# Patient Record
Sex: MALE | Race: WHITE | NOT HISPANIC OR LATINO | Employment: FULL TIME | URBAN - METROPOLITAN AREA
[De-identification: names, ages, dates, MRNs, and addresses within clinical notes are randomized per-mention and may not be internally consistent; named-entity substitution may affect disease eponyms.]

---

## 2017-04-25 ENCOUNTER — TRANSCRIBE ORDERS (OUTPATIENT)
Dept: ADMINISTRATIVE | Facility: HOSPITAL | Age: 52
End: 2017-04-25

## 2017-04-25 DIAGNOSIS — J44.9 CHRONIC OBSTRUCTIVE PULMONARY DISEASE, UNSPECIFIED COPD TYPE (HCC): Primary | ICD-10-CM

## 2017-04-30 ENCOUNTER — HOSPITAL ENCOUNTER (OUTPATIENT)
Facility: HOSPITAL | Age: 52
Setting detail: OBSERVATION
Discharge: HOME | End: 2017-05-01
Attending: EMERGENCY MEDICINE | Admitting: FAMILY MEDICINE
Payer: COMMERCIAL

## 2017-04-30 ENCOUNTER — GENERIC CONVERSION - ENCOUNTER (OUTPATIENT)
Dept: OTHER | Facility: OTHER | Age: 52
End: 2017-04-30

## 2017-04-30 ENCOUNTER — APPOINTMENT (EMERGENCY)
Dept: RADIOLOGY | Facility: HOSPITAL | Age: 52
End: 2017-04-30
Payer: COMMERCIAL

## 2017-04-30 DIAGNOSIS — R55 SYNCOPE: Primary | ICD-10-CM

## 2017-04-30 PROBLEM — S89.90XA KNEE INJURY: Status: ACTIVE | Noted: 2017-04-30

## 2017-04-30 LAB
ANION GAP SERPL CALCULATED.3IONS-SCNC: 9 MMOL/L (ref 4–13)
APTT PPP: 33 SECONDS (ref 23–35)
BASOPHILS # BLD AUTO: 0.07 THOUSANDS/ΜL (ref 0–0.1)
BASOPHILS NFR BLD AUTO: 1 % (ref 0–1)
BUN SERPL-MCNC: 15 MG/DL (ref 5–25)
CALCIUM SERPL-MCNC: 9 MG/DL (ref 8.3–10.1)
CHLORIDE SERPL-SCNC: 104 MMOL/L (ref 100–108)
CO2 SERPL-SCNC: 29 MMOL/L (ref 21–32)
CREAT SERPL-MCNC: 1.06 MG/DL (ref 0.6–1.3)
EOSINOPHIL # BLD AUTO: 0.28 THOUSAND/ΜL (ref 0–0.61)
EOSINOPHIL NFR BLD AUTO: 5 % (ref 0–6)
ERYTHROCYTE [DISTWIDTH] IN BLOOD BY AUTOMATED COUNT: 13.6 % (ref 11.6–15.1)
GFR SERPL CREATININE-BSD FRML MDRD: >60 ML/MIN/1.73SQ M
GLUCOSE SERPL-MCNC: 97 MG/DL (ref 65–140)
HCT VFR BLD AUTO: 45.6 % (ref 36.5–49.3)
HGB BLD-MCNC: 15 G/DL (ref 12–17)
INR PPP: 0.92 (ref 0.86–1.16)
LYMPHOCYTES # BLD AUTO: 1.94 THOUSANDS/ΜL (ref 0.6–4.47)
LYMPHOCYTES NFR BLD AUTO: 31 % (ref 14–44)
MCH RBC QN AUTO: 31.2 PG (ref 26.8–34.3)
MCHC RBC AUTO-ENTMCNC: 32.9 G/DL (ref 31.4–37.4)
MCV RBC AUTO: 95 FL (ref 82–98)
MONOCYTES # BLD AUTO: 0.59 THOUSAND/ΜL (ref 0.17–1.22)
MONOCYTES NFR BLD AUTO: 9 % (ref 4–12)
NEUTROPHILS # BLD AUTO: 3.37 THOUSANDS/ΜL (ref 1.85–7.62)
NEUTS SEG NFR BLD AUTO: 54 % (ref 43–75)
PLATELET # BLD AUTO: 272 THOUSANDS/UL (ref 149–390)
PLATELET # BLD AUTO: 291 THOUSANDS/UL (ref 149–390)
PMV BLD AUTO: 9.5 FL (ref 8.9–12.7)
PMV BLD AUTO: 9.6 FL (ref 8.9–12.7)
POTASSIUM SERPL-SCNC: 4.1 MMOL/L (ref 3.5–5.3)
PROTHROMBIN TIME: 12.6 SECONDS (ref 12.1–14.4)
RBC # BLD AUTO: 4.81 MILLION/UL (ref 3.88–5.62)
SODIUM SERPL-SCNC: 142 MMOL/L (ref 136–145)
TROPONIN I SERPL-MCNC: <0.02 NG/ML
TROPONIN I SERPL-MCNC: <0.02 NG/ML
WBC # BLD AUTO: 6.25 THOUSAND/UL (ref 4.31–10.16)

## 2017-04-30 PROCEDURE — 85049 AUTOMATED PLATELET COUNT: CPT | Performed by: PHYSICIAN ASSISTANT

## 2017-04-30 PROCEDURE — 94760 N-INVAS EAR/PLS OXIMETRY 1: CPT

## 2017-04-30 PROCEDURE — 80048 BASIC METABOLIC PNL TOTAL CA: CPT | Performed by: EMERGENCY MEDICINE

## 2017-04-30 PROCEDURE — 96360 HYDRATION IV INFUSION INIT: CPT

## 2017-04-30 PROCEDURE — 85730 THROMBOPLASTIN TIME PARTIAL: CPT | Performed by: EMERGENCY MEDICINE

## 2017-04-30 PROCEDURE — 99285 EMERGENCY DEPT VISIT HI MDM: CPT

## 2017-04-30 PROCEDURE — 85025 COMPLETE CBC W/AUTO DIFF WBC: CPT | Performed by: EMERGENCY MEDICINE

## 2017-04-30 PROCEDURE — 94664 DEMO&/EVAL PT USE INHALER: CPT

## 2017-04-30 PROCEDURE — 85610 PROTHROMBIN TIME: CPT | Performed by: EMERGENCY MEDICINE

## 2017-04-30 PROCEDURE — 84484 ASSAY OF TROPONIN QUANT: CPT | Performed by: EMERGENCY MEDICINE

## 2017-04-30 PROCEDURE — 73564 X-RAY EXAM KNEE 4 OR MORE: CPT

## 2017-04-30 PROCEDURE — 93005 ELECTROCARDIOGRAM TRACING: CPT | Performed by: EMERGENCY MEDICINE

## 2017-04-30 PROCEDURE — 84484 ASSAY OF TROPONIN QUANT: CPT | Performed by: PHYSICIAN ASSISTANT

## 2017-04-30 PROCEDURE — 36415 COLL VENOUS BLD VENIPUNCTURE: CPT | Performed by: EMERGENCY MEDICINE

## 2017-04-30 RX ORDER — ALBUTEROL SULFATE 90 UG/1
2 AEROSOL, METERED RESPIRATORY (INHALATION) EVERY 6 HOURS PRN
COMMUNITY
End: 2020-11-09 | Stop reason: SDUPTHER

## 2017-04-30 RX ORDER — PROMETHAZINE HYDROCHLORIDE 25 MG/1
25 TABLET ORAL EVERY 6 HOURS PRN
COMMUNITY
End: 2020-12-30

## 2017-04-30 RX ORDER — MECLIZINE HYDROCHLORIDE 25 MG/1
25 TABLET ORAL 2 TIMES DAILY PRN
Status: DISCONTINUED | OUTPATIENT
Start: 2017-04-30 | End: 2017-05-01 | Stop reason: HOSPADM

## 2017-04-30 RX ORDER — ALBUTEROL SULFATE 90 UG/1
2 AEROSOL, METERED RESPIRATORY (INHALATION) EVERY 6 HOURS PRN
Status: DISCONTINUED | OUTPATIENT
Start: 2017-04-30 | End: 2017-05-01 | Stop reason: HOSPADM

## 2017-04-30 RX ORDER — ONDANSETRON 2 MG/ML
4 INJECTION INTRAMUSCULAR; INTRAVENOUS EVERY 6 HOURS PRN
Status: DISCONTINUED | OUTPATIENT
Start: 2017-04-30 | End: 2017-05-01 | Stop reason: HOSPADM

## 2017-04-30 RX ORDER — NICOTINE 21 MG/24HR
1 PATCH, TRANSDERMAL 24 HOURS TRANSDERMAL DAILY
Status: DISCONTINUED | OUTPATIENT
Start: 2017-05-01 | End: 2017-05-01 | Stop reason: HOSPADM

## 2017-04-30 RX ORDER — ACETAMINOPHEN 325 MG/1
650 TABLET ORAL EVERY 6 HOURS PRN
Status: DISCONTINUED | OUTPATIENT
Start: 2017-04-30 | End: 2017-05-01 | Stop reason: HOSPADM

## 2017-04-30 RX ADMIN — SODIUM CHLORIDE 1000 ML: 0.9 INJECTION, SOLUTION INTRAVENOUS at 18:57

## 2017-05-01 ENCOUNTER — APPOINTMENT (OUTPATIENT)
Dept: NON INVASIVE DIAGNOSTICS | Facility: HOSPITAL | Age: 52
End: 2017-05-01
Payer: COMMERCIAL

## 2017-05-01 ENCOUNTER — GENERIC CONVERSION - ENCOUNTER (OUTPATIENT)
Dept: OTHER | Facility: OTHER | Age: 52
End: 2017-05-01

## 2017-05-01 VITALS
BODY MASS INDEX: 32.19 KG/M2 | WEIGHT: 224.87 LBS | DIASTOLIC BLOOD PRESSURE: 81 MMHG | TEMPERATURE: 98.1 F | OXYGEN SATURATION: 96 % | RESPIRATION RATE: 16 BRPM | SYSTOLIC BLOOD PRESSURE: 127 MMHG | HEIGHT: 70 IN | HEART RATE: 68 BPM

## 2017-05-01 LAB
ATRIAL RATE: 81 BPM
CHOLEST SERPL-MCNC: 190 MG/DL (ref 50–200)
HDLC SERPL-MCNC: 35 MG/DL (ref 40–60)
LDLC SERPL CALC-MCNC: 119 MG/DL (ref 0–100)
P AXIS: 41 DEGREES
PR INTERVAL: 210 MS
QRS AXIS: -15 DEGREES
QRSD INTERVAL: 78 MS
QT INTERVAL: 346 MS
QTC INTERVAL: 401 MS
T WAVE AXIS: 52 DEGREES
TRIGL SERPL-MCNC: 179 MG/DL
TROPONIN I SERPL-MCNC: <0.02 NG/ML
TSH SERPL DL<=0.05 MIU/L-ACNC: 3.51 UIU/ML (ref 0.36–3.74)
VENTRICULAR RATE: 81 BPM

## 2017-05-01 PROCEDURE — 84443 ASSAY THYROID STIM HORMONE: CPT | Performed by: PHYSICIAN ASSISTANT

## 2017-05-01 PROCEDURE — 84484 ASSAY OF TROPONIN QUANT: CPT | Performed by: PHYSICIAN ASSISTANT

## 2017-05-01 PROCEDURE — 80061 LIPID PANEL: CPT | Performed by: PHYSICIAN ASSISTANT

## 2017-05-01 PROCEDURE — 93306 TTE W/DOPPLER COMPLETE: CPT

## 2017-05-01 RX ADMIN — NICOTINE 1 PATCH: 21 PATCH, EXTENDED RELEASE TRANSDERMAL at 08:59

## 2017-05-01 RX ADMIN — ENOXAPARIN SODIUM 40 MG: 40 INJECTION SUBCUTANEOUS at 08:59

## 2017-05-06 ENCOUNTER — TRANSCRIBE ORDERS (OUTPATIENT)
Dept: ADMINISTRATIVE | Facility: HOSPITAL | Age: 52
End: 2017-05-06

## 2017-05-06 ENCOUNTER — HOSPITAL ENCOUNTER (OUTPATIENT)
Dept: RADIOLOGY | Facility: MEDICAL CENTER | Age: 52
Discharge: HOME | End: 2017-05-06
Payer: COMMERCIAL

## 2017-05-06 DIAGNOSIS — R05.9 COUGH: Primary | ICD-10-CM

## 2017-05-06 PROCEDURE — 71020 HB CHEST X-RAY 2VW FRONTAL&LATL: CPT

## 2017-05-16 ENCOUNTER — HOSPITAL ENCOUNTER (OUTPATIENT)
Dept: RADIOLOGY | Facility: MEDICAL CENTER | Age: 52
Discharge: HOME | End: 2017-05-16
Payer: COMMERCIAL

## 2017-05-16 DIAGNOSIS — J44.9 CHRONIC OBSTRUCTIVE PULMONARY DISEASE, UNSPECIFIED COPD TYPE (HCC): ICD-10-CM

## 2017-05-16 DIAGNOSIS — R05.9 COUGH: ICD-10-CM

## 2017-05-16 PROCEDURE — 71250 CT THORAX DX C-: CPT

## 2017-06-07 ENCOUNTER — ALLSCRIPTS OFFICE VISIT (OUTPATIENT)
Dept: OTHER | Facility: OTHER | Age: 52
End: 2017-06-07

## 2017-06-07 ENCOUNTER — TRANSCRIBE ORDERS (OUTPATIENT)
Dept: ADMINISTRATIVE | Facility: HOSPITAL | Age: 52
End: 2017-06-07

## 2017-06-07 DIAGNOSIS — R91.8 LUNG MASS: Primary | ICD-10-CM

## 2017-07-11 ENCOUNTER — ALLSCRIPTS OFFICE VISIT (OUTPATIENT)
Dept: OTHER | Facility: OTHER | Age: 52
End: 2017-07-11

## 2017-07-11 ENCOUNTER — TRANSCRIBE ORDERS (OUTPATIENT)
Dept: ADMINISTRATIVE | Facility: HOSPITAL | Age: 52
End: 2017-07-11

## 2017-07-11 DIAGNOSIS — I20.0 UNSTABLE ANGINA PECTORIS (HCC): Primary | ICD-10-CM

## 2017-07-11 DIAGNOSIS — R07.89 OTHER CHEST PAIN: ICD-10-CM

## 2017-07-18 ENCOUNTER — HOSPITAL ENCOUNTER (OUTPATIENT)
Dept: NON INVASIVE DIAGNOSTICS | Facility: CLINIC | Age: 52
Discharge: HOME | End: 2017-07-18
Payer: COMMERCIAL

## 2017-07-18 DIAGNOSIS — I20.0 UNSTABLE ANGINA PECTORIS (HCC): ICD-10-CM

## 2017-07-18 LAB
CHEST PAIN STATEMENT: NORMAL
MAX DIASTOLIC BP: 70 MMHG
MAX HEART RATE: 144 BPM
MAX PREDICTED HEART RATE: 169 BPM
MAX. SYSTOLIC BP: 164 MMHG
PROTOCOL NAME: NORMAL
REASON FOR TERMINATION: NORMAL
TARGET HR FORMULA: NORMAL
TEST INDICATION: NORMAL
TIME IN EXERCISE PHASE: 420 S

## 2017-07-18 PROCEDURE — A9502 TC99M TETROFOSMIN: HCPCS

## 2017-07-18 PROCEDURE — 93017 CV STRESS TEST TRACING ONLY: CPT

## 2017-07-18 PROCEDURE — 78452 HT MUSCLE IMAGE SPECT MULT: CPT

## 2017-07-20 ENCOUNTER — GENERIC CONVERSION - ENCOUNTER (OUTPATIENT)
Dept: OTHER | Facility: OTHER | Age: 52
End: 2017-07-20

## 2017-09-13 ENCOUNTER — HOSPITAL ENCOUNTER (OUTPATIENT)
Dept: RADIOLOGY | Facility: MEDICAL CENTER | Age: 52
Discharge: HOME | End: 2017-09-13
Payer: COMMERCIAL

## 2017-09-13 DIAGNOSIS — J42 CHRONIC BRONCHITIS (HCC): ICD-10-CM

## 2017-09-13 DIAGNOSIS — R91.1 SOLITARY PULMONARY NODULE: ICD-10-CM

## 2017-09-13 PROCEDURE — 71250 CT THORAX DX C-: CPT

## 2017-09-19 ENCOUNTER — GENERIC CONVERSION - ENCOUNTER (OUTPATIENT)
Dept: OTHER | Facility: OTHER | Age: 52
End: 2017-09-19

## 2018-01-11 NOTE — RESULT NOTES
Verified Results  NM MYOCARDIAL PERFUSION SPECT (RX STRESS AND/OR REST) [id number] 12:18PM Dominik Jeter     Test Name Result Flag Reference   NM MYOCARDIAL PERFUSION SPECT (RX STRESS AND/OR REST) (Report)     [address]   [phone]     Rest/Stress Gated SPECT Myocardial Perfusion Imaging After Exercise     Patient: Aquiles Will   MR number: [id number]   Account number: [de-identified]   : 1965   Age: 46 years   Gender: Male   Status: Outpatient   Location: [facility] [place]   Height: 70 in   Weight: 224 lb   BP: 130/ 90 mmHg     Allergies: NO KNOWN ALLERGIES     Diagnosis: R07 89 - Other chest pain     Primary Physician: Zeenat De Jesus MD   RN: Darryle Hoh, RN   Referring Physician: Sherral Closs, MD   Technician: Marlo Elizalde   Group: [facility]   Report Prepared By[de-identified] Darryle Hoh, RN   Interpreting Physician: Chidi Merino DO     INDICATIONS: Detection of coronary artery disease  HISTORY: The patient is a 46year old  male  Chest pain status: consistent with atypical angina  Other symptoms: syncope with collapse  Coronary artery disease risk factors: smoking and family history of premature coronary artery   disease  Cardiovascular history: none significant  Co-morbidity: history of lung disease(emphysema,lung nodule) and obesity  PHYSICAL EXAM: Baseline physical exam screening: no wheezes audible  REST ECG: Normal sinus rhythm  PROCEDURE: The study was performed at the [facility] [place]  Treadmill exercise testing was performed, using the Major protocol  Gated SPECT myocardial perfusion imaging was performed after stress  Systolic blood pressure   was 130 mmHg, at the start of the study  Diastolic blood pressure was 90 mmHg, at the start of the study  The heart rate was 78 bpm, at the start of the study  IV double checked       MAJOR PROTOCOL:   HR bpm SBP mmHg DBP mmHg Symptoms   Baseline 78 130 90 none   Stage 1 117 152 70 none   Stage 2 136 -- -- mild dyspnea   Stage 3 142 -- -- moderate dyspnea   Recovery 1 104 160 82 moderate dyspnea   Recovery 2 96 158 84 mild dyspnea   Recovery 3 -- -- -- none   No medications or fluids given  STRESS SUMMARY: Duration of exercise was 7 min  The patient exercised to protocol stage 3  Maximal work rate was 8 5 METs  Maximal heart rate during stress was 144 bpm ( 85 % of maximal predicted heart rate)  The heart rate response to   stress was normal  There was normal resting blood pressure with an appropriate response to stress  The rate-pressure product for the peak heart rate and blood pressure was 44082  There was no chest pain during stress  The stress test was   terminated due to moderate dyspnea  Pre oxygen saturation: 98 %  Peak oxygen saturation: 94 %  The stress ECG was negative for ischemia  There were no stress arrhythmias or conduction abnormalities  ISOTOPE ADMINISTRATION:   Resting isotope administration Stress isotope administration   Agent Tetrofosmin Tetrofosmin   Dose 11 mCi 33 mCi   Date 07/18/2017 07/18/2017   Injection-image interval 30 min 45 min     The radiopharmaceutical was injected one minute before the end of exercise  MYOCARDIAL PERFUSION IMAGING:   The image quality was good  Rotating projection images reveal mild diaphragmatic attenuation  Left ventricular size was normal  The TID ratio was 1 01  PERFUSION DEFECTS:   - There were no perfusion defects  GATED SPECT:   The calculated left ventricular ejection fraction was 59 %  There was no left ventricular regional abnormality  SUMMARY:   - Stress results: Duration of exercise was 7 min  Maximal work rate was 8 5 METs  Target heart rate was achieved  There was no chest pain during stress  - ECG conclusions: The stress ECG was negative for ischemia  - Perfusion imaging:  There were no perfusion defects    - Gated SPECT: The calculated left ventricular ejection fraction was 59 %  There was no left ventricular regional abnormality  IMPRESSIONS: Normal study after maximal exercise  No ischemia or scar  Myocardial perfusion imaging was normal at rest and with stress       Prepared and signed by     Qamar Peoples DO   Signed 07/18/2017 17:29:20

## 2018-01-13 VITALS
DIASTOLIC BLOOD PRESSURE: 86 MMHG | SYSTOLIC BLOOD PRESSURE: 140 MMHG | BODY MASS INDEX: 31.42 KG/M2 | HEART RATE: 72 BPM | WEIGHT: 224.44 LBS | HEIGHT: 71 IN

## 2018-01-13 VITALS
RESPIRATION RATE: 16 BRPM | DIASTOLIC BLOOD PRESSURE: 80 MMHG | WEIGHT: 220 LBS | SYSTOLIC BLOOD PRESSURE: 120 MMHG | BODY MASS INDEX: 29.8 KG/M2 | HEART RATE: 82 BPM | HEIGHT: 72 IN | OXYGEN SATURATION: 98 % | TEMPERATURE: 98 F

## 2018-01-13 NOTE — RESULT NOTES
Verified Results  * CT CHEST WO CONTRAST [id number] 09:56AM Price Murray Order Number: [id number]   Performing Comments: Agnes Burton   - Patient Instructions: Mayank Beal [place]     Test Name Result Flag Reference   CT CHEST WO CONTRAST (Report)     This is a summary report  The complete report is available in the patient's medical record  If you cannot access the medical record, please contact the sending organization for a detailed fax or copy  CT CHEST WITHOUT IV CONTRAST     INDICATION: 49-year-old man presenting for follow-up evaluation of pulmonary nodule     COMPARISON: Chest CT 5/16/2017  TECHNIQUE: CT examination of the chest was performed without intravenous contrast  Reformatted images were created in axial, sagittal, and coronal planes  Radiation dose length product (DLP) for this visit: 440 mGy-cm   This examination, like all CT scans performed in the [facility], was performed utilizing techniques to minimize radiation dose exposure, including the use of iterative    reconstruction and automated exposure control  FINDINGS:     LUNGS:    Right upper lobe anterior segment cavitary nodule measuring 1 8 x 1 0 cm is unchanged  A few other right upper lobe pulmonary micronodules measuring 1 to 2 mm seen on coronal MIPS are unchanged  No focal consolidation  Mild background centrilobular emphysema is unchanged  There is no tracheal or endobronchial lesion  PLEURA: Unremarkable  HEART/GREAT VESSELS: Unremarkable for patient's age  MEDIASTINUM AND ZAIDA: Unremarkable  CHEST WALL AND LOWER NECK:    Normal      VISUALIZED STRUCTURES IN THE UPPER ABDOMEN: Poorly defined hepatic hypodensities are again noted including a 4 4 x 2 4 cm lesion in segment 6 and a rounded hypodense lesion in segment 2 measuring 4 2 x 3 9 cm (series 2 images 59 and 47)  A few hepatic    cysts are also noted  OSSEOUS STRUCTURES: No acute fracture   No destructive osseous lesion  IMPRESSION:   1  Right upper lobe cavitary nodule measuring 1 8 x 1 1 cm is unchanged since 5/16/2017, for 4 months of stability  Despite unchanged size, this nodule remains suspicious in appearance  If tissue sampling is not performed, then the recommendation    would be for continued surveillance imaging in 3-6 months  2  Ill-defined hypodense hepatic solid masses are again identified, but suboptimally evaluated on a noncontrast chest CT  Liver MRI is again recommended         ##sigslh##sigslh     ##fuslh01##fuslh01   ##fuslh6##fuslh6       Workstation performed: [id number]     Signed by:   Olegario Jo MD   9/15/17   RAD_DOSE   Modality Radiation Exposure Data    Order Radiation    Type Dose Range    Radiation Dose 440 mGy-cm 0 - 6000 mGy-cm

## 2019-06-28 ENCOUNTER — APPOINTMENT (OUTPATIENT)
Dept: URGENT CARE | Facility: MEDICAL CENTER | Age: 54
End: 2019-06-28
Payer: OTHER MISCELLANEOUS

## 2019-06-28 PROCEDURE — 99283 EMERGENCY DEPT VISIT LOW MDM: CPT | Performed by: FAMILY MEDICINE

## 2019-06-28 PROCEDURE — G0382 LEV 3 HOSP TYPE B ED VISIT: HCPCS | Performed by: FAMILY MEDICINE

## 2019-07-03 ENCOUNTER — APPOINTMENT (OUTPATIENT)
Dept: URGENT CARE | Facility: MEDICAL CENTER | Age: 54
End: 2019-07-03
Payer: OTHER MISCELLANEOUS

## 2019-07-03 PROCEDURE — 99213 OFFICE O/P EST LOW 20 MIN: CPT

## 2019-07-10 ENCOUNTER — APPOINTMENT (OUTPATIENT)
Dept: URGENT CARE | Facility: MEDICAL CENTER | Age: 54
End: 2019-07-10
Payer: OTHER MISCELLANEOUS

## 2019-07-10 PROCEDURE — 99213 OFFICE O/P EST LOW 20 MIN: CPT | Performed by: PHYSICIAN ASSISTANT

## 2020-10-19 ENCOUNTER — TRANSCRIBE ORDERS (OUTPATIENT)
Dept: ADMINISTRATIVE | Facility: HOSPITAL | Age: 55
End: 2020-10-19

## 2020-10-19 ENCOUNTER — APPOINTMENT (OUTPATIENT)
Dept: RADIOLOGY | Facility: MEDICAL CENTER | Age: 55
End: 2020-10-19
Payer: COMMERCIAL

## 2020-10-19 ENCOUNTER — HOSPITAL ENCOUNTER (OUTPATIENT)
Dept: RADIOLOGY | Facility: MEDICAL CENTER | Age: 55
Discharge: HOME | End: 2020-10-19
Payer: COMMERCIAL

## 2020-10-19 ENCOUNTER — APPOINTMENT (OUTPATIENT)
Dept: LAB | Facility: MEDICAL CENTER | Age: 55
End: 2020-10-19
Payer: COMMERCIAL

## 2020-10-19 DIAGNOSIS — R55 SYNCOPE AND COLLAPSE: ICD-10-CM

## 2020-10-19 DIAGNOSIS — R55 SYNCOPE AND COLLAPSE: Primary | ICD-10-CM

## 2020-10-19 DIAGNOSIS — R05.9 COUGH: ICD-10-CM

## 2020-10-19 DIAGNOSIS — R05.9 COUGH: Primary | ICD-10-CM

## 2020-10-19 DIAGNOSIS — R55 SYNCOPE, UNSPECIFIED SYNCOPE TYPE: Primary | ICD-10-CM

## 2020-10-19 LAB
ALBUMIN SERPL BCP-MCNC: 3.7 G/DL (ref 3.5–5)
ALP SERPL-CCNC: 80 U/L (ref 46–116)
ALT SERPL W P-5'-P-CCNC: 36 U/L (ref 12–78)
ANION GAP SERPL CALCULATED.3IONS-SCNC: 3 MMOL/L (ref 4–13)
AST SERPL W P-5'-P-CCNC: 19 U/L (ref 5–45)
BACTERIA UR QL AUTO: ABNORMAL /HPF
BASOPHILS # BLD AUTO: 0.1 THOUSANDS/ΜL (ref 0–0.1)
BASOPHILS NFR BLD AUTO: 2 % (ref 0–1)
BILIRUB SERPL-MCNC: 0.35 MG/DL (ref 0.2–1)
BILIRUB UR QL STRIP: NEGATIVE
BUN SERPL-MCNC: 12 MG/DL (ref 5–25)
CALCIUM SERPL-MCNC: 8.9 MG/DL (ref 8.3–10.1)
CHLORIDE SERPL-SCNC: 109 MMOL/L (ref 100–108)
CLARITY UR: CLEAR
CO2 SERPL-SCNC: 26 MMOL/L (ref 21–32)
COLOR UR: YELLOW
CREAT SERPL-MCNC: 0.95 MG/DL (ref 0.6–1.3)
EOSINOPHIL # BLD AUTO: 0.34 THOUSAND/ΜL (ref 0–0.61)
EOSINOPHIL NFR BLD AUTO: 5 % (ref 0–6)
ERYTHROCYTE [DISTWIDTH] IN BLOOD BY AUTOMATED COUNT: 13.2 % (ref 11.6–15.1)
ERYTHROCYTE [SEDIMENTATION RATE] IN BLOOD: 31 MM/HOUR (ref 0–19)
GFR SERPL CREATININE-BSD FRML MDRD: 90 ML/MIN/1.73SQ M
GLUCOSE P FAST SERPL-MCNC: 84 MG/DL (ref 65–99)
GLUCOSE UR STRIP-MCNC: NEGATIVE MG/DL
HCT VFR BLD AUTO: 49.2 % (ref 36.5–49.3)
HGB BLD-MCNC: 15.4 G/DL (ref 12–17)
HGB UR QL STRIP.AUTO: NEGATIVE
HYALINE CASTS #/AREA URNS LPF: ABNORMAL /LPF
IMM GRANULOCYTES # BLD AUTO: 0.02 THOUSAND/UL (ref 0–0.2)
IMM GRANULOCYTES NFR BLD AUTO: 0 % (ref 0–2)
KETONES UR STRIP-MCNC: NEGATIVE MG/DL
LEUKOCYTE ESTERASE UR QL STRIP: NEGATIVE
LYMPHOCYTES # BLD AUTO: 2.15 THOUSANDS/ΜL (ref 0.6–4.47)
LYMPHOCYTES NFR BLD AUTO: 32 % (ref 14–44)
MCH RBC QN AUTO: 30.6 PG (ref 26.8–34.3)
MCHC RBC AUTO-ENTMCNC: 31.3 G/DL (ref 31.4–37.4)
MCV RBC AUTO: 98 FL (ref 82–98)
MONOCYTES # BLD AUTO: 0.58 THOUSAND/ΜL (ref 0.17–1.22)
MONOCYTES NFR BLD AUTO: 9 % (ref 4–12)
NEUTROPHILS # BLD AUTO: 3.64 THOUSANDS/ΜL (ref 1.85–7.62)
NEUTS SEG NFR BLD AUTO: 52 % (ref 43–75)
NITRITE UR QL STRIP: NEGATIVE
NON-SQ EPI CELLS URNS QL MICRO: ABNORMAL /HPF
NRBC BLD AUTO-RTO: 0 /100 WBCS
PH UR STRIP.AUTO: 5.5 [PH]
PLATELET # BLD AUTO: 312 THOUSANDS/UL (ref 149–390)
PMV BLD AUTO: 10.2 FL (ref 8.9–12.7)
POTASSIUM SERPL-SCNC: 4.6 MMOL/L (ref 3.5–5.3)
PROT SERPL-MCNC: 7.6 G/DL (ref 6.4–8.2)
PROT UR STRIP-MCNC: ABNORMAL MG/DL
RBC # BLD AUTO: 5.03 MILLION/UL (ref 3.88–5.62)
RBC #/AREA URNS AUTO: ABNORMAL /HPF
SODIUM SERPL-SCNC: 138 MMOL/L (ref 136–145)
SP GR UR STRIP.AUTO: 1.03 (ref 1–1.03)
TSH SERPL DL<=0.05 MIU/L-ACNC: 3 UIU/ML (ref 0.36–3.74)
UROBILINOGEN UR QL STRIP.AUTO: 0.2 E.U./DL
WBC # BLD AUTO: 6.83 THOUSAND/UL (ref 4.31–10.16)
WBC #/AREA URNS AUTO: ABNORMAL /HPF

## 2020-10-19 PROCEDURE — 80053 COMPREHEN METABOLIC PANEL: CPT | Performed by: INTERNAL MEDICINE

## 2020-10-19 PROCEDURE — 84443 ASSAY THYROID STIM HORMONE: CPT | Performed by: INTERNAL MEDICINE

## 2020-10-19 PROCEDURE — 70450 CT HEAD/BRAIN W/O DYE: CPT

## 2020-10-19 PROCEDURE — G1004 CDSM NDSC: HCPCS

## 2020-10-19 PROCEDURE — 36415 COLL VENOUS BLD VENIPUNCTURE: CPT | Performed by: INTERNAL MEDICINE

## 2020-10-19 PROCEDURE — 85025 COMPLETE CBC W/AUTO DIFF WBC: CPT | Performed by: INTERNAL MEDICINE

## 2020-10-19 PROCEDURE — 85652 RBC SED RATE AUTOMATED: CPT | Performed by: INTERNAL MEDICINE

## 2020-10-19 PROCEDURE — 81001 URINALYSIS AUTO W/SCOPE: CPT | Performed by: INTERNAL MEDICINE

## 2020-10-19 PROCEDURE — 71046 X-RAY EXAM CHEST 2 VIEWS: CPT

## 2020-10-21 ENCOUNTER — HOSPITAL ENCOUNTER (OUTPATIENT)
Dept: NEUROLOGY | Facility: CLINIC | Age: 55
Discharge: HOME | End: 2020-10-21
Payer: COMMERCIAL

## 2020-10-21 ENCOUNTER — TRANSCRIBE ORDERS (OUTPATIENT)
Dept: ADMINISTRATIVE | Facility: HOSPITAL | Age: 55
End: 2020-10-21

## 2020-10-21 DIAGNOSIS — R91.8 LUNG MASS: Primary | ICD-10-CM

## 2020-10-21 DIAGNOSIS — R55 SYNCOPE AND COLLAPSE: ICD-10-CM

## 2020-10-21 PROCEDURE — 95816 EEG AWAKE AND DROWSY: CPT | Performed by: PSYCHIATRY & NEUROLOGY

## 2020-10-21 PROCEDURE — 95816 EEG AWAKE AND DROWSY: CPT

## 2020-10-22 ENCOUNTER — HOSPITAL ENCOUNTER (OUTPATIENT)
Dept: RADIOLOGY | Facility: MEDICAL CENTER | Age: 55
Discharge: HOME | End: 2020-10-22
Payer: COMMERCIAL

## 2020-10-22 DIAGNOSIS — R91.8 LUNG MASS: ICD-10-CM

## 2020-10-22 PROCEDURE — 71260 CT THORAX DX C+: CPT

## 2020-10-22 PROCEDURE — G1004 CDSM NDSC: HCPCS

## 2020-10-22 RX ADMIN — IOHEXOL 85 ML: 350 INJECTION, SOLUTION INTRAVENOUS at 13:14

## 2020-10-26 ENCOUNTER — APPOINTMENT (OUTPATIENT)
Dept: CT IMAGING | Facility: HOSPITAL | Age: 55
End: 2020-10-26
Payer: COMMERCIAL

## 2020-10-26 ENCOUNTER — APPOINTMENT (EMERGENCY)
Dept: RADIOLOGY | Facility: HOSPITAL | Age: 55
End: 2020-10-26
Payer: COMMERCIAL

## 2020-10-26 ENCOUNTER — HOSPITAL ENCOUNTER (OUTPATIENT)
Facility: HOSPITAL | Age: 55
Setting detail: OBSERVATION
Discharge: HOME | End: 2020-10-27
Attending: EMERGENCY MEDICINE | Admitting: STUDENT IN AN ORGANIZED HEALTH CARE EDUCATION/TRAINING PROGRAM
Payer: COMMERCIAL

## 2020-10-26 DIAGNOSIS — K76.9 LIVER LESION: ICD-10-CM

## 2020-10-26 DIAGNOSIS — J44.9 COPD (CHRONIC OBSTRUCTIVE PULMONARY DISEASE) (HCC): ICD-10-CM

## 2020-10-26 DIAGNOSIS — R55 SYNCOPE: Primary | ICD-10-CM

## 2020-10-26 DIAGNOSIS — N39.0 UTI (URINARY TRACT INFECTION): ICD-10-CM

## 2020-10-26 PROBLEM — Z72.0 TOBACCO USE: Status: ACTIVE | Noted: 2020-10-26

## 2020-10-26 PROBLEM — R91.8 LUNG MASS: Status: ACTIVE | Noted: 2020-10-26

## 2020-10-26 LAB
ALBUMIN SERPL BCP-MCNC: 3.4 G/DL (ref 3.5–5)
ALP SERPL-CCNC: 70 U/L (ref 46–116)
ALT SERPL W P-5'-P-CCNC: 37 U/L (ref 12–78)
ANION GAP SERPL CALCULATED.3IONS-SCNC: 7 MMOL/L (ref 4–13)
APTT PPP: 34 SECONDS (ref 23–37)
AST SERPL W P-5'-P-CCNC: 18 U/L (ref 5–45)
BACTERIA UR QL AUTO: ABNORMAL /HPF
BASOPHILS # BLD AUTO: 0.06 THOUSANDS/ΜL (ref 0–0.1)
BASOPHILS NFR BLD AUTO: 0 % (ref 0–1)
BILIRUB SERPL-MCNC: 0.4 MG/DL (ref 0.2–1)
BILIRUB UR QL STRIP: NEGATIVE
BUN SERPL-MCNC: 12 MG/DL (ref 5–25)
CALCIUM ALBUM COR SERPL-MCNC: 9.3 MG/DL (ref 8.3–10.1)
CALCIUM SERPL-MCNC: 8.8 MG/DL (ref 8.3–10.1)
CHLORIDE SERPL-SCNC: 101 MMOL/L (ref 100–108)
CLARITY UR: CLEAR
CO2 SERPL-SCNC: 27 MMOL/L (ref 21–32)
COLOR UR: YELLOW
CREAT SERPL-MCNC: 1.09 MG/DL (ref 0.6–1.3)
EOSINOPHIL # BLD AUTO: 0.08 THOUSAND/ΜL (ref 0–0.61)
EOSINOPHIL NFR BLD AUTO: 1 % (ref 0–6)
ERYTHROCYTE [DISTWIDTH] IN BLOOD BY AUTOMATED COUNT: 13 % (ref 11.6–15.1)
GFR SERPL CREATININE-BSD FRML MDRD: 77 ML/MIN/1.73SQ M
GLUCOSE SERPL-MCNC: 98 MG/DL (ref 65–140)
GLUCOSE UR STRIP-MCNC: NEGATIVE MG/DL
HCT VFR BLD AUTO: 46.2 % (ref 36.5–49.3)
HGB BLD-MCNC: 15.2 G/DL (ref 12–17)
HGB UR QL STRIP.AUTO: NEGATIVE
IMM GRANULOCYTES # BLD AUTO: 0.08 THOUSAND/UL (ref 0–0.2)
IMM GRANULOCYTES NFR BLD AUTO: 1 % (ref 0–2)
INR PPP: 0.97 (ref 0.84–1.19)
KETONES UR STRIP-MCNC: NEGATIVE MG/DL
LACTATE SERPL-SCNC: 1.4 MMOL/L (ref 0.5–2)
LEUKOCYTE ESTERASE UR QL STRIP: ABNORMAL
LYMPHOCYTES # BLD AUTO: 1.26 THOUSANDS/ΜL (ref 0.6–4.47)
LYMPHOCYTES NFR BLD AUTO: 8 % (ref 14–44)
MAGNESIUM SERPL-MCNC: 2.2 MG/DL (ref 1.6–2.6)
MCH RBC QN AUTO: 31.5 PG (ref 26.8–34.3)
MCHC RBC AUTO-ENTMCNC: 32.9 G/DL (ref 31.4–37.4)
MCV RBC AUTO: 96 FL (ref 82–98)
MONOCYTES # BLD AUTO: 1.21 THOUSAND/ΜL (ref 0.17–1.22)
MONOCYTES NFR BLD AUTO: 7 % (ref 4–12)
NEUTROPHILS # BLD AUTO: 14.11 THOUSANDS/ΜL (ref 1.85–7.62)
NEUTS SEG NFR BLD AUTO: 83 % (ref 43–75)
NITRITE UR QL STRIP: POSITIVE
NON-SQ EPI CELLS URNS QL MICRO: ABNORMAL /HPF
NRBC BLD AUTO-RTO: 0 /100 WBCS
NT-PROBNP SERPL-MCNC: 43 PG/ML
PH UR STRIP.AUTO: 5.5 [PH]
PLATELET # BLD AUTO: 270 THOUSANDS/UL (ref 149–390)
PMV BLD AUTO: 9.2 FL (ref 8.9–12.7)
POTASSIUM SERPL-SCNC: 4.2 MMOL/L (ref 3.5–5.3)
PROT SERPL-MCNC: 7.4 G/DL (ref 6.4–8.2)
PROT UR STRIP-MCNC: NEGATIVE MG/DL
PROTHROMBIN TIME: 13.1 SECONDS (ref 11.6–14.5)
RBC # BLD AUTO: 4.83 MILLION/UL (ref 3.88–5.62)
RBC #/AREA URNS AUTO: ABNORMAL /HPF
SODIUM SERPL-SCNC: 135 MMOL/L (ref 136–145)
SP GR UR STRIP.AUTO: >1.03 (ref 1–1.03)
TROPONIN I SERPL-MCNC: <0.02 NG/ML
TSH SERPL DL<=0.05 MIU/L-ACNC: 1.47 UIU/ML (ref 0.36–3.74)
UROBILINOGEN UR QL STRIP.AUTO: 0.2 E.U./DL
WBC # BLD AUTO: 16.8 THOUSAND/UL (ref 4.31–10.16)
WBC #/AREA URNS AUTO: ABNORMAL /HPF

## 2020-10-26 PROCEDURE — 99285 EMERGENCY DEPT VISIT HI MDM: CPT | Performed by: PHYSICIAN ASSISTANT

## 2020-10-26 PROCEDURE — 84484 ASSAY OF TROPONIN QUANT: CPT | Performed by: STUDENT IN AN ORGANIZED HEALTH CARE EDUCATION/TRAINING PROGRAM

## 2020-10-26 PROCEDURE — 70496 CT ANGIOGRAPHY HEAD: CPT

## 2020-10-26 PROCEDURE — 99285 EMERGENCY DEPT VISIT HI MDM: CPT

## 2020-10-26 PROCEDURE — G1004 CDSM NDSC: HCPCS

## 2020-10-26 PROCEDURE — 99223 1ST HOSP IP/OBS HIGH 75: CPT | Performed by: STUDENT IN AN ORGANIZED HEALTH CARE EDUCATION/TRAINING PROGRAM

## 2020-10-26 PROCEDURE — 84443 ASSAY THYROID STIM HORMONE: CPT | Performed by: PHYSICIAN ASSISTANT

## 2020-10-26 PROCEDURE — 80053 COMPREHEN METABOLIC PANEL: CPT | Performed by: PHYSICIAN ASSISTANT

## 2020-10-26 PROCEDURE — 71046 X-RAY EXAM CHEST 2 VIEWS: CPT

## 2020-10-26 PROCEDURE — 83880 ASSAY OF NATRIURETIC PEPTIDE: CPT | Performed by: PHYSICIAN ASSISTANT

## 2020-10-26 PROCEDURE — 96361 HYDRATE IV INFUSION ADD-ON: CPT

## 2020-10-26 PROCEDURE — 84484 ASSAY OF TROPONIN QUANT: CPT | Performed by: PHYSICIAN ASSISTANT

## 2020-10-26 PROCEDURE — 81001 URINALYSIS AUTO W/SCOPE: CPT | Performed by: PHYSICIAN ASSISTANT

## 2020-10-26 PROCEDURE — 85025 COMPLETE CBC W/AUTO DIFF WBC: CPT | Performed by: PHYSICIAN ASSISTANT

## 2020-10-26 PROCEDURE — 93005 ELECTROCARDIOGRAM TRACING: CPT

## 2020-10-26 PROCEDURE — 87077 CULTURE AEROBIC IDENTIFY: CPT | Performed by: PHYSICIAN ASSISTANT

## 2020-10-26 PROCEDURE — 70498 CT ANGIOGRAPHY NECK: CPT

## 2020-10-26 PROCEDURE — 85610 PROTHROMBIN TIME: CPT | Performed by: PHYSICIAN ASSISTANT

## 2020-10-26 PROCEDURE — 83605 ASSAY OF LACTIC ACID: CPT | Performed by: PHYSICIAN ASSISTANT

## 2020-10-26 PROCEDURE — 96360 HYDRATION IV INFUSION INIT: CPT

## 2020-10-26 PROCEDURE — 85730 THROMBOPLASTIN TIME PARTIAL: CPT | Performed by: PHYSICIAN ASSISTANT

## 2020-10-26 PROCEDURE — 83735 ASSAY OF MAGNESIUM: CPT | Performed by: PHYSICIAN ASSISTANT

## 2020-10-26 PROCEDURE — 87086 URINE CULTURE/COLONY COUNT: CPT | Performed by: PHYSICIAN ASSISTANT

## 2020-10-26 PROCEDURE — 36415 COLL VENOUS BLD VENIPUNCTURE: CPT | Performed by: PHYSICIAN ASSISTANT

## 2020-10-26 PROCEDURE — 87186 SC STD MICRODIL/AGAR DIL: CPT | Performed by: PHYSICIAN ASSISTANT

## 2020-10-26 RX ORDER — NICOTINE 21 MG/24HR
1 PATCH, TRANSDERMAL 24 HOURS TRANSDERMAL DAILY
Status: DISCONTINUED | OUTPATIENT
Start: 2020-10-27 | End: 2020-10-27 | Stop reason: HOSPADM

## 2020-10-26 RX ORDER — SODIUM CHLORIDE, SODIUM LACTATE, POTASSIUM CHLORIDE, CALCIUM CHLORIDE 600; 310; 30; 20 MG/100ML; MG/100ML; MG/100ML; MG/100ML
75 INJECTION, SOLUTION INTRAVENOUS CONTINUOUS
Status: DISCONTINUED | OUTPATIENT
Start: 2020-10-26 | End: 2020-10-27 | Stop reason: HOSPADM

## 2020-10-26 RX ORDER — GUAIFENESIN 100 MG/5ML
200 SOLUTION ORAL EVERY 4 HOURS PRN
Status: DISCONTINUED | OUTPATIENT
Start: 2020-10-26 | End: 2020-10-27 | Stop reason: HOSPADM

## 2020-10-26 RX ADMIN — SODIUM CHLORIDE 500 ML: 0.9 INJECTION, SOLUTION INTRAVENOUS at 13:01

## 2020-10-26 RX ADMIN — CEFTRIAXONE SODIUM 1000 MG: 10 INJECTION, POWDER, FOR SOLUTION INTRAVENOUS at 15:10

## 2020-10-26 RX ADMIN — IOHEXOL 85 ML: 350 INJECTION, SOLUTION INTRAVENOUS at 17:09

## 2020-10-26 RX ADMIN — SODIUM CHLORIDE, SODIUM LACTATE, POTASSIUM CHLORIDE, AND CALCIUM CHLORIDE 75 ML/HR: .6; .31; .03; .02 INJECTION, SOLUTION INTRAVENOUS at 18:04

## 2020-10-27 ENCOUNTER — APPOINTMENT (OUTPATIENT)
Dept: NON INVASIVE DIAGNOSTICS | Facility: HOSPITAL | Age: 55
End: 2020-10-27
Payer: COMMERCIAL

## 2020-10-27 VITALS
DIASTOLIC BLOOD PRESSURE: 78 MMHG | WEIGHT: 225.09 LBS | BODY MASS INDEX: 31.51 KG/M2 | RESPIRATION RATE: 17 BRPM | TEMPERATURE: 97.5 F | OXYGEN SATURATION: 91 % | SYSTOLIC BLOOD PRESSURE: 130 MMHG | HEIGHT: 71 IN | HEART RATE: 82 BPM

## 2020-10-27 LAB
ATRIAL RATE: 85 BPM
BASOPHILS # BLD AUTO: 0.08 THOUSANDS/ΜL (ref 0–0.1)
BASOPHILS NFR BLD AUTO: 1 % (ref 0–1)
EOSINOPHIL # BLD AUTO: 0.05 THOUSAND/ΜL (ref 0–0.61)
EOSINOPHIL NFR BLD AUTO: 0 % (ref 0–6)
ERYTHROCYTE [DISTWIDTH] IN BLOOD BY AUTOMATED COUNT: 13.1 % (ref 11.6–15.1)
HCT VFR BLD AUTO: 44.8 % (ref 36.5–49.3)
HGB BLD-MCNC: 14.7 G/DL (ref 12–17)
IMM GRANULOCYTES # BLD AUTO: 0.09 THOUSAND/UL (ref 0–0.2)
IMM GRANULOCYTES NFR BLD AUTO: 1 % (ref 0–2)
LYMPHOCYTES # BLD AUTO: 1.72 THOUSANDS/ΜL (ref 0.6–4.47)
LYMPHOCYTES NFR BLD AUTO: 13 % (ref 14–44)
MCH RBC QN AUTO: 31.3 PG (ref 26.8–34.3)
MCHC RBC AUTO-ENTMCNC: 32.8 G/DL (ref 31.4–37.4)
MCV RBC AUTO: 95 FL (ref 82–98)
MONOCYTES # BLD AUTO: 1.26 THOUSAND/ΜL (ref 0.17–1.22)
MONOCYTES NFR BLD AUTO: 9 % (ref 4–12)
NEUTROPHILS # BLD AUTO: 10.45 THOUSANDS/ΜL (ref 1.85–7.62)
NEUTS SEG NFR BLD AUTO: 76 % (ref 43–75)
NRBC BLD AUTO-RTO: 0 /100 WBCS
P AXIS: 49 DEGREES
PLATELET # BLD AUTO: 240 THOUSANDS/UL (ref 149–390)
PMV BLD AUTO: 9.4 FL (ref 8.9–12.7)
PR INTERVAL: 202 MS
QRS AXIS: -10 DEGREES
QRSD INTERVAL: 64 MS
QT INTERVAL: 330 MS
QTC INTERVAL: 392 MS
RBC # BLD AUTO: 4.7 MILLION/UL (ref 3.88–5.62)
T WAVE AXIS: 61 DEGREES
VENTRICULAR RATE: 85 BPM
WBC # BLD AUTO: 13.65 THOUSAND/UL (ref 4.31–10.16)

## 2020-10-27 PROCEDURE — 93306 TTE W/DOPPLER COMPLETE: CPT

## 2020-10-27 PROCEDURE — 99406 BEHAV CHNG SMOKING 3-10 MIN: CPT | Performed by: PHYSICIAN ASSISTANT

## 2020-10-27 PROCEDURE — 93010 ELECTROCARDIOGRAM REPORT: CPT | Performed by: INTERNAL MEDICINE

## 2020-10-27 PROCEDURE — 93306 TTE W/DOPPLER COMPLETE: CPT | Performed by: INTERNAL MEDICINE

## 2020-10-27 PROCEDURE — 99217 PR OBSERVATION CARE DISCHARGE MANAGEMENT: CPT | Performed by: NURSE PRACTITIONER

## 2020-10-27 PROCEDURE — 85025 COMPLETE CBC W/AUTO DIFF WBC: CPT | Performed by: STUDENT IN AN ORGANIZED HEALTH CARE EDUCATION/TRAINING PROGRAM

## 2020-10-27 PROCEDURE — 99222 1ST HOSP IP/OBS MODERATE 55: CPT | Performed by: PHYSICIAN ASSISTANT

## 2020-10-27 RX ORDER — CEPHALEXIN 500 MG/1
500 CAPSULE ORAL EVERY 6 HOURS SCHEDULED
Qty: 20 CAPSULE | Refills: 0 | Status: SHIPPED | OUTPATIENT
Start: 2020-10-27 | End: 2020-11-01

## 2020-10-27 RX ORDER — ACETAMINOPHEN 325 MG/1
650 TABLET ORAL EVERY 6 HOURS PRN
Status: DISCONTINUED | OUTPATIENT
Start: 2020-10-27 | End: 2020-10-27 | Stop reason: HOSPADM

## 2020-10-27 RX ADMIN — CEFTRIAXONE SODIUM 1000 MG: 10 INJECTION, POWDER, FOR SOLUTION INTRAVENOUS at 14:40

## 2020-10-27 RX ADMIN — ENOXAPARIN SODIUM 40 MG: 40 INJECTION SUBCUTANEOUS at 08:56

## 2020-10-27 RX ADMIN — SODIUM CHLORIDE, SODIUM LACTATE, POTASSIUM CHLORIDE, AND CALCIUM CHLORIDE 75 ML/HR: .6; .31; .03; .02 INJECTION, SOLUTION INTRAVENOUS at 05:43

## 2020-10-27 RX ADMIN — NICOTINE 1 PATCH: 21 PATCH TRANSDERMAL at 08:55

## 2020-10-28 ENCOUNTER — TELEPHONE (OUTPATIENT)
Dept: CARDIOLOGY CLINIC | Facility: CLINIC | Age: 55
End: 2020-10-28

## 2020-10-28 ENCOUNTER — TELEPHONE (OUTPATIENT)
Dept: CARDIAC SURGERY | Facility: CLINIC | Age: 55
End: 2020-10-28

## 2020-10-28 LAB — BACTERIA UR CULT: ABNORMAL

## 2020-10-29 ENCOUNTER — TRANSCRIBE ORDERS (OUTPATIENT)
Dept: NON INVASIVE DIAGNOSTICS | Facility: CLINIC | Age: 55
End: 2020-10-29

## 2020-10-29 DIAGNOSIS — R55 SYNCOPE AND COLLAPSE: Primary | ICD-10-CM

## 2020-10-30 ENCOUNTER — HOSPITAL ENCOUNTER (OUTPATIENT)
Dept: NON INVASIVE DIAGNOSTICS | Facility: CLINIC | Age: 55
Discharge: HOME | End: 2020-10-30
Payer: COMMERCIAL

## 2020-10-30 DIAGNOSIS — R55 SYNCOPE AND COLLAPSE: ICD-10-CM

## 2020-10-30 PROCEDURE — 93226 XTRNL ECG REC<48 HR SCAN A/R: CPT

## 2020-10-30 PROCEDURE — 93225 XTRNL ECG REC<48 HRS REC: CPT

## 2020-11-02 ENCOUNTER — TELEPHONE (OUTPATIENT)
Dept: CARDIAC SURGERY | Facility: CLINIC | Age: 55
End: 2020-11-02

## 2020-11-03 ENCOUNTER — OFFICE VISIT (OUTPATIENT)
Dept: CARDIAC SURGERY | Facility: CLINIC | Age: 55
End: 2020-11-03
Payer: COMMERCIAL

## 2020-11-03 VITALS
HEART RATE: 88 BPM | DIASTOLIC BLOOD PRESSURE: 70 MMHG | BODY MASS INDEX: 32.27 KG/M2 | TEMPERATURE: 98.6 F | HEIGHT: 71 IN | SYSTOLIC BLOOD PRESSURE: 128 MMHG | WEIGHT: 230.5 LBS | OXYGEN SATURATION: 98 %

## 2020-11-03 DIAGNOSIS — R07.89 LEFT CHEST PRESSURE: Primary | ICD-10-CM

## 2020-11-03 DIAGNOSIS — R91.1 PULMONARY NODULE: ICD-10-CM

## 2020-11-03 DIAGNOSIS — R55 SYNCOPE AND COLLAPSE: ICD-10-CM

## 2020-11-03 DIAGNOSIS — R91.1 RIGHT UPPER LOBE PULMONARY NODULE: ICD-10-CM

## 2020-11-03 PROCEDURE — 99245 OFF/OP CONSLTJ NEW/EST HI 55: CPT | Performed by: THORACIC SURGERY (CARDIOTHORACIC VASCULAR SURGERY)

## 2020-11-03 PROCEDURE — 93227 XTRNL ECG REC<48 HR R&I: CPT | Performed by: INTERNAL MEDICINE

## 2020-11-06 RX ORDER — NICOTINE 21 MG/24HR
PATCH, TRANSDERMAL 24 HOURS TRANSDERMAL
COMMUNITY
Start: 2020-10-29 | End: 2020-12-30

## 2020-11-09 ENCOUNTER — OFFICE VISIT (OUTPATIENT)
Dept: PULMONOLOGY | Facility: CLINIC | Age: 55
End: 2020-11-09
Payer: COMMERCIAL

## 2020-11-09 VITALS
SYSTOLIC BLOOD PRESSURE: 144 MMHG | WEIGHT: 227 LBS | OXYGEN SATURATION: 98 % | HEART RATE: 80 BPM | TEMPERATURE: 98.9 F | BODY MASS INDEX: 31.78 KG/M2 | HEIGHT: 71 IN | DIASTOLIC BLOOD PRESSURE: 80 MMHG | RESPIRATION RATE: 18 BRPM

## 2020-11-09 DIAGNOSIS — R91.1 RIGHT UPPER LOBE PULMONARY NODULE: ICD-10-CM

## 2020-11-09 DIAGNOSIS — R06.00 DYSPNEA ON EXERTION: Primary | ICD-10-CM

## 2020-11-09 PROCEDURE — 94618 PULMONARY STRESS TESTING: CPT | Performed by: INTERNAL MEDICINE

## 2020-11-09 PROCEDURE — 99215 OFFICE O/P EST HI 40 MIN: CPT | Performed by: INTERNAL MEDICINE

## 2020-11-09 PROCEDURE — 94010 BREATHING CAPACITY TEST: CPT | Performed by: INTERNAL MEDICINE

## 2020-11-09 RX ORDER — ALBUTEROL SULFATE 90 UG/1
2 AEROSOL, METERED RESPIRATORY (INHALATION) EVERY 6 HOURS PRN
Qty: 1 INHALER | Refills: 3 | Status: SHIPPED | OUTPATIENT
Start: 2020-11-09 | End: 2021-01-25

## 2020-11-10 ENCOUNTER — HOSPITAL ENCOUNTER (OUTPATIENT)
Dept: RADIOLOGY | Age: 55
Discharge: HOME | End: 2020-11-10
Payer: COMMERCIAL

## 2020-11-10 DIAGNOSIS — R91.1 PULMONARY NODULE: ICD-10-CM

## 2020-11-10 LAB — GLUCOSE SERPL-MCNC: 82 MG/DL (ref 65–140)

## 2020-11-10 PROCEDURE — 82948 REAGENT STRIP/BLOOD GLUCOSE: CPT

## 2020-11-10 PROCEDURE — A9552 F18 FDG: HCPCS

## 2020-11-10 PROCEDURE — 78815 PET IMAGE W/CT SKULL-THIGH: CPT

## 2020-11-13 ENCOUNTER — OFFICE VISIT (OUTPATIENT)
Dept: GASTROENTEROLOGY | Facility: CLINIC | Age: 55
End: 2020-11-13
Payer: COMMERCIAL

## 2020-11-13 ENCOUNTER — HOSPITAL ENCOUNTER (OUTPATIENT)
Dept: PULMONOLOGY | Facility: HOSPITAL | Age: 55
Discharge: HOME | End: 2020-11-13
Attending: THORACIC SURGERY (CARDIOTHORACIC VASCULAR SURGERY)
Payer: COMMERCIAL

## 2020-11-13 VITALS
WEIGHT: 230 LBS | SYSTOLIC BLOOD PRESSURE: 132 MMHG | TEMPERATURE: 96.7 F | BODY MASS INDEX: 32.2 KG/M2 | DIASTOLIC BLOOD PRESSURE: 84 MMHG | HEIGHT: 71 IN | HEART RATE: 91 BPM

## 2020-11-13 DIAGNOSIS — K76.9 LIVER LESION: Primary | ICD-10-CM

## 2020-11-13 DIAGNOSIS — R91.1 PULMONARY NODULE: ICD-10-CM

## 2020-11-13 PROCEDURE — 94726 PLETHYSMOGRAPHY LUNG VOLUMES: CPT

## 2020-11-13 PROCEDURE — 94729 DIFFUSING CAPACITY: CPT

## 2020-11-13 PROCEDURE — 94060 EVALUATION OF WHEEZING: CPT | Performed by: INTERNAL MEDICINE

## 2020-11-13 PROCEDURE — 94726 PLETHYSMOGRAPHY LUNG VOLUMES: CPT | Performed by: INTERNAL MEDICINE

## 2020-11-13 PROCEDURE — 94729 DIFFUSING CAPACITY: CPT | Performed by: INTERNAL MEDICINE

## 2020-11-13 PROCEDURE — 94760 N-INVAS EAR/PLS OXIMETRY 1: CPT

## 2020-11-13 PROCEDURE — 94060 EVALUATION OF WHEEZING: CPT

## 2020-11-13 PROCEDURE — 99203 OFFICE O/P NEW LOW 30 MIN: CPT | Performed by: PHYSICIAN ASSISTANT

## 2020-11-16 ENCOUNTER — TELEPHONE (OUTPATIENT)
Dept: CARDIAC SURGERY | Facility: CLINIC | Age: 55
End: 2020-11-16

## 2020-11-17 ENCOUNTER — OFFICE VISIT (OUTPATIENT)
Dept: CARDIAC SURGERY | Facility: CLINIC | Age: 55
End: 2020-11-17
Payer: COMMERCIAL

## 2020-11-17 VITALS
SYSTOLIC BLOOD PRESSURE: 150 MMHG | BODY MASS INDEX: 32.34 KG/M2 | DIASTOLIC BLOOD PRESSURE: 82 MMHG | HEART RATE: 86 BPM | OXYGEN SATURATION: 96 % | HEIGHT: 71 IN | TEMPERATURE: 100.1 F | WEIGHT: 231 LBS

## 2020-11-17 DIAGNOSIS — R91.1 RIGHT UPPER LOBE PULMONARY NODULE: Primary | ICD-10-CM

## 2020-11-17 PROCEDURE — 99212 OFFICE O/P EST SF 10 MIN: CPT | Performed by: THORACIC SURGERY (CARDIOTHORACIC VASCULAR SURGERY)

## 2020-11-30 ENCOUNTER — TELEPHONE (OUTPATIENT)
Dept: CARDIAC SURGERY | Facility: CLINIC | Age: 55
End: 2020-11-30

## 2020-11-30 ENCOUNTER — CONSULT (OUTPATIENT)
Dept: CARDIOLOGY CLINIC | Facility: CLINIC | Age: 55
End: 2020-11-30
Payer: COMMERCIAL

## 2020-11-30 VITALS
HEART RATE: 92 BPM | HEIGHT: 71 IN | DIASTOLIC BLOOD PRESSURE: 76 MMHG | BODY MASS INDEX: 32.93 KG/M2 | SYSTOLIC BLOOD PRESSURE: 140 MMHG | OXYGEN SATURATION: 97 % | WEIGHT: 235.2 LBS

## 2020-11-30 DIAGNOSIS — R91.1 RIGHT UPPER LOBE PULMONARY NODULE: Primary | ICD-10-CM

## 2020-11-30 DIAGNOSIS — R07.89 LEFT CHEST PRESSURE: ICD-10-CM

## 2020-11-30 DIAGNOSIS — R55 SYNCOPE AND COLLAPSE: ICD-10-CM

## 2020-11-30 PROCEDURE — 99204 OFFICE O/P NEW MOD 45 MIN: CPT | Performed by: INTERNAL MEDICINE

## 2020-11-30 RX ORDER — NITROGLYCERIN 0.4 MG/1
0.4 TABLET SUBLINGUAL
Qty: 25 TABLET | Refills: 6 | Status: SHIPPED | OUTPATIENT
Start: 2020-11-30 | End: 2020-12-22

## 2020-12-01 ENCOUNTER — HOSPITAL ENCOUNTER (OUTPATIENT)
Dept: MRI IMAGING | Facility: HOSPITAL | Age: 55
Discharge: HOME | End: 2020-12-01
Payer: COMMERCIAL

## 2020-12-01 DIAGNOSIS — K76.9 LIVER LESION: ICD-10-CM

## 2020-12-01 PROCEDURE — A9585 GADOBUTROL INJECTION: HCPCS | Performed by: PHYSICIAN ASSISTANT

## 2020-12-01 PROCEDURE — G1004 CDSM NDSC: HCPCS

## 2020-12-01 PROCEDURE — 74183 MRI ABD W/O CNTR FLWD CNTR: CPT

## 2020-12-01 RX ADMIN — GADOBUTROL 10 ML: 604.72 INJECTION INTRAVENOUS at 09:05

## 2020-12-02 ENCOUNTER — TELEPHONE (OUTPATIENT)
Dept: CARDIOLOGY CLINIC | Facility: CLINIC | Age: 55
End: 2020-12-02

## 2020-12-02 ENCOUNTER — OFFICE VISIT (OUTPATIENT)
Dept: PULMONOLOGY | Facility: CLINIC | Age: 55
End: 2020-12-02
Payer: COMMERCIAL

## 2020-12-02 VITALS
DIASTOLIC BLOOD PRESSURE: 90 MMHG | BODY MASS INDEX: 33.18 KG/M2 | SYSTOLIC BLOOD PRESSURE: 138 MMHG | HEART RATE: 90 BPM | TEMPERATURE: 97.2 F | HEIGHT: 71 IN | WEIGHT: 237 LBS | OXYGEN SATURATION: 100 % | RESPIRATION RATE: 18 BRPM

## 2020-12-02 DIAGNOSIS — R07.89 LEFT CHEST PRESSURE: ICD-10-CM

## 2020-12-02 DIAGNOSIS — F17.211 CIGARETTE NICOTINE DEPENDENCE IN REMISSION: ICD-10-CM

## 2020-12-02 DIAGNOSIS — R91.1 RIGHT UPPER LOBE PULMONARY NODULE: ICD-10-CM

## 2020-12-02 DIAGNOSIS — J44.9 CHRONIC OBSTRUCTIVE PULMONARY DISEASE, UNSPECIFIED COPD TYPE (HCC): Primary | ICD-10-CM

## 2020-12-02 PROCEDURE — 99214 OFFICE O/P EST MOD 30 MIN: CPT | Performed by: INTERNAL MEDICINE

## 2020-12-04 ENCOUNTER — HOSPITAL ENCOUNTER (OUTPATIENT)
Dept: NON INVASIVE DIAGNOSTICS | Facility: CLINIC | Age: 55
Discharge: HOME | End: 2020-12-04
Payer: COMMERCIAL

## 2020-12-04 DIAGNOSIS — R07.89 LEFT CHEST PRESSURE: ICD-10-CM

## 2020-12-04 LAB
MAX DIASTOLIC BP: 74 MMHG
MAX HEART RATE: 114 BPM
MAX PREDICTED HEART RATE: 165 BPM
MAX. SYSTOLIC BP: 134 MMHG
PROTOCOL NAME: NORMAL
REASON FOR TERMINATION: NORMAL
TARGET HR FORMULA: NORMAL
TEST INDICATION: NORMAL
TIME IN EXERCISE PHASE: NORMAL

## 2020-12-04 PROCEDURE — 93018 CV STRESS TEST I&R ONLY: CPT | Performed by: INTERNAL MEDICINE

## 2020-12-04 PROCEDURE — 78452 HT MUSCLE IMAGE SPECT MULT: CPT

## 2020-12-04 PROCEDURE — 93017 CV STRESS TEST TRACING ONLY: CPT

## 2020-12-04 PROCEDURE — 93016 CV STRESS TEST SUPVJ ONLY: CPT | Performed by: INTERNAL MEDICINE

## 2020-12-04 PROCEDURE — G1004 CDSM NDSC: HCPCS

## 2020-12-04 PROCEDURE — 78452 HT MUSCLE IMAGE SPECT MULT: CPT | Performed by: INTERNAL MEDICINE

## 2020-12-04 PROCEDURE — A9502 TC99M TETROFOSMIN: HCPCS

## 2020-12-04 RX ORDER — AMINOPHYLLINE DIHYDRATE 25 MG/ML
50 INJECTION, SOLUTION INTRAVENOUS ONCE
Status: COMPLETED | OUTPATIENT
Start: 2020-12-04 | End: 2020-12-04

## 2020-12-04 RX ADMIN — REGADENOSON 0.4 MG: 0.08 INJECTION, SOLUTION INTRAVENOUS at 08:47

## 2020-12-04 RX ADMIN — AMINOPHYLLINE 50 MG: 25 INJECTION, SOLUTION INTRAVENOUS at 08:50

## 2020-12-08 ENCOUNTER — TELEPHONE (OUTPATIENT)
Dept: CARDIAC SURGERY | Facility: CLINIC | Age: 55
End: 2020-12-08

## 2020-12-10 ENCOUNTER — OFFICE VISIT (OUTPATIENT)
Dept: CARDIAC SURGERY | Facility: CLINIC | Age: 55
End: 2020-12-10
Payer: COMMERCIAL

## 2020-12-10 VITALS
DIASTOLIC BLOOD PRESSURE: 76 MMHG | TEMPERATURE: 99.7 F | OXYGEN SATURATION: 96 % | BODY MASS INDEX: 34.02 KG/M2 | HEIGHT: 71 IN | WEIGHT: 243 LBS | HEART RATE: 85 BPM | SYSTOLIC BLOOD PRESSURE: 118 MMHG

## 2020-12-10 DIAGNOSIS — R91.1 LUNG NODULE: Primary | ICD-10-CM

## 2020-12-10 DIAGNOSIS — R91.1 RIGHT UPPER LOBE PULMONARY NODULE: ICD-10-CM

## 2020-12-10 PROCEDURE — 99213 OFFICE O/P EST LOW 20 MIN: CPT | Performed by: THORACIC SURGERY (CARDIOTHORACIC VASCULAR SURGERY)

## 2020-12-10 RX ORDER — CELECOXIB 200 MG/1
200 CAPSULE ORAL ONCE
Status: CANCELLED | OUTPATIENT
Start: 2020-12-10 | End: 2020-12-10

## 2020-12-10 RX ORDER — GABAPENTIN 300 MG/1
600 CAPSULE ORAL ONCE
Status: CANCELLED | OUTPATIENT
Start: 2020-12-10 | End: 2020-12-10

## 2020-12-10 RX ORDER — ACETAMINOPHEN 325 MG/1
975 TABLET ORAL ONCE
Status: CANCELLED | OUTPATIENT
Start: 2020-12-10 | End: 2020-12-10

## 2020-12-10 RX ORDER — CEFAZOLIN SODIUM 2 G/50ML
2000 SOLUTION INTRAVENOUS ONCE
Status: CANCELLED | OUTPATIENT
Start: 2020-12-10 | End: 2020-12-10

## 2020-12-10 RX ORDER — HEPARIN SODIUM 5000 [USP'U]/ML
5000 INJECTION, SOLUTION INTRAVENOUS; SUBCUTANEOUS
Status: CANCELLED | OUTPATIENT
Start: 2020-12-11 | End: 2020-12-12

## 2020-12-10 NOTE — H&P (VIEW-ONLY)
Thoracic Follow-Up  Assessment/Plan:    Right upper lobe pulmonary nodule  Mr Zeb Pascual is a very pleasant 59-year-old male who presents to my office to discuss his right upper lobe lung nodule  We had previously met and he presents today after cardiac workup  He underwent a stress test which was negative  His cardiologist has appropriately risk stratified him for surgery  Today in the office, discussed the plan moving forward  At this time, I am recommending that he undergo a right VATS wedge resection with possible completion lobectomy depending on the frozen specimen  We discussed expected postoperative course and all of his questions were answered  He wishes to proceed  He has requested to proceed sometime after the holidays  I have scheduled him for January 6, which is approximately 3 weeks away  I believe that this is safe since this is a slow-growing nodule on his PET-CT scan  He will undergo some lab work prior to surgery  Daniel Larry MD  Thoracic Surgery  (Available on [employer] Text)  Office: [phone]         Diagnoses and all orders for this visit:    Lung nodule  -     Case request operating room: THORACOSCOPY VIDEO ASSISTED SURGERY (VATS), right upper lobe wedge resection, possible completion lobectomy; Standing  -     PAT Covid Screening; Future  -     Type and screen; Future  -     Basic metabolic panel; Future  -     CBC and Platelet; Future  -     APTT; Future  -     Protime-INR; Future  -     Case request operating room: [place] [id number] (VATS), right upper lobe wedge resection, possible completion lobectomy    Right upper lobe pulmonary nodule    Other orders  -     Diet NPO; Sips with meds; Standing  -     Nursing communication Please give pre-op Carbohydrate drink to patient 2-4 hours prior to surgery; Standing  -     Void on call to OR; Standing  -     Insert peripheral IV;  Standing  -     Place sequential compression device; Standing  - acetaminophen (TYLENOL) tablet 975 mg  -     celecoxib (CeleBREX) capsule 200 mg  -     gabapentin (NEURONTIN) capsule 600 mg  -     heparin (porcine) subcutaneous injection 5,000 Units  -     ceFAZolin (ANCEF) IVPB (premix in dextrose) 2,000 mg 50 mL          Thoracic History          Subjective:    Patient ID: Loreta Jovel is a 54 y o  male  HPI  Mr Estephania Mon is a very pleasant 51-year-old male who presents to my office to discuss his right upper lobe lung nodule  We had previously met and he presents today after cardiac workup  He underwent a stress test which was negative  His cardiologist has appropriately risk stratified him for surgery  He has had no change in his symptoms since seeing me last   He does report that he has not had any additional episodes of syncope  Additionally, he has not had any additional episodes of chest pressure  He reports that he remains off of cigarettes  The following portions of the patient's history were reviewed and updated as appropriate: allergies, current medications, past family history, past medical history, past social history, past surgical history and problem list     Review of Systems   Constitutional: Negative  Negative for activity change, chills, fatigue, fever and unexpected weight change  HENT: Negative for sore throat, trouble swallowing and voice change  Eyes: Negative  Negative for visual disturbance  Respiratory: Negative for cough, chest tightness, shortness of breath, wheezing and stridor  Cardiovascular: Negative for chest pain and leg swelling  Gastrointestinal: Negative  Endocrine: Negative  Genitourinary: Negative  Musculoskeletal: Negative  Skin: Negative  Allergic/Immunologic: Negative  Neurological: Negative for seizures, syncope, speech difficulty, weakness, light-headedness and headaches  Hematological: Negative for adenopathy  Psychiatric/Behavioral: Negative            Objective:   Physical Exam  Vitals signs and nursing note reviewed  Constitutional:       Appearance: He is well-developed  He is not diaphoretic  HENT:      Head: Normocephalic and atraumatic  Nose: Nose normal  No congestion  Mouth/Throat:      Mouth: Mucous membranes are moist       Pharynx: Oropharynx is clear  Eyes:      Extraocular Movements: Extraocular movements intact  Pupils: Pupils are equal, round, and reactive to light  Neck:      Musculoskeletal: Normal range of motion  Trachea: No tracheal deviation  Cardiovascular:      Rate and Rhythm: Normal rate and regular rhythm  Pulses: Normal pulses  Heart sounds: Normal heart sounds  No murmur  Pulmonary:      Effort: Pulmonary effort is normal  No respiratory distress  Breath sounds: Normal breath sounds  No stridor  No wheezing or rales  Chest:      Chest wall: No tenderness  Abdominal:      General: Bowel sounds are normal  There is no distension  Palpations: Abdomen is soft  Tenderness: There is no abdominal tenderness  Musculoskeletal: Normal range of motion  Lymphadenopathy:      Cervical: No cervical adenopathy  Skin:     General: Skin is warm and dry  Coloration: Skin is not pale  Findings: No erythema or rash  Neurological:      Mental Status: He is alert and oriented to person, place, and time  Psychiatric:         Behavior: Behavior normal          Thought Content: Thought content normal          Judgment: Judgment normal      /76 (BP Location: Left arm, Patient Position: Sitting, Cuff Size: Large)   Pulse 85   Temp 99 7 °F (37 6 °C) (Tympanic)   Ht 5' 10 5" (1 791 m)   Wt 110 kg (243 lb)   SpO2 96%   BMI 34 37 kg/m²     Xr Chest 2 Views    Result Date: 10/26/2020  Impression No acute cardiopulmonary disease  Workstation performed: [id number]     Xr Chest Pa & Lateral    Result Date: 10/21/2020  Impression No acute cardiopulmonary disease   Increased opacity in the right upper lobe at the site of the cystic lesion on the chest CT from 2017  Follow-up with a chest CT is recommended to exclude a slowly growing adenocarcinoma  I personally discussed this study with Precious Altman on 10/21/2020 at 10:25AM  This study was marked in Epic for follow-up  Workstation performed: [id number]      Ct Chest W Contrast    Result Date: 10/26/2020  Narrative CT CHEST WITH IV CONTRAST INDICATION:   R91 8: Other nonspecific abnormal finding of lung field  COMPARISON:  None  TECHNIQUE: CT examination of the chest was performed  Axial, sagittal, and coronal 2D reformatted images were created from the source data and submitted for interpretation  Radiation dose length product (DLP) for this visit:  492 14 mGy-cm   This examination, like all CT scans performed in the [facility], was performed utilizing techniques to minimize radiation dose exposure, including the use of iterative  reconstruction and automated exposure control  IV Contrast:  85 mL of iohexol (OMNIPAQUE) FINDINGS: LUNGS:  Previous anterior right upper lobe cystic area demonstrates interval wall thickening with adjacent nodular foci measuring 5 8 mm and 6 3 mm  Mild centrilobular emphysematous changes seen bilaterally  There is no tracheal or endobronchial lesion  PLEURA:  Unremarkable  HEART/GREAT VESSELS:  Unremarkable for patient's age  MEDIASTINUM AND ZAIDA:  Subcentimeter prevascular, right paratracheal and subcarinal lymph nodes are unchanged  CHEST WALL AND LOWER NECK: Partially imaged thyroid gland demonstrates subcentimeter nodules similar to prior study  Incidental discovery of one or more thyroid nodule(s) measuring less than 1 5 cm and without suspicious features is noted in this patient who is above 28years old; according to guidelines published in the February 2015 white paper on incidental thyroid nodules in the Journal of the [employer] of Radiology [facility]), no further evaluation is recommended   VISUALIZED STRUCTURES IN THE UPPER ABDOMEN:  Previously seen hepatic hypodensities are again identified  Left hepatic lobe 6 4 x 5 7 cm hypodensity demonstrates peripheral subtle nodular enhancement  A more heterogeneously enhancing area in the right lobe measuring 3 6 x 1 9 cm seen  Small enhancing area seen anteriorly in the right lobe measuring 1 4 cm  These may represent vascular lesion such as hemangioma  However MR characterization is again recommended  Simple cyst seen measuring 4 4 cm in the left and 2 cm on the right  Cholecystectomy clips seen  OSSEOUS STRUCTURES:  No acute fracture or destructive osseous lesion  Impression 1  Interval worsened appearance of the right upper lobe lobe anterior cavitary focus which now demonstrates wall thickening with adjacent most prominent subcentimeter nodules measuring 6 3 mm and 5 8 mm  Malignant process such as slow-growing adenocarcinoma suggested  Pulmonary and thoracic surgery consult recommended  2  Hepatic hypodensities with vascular enhancement as described most likely represent hemangiomas  There are increased in size from prior study  Simple hepatic cysts  Complete evaluation with MRI is again recommended  I personally discussed this study with Pollo Sparks on 10/26/2020 at 3 06 PM  Workstation performed: [id number]     No CT Chest,Abdomen,Pelvis results available for this patient  Nm Pet Ct Skull Base To Mid Thigh    Result Date: 11/10/2020  Narrative PET/CT SCAN INDICATION:  R91 1: Solitary pulmonary nodule   , abnormal CT, right upper lung cavitary lesion MODIFIER: PI COMPARISON: CT chest 10/22/2020 and priors CELL TYPE:  None TECHNIQUE:   10 2 mCi F-18-FDG administered IV  Multiplanar attenuation corrected and non attenuation corrected PET images were acquired 60 minutes post injection  Contiguous, low dose, axial CT sections were obtained from the vertex through the femurs   Intravenous contrast material was not utilized    This examination, like all CT scans performed in the [facility], was performed utilizing techniques to minimize radiation dose exposure, including the use of iterative reconstruction and automated exposure control  Fasting serum glucose: 82 mg/dl FINDINGS: VISUALIZED BRAIN:   No acute abnormalities are seen  HEAD/NECK:   There is a physiologic distribution of FDG  No FDG avid cervical adenopathy is seen  CT images: Unremarkable  CHEST: Right upper anterior irregular lung lesion series 3 image 113 measuring 2 5 x 1 8 cm demonstrates mild FDG activity, SUV 1 8  Given its increasing size and soft tissue component, a low-grade neoplasm should be considered until proven otherwise  No additional FDG avid soft tissue lesions are seen  No hypermetabolic adenopathy  CT images: Mild emphysema  ABDOMEN: Multiple hepatic hypodensities again visualized  These do not demonstrate significant FDG activity  No FDG avid soft tissue lesions are seen  CT images: Cholecystectomy  PELVIS: Probable retained ureteral activity in the prostate  No FDG avid soft tissue lesions are seen  CT images: Prostatomegaly measuring 6 7 x 5 8 cm, with small calcifications  OSSEOUS STRUCTURES: No FDG avid lesions are seen  CT images: No significant findings  Impression 1   2 5 x 1 8 cm irregular right upper anterior lung lesion demonstrates mild FDG activity  A low-grade neoplasm should be considered until proven otherwise  Tissue sampling suggested for diagnosis  2   No additional hypermetabolic lesion in the thorax  No hypermetabolic adenopathy  3   Multiple hepatic hypodensities again visualized without significant FDG activity  Contrast MR characterization may be considered if clinically warranted  4   No additional hypermetabolic lesions that would be concerning for malignancy/metastases  The study was marked in EPIC for significant notification  Workstation performed: [id number]      No Barium Swallow results available for this patient

## 2020-12-22 DIAGNOSIS — R07.89 LEFT CHEST PRESSURE: ICD-10-CM

## 2020-12-22 RX ORDER — NITROGLYCERIN 0.4 MG/1
0.4 TABLET SUBLINGUAL
Qty: 25 TABLET | Refills: 6 | Status: SHIPPED | OUTPATIENT
Start: 2020-12-22 | End: 2022-05-24

## 2020-12-23 ENCOUNTER — LAB REQUISITION (OUTPATIENT)
Dept: LAB | Facility: HOSPITAL | Age: 55
End: 2020-12-23
Payer: COMMERCIAL

## 2020-12-23 ENCOUNTER — LAB (OUTPATIENT)
Dept: LAB | Facility: MEDICAL CENTER | Age: 55
End: 2020-12-23
Payer: COMMERCIAL

## 2020-12-23 ENCOUNTER — TELEPHONE (OUTPATIENT)
Dept: HEMATOLOGY ONCOLOGY | Facility: CLINIC | Age: 55
End: 2020-12-23

## 2020-12-23 DIAGNOSIS — R91.1 SOLITARY PULMONARY NODULE: ICD-10-CM

## 2020-12-23 DIAGNOSIS — R91.1 LUNG NODULE: ICD-10-CM

## 2020-12-23 LAB
ABO GROUP BLD: NORMAL
ANION GAP SERPL CALCULATED.3IONS-SCNC: 5 MMOL/L (ref 4–13)
APTT PPP: 35 SECONDS (ref 23–37)
BLD GP AB SCN SERPL QL: NEGATIVE
BUN SERPL-MCNC: 16 MG/DL (ref 5–25)
CALCIUM SERPL-MCNC: 9.2 MG/DL (ref 8.3–10.1)
CHLORIDE SERPL-SCNC: 109 MMOL/L (ref 100–108)
CO2 SERPL-SCNC: 25 MMOL/L (ref 21–32)
CREAT SERPL-MCNC: 1.06 MG/DL (ref 0.6–1.3)
ERYTHROCYTE [DISTWIDTH] IN BLOOD BY AUTOMATED COUNT: 13.3 % (ref 11.6–15.1)
GFR SERPL CREATININE-BSD FRML MDRD: 79 ML/MIN/1.73SQ M
GLUCOSE P FAST SERPL-MCNC: 97 MG/DL (ref 65–99)
HCT VFR BLD AUTO: 45.1 % (ref 36.5–49.3)
HGB BLD-MCNC: 14.5 G/DL (ref 12–17)
INR PPP: 0.95 (ref 0.84–1.19)
MCH RBC QN AUTO: 30.9 PG (ref 26.8–34.3)
MCHC RBC AUTO-ENTMCNC: 32.2 G/DL (ref 31.4–37.4)
MCV RBC AUTO: 96 FL (ref 82–98)
PLATELET # BLD AUTO: 315 THOUSANDS/UL (ref 149–390)
PMV BLD AUTO: 9.6 FL (ref 8.9–12.7)
POTASSIUM SERPL-SCNC: 4.2 MMOL/L (ref 3.5–5.3)
PROTHROMBIN TIME: 12.7 SECONDS (ref 11.6–14.5)
RBC # BLD AUTO: 4.69 MILLION/UL (ref 3.88–5.62)
RH BLD: POSITIVE
SODIUM SERPL-SCNC: 139 MMOL/L (ref 136–145)
SPECIMEN EXPIRATION DATE: NORMAL
WBC # BLD AUTO: 5.3 THOUSAND/UL (ref 4.31–10.16)

## 2020-12-23 PROCEDURE — 80048 BASIC METABOLIC PNL TOTAL CA: CPT

## 2020-12-23 PROCEDURE — 85730 THROMBOPLASTIN TIME PARTIAL: CPT

## 2020-12-23 PROCEDURE — 86901 BLOOD TYPING SEROLOGIC RH(D): CPT | Performed by: THORACIC SURGERY (CARDIOTHORACIC VASCULAR SURGERY)

## 2020-12-23 PROCEDURE — 86850 RBC ANTIBODY SCREEN: CPT | Performed by: THORACIC SURGERY (CARDIOTHORACIC VASCULAR SURGERY)

## 2020-12-23 PROCEDURE — 85610 PROTHROMBIN TIME: CPT

## 2020-12-23 PROCEDURE — 36415 COLL VENOUS BLD VENIPUNCTURE: CPT

## 2020-12-23 PROCEDURE — 86900 BLOOD TYPING SEROLOGIC ABO: CPT | Performed by: THORACIC SURGERY (CARDIOTHORACIC VASCULAR SURGERY)

## 2020-12-23 PROCEDURE — 85027 COMPLETE CBC AUTOMATED: CPT

## 2020-12-30 DIAGNOSIS — R91.1 LUNG NODULE: ICD-10-CM

## 2020-12-30 PROCEDURE — U0003 INFECTIOUS AGENT DETECTION BY NUCLEIC ACID (DNA OR RNA); SEVERE ACUTE RESPIRATORY SYNDROME CORONAVIRUS 2 (SARS-COV-2) (CORONAVIRUS DISEASE [COVID-19]), AMPLIFIED PROBE TECHNIQUE, MAKING USE OF HIGH THROUGHPUT TECHNOLOGIES AS DESCRIBED BY CMS-2020-01-R: HCPCS | Performed by: THORACIC SURGERY (CARDIOTHORACIC VASCULAR SURGERY)

## 2020-12-30 NOTE — PRE-PROCEDURE INSTRUCTIONS
Pre-Surgery Instructions:   Medication Instructions    albuterol (PROVENTIL HFA,VENTOLIN HFA) 90 mcg/act inhaler PRN    nitroglycerin (NITROSTAT) 0 4 mg SL tablet PRN      Spoke to pt  Med list reviewed & instructed   As of 12/30 pt instructed on tylenol only   Am DOS no medications  Showering instructions provided by surgeon office, reviewed @ time of call   Reviewed visitation policy   Negative COVID screening, in process   Quit smoking 10/2020  Pt received pre op carb drinks, reviewed  All instructions verbally understood by patient  All questions answered   Callback number provided

## 2020-12-31 LAB — SARS-COV-2 RNA SPEC QL NAA+PROBE: NOT DETECTED

## 2021-01-06 ENCOUNTER — APPOINTMENT (INPATIENT)
Dept: RADIOLOGY | Facility: HOSPITAL | Age: 56
DRG: 168 | End: 2021-01-06
Payer: COMMERCIAL

## 2021-01-06 ENCOUNTER — HOSPITAL ENCOUNTER (INPATIENT)
Facility: HOSPITAL | Age: 56
LOS: 2 days | Discharge: HOME | DRG: 168 | End: 2021-01-08
Attending: THORACIC SURGERY (CARDIOTHORACIC VASCULAR SURGERY) | Admitting: THORACIC SURGERY (CARDIOTHORACIC VASCULAR SURGERY)
Payer: COMMERCIAL

## 2021-01-06 ENCOUNTER — ANESTHESIA (OUTPATIENT)
Dept: PERIOP | Facility: HOSPITAL | Age: 56
DRG: 168 | End: 2021-01-06
Payer: COMMERCIAL

## 2021-01-06 ENCOUNTER — ANESTHESIA EVENT (OUTPATIENT)
Dept: PERIOP | Facility: HOSPITAL | Age: 56
DRG: 168 | End: 2021-01-06
Payer: COMMERCIAL

## 2021-01-06 VITALS — HEART RATE: 82 BPM

## 2021-01-06 DIAGNOSIS — R91.1 LUNG NODULE: ICD-10-CM

## 2021-01-06 DIAGNOSIS — R91.1 RIGHT UPPER LOBE PULMONARY NODULE: Primary | ICD-10-CM

## 2021-01-06 LAB
ABO GROUP BLD: NORMAL
ANION GAP SERPL CALCULATED.3IONS-SCNC: 5 MMOL/L (ref 4–13)
BUN SERPL-MCNC: 17 MG/DL (ref 5–25)
CALCIUM SERPL-MCNC: 8.2 MG/DL (ref 8.3–10.1)
CHLORIDE SERPL-SCNC: 114 MMOL/L (ref 100–108)
CO2 SERPL-SCNC: 21 MMOL/L (ref 21–32)
CREAT SERPL-MCNC: 1.09 MG/DL (ref 0.6–1.3)
ERYTHROCYTE [DISTWIDTH] IN BLOOD BY AUTOMATED COUNT: 13.3 % (ref 11.6–15.1)
GFR SERPL CREATININE-BSD FRML MDRD: 76 ML/MIN/1.73SQ M
GLUCOSE SERPL-MCNC: 137 MG/DL (ref 65–140)
HCT VFR BLD AUTO: 41.1 % (ref 36.5–49.3)
HGB BLD-MCNC: 13.7 G/DL (ref 12–17)
MAGNESIUM SERPL-MCNC: 1.9 MG/DL (ref 1.6–2.6)
MCH RBC QN AUTO: 31.3 PG (ref 26.8–34.3)
MCHC RBC AUTO-ENTMCNC: 33.3 G/DL (ref 31.4–37.4)
MCV RBC AUTO: 94 FL (ref 82–98)
PLATELET # BLD AUTO: 278 THOUSANDS/UL (ref 149–390)
PMV BLD AUTO: 9.1 FL (ref 8.9–12.7)
POTASSIUM SERPL-SCNC: 4.3 MMOL/L (ref 3.5–5.3)
RBC # BLD AUTO: 4.38 MILLION/UL (ref 3.88–5.62)
RH BLD: POSITIVE
SODIUM SERPL-SCNC: 140 MMOL/L (ref 136–145)
WBC # BLD AUTO: 13.22 THOUSAND/UL (ref 4.31–10.16)

## 2021-01-06 PROCEDURE — 88313 SPECIAL STAINS GROUP 2: CPT | Performed by: PATHOLOGY

## 2021-01-06 PROCEDURE — 88305 TISSUE EXAM BY PATHOLOGIST: CPT | Performed by: PATHOLOGY

## 2021-01-06 PROCEDURE — 88342 IMHCHEM/IMCYTCHM 1ST ANTB: CPT | Performed by: PATHOLOGY

## 2021-01-06 PROCEDURE — 88309 TISSUE EXAM BY PATHOLOGIST: CPT | Performed by: PATHOLOGY

## 2021-01-06 PROCEDURE — 80048 BASIC METABOLIC PNL TOTAL CA: CPT | Performed by: PHYSICIAN ASSISTANT

## 2021-01-06 PROCEDURE — 0BJ08ZZ INSPECTION OF TRACHEOBRONCHIAL TREE, VIA NATURAL OR ARTIFICIAL OPENING ENDOSCOPIC: ICD-10-PCS | Performed by: THORACIC SURGERY (CARDIOTHORACIC VASCULAR SURGERY)

## 2021-01-06 PROCEDURE — 85027 COMPLETE CBC AUTOMATED: CPT | Performed by: PHYSICIAN ASSISTANT

## 2021-01-06 PROCEDURE — 0BBC4ZX EXCISION OF RIGHT UPPER LUNG LOBE, PERCUTANEOUS ENDOSCOPIC APPROACH, DIAGNOSTIC: ICD-10-PCS | Performed by: THORACIC SURGERY (CARDIOTHORACIC VASCULAR SURGERY)

## 2021-01-06 PROCEDURE — 32663 THORACOSCOPY W/LOBECTOMY: CPT | Performed by: THORACIC SURGERY (CARDIOTHORACIC VASCULAR SURGERY)

## 2021-01-06 PROCEDURE — 88331 PATH CONSLTJ SURG 1 BLK 1SPC: CPT | Performed by: PATHOLOGY

## 2021-01-06 PROCEDURE — 94760 N-INVAS EAR/PLS OXIMETRY 1: CPT

## 2021-01-06 PROCEDURE — 32668 THORACOSCOPY W/W RESECT DIAG: CPT | Performed by: THORACIC SURGERY (CARDIOTHORACIC VASCULAR SURGERY)

## 2021-01-06 PROCEDURE — 71045 X-RAY EXAM CHEST 1 VIEW: CPT

## 2021-01-06 PROCEDURE — 88341 IMHCHEM/IMCYTCHM EA ADD ANTB: CPT | Performed by: PATHOLOGY

## 2021-01-06 PROCEDURE — 32674 THORACOSCOPY LYMPH NODE EXC: CPT | Performed by: THORACIC SURGERY (CARDIOTHORACIC VASCULAR SURGERY)

## 2021-01-06 PROCEDURE — 86920 COMPATIBILITY TEST SPIN: CPT

## 2021-01-06 PROCEDURE — 88307 TISSUE EXAM BY PATHOLOGIST: CPT | Performed by: PATHOLOGY

## 2021-01-06 PROCEDURE — C9290 INJ, BUPIVACAINE LIPOSOME: HCPCS | Performed by: THORACIC SURGERY (CARDIOTHORACIC VASCULAR SURGERY)

## 2021-01-06 PROCEDURE — 83735 ASSAY OF MAGNESIUM: CPT | Performed by: PHYSICIAN ASSISTANT

## 2021-01-06 RX ORDER — HYDROMORPHONE HCL/PF 1 MG/ML
0.5 SYRINGE (ML) INJECTION
Status: DISCONTINUED | OUTPATIENT
Start: 2021-01-06 | End: 2021-01-06 | Stop reason: HOSPADM

## 2021-01-06 RX ORDER — OXYCODONE HYDROCHLORIDE 5 MG/1
10 TABLET ORAL EVERY 4 HOURS PRN
Status: DISCONTINUED | OUTPATIENT
Start: 2021-01-06 | End: 2021-01-08 | Stop reason: HOSPADM

## 2021-01-06 RX ORDER — DEXAMETHASONE SODIUM PHOSPHATE 10 MG/ML
INJECTION, SOLUTION INTRAMUSCULAR; INTRAVENOUS AS NEEDED
Status: DISCONTINUED | OUTPATIENT
Start: 2021-01-06 | End: 2021-01-06

## 2021-01-06 RX ORDER — HYDROMORPHONE HCL/PF 1 MG/ML
0.5 SYRINGE (ML) INJECTION EVERY 4 HOURS PRN
Status: DISCONTINUED | OUTPATIENT
Start: 2021-01-06 | End: 2021-01-08 | Stop reason: HOSPADM

## 2021-01-06 RX ORDER — METOCLOPRAMIDE HYDROCHLORIDE 5 MG/ML
10 INJECTION INTRAMUSCULAR; INTRAVENOUS ONCE AS NEEDED
Status: DISCONTINUED | OUTPATIENT
Start: 2021-01-06 | End: 2021-01-06 | Stop reason: HOSPADM

## 2021-01-06 RX ORDER — SODIUM CHLORIDE 9 MG/ML
INJECTION, SOLUTION INTRAVENOUS CONTINUOUS PRN
Status: DISCONTINUED | OUTPATIENT
Start: 2021-01-06 | End: 2021-01-06

## 2021-01-06 RX ORDER — ALBUTEROL SULFATE 90 UG/1
2 AEROSOL, METERED RESPIRATORY (INHALATION) EVERY 6 HOURS PRN
Status: DISCONTINUED | OUTPATIENT
Start: 2021-01-06 | End: 2021-01-08 | Stop reason: HOSPADM

## 2021-01-06 RX ORDER — HEPARIN SODIUM 5000 [USP'U]/ML
5000 INJECTION, SOLUTION INTRAVENOUS; SUBCUTANEOUS
Status: COMPLETED | OUTPATIENT
Start: 2021-01-06 | End: 2021-01-06

## 2021-01-06 RX ORDER — SENNOSIDES 8.6 MG
1 TABLET ORAL DAILY
Status: DISCONTINUED | OUTPATIENT
Start: 2021-01-06 | End: 2021-01-08 | Stop reason: HOSPADM

## 2021-01-06 RX ORDER — CELECOXIB 200 MG/1
200 CAPSULE ORAL ONCE
Status: COMPLETED | OUTPATIENT
Start: 2021-01-06 | End: 2021-01-06

## 2021-01-06 RX ORDER — LIDOCAINE HYDROCHLORIDE 10 MG/ML
INJECTION, SOLUTION EPIDURAL; INFILTRATION; INTRACAUDAL; PERINEURAL AS NEEDED
Status: DISCONTINUED | OUTPATIENT
Start: 2021-01-06 | End: 2021-01-06

## 2021-01-06 RX ORDER — OXYCODONE HYDROCHLORIDE 5 MG/1
5 TABLET ORAL EVERY 4 HOURS PRN
Status: DISCONTINUED | OUTPATIENT
Start: 2021-01-06 | End: 2021-01-08 | Stop reason: HOSPADM

## 2021-01-06 RX ORDER — ROCURONIUM BROMIDE 10 MG/ML
INJECTION, SOLUTION INTRAVENOUS AS NEEDED
Status: DISCONTINUED | OUTPATIENT
Start: 2021-01-06 | End: 2021-01-06

## 2021-01-06 RX ORDER — ONDANSETRON 2 MG/ML
4 INJECTION INTRAMUSCULAR; INTRAVENOUS ONCE AS NEEDED
Status: DISCONTINUED | OUTPATIENT
Start: 2021-01-06 | End: 2021-01-06 | Stop reason: HOSPADM

## 2021-01-06 RX ORDER — SODIUM CHLORIDE, SODIUM LACTATE, POTASSIUM CHLORIDE, CALCIUM CHLORIDE 600; 310; 30; 20 MG/100ML; MG/100ML; MG/100ML; MG/100ML
INJECTION, SOLUTION INTRAVENOUS CONTINUOUS PRN
Status: DISCONTINUED | OUTPATIENT
Start: 2021-01-06 | End: 2021-01-06

## 2021-01-06 RX ORDER — SODIUM CHLORIDE, SODIUM LACTATE, POTASSIUM CHLORIDE, CALCIUM CHLORIDE 600; 310; 30; 20 MG/100ML; MG/100ML; MG/100ML; MG/100ML
60 INJECTION, SOLUTION INTRAVENOUS CONTINUOUS
Status: DISCONTINUED | OUTPATIENT
Start: 2021-01-06 | End: 2021-01-07

## 2021-01-06 RX ORDER — POLYETHYLENE GLYCOL 3350 17 G/17G
17 POWDER, FOR SOLUTION ORAL DAILY
Status: DISCONTINUED | OUTPATIENT
Start: 2021-01-06 | End: 2021-01-08 | Stop reason: HOSPADM

## 2021-01-06 RX ORDER — SUCCINYLCHOLINE/SOD CL,ISO/PF 100 MG/5ML
SYRINGE (ML) INTRAVENOUS AS NEEDED
Status: DISCONTINUED | OUTPATIENT
Start: 2021-01-06 | End: 2021-01-06

## 2021-01-06 RX ORDER — FENTANYL CITRATE 50 UG/ML
INJECTION, SOLUTION INTRAMUSCULAR; INTRAVENOUS AS NEEDED
Status: DISCONTINUED | OUTPATIENT
Start: 2021-01-06 | End: 2021-01-06

## 2021-01-06 RX ORDER — CEFAZOLIN SODIUM 2 G/50ML
2000 SOLUTION INTRAVENOUS ONCE
Status: COMPLETED | OUTPATIENT
Start: 2021-01-06 | End: 2021-01-06

## 2021-01-06 RX ORDER — ONDANSETRON 2 MG/ML
4 INJECTION INTRAMUSCULAR; INTRAVENOUS EVERY 6 HOURS PRN
Status: DISCONTINUED | OUTPATIENT
Start: 2021-01-06 | End: 2021-01-08 | Stop reason: HOSPADM

## 2021-01-06 RX ORDER — FENTANYL CITRATE/PF 50 MCG/ML
50 SYRINGE (ML) INJECTION
Status: DISCONTINUED | OUTPATIENT
Start: 2021-01-06 | End: 2021-01-06 | Stop reason: HOSPADM

## 2021-01-06 RX ORDER — HYDROMORPHONE HCL 110MG/55ML
PATIENT CONTROLLED ANALGESIA SYRINGE INTRAVENOUS AS NEEDED
Status: DISCONTINUED | OUTPATIENT
Start: 2021-01-06 | End: 2021-01-06

## 2021-01-06 RX ORDER — DEXMEDETOMIDINE HYDROCHLORIDE 100 UG/ML
INJECTION, SOLUTION INTRAVENOUS AS NEEDED
Status: DISCONTINUED | OUTPATIENT
Start: 2021-01-06 | End: 2021-01-06

## 2021-01-06 RX ORDER — DOCUSATE SODIUM 100 MG/1
100 CAPSULE, LIQUID FILLED ORAL 2 TIMES DAILY
Status: DISCONTINUED | OUTPATIENT
Start: 2021-01-06 | End: 2021-01-08 | Stop reason: HOSPADM

## 2021-01-06 RX ORDER — ACETAMINOPHEN 325 MG/1
975 TABLET ORAL EVERY 6 HOURS
Status: DISCONTINUED | OUTPATIENT
Start: 2021-01-06 | End: 2021-01-08 | Stop reason: HOSPADM

## 2021-01-06 RX ORDER — ACETAMINOPHEN 325 MG/1
975 TABLET ORAL ONCE
Status: COMPLETED | OUTPATIENT
Start: 2021-01-06 | End: 2021-01-06

## 2021-01-06 RX ORDER — SODIUM CHLORIDE 9 MG/ML
INJECTION INTRAVENOUS AS NEEDED
Status: DISCONTINUED | OUTPATIENT
Start: 2021-01-06 | End: 2021-01-06 | Stop reason: HOSPADM

## 2021-01-06 RX ORDER — GABAPENTIN 300 MG/1
300 CAPSULE ORAL 3 TIMES DAILY
Status: DISCONTINUED | OUTPATIENT
Start: 2021-01-06 | End: 2021-01-08 | Stop reason: HOSPADM

## 2021-01-06 RX ORDER — MIDAZOLAM HYDROCHLORIDE 2 MG/2ML
INJECTION, SOLUTION INTRAMUSCULAR; INTRAVENOUS AS NEEDED
Status: DISCONTINUED | OUTPATIENT
Start: 2021-01-06 | End: 2021-01-06

## 2021-01-06 RX ORDER — ONDANSETRON 2 MG/ML
INJECTION INTRAMUSCULAR; INTRAVENOUS AS NEEDED
Status: DISCONTINUED | OUTPATIENT
Start: 2021-01-06 | End: 2021-01-06

## 2021-01-06 RX ORDER — GABAPENTIN 300 MG/1
600 CAPSULE ORAL ONCE
Status: COMPLETED | OUTPATIENT
Start: 2021-01-06 | End: 2021-01-06

## 2021-01-06 RX ORDER — PANTOPRAZOLE SODIUM 40 MG/1
40 TABLET, DELAYED RELEASE ORAL
Status: DISCONTINUED | OUTPATIENT
Start: 2021-01-06 | End: 2021-01-08 | Stop reason: HOSPADM

## 2021-01-06 RX ORDER — CELECOXIB 100 MG/1
100 CAPSULE ORAL 2 TIMES DAILY
Status: DISCONTINUED | OUTPATIENT
Start: 2021-01-06 | End: 2021-01-08 | Stop reason: HOSPADM

## 2021-01-06 RX ORDER — PROPOFOL 10 MG/ML
INJECTION, EMULSION INTRAVENOUS AS NEEDED
Status: DISCONTINUED | OUTPATIENT
Start: 2021-01-06 | End: 2021-01-06

## 2021-01-06 RX ORDER — GLYCOPYRROLATE 0.2 MG/ML
INJECTION INTRAMUSCULAR; INTRAVENOUS AS NEEDED
Status: DISCONTINUED | OUTPATIENT
Start: 2021-01-06 | End: 2021-01-06

## 2021-01-06 RX ORDER — ALBUMIN, HUMAN INJ 5% 5 %
SOLUTION INTRAVENOUS CONTINUOUS PRN
Status: DISCONTINUED | OUTPATIENT
Start: 2021-01-06 | End: 2021-01-06

## 2021-01-06 RX ORDER — BUPIVACAINE HYDROCHLORIDE 2.5 MG/ML
INJECTION, SOLUTION EPIDURAL; INFILTRATION; INTRACAUDAL AS NEEDED
Status: DISCONTINUED | OUTPATIENT
Start: 2021-01-06 | End: 2021-01-06 | Stop reason: HOSPADM

## 2021-01-06 RX ORDER — NEOSTIGMINE METHYLSULFATE 1 MG/ML
INJECTION INTRAVENOUS AS NEEDED
Status: DISCONTINUED | OUTPATIENT
Start: 2021-01-06 | End: 2021-01-06

## 2021-01-06 RX ADMIN — CELECOXIB 100 MG: 100 CAPSULE ORAL at 17:50

## 2021-01-06 RX ADMIN — DOCUSATE SODIUM 100 MG: 100 CAPSULE, LIQUID FILLED ORAL at 14:40

## 2021-01-06 RX ADMIN — DEXAMETHASONE SODIUM PHOSPHATE 10 MG: 10 INJECTION, SOLUTION INTRAMUSCULAR; INTRAVENOUS at 08:58

## 2021-01-06 RX ADMIN — SENNOSIDES 8.6 MG: 8.6 TABLET, FILM COATED ORAL at 17:50

## 2021-01-06 RX ADMIN — ACETAMINOPHEN 975 MG: 325 TABLET, FILM COATED ORAL at 21:33

## 2021-01-06 RX ADMIN — KETAMINE HYDROCHLORIDE 0.2 MG/KG/HR: 50 INJECTION, SOLUTION INTRAMUSCULAR; INTRAVENOUS at 09:05

## 2021-01-06 RX ADMIN — SODIUM CHLORIDE, SODIUM LACTATE, POTASSIUM CHLORIDE, AND CALCIUM CHLORIDE 60 ML/HR: .6; .31; .03; .02 INJECTION, SOLUTION INTRAVENOUS at 14:37

## 2021-01-06 RX ADMIN — ACETAMINOPHEN 975 MG: 325 TABLET, FILM COATED ORAL at 14:40

## 2021-01-06 RX ADMIN — HEPARIN SODIUM 5000 UNITS: 5000 INJECTION INTRAVENOUS; SUBCUTANEOUS at 08:00

## 2021-01-06 RX ADMIN — GABAPENTIN 300 MG: 300 CAPSULE ORAL at 17:50

## 2021-01-06 RX ADMIN — ROCURONIUM BROMIDE 30 MG: 50 INJECTION, SOLUTION INTRAVENOUS at 11:01

## 2021-01-06 RX ADMIN — FENTANYL CITRATE 50 MCG: 50 INJECTION INTRAMUSCULAR; INTRAVENOUS at 09:11

## 2021-01-06 RX ADMIN — SODIUM CHLORIDE, SODIUM LACTATE, POTASSIUM CHLORIDE, AND CALCIUM CHLORIDE: .6; .31; .03; .02 INJECTION, SOLUTION INTRAVENOUS at 11:50

## 2021-01-06 RX ADMIN — ALBUMIN (HUMAN): 12.5 INJECTION, SOLUTION INTRAVENOUS at 11:52

## 2021-01-06 RX ADMIN — PANTOPRAZOLE SODIUM 40 MG: 40 TABLET, DELAYED RELEASE ORAL at 14:40

## 2021-01-06 RX ADMIN — ROCURONIUM BROMIDE 50 MG: 50 INJECTION, SOLUTION INTRAVENOUS at 08:44

## 2021-01-06 RX ADMIN — CEFAZOLIN SODIUM 2000 MG: 2 SOLUTION INTRAVENOUS at 08:44

## 2021-01-06 RX ADMIN — CELECOXIB 200 MG: 200 CAPSULE ORAL at 06:52

## 2021-01-06 RX ADMIN — DEXMEDETOMIDINE HCL 12 MCG: 100 INJECTION INTRAVENOUS at 09:30

## 2021-01-06 RX ADMIN — LIDOCAINE HYDROCHLORIDE 50 MG: 10 INJECTION, SOLUTION EPIDURAL; INFILTRATION; INTRACAUDAL; PERINEURAL at 08:33

## 2021-01-06 RX ADMIN — GLYCOPYRROLATE 0.6 MG: 0.2 INJECTION, SOLUTION INTRAMUSCULAR; INTRAVENOUS at 12:07

## 2021-01-06 RX ADMIN — GABAPENTIN 300 MG: 300 CAPSULE ORAL at 21:33

## 2021-01-06 RX ADMIN — FENTANYL CITRATE 50 MCG: 50 INJECTION INTRAMUSCULAR; INTRAVENOUS at 09:30

## 2021-01-06 RX ADMIN — DEXMEDETOMIDINE HCL 12 MCG: 100 INJECTION INTRAVENOUS at 09:46

## 2021-01-06 RX ADMIN — ROCURONIUM BROMIDE 30 MG: 50 INJECTION, SOLUTION INTRAVENOUS at 09:16

## 2021-01-06 RX ADMIN — DEXMEDETOMIDINE HCL 16 MCG: 100 INJECTION INTRAVENOUS at 09:21

## 2021-01-06 RX ADMIN — NEOSTIGMINE METHYLSULFATE 3.5 MG: 1 INJECTION INTRAVENOUS at 12:07

## 2021-01-06 RX ADMIN — Medication 100 MG: at 08:34

## 2021-01-06 RX ADMIN — SODIUM CHLORIDE, SODIUM LACTATE, POTASSIUM CHLORIDE, AND CALCIUM CHLORIDE: .6; .31; .03; .02 INJECTION, SOLUTION INTRAVENOUS at 08:15

## 2021-01-06 RX ADMIN — GABAPENTIN 600 MG: 300 CAPSULE ORAL at 06:51

## 2021-01-06 RX ADMIN — HYDROMORPHONE HYDROCHLORIDE 0.5 MG: 2 INJECTION, SOLUTION INTRAMUSCULAR; INTRAVENOUS; SUBCUTANEOUS at 11:48

## 2021-01-06 RX ADMIN — ROCURONIUM BROMIDE 50 MG: 50 INJECTION, SOLUTION INTRAVENOUS at 09:54

## 2021-01-06 RX ADMIN — PROPOFOL 200 MG: 10 INJECTION, EMULSION INTRAVENOUS at 08:33

## 2021-01-06 RX ADMIN — SODIUM CHLORIDE: 0.9 INJECTION, SOLUTION INTRAVENOUS at 08:41

## 2021-01-06 RX ADMIN — DOCUSATE SODIUM 100 MG: 100 CAPSULE, LIQUID FILLED ORAL at 17:49

## 2021-01-06 RX ADMIN — MIDAZOLAM 2 MG: 1 INJECTION INTRAMUSCULAR; INTRAVENOUS at 08:25

## 2021-01-06 RX ADMIN — ACETAMINOPHEN 975 MG: 325 TABLET, FILM COATED ORAL at 06:52

## 2021-01-06 RX ADMIN — FENTANYL CITRATE 50 MCG: 50 INJECTION INTRAMUSCULAR; INTRAVENOUS at 08:33

## 2021-01-06 RX ADMIN — ROCURONIUM BROMIDE 20 MG: 50 INJECTION, SOLUTION INTRAVENOUS at 10:26

## 2021-01-06 RX ADMIN — PROPOFOL 50 MG: 10 INJECTION, EMULSION INTRAVENOUS at 12:16

## 2021-01-06 RX ADMIN — ONDANSETRON 4 MG: 2 INJECTION INTRAMUSCULAR; INTRAVENOUS at 12:01

## 2021-01-06 NOTE — RESPIRATORY THERAPY NOTE
RT Protocol Note  Emily Hope 54 y o  male MRN: [id number]  Unit/Bed#: [facility] 403-01 Encounter: [id number]    Assessment    Principal Problem:    Right upper lobe pulmonary nodule  Active Problems:    Tobacco use    Liver lesion      Home Pulmonary Medications:  none       Past Medical History:   Diagnosis Date    COPD (chronic obstructive pulmonary disease) (HCC)     Cough     Emphysema of lung ([address] )     Liver lesion     Right upper lobe pulmonary nodule     Syncope and collapse      Social History     Socioeconomic History    Marital status: /Civil Union     Spouse name: None    Number of children: None    Years of education: None    Highest education level: None   Occupational History    None   Social Needs    Financial resource strain: None    Food insecurity     Worry: None     Inability: None    Transportation needs     Medical: None     Non-medical: None   Tobacco Use    Smoking status: Former Smoker     Packs/day: 3 00     Years: 30 00     Pack years: 90 00     Types: Cigarettes     Quit date: 10/26/2020     Years since quittin 1    Smokeless tobacco: Never Used    Tobacco comment: 30 years-5 packs a day    Substance and Sexual Activity    Alcohol use: Not Currently    Drug use: Not Currently    Sexual activity: Not Currently   Lifestyle    Physical activity     Days per week: None     Minutes per session: None    Stress: None   Relationships    Social connections     Talks on phone: None     Gets together: None     Attends [employer] service: None     Active member of club or organization: None     Attends meetings of clubs or organizations: None     Relationship status: None    Intimate partner violence     Fear of current or ex partner: None     Emotionally abused: None     Physically abused: None     Forced sexual activity: None   Other Topics Concern    None   Social History Narrative    None       Subjective         Objective    Physical Exam:   Assessment Type: (P) Assess only  General Appearance: (P) Alert, Awake  Respiratory Pattern: (P) Normal  Chest Assessment: (P) Chest expansion symmetrical  Bilateral Breath Sounds: (P) Diminished  O2 Device: (P) nc    Vitals:  Blood pressure 108/74, pulse 71, temperature 97 5 °F (36 4 °C), temperature source Oral, resp  rate 16, height 5' 10 5" (1 791 m), weight 109 kg (240 lb), SpO2 99 %  Imaging and other studies: I have personally reviewed pertinent reports  O2 Device: (P) nc     Plan       Airway Clearance Plan: (P) Incentive Spirometer     Resp Comments: (P) Pt  s/p wedge resection and lobectomy   Pt  instructed on IS use and demonstrates well

## 2021-01-06 NOTE — INTERVAL H&P NOTE
H&P reviewed  After examining the patient I find no changes in the patients condition since the H&P had been written  Vitals:    01/06/21 0704   BP: 137/82   Pulse: 84   Resp: 16   Temp: 97 9 °F (36 6 °C)   SpO2: 98%     Safe to proceed   Flexible bronchoscopy, VATS right upper wedge resection, possible completion lobectomy    Lamont Sewell MD  Thoracic Surgery  (Available on [employer] Text)  Office: [phone]

## 2021-01-06 NOTE — ANESTHESIA POSTPROCEDURE EVALUATION
Post-Op Assessment Note    CV Status:  Stable  Pain Score: 0    Pain management: adequate     Mental Status:  Alert and anxious   Hydration Status:  Euvolemic   PONV Controlled:  Controlled   Airway Patency:  Patent      Post Op Vitals Reviewed: Yes      Staff: Anesthesiologist, CRNA         No complications documented      BP   156/70   Temp 97 1   Pulse 90   Resp 19   SpO2 97

## 2021-01-06 NOTE — OP NOTE
OPERATIVE REPORT  PATIENT NAME: Jazz Pickett    :  1965  MRN: [id number]  Pt Location: [facility] OR ROOM 08    SURGERY DATE: 2021    Surgeon(s) and Role:     * Sumi Bustamante MD - Primary      * Juan Jose Freeman - Assisting     * Dannie Campa MD - Assisting    Preop Diagnosis:  Lung nodule [R91 1]    Post-Op Diagnosis Codes:     * Lung nodule [R91 1]    Procedure(s) (LRB):  THORACOSCOPY VIDEO ASSISTED SURGERY (VATS), (Right)  RESECTION WEDGE LUNG RIGHT UPPER LOBE (Right)  RIGHT UPPER LOBECTOMY LUNG (Right)  BRONCHOSCOPY FLEXIBLE (N/A)    Specimen(s):  ID Type Source Tests Collected by Time Destination   1 : Right upper lobe wedge resection Tissue Lung, Right Upper Lobe TISSUE EXAM Sumi Bustamante MD 2021 9457    2 : Level 2 Tissue Lymph Node TISSUE EXAM Sumi Bustamante MD 2021 1002    3 : Level 4R Tissue Lymph Node TISSUE EXAM Sumi Bustamante MD 2021 1003    4 : Level 4R #2 Tissue Lymph Node TISSUE EXAM Sumi Bustamante MD 2021 1005    5 : Level 2R #2 Tissue Lymph Node TISSUE EXAM Sumi Bustamante MD 2021 1006    6 : Level 7 Tissue Lymph Node TISSUE EXAM Sumi Bustamante MD 2021 1009    7 : Level 10 Anterior Tissue Lymph Node TISSUE EXAM Sumi Bustamante MD 2021 1046    8 : Level 11 Tissue Lymph Node TISSUE EXAM Sumi Bustamante MD 2021 1120    9 : Right upper lobe completion Tissue Lung, Right Upper Lobe TISSUE EXAM Sumi Bustamante MD 2021 1140        Estimated Blood Loss:   100 mL    Drains:  Chest Tube 1 Right Pleural 28 Fr  (Active)   Number of days: 0       Urethral Catheter Latex 16 Fr  (Active)   Number of days: 0       Anesthesia Type:   General    Operative Indications:  Lung nodule [R91 1]    Operative Findings:  RUL wedge resection  Frozen specimen consistent with NSCLC   Completion RULobectomy performed    Complications:   None    Procedure and Technique:  INDICATION:       Jazz Pickett is a 54 y o  male who presents with a RUL nodule  Based upon its appearance on CT scan, this was concerning for a malignancy  It was located in a difficult position for percutaneous biopsy so he was offered a VATS wedge resection with possible completion lobectomy  He was deemed an adequate surgical candidate  The potential risks and benefits of the surgery were explained to the patient and he elected to proceed  All of his questions were answered  DETAILS OF PROCEDURE:  The patient was correctly identified by name and medical record number in the holding area and brought to the operative suite, where she was placed supine on the operative table  After satisfactory induction of general endotracheal anesthesia, a flexible bronchoscope was passed through the endotracheal tube visualizing the distal trachea, carmen, right and left main stem bronchi, including all of the primary and secondary divisions  No evidence of any endobronchial tumor was noted  No suspicion or identified risk for TB or other airborne infectious disease; bronchoscopy procedure being performed for diagnostic purposes  Flexible bronchoscopy was then terminated and the scope was withdrawn  The patient was positioned in the left lateral decubitus position, prepped and draped in the usual fashion  A time-out was performed to confirm procedure and laterality  A 1 5cm incision was made in the 7th intercostal space, in line with the scapula tip  This was carried down to the pleura and the thoracic cavity was entered  The thoracoscope was introduced and the cavity explored  An additional port was placed in the 7th intercostal space in the posterior axillary line  An access port was then created in the 4th intercostal space in the anterior axillary line  An intercostal nerve block was then applied using Exparel into rib spaces 3-10  The nodule was identified in the right upper lobe  Using multiple loads of a tissue stapler, a wedge resection was performed  This was sent for frozen section  The pathology was consistent with a non-small cell lung cancer  The decision was made to proceed with completion lobectomy  The entire lung was then mobilized using a combination of electrocautery and sharp dissection to divide adhesions to the mediastinum  The inferior pulmonary ligament was taken down using electrocautery  No level 9 lymph nodes were identified  The overlying pleura was incised up the posterior hilum  Level 7 lymph nodes were taken  The lung was then retracted inferiorly and posteriorly, exposing the superior mediastinal structures  The superior pulmonary vein was identified along with the middle lobe branch  The superior pulmonary vein was circumferentially dissected using blunt dissection  Using a vascular load stapler, the superior pulmonary vein was divided  After division of the superior pulmonary vein, the truncus anterior was identified  This was then circumferentially dissected  Level 10 lymph node around this artery was sent for pathology  The truncus anterior was then taken with a vascular load stapler  Attention was then turned to the right upper lobe bronchus  The upper lobe bronchus was then identified and divided using an endoscopic stapler  Level 11 lymph nodes around the bronchus were taken and sent for routine pathology  After the bronchus was taken, the posterior ascending artery was easily identified  This was circumferentially dissected  This was then taken with a vascular load stapler  After this division, the lobectomy was completed with serial firings of a tissue load stapler  The specimen was placed in a Lap-Sac and removed from the chest  The specimen was sent off the table  The resection bed and all dissected areas were examined and hemostasis was deemed as adequate  Dissection of stations 2 and 4 was then performed and these lymph nodes were sent off the table to pathology   Hemostasis was obtained by placing Surgicel into the lymph node beds  A posterior apical 28F chest tube was placed and secured to the skin with 0 Prolene sutures  The lung was re-expanded under direct visualization  The wounds were closed in layers using Vicryl suture followed by subcuticular monofilament suture  Dermabond was applied to each wound  At the end of the procedure, the instrument, sponge and needle counts were confirmed to be correct x2  The patient tolerated the procedure well and was delivered to the PACU         I was present for the entire procedure    Patient Disposition:  PACU  and extubated and stable    SIGNATURE: Quan Burroughs MD  DATE: January 6, 2021  TIME: 12:13 PM

## 2021-01-06 NOTE — ANESTHESIA PROCEDURE NOTES
Arterial Line Insertion  Performed by: Alexandru Downey CRNA  Authorized by: Ozzy Marley MD   Consent: Verbal consent obtained  Written consent obtained  Risks and benefits: risks, benefits and alternatives were discussed  Consent given by: patient  Patient understanding: patient states understanding of the procedure being performed  Patient consent: the patient's understanding of the procedure matches consent given  Procedure consent: procedure consent matches procedure scheduled  Relevant documents: relevant documents present and verified  Required items: required blood products, implants, devices, and special equipment available  Patient identity confirmed: verbally with patient and arm band  Preparation: Patient was prepped and draped in the usual sterile fashion  Indications: hemodynamic monitoring  Orientation:  Left  Location: radial artery  Sedation:  Patient sedated: geta      Procedure Details:  Elver's test normal: yes  Needle gauge: 20  Seldinger technique: Seldinger technique used  Number of attempts: 1    Post-procedure:  Post-procedure: dressing applied  Waveform: good waveform and waveform confirmed  Post-procedure CNS: normal and unchanged

## 2021-01-06 NOTE — PLAN OF CARE
Problem: Prexisting or High Potential for Compromised Skin Integrity  Goal: Skin integrity is maintained or improved  Description: INTERVENTIONS:  - Identify patients at risk for skin breakdown  - Assess and monitor skin integrity  - Assess and monitor nutrition and hydration status  - Monitor labs   - Assess for incontinence   - Turn and reposition patient  - Assist with mobility/ambulation  - Relieve pressure over bony prominences  - Avoid friction and shearing  - Provide appropriate hygiene as needed including keeping skin clean and dry  - Evaluate need for skin moisturizer/barrier cream  - Collaborate with interdisciplinary team   - Patient/family teaching  - Consider wound care consult   Outcome: Progressing     Problem: Potential for Falls  Goal: Patient will remain free of falls  Description: INTERVENTIONS:  - Assess patient frequently for physical needs  -  Identify cognitive and physical deficits and behaviors that affect risk of falls    -  [place] fall precautions as indicated by assessment   - Educate patient/family on patient safety including physical limitations  - Instruct patient to call for assistance with activity based on assessment  - Modify environment to reduce risk of injury  - Consider OT/PT consult to assist with strengthening/mobility  Outcome: Progressing     Problem: PAIN - ADULT  Goal: Verbalizes/displays adequate comfort level or baseline comfort level  Description: Interventions:  - Encourage patient to monitor pain and request assistance  - Assess pain using appropriate pain scale  - Administer analgesics based on type and severity of pain and evaluate response  - Implement non-pharmacological measures as appropriate and evaluate response  - Consider cultural and social influences on pain and pain management  - Notify physician/advanced practitioner if interventions unsuccessful or patient reports new pain  Outcome: Progressing     Problem: INFECTION - ADULT  Goal: Absence or prevention of progression during hospitalization  Description: INTERVENTIONS:  - Assess and monitor for signs and symptoms of infection  - Monitor lab/diagnostic results  - Monitor all insertion sites, i e  indwelling lines, tubes, and drains  - Monitor endotracheal if appropriate and nasal secretions for changes in amount and color  - [place] appropriate cooling/warming therapies per order  - Administer medications as ordered  - Instruct and encourage patient and family to use good hand hygiene technique  - Identify and instruct in appropriate isolation precautions for identified infection/condition  Outcome: Progressing  Goal: Absence of fever/infection during neutropenic period  Description: INTERVENTIONS:  - Monitor WBC    Outcome: Progressing     Problem: SAFETY ADULT  Goal: Patient will remain free of falls  Description: INTERVENTIONS:  - Assess patient frequently for physical needs  -  Identify cognitive and physical deficits and behaviors that affect risk of falls    -  [place] fall precautions as indicated by assessment   - Educate patient/family on patient safety including physical limitations  - Instruct patient to call for assistance with activity based on assessment  - Modify environment to reduce risk of injury  - Consider OT/PT consult to assist with strengthening/mobility  Outcome: Progressing  Goal: Maintain or return to baseline ADL function  Description: INTERVENTIONS:  -  Assess patient's ability to carry out ADLs; assess patient's baseline for ADL function and identify physical deficits which impact ability to perform ADLs (bathing, care of mouth/teeth, toileting, grooming, dressing, etc )  - Assess/evaluate cause of self-care deficits   - Assess range of motion  - Assess patient's mobility; develop plan if impaired  - Assess patient's need for assistive devices and provide as appropriate  - Encourage maximum independence but intervene and supervise when necessary  - Involve family in performance of ADLs  - Assess for home care needs following discharge   - Consider OT consult to assist with ADL evaluation and planning for discharge  - Provide patient education as appropriate  Outcome: Progressing  Goal: Maintain or return mobility status to optimal level  Description: INTERVENTIONS:  - Assess patient's baseline mobility status (ambulation, transfers, stairs, etc )    - Identify cognitive and physical deficits and behaviors that affect mobility  - Identify mobility aids required to assist with transfers and/or ambulation (gait belt, sit-to-stand, lift, walker, cane, etc )  - [place] fall precautions as indicated by assessment  - Record patient progress and toleration of activity level on Mobility SBAR; progress patient to next Phase/Stage  - Instruct patient to call for assistance with activity based on assessment  - Consider rehabilitation consult to assist with strengthening/weightbearing, etc   Outcome: Progressing     Problem: DISCHARGE PLANNING  Goal: Discharge to home or other facility with appropriate resources  Description: INTERVENTIONS:  - Identify barriers to discharge w/patient and caregiver  - Arrange for needed discharge resources and transportation as appropriate  - Identify discharge learning needs (meds, wound care, etc )  - Arrange for interpretive services to assist at discharge as needed  - Refer to Case Management Department for coordinating discharge planning if the patient needs post-hospital services based on physician/advanced practitioner order or complex needs related to functional status, cognitive ability, or social support system  Outcome: Progressing     Problem: Knowledge Deficit  Goal: Patient/family/caregiver demonstrates understanding of disease process, treatment plan, medications, and discharge instructions  Description: Complete learning assessment and assess knowledge base    Interventions:  - Provide teaching at level of understanding  - Provide teaching via preferred learning methods  Outcome: Progressing

## 2021-01-07 ENCOUNTER — APPOINTMENT (INPATIENT)
Dept: RADIOLOGY | Facility: HOSPITAL | Age: 56
DRG: 168 | End: 2021-01-07
Payer: COMMERCIAL

## 2021-01-07 LAB
ANION GAP SERPL CALCULATED.3IONS-SCNC: 6 MMOL/L (ref 4–13)
ATRIAL RATE: 81 BPM
BUN SERPL-MCNC: 19 MG/DL (ref 5–25)
CALCIUM SERPL-MCNC: 8.3 MG/DL (ref 8.3–10.1)
CHLORIDE SERPL-SCNC: 107 MMOL/L (ref 100–108)
CO2 SERPL-SCNC: 24 MMOL/L (ref 21–32)
CREAT SERPL-MCNC: 1.06 MG/DL (ref 0.6–1.3)
ERYTHROCYTE [DISTWIDTH] IN BLOOD BY AUTOMATED COUNT: 13.4 % (ref 11.6–15.1)
GFR SERPL CREATININE-BSD FRML MDRD: 79 ML/MIN/1.73SQ M
GLUCOSE SERPL-MCNC: 123 MG/DL (ref 65–140)
HCT VFR BLD AUTO: 38.4 % (ref 36.5–49.3)
HGB BLD-MCNC: 12.7 G/DL (ref 12–17)
MAGNESIUM SERPL-MCNC: 2 MG/DL (ref 1.6–2.6)
MCH RBC QN AUTO: 31.3 PG (ref 26.8–34.3)
MCHC RBC AUTO-ENTMCNC: 33.1 G/DL (ref 31.4–37.4)
MCV RBC AUTO: 95 FL (ref 82–98)
P AXIS: 39 DEGREES
PLATELET # BLD AUTO: 286 THOUSANDS/UL (ref 149–390)
PMV BLD AUTO: 9.5 FL (ref 8.9–12.7)
POTASSIUM SERPL-SCNC: 4 MMOL/L (ref 3.5–5.3)
PR INTERVAL: 206 MS
QRS AXIS: -8 DEGREES
QRSD INTERVAL: 80 MS
QT INTERVAL: 358 MS
QTC INTERVAL: 415 MS
RBC # BLD AUTO: 4.06 MILLION/UL (ref 3.88–5.62)
SODIUM SERPL-SCNC: 137 MMOL/L (ref 136–145)
T WAVE AXIS: 53 DEGREES
VENTRICULAR RATE: 81 BPM
WBC # BLD AUTO: 12.58 THOUSAND/UL (ref 4.31–10.16)

## 2021-01-07 PROCEDURE — 93005 ELECTROCARDIOGRAM TRACING: CPT

## 2021-01-07 PROCEDURE — 80048 BASIC METABOLIC PNL TOTAL CA: CPT | Performed by: PHYSICIAN ASSISTANT

## 2021-01-07 PROCEDURE — 83735 ASSAY OF MAGNESIUM: CPT | Performed by: PHYSICIAN ASSISTANT

## 2021-01-07 PROCEDURE — 85027 COMPLETE CBC AUTOMATED: CPT | Performed by: PHYSICIAN ASSISTANT

## 2021-01-07 PROCEDURE — 97110 THERAPEUTIC EXERCISES: CPT

## 2021-01-07 PROCEDURE — 93010 ELECTROCARDIOGRAM REPORT: CPT | Performed by: INTERNAL MEDICINE

## 2021-01-07 PROCEDURE — 97163 PT EVAL HIGH COMPLEX 45 MIN: CPT

## 2021-01-07 PROCEDURE — 99024 POSTOP FOLLOW-UP VISIT: CPT | Performed by: THORACIC SURGERY (CARDIOTHORACIC VASCULAR SURGERY)

## 2021-01-07 PROCEDURE — 71045 X-RAY EXAM CHEST 1 VIEW: CPT

## 2021-01-07 RX ADMIN — POLYETHYLENE GLYCOL 3350 17 G: 17 POWDER, FOR SOLUTION ORAL at 08:22

## 2021-01-07 RX ADMIN — GABAPENTIN 300 MG: 300 CAPSULE ORAL at 15:26

## 2021-01-07 RX ADMIN — GABAPENTIN 300 MG: 300 CAPSULE ORAL at 21:35

## 2021-01-07 RX ADMIN — OXYCODONE HYDROCHLORIDE 10 MG: 5 TABLET ORAL at 21:35

## 2021-01-07 RX ADMIN — DOCUSATE SODIUM 100 MG: 100 CAPSULE, LIQUID FILLED ORAL at 17:37

## 2021-01-07 RX ADMIN — GABAPENTIN 300 MG: 300 CAPSULE ORAL at 08:22

## 2021-01-07 RX ADMIN — ACETAMINOPHEN 975 MG: 325 TABLET, FILM COATED ORAL at 08:22

## 2021-01-07 RX ADMIN — OXYCODONE HYDROCHLORIDE 5 MG: 5 TABLET ORAL at 08:22

## 2021-01-07 RX ADMIN — OXYCODONE HYDROCHLORIDE 5 MG: 5 TABLET ORAL at 00:16

## 2021-01-07 RX ADMIN — PANTOPRAZOLE SODIUM 40 MG: 40 TABLET, DELAYED RELEASE ORAL at 05:18

## 2021-01-07 RX ADMIN — SENNOSIDES 8.6 MG: 8.6 TABLET, FILM COATED ORAL at 08:22

## 2021-01-07 RX ADMIN — ACETAMINOPHEN 975 MG: 325 TABLET, FILM COATED ORAL at 15:26

## 2021-01-07 RX ADMIN — CELECOXIB 100 MG: 100 CAPSULE ORAL at 08:23

## 2021-01-07 RX ADMIN — DOCUSATE SODIUM 100 MG: 100 CAPSULE, LIQUID FILLED ORAL at 08:22

## 2021-01-07 RX ADMIN — OXYCODONE HYDROCHLORIDE 10 MG: 5 TABLET ORAL at 15:26

## 2021-01-07 RX ADMIN — ACETAMINOPHEN 975 MG: 325 TABLET, FILM COATED ORAL at 21:35

## 2021-01-07 RX ADMIN — ENOXAPARIN SODIUM 40 MG: 40 INJECTION SUBCUTANEOUS at 08:22

## 2021-01-07 RX ADMIN — CELECOXIB 100 MG: 100 CAPSULE ORAL at 17:37

## 2021-01-07 NOTE — PROGRESS NOTES
Progress Note -    Loreta Lower 54 y o  male MRN: [id number]  Unit/Bed#: [facility] 403-01 Encounter: [id number]    Assessment:  46-yr old male;POD # 1; s/p VATS-assisted R upper lobectomy  Stable VS in room air  R CT: 185 cc; serosanguinous/WS/AL-  Doing IS @ 750  PLAN:  - dc graham/briana   - PT/OT/pulmonary toilet  - continue VTE chemoprophylaxis  - keep CT on WS  Subjective:   - c/o: mild to moderate right chest wall pain; responsive to analgesics  Objective:     Vitals: Temp:  [97 1 °F (36 2 °C)-98 4 °F (36 9 °C)] 98 4 °F (36 9 °C)  HR:  [0-145] 81  Resp:  [12-24] 17  BP: (102-137)/(64-83) 109/72  Arterial Line BP: (104-145)/(50-88) 104/70  Body mass index is 33 95 kg/m²  I/O       01/05 0701 - 01/06 0700 01/06 0701 - 01/07 0700    P  O   480    I V  (mL/kg)  2703 (24 8)    IV Piggyback  250    Total Intake(mL/kg)  3433 (31 5)    Urine (mL/kg/hr)  775 (0 3)    Blood  100    Chest Tube  260    Total Output  1135    Net  +2298                Physical Exam:  GEN: NAD  HEENT: atraumatic   CV: RRR  Lung: Normal effort; R CT in situ; no air leak on Thopaz device set at -8  Ab: Soft, NT/ND  Extrem: No CCE  Neuro: A+Ox3    Lab, Imaging and other studies: I have personally reviewed pertinent reports      VTE Pharmacologic Prophylaxis: Enoxaparin (Lovenox)  VTE Mechanical Prophylaxis: sequential compression device

## 2021-01-07 NOTE — PHYSICAL THERAPY NOTE
Physical Therapy Evaluation     Patient's Name: Loreta Jovel    Admitting Diagnosis  Lung nodule [R91 1]    Problem List  Patient Active Problem List   Diagnosis    Syncope and collapse    Knee injury    Right upper lobe pulmonary nodule    Tobacco use    Liver lesion    Left chest pressure    Cigarette nicotine dependence in remission       Past Medical History  Past Medical History:   Diagnosis Date    COPD [facility]chronic obstructive [facility] disease) [facility] [address] [facility]     Cough     Emphysema of lung [facility] [address] [facility]     Liver lesion     Right upper lobe pulmonary nodule     Syncope and collapse        Past Surgical History  Past Surgical History:   Procedure Laterality Date    CHOLECYSTECTOMY      TESTICLE SURGERY            01/07/21 1002   PT Last Visit   PT Visit Date 01/07/21   Note Type   Note type Evaluation   Pain Assessment   Pain Assessment Tool FLACC   Pain Location/Orientation Orientation: Right;Location: Incision; Location: Chest   Pain Onset/Description Onset: Ongoing;Frequency: Intermittent; Descriptor: Aching;Descriptor: Discomfort   Effect of Pain on Daily Activities difficulty taking deep breaths   Patient's Stated Pain Goal No pain   Hospital Pain Intervention(s) Repositioned; Ambulation/increased activity; Emotional support   Pain Rating: FLACC (Rest) - Face 0   Pain Rating: FLACC (Rest) - Legs 0   Pain Rating: FLACC (Rest) - Activity 0   Pain Rating: FLACC (Rest) - Cry 0   Pain Rating: FLACC (Rest) - Consolability 0   Score: FLACC (Rest) 0   Pain Rating: FLACC (Activity) - Face 1   Pain Rating: FLACC (Activity) - Legs 0   Pain Rating: FLACC (Activity) - Activity 0   Pain Rating: FLACC (Activity) - Cry 1   Pain Rating: FLACC (Activity) - Consolability 1   Score: FLACC (Activity) 3   Home Living   Type of Home Mobile home   Home Layout One level  (3 TOÑO w/ hand rail)   Prior Function   Level of [place] Independent with ADLs and functional mobility  (amb w/o AD)   Lives With Spouse  (able to (A) as needed) Vocational Full time employment  ()   Restrictions/Precautions   Braces or Orthoses   (denies)   Other Precautions Multiple lines;Telemetry;Pain  ((R) CT)   General   Additional Pertinent History cleared for assessment (spoke to mary alice)   Cognition   Overall Cognitive Status WFL   Arousal/Participation Alert   Orientation Level Oriented to person;Oriented to place;Oriented to situation   Memory Within functional limits   Following Commands Follows one step commands without difficulty   Comments Pt is in the chair; agreeable to mobilize;   RUE Assessment   RUE Assessment WFL  (AROM)   LUE Assessment   LUE Assessment WFL  (AROM)   RLE Assessment   RLE Assessment WFL  (AROM)   Strength RLE   RLE Overall Strength   (good - (grossly))   LLE Assessment   LLE Assessment WFL  (AROM)   Strength LLE   LLE Overall Strength   (good - (grossly))   Transfers   Sit to Stand 4  Minimal assistance   Additional items Assist x 1;Verbal cues   Stand to Sit 4  Minimal assistance   Additional items Assist x 1;Verbal cues   Ambulation/Elevation   Gait pattern Excessively slow; Short stride; Inconsistent kimberly   Gait Assistance 4  Minimal assist   Additional items Assist x 1;Verbal cues; Tactile cues  (stand by (A) of 2nd for lines)   Assistive Device Rolling walker   Distance 2 x 50 ft w/ standing rest stop in between; limited by SOB; resting O2 sat at 91 %; post amb, O2 sat at 90 %   Stair Management Assistance Not tested  (not appropriate at this time)   Balance   Static Sitting Good   Dynamic Sitting Fair +   Static Standing Fair   Dynamic Standing Fair -   Ambulatory Poor +   Activity Tolerance   Activity Tolerance Patient limited by fatigue;Treatment limited secondary to medical complications (Comment)  (REID)   Nurse Made Aware spoke to KOLE Tobias   Assessment   Prognosis Good   Problem List Decreased strength;Decreased endurance; Impaired balance;Decreased mobility;Obesity;Pain   Assessment Pt is 54 y o  male admitted with Dx of right upper lobe pulmonary nodule and underwent THORACOSCOPY VIDEO ASSISTED SURGERY (VATS), (Right), RESECTION WEDGE LUNG RIGHT UPPER LOBE, RIGHT UPPER LOBECTOMY LUNG, and BRONCHOSCOPY FLEXIBLE on 1/7/2021  Pt 's comorbidities affecting POC include: COPD, tobacco use and personal factors of: TOÑO; works as a   Pt's clinical presentation is currently unstable/unpredictable which is evident in ongoing telem monitoring, CT in place, and need for min assist w/ all phases of performed mobility incl amb w/ rw when usually mobilizing independently and w/o AD  Pt presents w/ postop guarding, min overall weakness, decreased functional endurance and inconsistent amb balance and gait patterns (rw was introduced)  Will cont to follow pt in PT for progressive mobilization to address above functional deficits and to max level of (I), endurance, and safety  Otherwise, anticipate pt will return home w/ available family support upon D/C provided he cont improving w/ mobility skills, safety, and endurance (incl on the steps) and when medically cleared; pt denies the need for home PT; will follow  Barriers to Discharge Inaccessible home environment   Goals   Patient Goals to go home   STG Expiration Date 01/17/21   Short Term Goal #1 7-10 days  Pt will amb 300 ft w/ least restrictive assistive device PRN, mod (I) in order to facilitate safe return to premorbid environment and community amb status  Pt will negotiate 3 steps w/ hand rail and SPC PRN, mod (I) in order to navigate in and out of home environment safely  Pt will achieve (I) level w/ bed mob in order to facilitate safety with OOB and back to bed transitions in own living environment  Pt will perform transfers w/ mod (I) to assure (I) and safety w/ functional mobility/transitions w/ all aspects of mobility/locomotion  Pt will participate in LE therex and balance activities to max progression w/ mobility skills     PT Treatment Day 1  (follow up Tx session) Plan   Treatment/Interventions Functional transfer training; Therapeutic exercise;LE strengthening/ROM; Elevations; Endurance training;Gait training;Bed mobility;Spoke to nursing;Equipment eval/education   PT Frequency Other (Comment)  (3-6x/wk)   Recommendation   PT Discharge Recommendation Return to previous environment with social support  (pt declines home PT)   Equipment Recommended Walker  (at this time; will cont to monitor progress)   Modified [place] Scale   Modified [place] Scale 4       Elian Madrigal, PT           PT Tx note    Time In: 10:02  Time Out: 10:11  Total Time: 9 min    S:  Pt is in the chair; appears to be comfortable; agreeable to perform LE therex;     O:  (B) LE therex/HEP performed in the chair as following: (B) ankle pumps 2 x 10 reps, AROM; (B) LAQ 2 x 10 reps, AROM; marching 2 x 5 reps, AROM; (B) SCD re-activated; call bell placed w/in reach;     A:  Additional follow up consecutive session performed to initiate LE therex in order to max strength and to facilitate progression w/ functional mobility skills and overall level of (I) and to initiate HEP  Pt was able to complete the program actively w/ no increased discomfort or excessive fatigue expressed; verbal instructions for proper form and breathing patterns provided as well as rest periods provided during the session as needed; pt remained in NAD and appeared to be comfortable at the end of session; currently, cont to anticipate pt will return home w/ family support pending additional progress (incl on the steps) and when medically cleared; will follow  P:  Cont to follow pt 3-6x/week for LE therex, functional mobility training, endurance training and pt education per POC to max functional (I) and safety        [place], PT

## 2021-01-07 NOTE — QUICK NOTE
- s/p Right upper lobectomy- VATS  - c/o: mild right chest wall pain  - R CT in place; no air leak  Dressing dry  - stable VS in room air

## 2021-01-07 NOTE — RESPIRATORY THERAPY NOTE
resp care      01/07/21 0815   Respiratory Protocol   Airway Clearance Plan Discontinue Protocol   Respiratory Assessment   Assessment Type Pre-treatment   Respiratory Pattern Normal   Chest Assessment Chest expansion symmetrical   Bilateral Breath Sounds Clear   Resp Comments pt using IS on his own with good effort  States copd hx but  has never had meds for this  BS clear and pt is on rma   Will encourge self use and d/c ACP

## 2021-01-07 NOTE — PLAN OF CARE
Problem: PHYSICAL THERAPY ADULT  Goal: Performs mobility at highest level of function for planned discharge setting  See evaluation for individualized goals  Description: Treatment/Interventions: Functional transfer training, Therapeutic exercise, LE strengthening/ROM, Elevations, Endurance training, Gait training, Bed mobility, Spoke to nursing, Equipment eval/education  Equipment Recommended: Walker(at this time; will cont to monitor progress)       See flowsheet documentation for full assessment, interventions and recommendations  Note: Prognosis: Good  Problem List: Decreased strength, Decreased endurance, Impaired balance, Decreased mobility, Obesity, Pain  Assessment: Pt is 54 y o  male admitted with Dx of right upper lobe pulmonary nodule and underwent THORACOSCOPY VIDEO ASSISTED SURGERY (VATS), (Right), RESECTION WEDGE LUNG RIGHT UPPER LOBE, RIGHT UPPER LOBECTOMY LUNG, and BRONCHOSCOPY FLEXIBLE on 1/7/2021  Pt 's comorbidities affecting POC include: COPD, tobacco use and personal factors of: TOÑO; works as a   Pt's clinical presentation is currently unstable/unpredictable which is evident in ongoing telem monitoring, CT in place, and need for min assist w/ all phases of performed mobility incl amb w/ rw when usually mobilizing independently and w/o AD  Pt presents w/ postop guarding, min overall weakness, decreased functional endurance and inconsistent amb balance and gait patterns (rw was introduced)  Will cont to follow pt in PT for progressive mobilization to address above functional deficits and to max level of (I), endurance, and safety  Otherwise, anticipate pt will return home w/ available family support upon D/C provided he cont improving w/ mobility skills, safety, and endurance (incl on the steps) and when medically cleared; pt denies the need for home PT; will follow    Barriers to Discharge: Inaccessible home environment     PT Discharge Recommendation: Return to previous environment with social support(pt declines home PT)          See flowsheet documentation for full assessment

## 2021-01-07 NOTE — UTILIZATION REVIEW
Initial Clinical Review    Elective surgical procedure  Age/Sex: 54 y o  male  Surgery Date: 01/06/2021  Procedure: THORACOSCOPY VIDEO ASSISTED SURGERY (VATS), (Right)  RESECTION WEDGE LUNG RIGHT UPPER LOBE (Right)  RIGHT UPPER LOBECTOMY LUNG (Right)  BRONCHOSCOPY FLEXIBLE (N/A)  Anesthesia: General  Operative Findings: RUL wedge resection  Frozen specimen consistent with NSCLC  Completion RULobectomy performed  POD#1 Progress Note (01/07/2021):  s/p VATS-assisted R upper lobectomy  DC A  Line and graham  PT/OT    Pulmonary toliet - IS, cough etc   Keep chest tube on water seal     Admission Orders: Date/Time/Statement:   Admission Orders (From admission, onward)     Ordered        01/06/21 1233  Inpatient Admission  Once                   Orders Placed This Encounter   Procedures    Inpatient Admission     Standing Status:   Standing     Number of Occurrences:   1     Order Specific Question:   Admitting Physician     Answer:   Lurena Lennox [[id number]]     Order Specific Question:   Level of Care     Answer:   Level 1 Stepdown [13]     Order Specific Question:   Estimated length of stay     Answer:   Inpatient Only Surgery     Vital Signs: /69 (BP Location: Right arm)   Pulse 82   Temp 98 °F (36 7 °C) (Oral)   Resp 18   Ht 5' 10 5" (1 791 m)   Wt 111 kg (244 lb 0 8 oz)   SpO2 90%   BMI 34 52 kg/m²   Diet: Regular diet  Mobility: Ambulate TID  DVT Prophylaxis: VTE Pharmacologic Prophylaxis: Enoxaparin (Lovenox)  VTE Mechanical Prophylaxis: sequential compression device  Medications/Pain Control:   Scheduled Medications:  acetaminophen, 975 mg, Oral, Q6H  celecoxib, 100 mg, Oral, BID  docusate sodium, 100 mg, Oral, BID  enoxaparin, 40 mg, Subcutaneous, Daily  gabapentin, 300 mg, Oral, TID  pantoprazole, 40 mg, Oral, Early Morning  polyethylene glycol, 17 g, Oral, Daily  senna, 1 tablet, Oral, Daily      Continuous IV Infusions:     PRN Meds:  albuterol, 2 puff, Inhalation, Q6H PRN  HYDROmorphone, 0 5 mg, Intravenous, Q4H PRN  ondansetron, 4 mg, Intravenous, Q6H PRN  oxyCODONE, 10 mg, Oral, Q4H PRN  oxyCODONE, 5 mg, Oral, Q4H PRN        Network Utilization Review Department  ATTENTION: Please call with any questions or concerns to [phone] and carefully listen to the prompts so that you are directed to the right person  All voicemails are confidential   Edmundo Duval all requests for admission clinical reviews, approved or denied determinations and any other requests to dedicated fax number below belonging to the campus where the patient is receiving treatment   List of dedicated fax numbers for the Facilities:  [address] DENIALS (Administrative/Medical Necessity) [phone]   [address] (Maternity/NICU/Pediatrics) [phone] [address] Radha Ryder 9212 (  Ellis Amaya [facility] "Lakshmi" 103) [address] [place] [address] [address] [place]) [address] [place] [address] [phone]

## 2021-01-07 NOTE — RESTORATIVE TECHNICIAN NOTE
Restorative Specialist Mobility Note       Activity: Ambulate in baltazar, Chair     Assistive Device: Front wheel walker

## 2021-01-07 NOTE — CASE MANAGEMENT
51yo male is POD#1 right VATS/wedge resection, RUL lobectomy  Has chest tube  He is alert and oriented, independent ADLs, works full time, drives  He resides in [facility] with his wife Mu Gonzáles, phone [phone]  They live in a mobile home with 3 TOÑO  He quit smoking in October  He uses no DME, no hx hhc, no rehab  Denies mental illness, D&A  His PCP is Dr Migel Hernández and he uses [place] [address] [address]  Discussed [facility] meds on discharge, He would like pain meds filled here and rest sent to [place] [address] at discharge  His wife does not drive, so his friend or daughter will transport home when ready  No HHC needs  CM reviewed d/c planning process including the following: identifying help at home, patient preference for d/c planning needs, Discharge Lounge, [facility] Meds to Bed program, availability of treatment team to discuss questions or concerns patient and/or family may have regarding understanding medications and recognizing signs and symptoms once discharged  CM also encouraged patient to follow up with all recommended appointments after discharge  Patient advised of importance for patient and family to participate in managing patients medical well being  Patient/caregiver received discharge checklist  Content reviewed  Patient/caregiver encouraged to participate in discharge plan of care prior to discharge home

## 2021-01-08 ENCOUNTER — APPOINTMENT (INPATIENT)
Dept: RADIOLOGY | Facility: HOSPITAL | Age: 56
DRG: 168 | End: 2021-01-08
Payer: COMMERCIAL

## 2021-01-08 VITALS
HEART RATE: 81 BPM | BODY MASS INDEX: 34.44 KG/M2 | TEMPERATURE: 97.7 F | SYSTOLIC BLOOD PRESSURE: 121 MMHG | HEIGHT: 71 IN | OXYGEN SATURATION: 94 % | DIASTOLIC BLOOD PRESSURE: 73 MMHG | WEIGHT: 246.03 LBS | RESPIRATION RATE: 18 BRPM

## 2021-01-08 LAB
ABO GROUP BLD BPU: NORMAL
ABO GROUP BLD BPU: NORMAL
BPU ID: NORMAL
BPU ID: NORMAL
CROSSMATCH: NORMAL
CROSSMATCH: NORMAL
UNIT DISPENSE STATUS: NORMAL
UNIT DISPENSE STATUS: NORMAL
UNIT PRODUCT CODE: NORMAL
UNIT PRODUCT CODE: NORMAL
UNIT RH: NORMAL
UNIT RH: NORMAL

## 2021-01-08 PROCEDURE — 99024 POSTOP FOLLOW-UP VISIT: CPT | Performed by: PHYSICIAN ASSISTANT

## 2021-01-08 PROCEDURE — 0WP9X0Z REMOVAL OF DRAINAGE DEVICE FROM RIGHT PLEURAL CAVITY, EXTERNAL APPROACH: ICD-10-PCS | Performed by: THORACIC SURGERY (CARDIOTHORACIC VASCULAR SURGERY)

## 2021-01-08 PROCEDURE — 97166 OT EVAL MOD COMPLEX 45 MIN: CPT

## 2021-01-08 PROCEDURE — 97116 GAIT TRAINING THERAPY: CPT

## 2021-01-08 PROCEDURE — 97530 THERAPEUTIC ACTIVITIES: CPT

## 2021-01-08 PROCEDURE — 99024 POSTOP FOLLOW-UP VISIT: CPT | Performed by: THORACIC SURGERY (CARDIOTHORACIC VASCULAR SURGERY)

## 2021-01-08 PROCEDURE — 71046 X-RAY EXAM CHEST 2 VIEWS: CPT

## 2021-01-08 RX ORDER — DOCUSATE SODIUM 100 MG/1
100 CAPSULE, LIQUID FILLED ORAL 2 TIMES DAILY
Qty: 10 CAPSULE | Refills: 0 | Status: SHIPPED | OUTPATIENT
Start: 2021-01-08 | End: 2021-01-19 | Stop reason: HOSPADM

## 2021-01-08 RX ORDER — ACETAMINOPHEN 325 MG/1
975 TABLET ORAL EVERY 6 HOURS
Qty: 84 TABLET | Refills: 0 | Status: SHIPPED | OUTPATIENT
Start: 2021-01-08 | End: 2021-01-15

## 2021-01-08 RX ORDER — GABAPENTIN 300 MG/1
300 CAPSULE ORAL 3 TIMES DAILY
Qty: 63 CAPSULE | Refills: 0 | Status: SHIPPED | OUTPATIENT
Start: 2021-01-08 | End: 2021-01-19 | Stop reason: HOSPADM

## 2021-01-08 RX ORDER — OXYCODONE HYDROCHLORIDE 5 MG/1
5 TABLET ORAL EVERY 4 HOURS PRN
Qty: 30 TABLET | Refills: 0 | Status: SHIPPED | OUTPATIENT
Start: 2021-01-08 | End: 2021-01-19 | Stop reason: ALTCHOICE

## 2021-01-08 RX ORDER — IBUPROFEN 600 MG/1
600 TABLET ORAL EVERY 8 HOURS SCHEDULED
Qty: 21 TABLET | Refills: 0 | Status: SHIPPED | OUTPATIENT
Start: 2021-01-08 | End: 2021-01-19 | Stop reason: HOSPADM

## 2021-01-08 RX ADMIN — ENOXAPARIN SODIUM 40 MG: 40 INJECTION SUBCUTANEOUS at 09:15

## 2021-01-08 RX ADMIN — ACETAMINOPHEN 975 MG: 325 TABLET, FILM COATED ORAL at 09:15

## 2021-01-08 RX ADMIN — SENNOSIDES 8.6 MG: 8.6 TABLET, FILM COATED ORAL at 09:15

## 2021-01-08 RX ADMIN — POLYETHYLENE GLYCOL 3350 17 G: 17 POWDER, FOR SOLUTION ORAL at 09:15

## 2021-01-08 RX ADMIN — DOCUSATE SODIUM 100 MG: 100 CAPSULE, LIQUID FILLED ORAL at 09:15

## 2021-01-08 RX ADMIN — CELECOXIB 100 MG: 100 CAPSULE ORAL at 09:17

## 2021-01-08 RX ADMIN — PANTOPRAZOLE SODIUM 40 MG: 40 TABLET, DELAYED RELEASE ORAL at 06:15

## 2021-01-08 RX ADMIN — GABAPENTIN 300 MG: 300 CAPSULE ORAL at 09:15

## 2021-01-08 NOTE — PLAN OF CARE
Problem: Prexisting or High Potential for Compromised Skin Integrity  Goal: Skin integrity is maintained or improved  Description: INTERVENTIONS:  - Identify patients at risk for skin breakdown  - Assess and monitor skin integrity  - Assess and monitor nutrition and hydration status  - Monitor labs   - Assess for incontinence   - Turn and reposition patient  - Assist with mobility/ambulation  - Relieve pressure over bony prominences  - Avoid friction and shearing  - Provide appropriate hygiene as needed including keeping skin clean and dry  - Evaluate need for skin moisturizer/barrier cream  - Collaborate with interdisciplinary team   - Patient/family teaching  - Consider wound care consult   Outcome: Progressing     Problem: Potential for Falls  Goal: Patient will remain free of falls  Description: INTERVENTIONS:  - Assess patient frequently for physical needs  -  Identify cognitive and physical deficits and behaviors that affect risk of falls    -  [place] fall precautions as indicated by assessment   - Educate patient/family on patient safety including physical limitations  - Instruct patient to call for assistance with activity based on assessment  - Modify environment to reduce risk of injury  - Consider OT/PT consult to assist with strengthening/mobility  Outcome: Progressing     Problem: PAIN - ADULT  Goal: Verbalizes/displays adequate comfort level or baseline comfort level  Description: Interventions:  - Encourage patient to monitor pain and request assistance  - Assess pain using appropriate pain scale  - Administer analgesics based on type and severity of pain and evaluate response  - Implement non-pharmacological measures as appropriate and evaluate response  - Consider cultural and social influences on pain and pain management  - Notify physician/advanced practitioner if interventions unsuccessful or patient reports new pain  Outcome: Progressing     Problem: INFECTION - ADULT  Goal: Absence or prevention of progression during hospitalization  Description: INTERVENTIONS:  - Assess and monitor for signs and symptoms of infection  - Monitor lab/diagnostic results  - Monitor all insertion sites, i e  indwelling lines, tubes, and drains  - Monitor endotracheal if appropriate and nasal secretions for changes in amount and color  - [place] appropriate cooling/warming therapies per order  - Administer medications as ordered  - Instruct and encourage patient and family to use good hand hygiene technique  - Identify and instruct in appropriate isolation precautions for identified infection/condition  Outcome: Progressing  Goal: Absence of fever/infection during neutropenic period  Description: INTERVENTIONS:  - Monitor WBC    Outcome: Progressing     Problem: SAFETY ADULT  Goal: Patient will remain free of falls  Description: INTERVENTIONS:  - Assess patient frequently for physical needs  -  Identify cognitive and physical deficits and behaviors that affect risk of falls    -  [place] fall precautions as indicated by assessment   - Educate patient/family on patient safety including physical limitations  - Instruct patient to call for assistance with activity based on assessment  - Modify environment to reduce risk of injury  - Consider OT/PT consult to assist with strengthening/mobility  Outcome: Progressing  Goal: Maintain or return to baseline ADL function  Description: INTERVENTIONS:  -  Assess patient's ability to carry out ADLs; assess patient's baseline for ADL function and identify physical deficits which impact ability to perform ADLs (bathing, care of mouth/teeth, toileting, grooming, dressing, etc )  - Assess/evaluate cause of self-care deficits   - Assess range of motion  - Assess patient's mobility; develop plan if impaired  - Assess patient's need for assistive devices and provide as appropriate  - Encourage maximum independence but intervene and supervise when necessary  - Involve family in performance of ADLs  - Assess for home care needs following discharge   - Consider OT consult to assist with ADL evaluation and planning for discharge  - Provide patient education as appropriate  Outcome: Progressing  Goal: Maintain or return mobility status to optimal level  Description: INTERVENTIONS:  - Assess patient's baseline mobility status (ambulation, transfers, stairs, etc )    - Identify cognitive and physical deficits and behaviors that affect mobility  - Identify mobility aids required to assist with transfers and/or ambulation (gait belt, sit-to-stand, lift, walker, cane, etc )  - [place] fall precautions as indicated by assessment  - Record patient progress and toleration of activity level on Mobility SBAR; progress patient to next Phase/Stage  - Instruct patient to call for assistance with activity based on assessment  - Consider rehabilitation consult to assist with strengthening/weightbearing, etc   Outcome: Progressing     Problem: DISCHARGE PLANNING  Goal: Discharge to home or other facility with appropriate resources  Description: INTERVENTIONS:  - Identify barriers to discharge w/patient and caregiver  - Arrange for needed discharge resources and transportation as appropriate  - Identify discharge learning needs (meds, wound care, etc )  - Arrange for interpretive services to assist at discharge as needed  - Refer to Case Management Department for coordinating discharge planning if the patient needs post-hospital services based on physician/advanced practitioner order or complex needs related to functional status, cognitive ability, or social support system  Outcome: Progressing     Problem: Knowledge Deficit  Goal: Patient/family/caregiver demonstrates understanding of disease process, treatment plan, medications, and discharge instructions  Description: Complete learning assessment and assess knowledge base    Interventions:  - Provide teaching at level of understanding  - Provide teaching via preferred learning methods  Outcome: Progressing     Problem: RESPIRATORY - ADULT  Goal: Achieves optimal ventilation and oxygenation  Description: INTERVENTIONS:  - Assess for changes in respiratory status  - Assess for changes in mentation and behavior  - Position to facilitate oxygenation and minimize respiratory effort  - Oxygen administered by appropriate delivery if ordered  - Initiate smoking cessation education as indicated  - Encourage broncho-pulmonary hygiene including cough, deep breathe, Incentive Spirometry  - Assess the need for suctioning and aspirate as needed  - Assess and instruct to report SOB or any respiratory difficulty  - Respiratory Therapy support as indicated  Outcome: Progressing

## 2021-01-08 NOTE — PHYSICAL THERAPY NOTE
PHYSICAL THERAPY NOTE          Patient Name: Mariza Butt  SRRYQ'M Date: 1/8/2021 01/08/21 1006   PT Last Visit   PT Visit Date 01/08/21   Note Type   Note Type Treatment   Pain Assessment   Pain Assessment Tool Pain Assessment not indicated - pt denies pain   Pain Score No Pain   Restrictions/Precautions   Other Precautions Telemetry   General   Chart Reviewed Yes   Additional Pertinent History Cleared for Tx session (spoke to nsg); reportedly pt is for D/C today; Response to Previous Treatment Patient with no complaints from previous session  Cognition   Overall Cognitive Status WFL   Arousal/Participation Alert; Cooperative   Attention Within functional limits   Orientation Level Oriented to person;Oriented to place;Oriented to situation   Memory Within functional limits   Following Commands Follows all commands and directions without difficulty   Subjective   Subjective Pt is in the chair; reports he will be going home today; agreeable to amb and try steps; pt does not feel he needs a walker or a cane at this time --> wishes to try mobilizing w/o it  Transfers   Sit to Stand 6  Modified independent  (3 trials)   Stand to Sit 6  Modified independent  (3 trials)   Additional Comments On RA, resting O2 sat at 91-92 %; post amb trial, O2 sat at 93 %   Ambulation/Elevation   Gait pattern Wide RENÉ; Short stride; Inconsistent kimberly  (no overt LOB, gross knee buckling or excessive swaying)   Gait Assistance 6  Modified independent   Assistive Device None   Distance  110 ft; after seated rest period, another 2 x 15 ft w/ steps negotiation in between; after another seated rest period, additional 110 ft   Stair Management Assistance 6  Modified independent   Additional items Verbal cues   Stair Management Technique Step to pattern; Foreward;Backward;Nonreciprocal;One rail L   Number of Stairs 3  (1 step x 3 trials)   Balance   Static Sitting Good   Static Standing Fair +   Ambulatory Fair   Activity Tolerance   Activity Tolerance Patient limited by fatigue  (mild REID; O2 sat stable)   Nurse Made Aware spoke to Jack Thayer RN  (spoke to [place], [place] w/ sx)   Equipment Use   Comments Reviewed HEP verbally only as pt reports he has been performing them on his own;   Assessment   Prognosis Good   Problem List Decreased endurance;Obesity   Assessment Pt demonstrated overall improvement in balance and mobility skills progressing to mod (I) level w/ all aspects of observed mobility on level surfaces and elevations including amb w/o AD; pt still remains to be generally deconditioned w/ rest periods required b/in bouts of mobility; O2 sat remained stable  Overall, cont to anticipate pt will return home w/ family support upon D/C from PT/mobility stand point; an OP pulm rehab may need to be considered when medically cleared; will cont to follow until then to max endurance  Barriers to Discharge None   Goals   Patient Goals to return home   STG Expiration Date 01/17/21   PT Treatment Day 2   Plan   Treatment/Interventions Endurance training;Spoke to nursing;Spoke to advanced practitioner   Progress Improving as expected   PT Frequency Other (Comment)  (3-6x/wk)   Recommendation   PT Discharge Recommendation Return to previous environment with social support; Other (Comment)  (OP pulm rehab when medically cleared)   Equipment Recommended   (none at this time)       Evans Rashawn, PT

## 2021-01-08 NOTE — DISCHARGE INSTRUCTIONS
Gently wash your incisions daily with soap and water, do not soak in a tub  Do not apply any lotions, creams, or ointments to incisions  No lifting over 10 lbs or strenuous exercise  No driving until seen at your post operative visit  The blue stitch will be removed at your post operative visit  Please obtain a pa/lat chest xray at a [facility] within 3 days of your follow up visit  You will be prescribed 3 medications to take at home, that should be taken exactly as directed  These have been shown to greatly improve post-operative pain:     1  Gabapentin 300mg tablet  Take 1 tablet by mouth 3x a day for 3 weeks  2    Tylenol 325mg tablet  Take 2 tablets by mouth, every 6 hours, for 1 week  3    Ibuprofen 200mg tablet  Take 3 tablets by mouth, 3x a day with meals for 1 week  You will also be prescribed a medication that you may take on an as needed basis:  Oxycodone 5mg tablet  Take 1-2 tablets every 4 hours as needed for pain

## 2021-01-08 NOTE — PROGRESS NOTES
Progress Note - Thoracic Surgery   Sofia Sher 54 y o  male MRN: [id number]  Unit/Bed#: [facility] 403-01 Encounter: [id number]    Assessment:  53 y/o M p/w RUL malignancy, s/p R VATS, RUL lobectomy on 1/6    Plan:  --Regular diet  --Keep R CT to water seal  --f/u AM CXR  --IS, aggressive pulmonary toilet  --DVT ppx  --OOB, ambulate    Subjective/Objective     Subjective:     No acute events overnight  Pt c/o 4/10 R-sided pain, mostly near his chest tube site  Denies any SOB  Objective:     Blood pressure 108/73, pulse 65, temperature 98 2 °F (36 8 °C), temperature source Oral, resp  rate 18, height 5' 10 5" (1 791 m), weight 111 kg (244 lb 0 8 oz), SpO2 96 %  ,Body mass index is 34 52 kg/m²  Intake/Output Summary (Last 24 hours) at 1/8/2021 0413  Last data filed at 1/8/2021 0000  Gross per 24 hour   Intake 645 ml   Output 1875 ml   Net -1230 ml       Invasive Devices     Peripheral Intravenous Line            Peripheral IV 01/06/21 Left Wrist 1 day    Peripheral IV 01/06/21 Right Wrist 1 day          Drain            Chest Tube 1 Right Pleural 28 Fr  1 day                Physical Exam:     GEN: NAD  HEENT: MMM  Chest: R CT to water seal with serosanguinous output, -AL  CV: RRR  Lung: normal effort, on 2L NC  Ab: Soft, NT/ND  Extrem: No CCE  Neuro:  A+Ox3, motor and sensation grossly intact      Lab, Imaging and other studies:  CBC:   Lab Results   Component Value Date    WBC 12 58 (H) 01/07/2021    HGB 12 7 01/07/2021    HCT 38 4 01/07/2021    MCV 95 01/07/2021     01/07/2021    MCH 31 3 01/07/2021    MCHC 33 1 01/07/2021    RDW 13 4 01/07/2021    MPV 9 5 01/07/2021   , CMP:   Lab Results   Component Value Date    SODIUM 137 01/07/2021    K 4 0 01/07/2021     01/07/2021    CO2 24 01/07/2021    BUN 19 01/07/2021    CREATININE 1 06 01/07/2021    CALCIUM 8 3 01/07/2021    EGFR 79 01/07/2021   , Coagulation: No results found for: PT, INR, APTT  VTE Pharmacologic Prophylaxis: Enoxaparin (Lovenox)  VTE Mechanical Prophylaxis: sequential compression device

## 2021-01-08 NOTE — OCCUPATIONAL THERAPY NOTE
Occupational Therapy Evaluation     Patient Name: Renato Jimenes  QJDNB'[id number] Date: 1/8/2021  Problem List  Principal Problem:    Right upper lobe pulmonary nodule  Active Problems:    Tobacco use    Liver lesion    Past Medical History  Past Medical History:   Diagnosis Date    COPD (chronic obstructive pulmonary disease) ([facility] [address] [facility]     Cough     Emphysema of lung ([facility] [address] )     Liver lesion     Right upper lobe pulmonary nodule     Syncope and collapse      Past Surgical History  Past Surgical History:   Procedure Laterality Date    CHOLECYSTECTOMY      LUNG SEGMENTECTOMY Right 1/6/2021    Procedure: RESECTION WEDGE LUNG RIGHT UPPER LOBE;  Surgeon: Jie Holley MD;  [facility];  Service: Thoracic    [facility] BRONCHOSCOPY,DIAGNOSTIC N/A 1/6/2021    Procedure: BRONCHOSCOPY FLEXIBLE;  Surgeon: Jie Holley MD;  [facility];  Service: Thoracic    [facility] THORACOSCOPY SURG LOBECTOMY Right 1/6/2021    Procedure: RIGHT UPPER LOBECTOMY LUNG;  Surgeon: Jie Holley MD;  [facility];  Service: Thoracic    [facility] THORACOSCOPY [place] WEDGE RESEXN INITIAL UNILAT Right 1/6/2021    Procedure: THORACOSCOPY VIDEO ASSISTED SURGERY (VATS),;  Surgeon: Jie Holley MD;  [facility];  Service: Thoracic    TESTICLE SURGERY             01/08/21 1005   OT Last Visit   OT Visit Date 01/08/21   Note Type   Note type Evaluation   Restrictions/Precautions   Other Precautions Telemetry   Pain Assessment   Pain Assessment Tool 0-10   Pain Score No Pain   Home Living   Type of Home Mobile home   Home Layout One level;Stairs to enter with rails   Bathroom Shower/Tub Walk-in shower   [address] [place] [address] Dr blake   Bathroom Accessibility Accessible   Additional Comments Pt reports living in a 1 story home with 3 TOÑO      Prior Function   Level of [place] Independent with ADLs and functional mobility   Lives With Spouse   Receives Help From Family   ADL Assistance Independent   IADLs Independent   Falls in the last 6 months 0   Vocational Full time employment   Lifestyle   Autonomy Pt reports being I with ADLS, IADLS and mobility without device PTA  (+)     Reciprocal Relationships Pt lives with his spouse who he reports is able to assist as needed upon d/c     Service to Others Pt works as a     Intrinsic Gratification Enjoys working around the [facility] Pt reports that he has no concerns about returning home    ADL   Where [facility] [address]  [address] [place] [address] Dr Robin [address]  [address]  [phone]  [phone]  Supervision/Setup   Bed Mobility   Additional Comments Unable to assess, pt seated OOB in chair upon OT arrival  After OT session pt in chair with all needs within reach  Transfers   Sit to Stand 6  Modified independent   Stand to Sit 6  Modified independent   Functional Mobility   Functional Mobility 5  Supervision   Additional Comments Pt demonstrated household mobility with no device  Balance   Static Sitting Good   Dynamic Sitting Fair +   Static Standing Fair +   Dynamic Standing Fair   Ambulatory Fair   Activity Tolerance   Activity Tolerance Patient limited by fatigue   Nurse Made Aware RN confirmed okay to see pt   RUE Assessment   RUE Assessment WFL   LUE Assessment   LUE Assessment WFL   Cognition   Overall Cognitive Status WFL   Arousal/Participation Alert; Cooperative   Attention Within functional limits   Orientation Level Oriented X4   Memory Within functional limits   Following Commands Follows all commands and directions without difficulty   Comments Pt is very pleasant and cooperative to work with therapy      Assessment   Assessment Pt is a 54 y o  male admitted to [facility] on 1/6/2021 w/ R upper lobe pulmonary nodule s/p "THORACOSCOPY VIDEO ASSISTED SURGERY (VATS), (Right), RESECTION WEDGE LUNG RIGHT UPPER LOBE (Right), RIGHT UPPER LOBECTOMY LUNG (Right), BRONCHOSCOPY FLEXIBLE" on 1/6/2021  Comorbidities include a h/o COPD (chronic obstructive pulmonary disease) (HCC), Cough, Emphysema of lung ([address] ), Liver lesion, Right upper lobe pulmonary nodule, and Syncope and collapse  Pt with active OT orders and ambulate  orders  Pt resides in a 1 story home with 3 TOÑO  Pt lives with his spouse who is able to assist as needed upon d/c  Pt was I w/  ADLS and IADLS, (+) drove, & required no use of DME PTA  Currently pt is supervision for functional transfers, functional mobility and ADLS overall  Based on the aforementioned OT evaluation, functional performance deficits, and assessments, pt has been identified as a moderate complexity evaluation  From OT standpoint, anticipate d/c home with family support  Recommend continued participation in [facility] and functional mobility with staff  No further acute OT needs, d/c OT      Goals   Patient Goals To return home   Recommendation   OT Discharge Recommendation Return to previous environment with social support   OT - OK to Discharge Yes  (When medically appropriate)   Modified Independence Scale   Modified Denise Scale 3     Venus Hadley, MYCHAL, OTR/L

## 2021-01-08 NOTE — QUICK NOTE
01/08/21    Procedure: Chest tube removal    Right chest tube removed in routine fashion without incident  The patient tolerated the procedure well  A dry, sterile dressing was placed  Will check a pa/lat chest x-ray       Janusz Zuniga PA-C

## 2021-01-08 NOTE — CASE MANAGEMENT
Plan home today, chest tubes removed and awaiting CXR results  No HHC needs, pt does not want RW or home PT  Good understanding  Friend or family to transport home

## 2021-01-08 NOTE — PLAN OF CARE
Problem: PHYSICAL THERAPY ADULT  Goal: Performs mobility at highest level of function for planned discharge setting  See evaluation for individualized goals  Description: Treatment/Interventions: Functional transfer training, Therapeutic exercise, LE strengthening/ROM, Elevations, Endurance training, Gait training, Bed mobility, Spoke to nursing, Equipment eval/education  Equipment Recommended: Walker(at this time; will cont to monitor progress)       See flowsheet documentation for full assessment, interventions and recommendations  Outcome: Adequate for Discharge  Note: Prognosis: Good  Problem List: Decreased endurance, Obesity  Assessment: Pt demonstrated overall improvement in balance and mobility skills progressing to mod (I) level w/ all aspects of observed mobility on level surfaces and elevations including amb w/o AD; pt still remains to be generally deconditioned w/ rest periods required b/in bouts of mobility; O2 sat remained stable  Overall, cont to anticipate pt will return home w/ family support upon D/C from PT/mobility stand point; an OP pulm rehab may need to be considered when medically cleared; will cont to follow until then to max endurance  Barriers to Discharge: None     PT Discharge Recommendation: Return to previous environment with social support, Other (Comment)(OP pulm rehab when medically cleared)          See flowsheet documentation for full assessment  No

## 2021-01-08 NOTE — NURSING NOTE
Patient discharge instructions provided to patient  Offered to provide instructions to spouse or daughter but patient verbalized understanding and declined offer to provide discharge education to alternate family member

## 2021-01-08 NOTE — DISCHARGE SUMMARY
Discharge Summary - Thoracic Surgery   Beryle Spare 54 y o  male MRN: [id number]  Unit/Bed#: [facility] 403-01 Encounter: [id number]    Admission Date:   Admission Orders (From admission, onward)     Ordered        01/06/21 1233  Inpatient Admission  Once                      Discharge Date: 1/8/21    Admitting Diagnosis: Lung nodule [R91 1]    Discharge Diagnosis: lung cancer    Resolved Problems  Date Reviewed: 1/8/2021    None          Attending: Dr Max Zuniga Physician(s): none    Procedures Performed: [address] (VATS), (Right)  RESECTION WEDGE LUNG RIGHT UPPER LOBE (Right)  RIGHT UPPER LOBECTOMY LUNG (Right)  BRONCHOSCOPY FLEXIBLE (N/A) 1/6/21    Pathology:   Intraoperative Consultation P       AF1:  Non-small cell carcinoma  Donnelly Never MD     final pending    Hospital Course: Mr Travis Bautista is a 54year old man with a right upper lobe pulmonary nodule who electively presented for the above procedure  [place] pathology was consistent with NSCLC so completion right upper lobectomy was performed  He tolerated the procedure well and was transferred to [facility] with a chest tube  On POD#1, his chest tube demonstrated no air leak, and his pain control was optimized  On POD#2, he was completely weaned off of oxygen, and ambulatory saturations on room air were appropriate  His chest tube was ready for removal, and post pull CXR revealed a tiny right apical pneumothorax  He was deemed stable for discharge home and cleared by physical therapy without any needs  He will follow up with Dr Yaritza Tenorio at the [facility] office in 2 weeks with a CXR for follow up  He was discharged with full ERAS medications  Condition at Discharge: good     Discharge instructions/Information to patient and family:   See after visit summary for information provided to patient and family        Provisions for Follow-Up Care:  See after visit summary for information related to follow-up care and any pertinent home health orders  Disposition: Home          Planned Readmission: No    Discharge Statement   I spent 30 minutes discharging the patient  This time was spent on the day of discharge  I had direct contact with the patient on the day of discharge  Additional documentation is required if more than 30 minutes were spent on discharge  Discharge Medications:  See after visit summary for reconciled discharge medications provided to patient and family

## 2021-01-11 ENCOUNTER — TELEPHONE (OUTPATIENT)
Dept: CARDIAC SURGERY | Facility: CLINIC | Age: 56
End: 2021-01-11

## 2021-01-11 NOTE — TELEPHONE ENCOUNTER
Spoke to patient at 1500  He is feeling pretty well after surgery  His pain is about a 2-3/10 during the day, increases at night never above a 5  He takes gabapentin and Tylenol scheduled, oxycodone at night only  He notices shortness of breath on exertion but this resolves quickly with rest  He is using IS frequently  I encouraged him to walk as much as possible and use IS multiple times per hour to help his breathing  He has a good appetite and is having bowel movements  His incisions are not red or draining  He has an appointment 1/19 and will get a CXR within a few days of that  All questions addressed

## 2021-01-12 ENCOUNTER — TELEPHONE (OUTPATIENT)
Dept: OTHER | Facility: OTHER | Age: 56
End: 2021-01-12

## 2021-01-13 ENCOUNTER — TELEPHONE (OUTPATIENT)
Dept: HEMATOLOGY ONCOLOGY | Facility: CLINIC | Age: 56
End: 2021-01-13

## 2021-01-13 NOTE — TELEPHONE ENCOUNTER
Spoke to patient at 46  About 4:30PM yesterday, swelling of ankles and hands started  He is not having any trouble urinating, and is experiencing chest pain at his incisions with deep breaths  He has shortness of breath on exertion since surgery  At this time, I recommended stopping ibuprofen and elevating his legs as much as possible  If it does not get better, he should call his PCP for an appointment  Will consider getting a CXR

## 2021-01-13 NOTE — TELEPHONE ENCOUNTER
Patient's wife states that the patient had surgery 7 days ago and is experiencing swollen hands, legs, and feet      Requesting a call back to [phone]

## 2021-01-14 NOTE — TELEPHONE ENCOUNTER
Spoke to patient at 1000  His swelling is resolved  He stopped taking ibuprofen and gabapentin  He is only taking Tylenol and his incisional pain is covered  He is to call us if it returns

## 2021-01-15 ENCOUNTER — TELEPHONE (OUTPATIENT)
Dept: CARDIAC SURGERY | Facility: CLINIC | Age: 56
End: 2021-01-15

## 2021-01-15 ENCOUNTER — TELEPHONE (OUTPATIENT)
Dept: SURGICAL ONCOLOGY | Facility: CLINIC | Age: 56
End: 2021-01-15

## 2021-01-15 ENCOUNTER — HOSPITAL ENCOUNTER (OUTPATIENT)
Dept: VASCULAR ULTRASOUND | Facility: HOSPITAL | Age: 56
Discharge: HOME | End: 2021-01-15
Payer: COMMERCIAL

## 2021-01-15 DIAGNOSIS — M79.89 SWELLING OF LOWER EXTREMITY: Primary | ICD-10-CM

## 2021-01-15 DIAGNOSIS — M79.89 SWELLING OF LOWER EXTREMITY: ICD-10-CM

## 2021-01-15 PROCEDURE — 93970 EXTREMITY STUDY: CPT

## 2021-01-15 NOTE — TELEPHONE ENCOUNTER
Patient called, thinks he spoke to Adams gomez on 1/14/21  Patient had upper right lobe surgery on 1/6/21 by Dr Knowles Graft  He is complaining again today of swollen ankles and was asked to call back if still swollen  Please call him at [phone]

## 2021-01-15 NOTE — TELEPHONE ENCOUNTER
Patient woke up with worsening swelling of bilateral lower extremities, R>L  No pain  Will obtain b/l duplex us to r/o DVT  Patient prefers [facility]

## 2021-01-15 NOTE — TELEPHONE ENCOUNTER
Spoke to patient's wife at 65  No evidence of LE DVT bilaterally  Continue elevating legs and he is getting a CXR tomorrow  F/u Dr Edel Duarte 1/19

## 2021-01-16 ENCOUNTER — APPOINTMENT (OUTPATIENT)
Dept: RADIOLOGY | Facility: MEDICAL CENTER | Age: 56
End: 2021-01-16
Payer: COMMERCIAL

## 2021-01-16 DIAGNOSIS — R91.1 RIGHT UPPER LOBE PULMONARY NODULE: ICD-10-CM

## 2021-01-16 PROCEDURE — 71046 X-RAY EXAM CHEST 2 VIEWS: CPT

## 2021-01-16 PROCEDURE — 93970 EXTREMITY STUDY: CPT | Performed by: SURGERY

## 2021-01-19 ENCOUNTER — OFFICE VISIT (OUTPATIENT)
Dept: CARDIAC SURGERY | Facility: CLINIC | Age: 56
End: 2021-01-19

## 2021-01-19 VITALS
RESPIRATION RATE: 18 BRPM | WEIGHT: 243 LBS | HEIGHT: 71 IN | HEART RATE: 88 BPM | BODY MASS INDEX: 34.02 KG/M2 | DIASTOLIC BLOOD PRESSURE: 92 MMHG | SYSTOLIC BLOOD PRESSURE: 128 MMHG | TEMPERATURE: 98.3 F | OXYGEN SATURATION: 96 %

## 2021-01-19 DIAGNOSIS — C34.11 MALIGNANT NEOPLASM OF UPPER LOBE OF RIGHT LUNG (HCC): Primary | ICD-10-CM

## 2021-01-19 PROCEDURE — 99024 POSTOP FOLLOW-UP VISIT: CPT | Performed by: THORACIC SURGERY (CARDIOTHORACIC VASCULAR SURGERY)

## 2021-01-19 NOTE — PROGRESS NOTES
Thoracic Follow-Up  Assessment/Plan:    Malignant neoplasm of upper lobe of right lung [facility])  Mr Deborah Glass is a very pleasant 63-year-old male who is known to me  He underwent a VATS right upper lobe wedge resection with completion lobectomy on January 6th  This was positive for adenocarcinoma  Today in the office is his 1st postoperative visit  Today in the office, I am overall happy with his progress  He feels that he is recovering slowly but I assured him that he is recovering exactly as expected  I explained to him that this is a very big surgery and he needs to give himself some [employer]  I have cleared him and allowed him to start driving but I recommended only short drives at a time  I explained to him that he still is not allowed to lift heavy or return to work  He is under my care until at least March 30th 2021  We also discussed his pathology in office today  I explained to him that he is a stage I B  This is secondary to the visceral pleural invasion that was present  However, he had no lymph nodes involved  I explained to him that this means he does not need any type of adjuvant therapy and that this is considered a surgical cure  I assured him that this is all good news  Armando Avila He will come back and see me in 4 weeks time with another chest x-ray  There are no diagnoses linked to this encounter  Thoracic History     Date: 1/6/2021  Procedure: VATS RULobe wedge resection with completion lobectomy  Pathology: well differentiated adenocarcinoma, 2 0cm, PL1, 0/10 lymph nodes, G1 - [id number], Stage IB      Subjective:    Patient ID: Emily Hope is a 54 y o  male  HPI  Mr Deborah Glass is a very pleasant 63-year-old male who is known to me  He underwent a VATS right upper lobe wedge resection with completion lobectomy on January 6th  This was positive for adenocarcinoma  Today in the office is his 1st postoperative visit  Today in the office, he states that he is doing okay    He does report that he gets winded with exertion  He reports that he has some pain but that this is controlled for the most part  He denies a cough or fevers or chills  He reports that he has fatigue but that this is improving  I personally reviewed his chest x-ray in PACS  This demonstrates a small effusion and a trace apical space  These are both common findings after surgery  The effusion will resolve  The apical space will likely not resolve since he had a right upper lobectomy    The following portions of the patient's history were reviewed and updated as appropriate: allergies, current medications, past family history, past medical history, past social history, past surgical history and problem list     Review of Systems   Constitutional: Positive for activity change and fatigue  Negative for chills, fever and unexpected weight change  HENT: Negative for sore throat, trouble swallowing and voice change  Eyes: Negative  Negative for visual disturbance  Respiratory: Positive for shortness of breath  Negative for cough, chest tightness, wheezing and stridor  Cardiovascular: Negative for chest pain and leg swelling  Gastrointestinal: Negative  Endocrine: Negative  Genitourinary: Negative  Musculoskeletal: Negative  Skin: Negative  Allergic/Immunologic: Negative  Neurological: Negative for seizures, syncope, speech difficulty, weakness, light-headedness and headaches  Hematological: Negative for adenopathy  Psychiatric/Behavioral: Negative  Objective:   Physical Exam  Vitals signs and nursing note reviewed  Constitutional:       Appearance: He is well-developed  He is not diaphoretic  HENT:      Head: Normocephalic and atraumatic  Nose: Nose normal  No congestion  Mouth/Throat:      Mouth: Mucous membranes are moist       Pharynx: Oropharynx is clear  Eyes:      Extraocular Movements: Extraocular movements intact        Pupils: Pupils are equal, round, and reactive to light  Neck:      Musculoskeletal: Normal range of motion  Trachea: No tracheal deviation  Cardiovascular:      Rate and Rhythm: Normal rate and regular rhythm  Heart sounds: Normal heart sounds  No murmur  Pulmonary:      Effort: Pulmonary effort is normal  No respiratory distress  Breath sounds: Normal breath sounds  No stridor  No wheezing or rales  Comments: Chest incisions are healing well  Chest tube stitch removed  Chest:      Chest wall: No tenderness  Abdominal:      General: Bowel sounds are normal  There is no distension  Palpations: Abdomen is soft  Tenderness: There is no abdominal tenderness  Musculoskeletal: Normal range of motion  General: No swelling  Right lower leg: No edema  Left lower leg: No edema  Comments: Bilateral lower extremity edema improving   Lymphadenopathy:      Cervical: No cervical adenopathy  Skin:     General: Skin is warm and dry  Coloration: Skin is not pale  Findings: No erythema or rash  Neurological:      Mental Status: He is alert and oriented to person, place, and time  Psychiatric:         Behavior: Behavior normal          Thought Content: Thought content normal          Judgment: Judgment normal      /92 (BP Location: Left arm, Patient Position: Sitting, Cuff Size: Standard)   Pulse 88   Temp 98 3 °F (36 8 °C)   Resp 18   Ht 5' 10 5" (1 791 m)   Wt 110 kg (243 lb)   SpO2 96%   BMI 34 37 kg/m²     Xr Chest Pa & Lateral    Result Date: 1/19/2021  Impression Persistent trace right apical pneumothorax  Workstation performed: [id number]     Xr Chest Pa & Lateral    Result Date: 1/8/2021  Impression Status post removal of right chest tube  Small right apical pneumothorax  Workstation performed: [id number]     Xr Chest 2 Views    Result Date: 10/26/2020  Impression No acute cardiopulmonary disease   Workstation performed: [id number]     Xr Chest Pa & Lateral    Result Date: 10/21/2020  Impression No acute cardiopulmonary disease  Increased opacity in the right upper lobe at the site of the cystic lesion on the chest CT from 2017  Follow-up with a chest CT is recommended to exclude a slowly growing adenocarcinoma  I personally discussed this study with Zca Pérez on 10/21/2020 at 10:25AM  This study was marked in Epic for follow-up  Workstation performed: [id number]      Ct Chest W Contrast    Result Date: 10/26/2020  Narrative CT CHEST WITH IV CONTRAST INDICATION:   R91 8: Other nonspecific abnormal finding of lung field  COMPARISON:  None  TECHNIQUE: CT examination of the chest was performed  Axial, sagittal, and coronal 2D reformatted images were created from the source data and submitted for interpretation  Radiation dose length product (DLP) for this visit:  492 14 mGy-cm   This examination, like all CT scans performed in the [facility], was performed utilizing techniques to minimize radiation dose exposure, including the use of iterative  reconstruction and automated exposure control  IV Contrast:  85 mL of iohexol (OMNIPAQUE) FINDINGS: LUNGS:  Previous anterior right upper lobe cystic area demonstrates interval wall thickening with adjacent nodular foci measuring 5 8 mm and 6 3 mm  Mild centrilobular emphysematous changes seen bilaterally  There is no tracheal or endobronchial lesion  PLEURA:  Unremarkable  HEART/GREAT VESSELS:  Unremarkable for patient's age  MEDIASTINUM AND ZAIDA:  Subcentimeter prevascular, right paratracheal and subcarinal lymph nodes are unchanged  CHEST WALL AND LOWER NECK: Partially imaged thyroid gland demonstrates subcentimeter nodules similar to prior study    Incidental discovery of one or more thyroid nodule(s) measuring less than 1 5 cm and without suspicious features is noted in this patient who is above 28years old; according to guidelines published in the February 2015 white paper on incidental thyroid nodules in the Journal of the [employer] of Radiology [facility]), no further evaluation is recommended  VISUALIZED STRUCTURES IN THE UPPER ABDOMEN:  Previously seen hepatic hypodensities are again identified  Left hepatic lobe 6 4 x 5 7 cm hypodensity demonstrates peripheral subtle nodular enhancement  A more heterogeneously enhancing area in the right lobe measuring 3 6 x 1 9 cm seen  Small enhancing area seen anteriorly in the right lobe measuring 1 4 cm  These may represent vascular lesion such as hemangioma  However MR characterization is again recommended  Simple cyst seen measuring 4 4 cm in the left and 2 cm on the right  Cholecystectomy clips seen  OSSEOUS STRUCTURES:  No acute fracture or destructive osseous lesion  Impression 1  Interval worsened appearance of the right upper lobe lobe anterior cavitary focus which now demonstrates wall thickening with adjacent most prominent subcentimeter nodules measuring 6 3 mm and 5 8 mm  Malignant process such as slow-growing adenocarcinoma suggested  Pulmonary and thoracic surgery consult recommended  2  Hepatic hypodensities with vascular enhancement as described most likely represent hemangiomas  There are increased in size from prior study  Simple hepatic cysts  Complete evaluation with MRI is again recommended  I personally discussed this study with Zac Pérez on 10/26/2020 at 3 06 PM  Workstation performed: [id number]     No CT Chest,Abdomen,Pelvis results available for this patient  Nm Pet Ct Skull Base To Mid Thigh    Result Date: 11/10/2020  Narrative PET/CT SCAN INDICATION:  R91 1: Solitary pulmonary nodule   , abnormal CT, right upper lung cavitary lesion MODIFIER: PI COMPARISON: CT chest 10/22/2020 and priors CELL TYPE:  None TECHNIQUE:   10 2 mCi F-18-FDG administered IV  Multiplanar attenuation corrected and non attenuation corrected PET images were acquired 60 minutes post injection   Contiguous, low dose, axial CT sections were obtained from the vertex through the femurs   Intravenous contrast material was not utilized  This examination, like all CT scans performed in the [facility], was performed utilizing techniques to minimize radiation dose exposure, including the use of iterative reconstruction and automated exposure control  Fasting serum glucose: 82 mg/dl FINDINGS: VISUALIZED BRAIN:   No acute abnormalities are seen  HEAD/NECK:   There is a physiologic distribution of FDG  No FDG avid cervical adenopathy is seen  CT images: Unremarkable  CHEST: Right upper anterior irregular lung lesion series 3 image 113 measuring 2 5 x 1 8 cm demonstrates mild FDG activity, SUV 1 8  Given its increasing size and soft tissue component, a low-grade neoplasm should be considered until proven otherwise  No additional FDG avid soft tissue lesions are seen  No hypermetabolic adenopathy  CT images: Mild emphysema  ABDOMEN: Multiple hepatic hypodensities again visualized  These do not demonstrate significant FDG activity  No FDG avid soft tissue lesions are seen  CT images: Cholecystectomy  PELVIS: Probable retained ureteral activity in the prostate  No FDG avid soft tissue lesions are seen  CT images: Prostatomegaly measuring 6 7 x 5 8 cm, with small calcifications  OSSEOUS STRUCTURES: No FDG avid lesions are seen  CT images: No significant findings  Impression 1   2 5 x 1 8 cm irregular right upper anterior lung lesion demonstrates mild FDG activity  A low-grade neoplasm should be considered until proven otherwise  Tissue sampling suggested for diagnosis  2   No additional hypermetabolic lesion in the thorax  No hypermetabolic adenopathy  3   Multiple hepatic hypodensities again visualized without significant FDG activity  Contrast MR characterization may be considered if clinically warranted  4   No additional hypermetabolic lesions that would be concerning for malignancy/metastases   The study was marked in EPIC for significant notification  Workstation performed: [id number]      No Barium Swallow results available for this patient

## 2021-01-19 NOTE — ASSESSMENT & PLAN NOTE
Mr Dakota Lorenzo is a very pleasant 59-year-old male who is known to me  He underwent a VATS right upper lobe wedge resection with completion lobectomy on January 6th  This was positive for adenocarcinoma  Today in the office is his 1st postoperative visit  Today in the office, I am overall happy with his progress  He feels that he is recovering slowly but I assured him that he is recovering exactly as expected  I explained to him that this is a very big surgery and he needs to give himself some J Luis Specking  I have cleared him and allowed him to start driving but I recommended only short drives at a time  I explained to him that he still is not allowed to lift heavy or return to work  He is under my care until at least March 30th 2021  We also discussed his pathology in office today  I explained to him that he is a stage I B  This is secondary to the visceral pleural invasion that was present  However, he had no lymph nodes involved  I explained to him that this means he does not need any type of adjuvant therapy and that this is considered a surgical cure  I assured him that this is all good news  Eileen Giraldo He will come back and see me in 4 weeks time with another chest x-ray

## 2021-01-25 DIAGNOSIS — R06.00 DYSPNEA ON EXERTION: ICD-10-CM

## 2021-01-25 RX ORDER — ALBUTEROL SULFATE 90 UG/1
2 AEROSOL, METERED RESPIRATORY (INHALATION) EVERY 6 HOURS PRN
Qty: 8.5 G | Refills: 3 | Status: SHIPPED | OUTPATIENT
Start: 2021-01-25 | End: 2021-03-23

## 2021-02-15 ENCOUNTER — APPOINTMENT (OUTPATIENT)
Dept: RADIOLOGY | Facility: MEDICAL CENTER | Age: 56
End: 2021-02-15
Payer: COMMERCIAL

## 2021-02-15 DIAGNOSIS — C34.11 MALIGNANT NEOPLASM OF UPPER LOBE OF RIGHT LUNG (HCC): Primary | ICD-10-CM

## 2021-02-15 DIAGNOSIS — C34.11 MALIGNANT NEOPLASM OF UPPER LOBE OF RIGHT LUNG (HCC): ICD-10-CM

## 2021-02-15 PROCEDURE — 71046 X-RAY EXAM CHEST 2 VIEWS: CPT

## 2021-02-16 ENCOUNTER — TELEMEDICINE (OUTPATIENT)
Dept: CARDIAC SURGERY | Facility: CLINIC | Age: 56
End: 2021-02-16

## 2021-02-16 DIAGNOSIS — C34.11 MALIGNANT NEOPLASM OF UPPER LOBE OF RIGHT LUNG (HCC): Primary | ICD-10-CM

## 2021-02-16 PROCEDURE — 99024 POSTOP FOLLOW-UP VISIT: CPT | Performed by: THORACIC SURGERY (CARDIOTHORACIC VASCULAR SURGERY)

## 2021-02-16 NOTE — ASSESSMENT & PLAN NOTE
Emily Hope is a 54 y o  male who is well known to me  He underwent a VATS right upper lobectomy on January 6, 2021  He presents today for his 2nd postoperative visit  Overall, I am happy with his postoperative recovery  We did talk today regarding his continued dyspnea on exertion  I explained to him that he is only 6 weeks out of surgery and that this should improve with time  I did explain to him that he will need to recondition his lungs  I explained to him that this will come with time but I did encourage him to increase his cardio activity as much as possible  I also reassured him that he is still only 6 weeks out of surgery and that the reconditioning will come with time  At this time, I do not believe that it is safe for him to return to work just yet  His job requires a significant amount of physical demand  I believe that he will need the full 3 months before returning to work  At this time, I am recommending that he return on March 30, 2021  At this time, he will enter into a surveillance schedule  He will get a CT scan of his chest 6 months from time of surgery and he will follow up with me at that time  He will get CT scans every 6 months for the 1st 3 years  As long as these demonstrate stability, I will increase his surveillance interval to 1 year  This is in line with NCCN guidelines  He understands and all his questions have been answered  He will call me if he feels that he needs pulmonary rehab sometime after he has completely recovered from surgery      Jamie Honeycutt MD  Thoracic Surgery  (Available on [employer] Text)  Office: [phone]

## 2021-02-16 NOTE — PROGRESS NOTES
Virtual Regular Visit      Assessment/Plan:    Problem List Items Addressed This Visit        Respiratory    Malignant neoplasm of upper lobe of right lung ([facility] [address] ) - Primary     Mariza Butt is a 54 y o  male who is well known to me  He underwent a VATS right upper lobectomy on January 6, 2021  He presents today for his 2nd postoperative visit  Overall, I am happy with his postoperative recovery  We did talk today regarding his continued dyspnea on exertion  I explained to him that he is only 6 weeks out of surgery and that this should improve with time  I did explain to him that he will need to recondition his lungs  I explained to him that this will come with time but I did encourage him to increase his cardio activity as much as possible  I also reassured him that he is still only 6 weeks out of surgery and that the reconditioning will come with time  At this time, I do not believe that it is safe for him to return to work just yet  His job requires a significant amount of physical demand  I believe that he will need the full 3 months before returning to work  At this time, I am recommending that he return on March 30, 2021  At this time, he will enter into a surveillance schedule  He will get a CT scan of his chest 6 months from time of surgery and he will follow up with me at that time  He will get CT scans every 6 months for the 1st 3 years  As long as these demonstrate stability, I will increase his surveillance interval to 1 year  This is in line with NCCN guidelines  He understands and all his questions have been answered  He will call me if he feels that he needs pulmonary rehab sometime after he has completely recovered from surgery      Lucretia Sandhu MD  Thoracic Surgery  (Available on [employer] Text)  Office: [phone]           Relevant Orders    CT chest wo contrast               Reason for visit is   Chief Complaint   Patient presents with    Virtual Regular Visit Encounter provider Marycarmen Carranza MD    Provider located at [address] [place] [id number] [phone]      Recent Visits  No visits were found meeting these conditions  Showing recent visits within past 7 days and meeting all other requirements     Today's Visits  Date Type Provider Dept   02/16/21 Telemedicine Marycarmen Carranza MD [facility] Thor[facility]   Showing today's visits and meeting all other requirements     Future Appointments  No visits were found meeting these conditions  Showing future appointments within next 150 days and meeting all other requirements        The patient was identified by name and date of birth  Markus Flores was informed that this is a telemedicine visit and that the visit is being conducted through [facility] and patient was informed that this is a secure, HIPAA-compliant platform  He agrees to proceed     My office door was closed  No one else was in the room  He acknowledged consent and understanding of privacy and security of the video platform  The patient has agreed to participate and understands they can discontinue the visit at any time  Patient is aware this is a billable service  Thoracic History     Date: 1/6/2021  Procedure: VATS RULobe wedge resection with completion lobectomy  Pathology: well differentiated adenocarcinoma, 2 0cm, PL1, 0/10 lymph nodes, G1 - [id number], Stage IB     Subjective  Markus Flores is a 54 y o  male who is well known to me  He underwent a VATS right upper lobectomy on January 6, 2021  He presents today for his 2nd postoperative visit  Today during the visit, he states that He is doing okay  He does report that he is still having dyspnea on exertion  He feels that this is not improving    Additionally, he states that he has an occasional sharp shooting pain from 1 of his incisions, specifically where the chest tube previously was  He denies any chest pain  He denies any cough or hemoptysis  He states that his fatigue is improved  He is concerned about returning to work as it is very physically demanding and he does not believe that he will be able to perform those demands just yet  He is only 6 weeks out of surgery  HPI     Past Medical History:   Diagnosis Date    COPD [facility]chronic obstructive pulmonary disease) ([facility] [address] )     Cough     Emphysema of lung ([facility] [address] )     Liver lesion     Right upper lobe pulmonary nodule     Syncope and collapse        Past Surgical History:   Procedure Laterality Date    CHOLECYSTECTOMY      LUNG SEGMENTECTOMY Right 1/6/2021    Procedure: RESECTION WEDGE LUNG RIGHT UPPER LOBE;  Surgeon: Maximino Wright MD;  [facility];  Service: Thoracic    MA [address] N/A 1/6/2021    Procedure: BRONCHOSCOPY FLEXIBLE;  Surgeon: Maximino Wright MD;  [facility];  Service: Thoracic    MA THORACOSCOPY SURG LOBECTOMY Right 1/6/2021    Procedure: RIGHT UPPER LOBECTOMY LUNG;  Surgeon: Maximino Wright MD;  [facility];  Service: Thoracic    MA THORACOSCOPY [employer] WEDGE RESEXN INITIAL UNILAT Right 1/6/2021    Procedure: THORACOSCOPY VIDEO ASSISTED SURGERY (VATS),;  Surgeon: Maximino Wright MD;  [facility];  Service: Thoracic    TESTICLE SURGERY         Current Outpatient Medications   Medication Sig Dispense Refill    albuterol (PROVENTIL HFA,VENTOLIN HFA) 90 mcg/act inhaler INHALE 2 PUFFS EVERY 6 (SIX) HOURS AS NEEDED FOR WHEEZING OR SHORTNESS OF BREATH 8 5 g 3    nitroglycerin (NITROSTAT) 0 4 mg SL tablet PLACE 1 TABLET (0 4 MG TOTAL) UNDER THE TONGUE EVERY 5 (FIVE) MINUTES AS NEEDED FOR CHEST PAIN (Patient not taking: Reported on 1/19/2021) 25 tablet 6     No current facility-administered medications for this visit  No Known Allergies    Review of Systems   Constitutional: Negative    Negative for activity change, chills, fatigue, fever and unexpected weight change  HENT: Negative for sore throat, trouble swallowing and voice change  Eyes: Negative  Negative for visual disturbance  Respiratory: Positive for shortness of breath  Negative for cough, chest tightness, wheezing and stridor  Cardiovascular: Negative for chest pain and leg swelling  Gastrointestinal: Negative  Endocrine: Negative  Genitourinary: Negative  Musculoskeletal: Negative  Skin: Negative  Allergic/Immunologic: Negative  Neurological: Negative for seizures, syncope, speech difficulty, weakness, light-headedness and headaches  Hematological: Negative for adenopathy  Psychiatric/Behavioral: Negative  Video Exam    There were no vitals filed for this visit  Physical Exam  Constitutional:       General: He is not in acute distress  Appearance: He is well-developed  He is not diaphoretic  HENT:      Head: Normocephalic and atraumatic  Nose: Nose normal       Mouth/Throat:      Mouth: Mucous membranes are moist       Pharynx: Oropharynx is clear  Eyes:      General: No scleral icterus  Right eye: No discharge  Left eye: No discharge  Conjunctiva/sclera: Conjunctivae normal    Neck:      Musculoskeletal: Normal range of motion  Pulmonary:      Effort: Pulmonary effort is normal  No respiratory distress  Breath sounds: No stridor  No wheezing  Comments: Well healed incisions  Musculoskeletal: Normal range of motion  Skin:     Coloration: Skin is not pale  Findings: No erythema or rash  Neurological:      Mental Status: He is alert and oriented to person, place, and time  Psychiatric:         Behavior: Behavior normal          Thought Content:  Thought content normal          Judgment: Judgment normal           I spent 20 minutes directly with the patient during this visit      VIRTUAL VISIT DISCLAIMER    Sterling Hester acknowledges that he has consented to an online visit or consultation  He understands that the online visit is based solely on information provided by him, and that, in the absence of a face-to-face physical evaluation by the physician, the diagnosis he receives is both limited and provisional in terms of accuracy and completeness  This is not intended to replace a full medical face-to-face evaluation by the physician  Honey Frost understands and accepts these terms

## 2021-03-09 ENCOUNTER — OFFICE VISIT (OUTPATIENT)
Dept: PULMONOLOGY | Facility: CLINIC | Age: 56
End: 2021-03-09
Payer: COMMERCIAL

## 2021-03-09 VITALS
SYSTOLIC BLOOD PRESSURE: 130 MMHG | HEART RATE: 88 BPM | WEIGHT: 232.6 LBS | HEIGHT: 71 IN | TEMPERATURE: 97.3 F | DIASTOLIC BLOOD PRESSURE: 70 MMHG | OXYGEN SATURATION: 98 % | BODY MASS INDEX: 32.56 KG/M2 | RESPIRATION RATE: 18 BRPM

## 2021-03-09 DIAGNOSIS — C34.11 MALIGNANT NEOPLASM OF UPPER LOBE OF RIGHT LUNG (HCC): ICD-10-CM

## 2021-03-09 DIAGNOSIS — Z23 NEED FOR 23-POLYVALENT PNEUMOCOCCAL POLYSACCHARIDE VACCINE: ICD-10-CM

## 2021-03-09 DIAGNOSIS — F17.211 CIGARETTE NICOTINE DEPENDENCE IN REMISSION: ICD-10-CM

## 2021-03-09 DIAGNOSIS — J44.9 CHRONIC OBSTRUCTIVE PULMONARY DISEASE, UNSPECIFIED COPD TYPE (HCC): Primary | ICD-10-CM

## 2021-03-09 DIAGNOSIS — K21.9 GASTROESOPHAGEAL REFLUX DISEASE WITHOUT ESOPHAGITIS: ICD-10-CM

## 2021-03-09 PROBLEM — R07.89 LEFT CHEST PRESSURE: Status: RESOLVED | Noted: 2020-11-30 | Resolved: 2021-03-09

## 2021-03-09 PROBLEM — R91.1 RIGHT UPPER LOBE PULMONARY NODULE: Status: RESOLVED | Noted: 2020-10-26 | Resolved: 2021-03-09

## 2021-03-09 PROBLEM — Z72.0 TOBACCO USE: Status: RESOLVED | Noted: 2020-10-26 | Resolved: 2021-03-09

## 2021-03-09 PROCEDURE — 1036F TOBACCO NON-USER: CPT | Performed by: INTERNAL MEDICINE

## 2021-03-09 PROCEDURE — 99215 OFFICE O/P EST HI 40 MIN: CPT | Performed by: INTERNAL MEDICINE

## 2021-03-09 PROCEDURE — 90471 IMMUNIZATION ADMIN: CPT

## 2021-03-09 PROCEDURE — 3008F BODY MASS INDEX DOCD: CPT | Performed by: INTERNAL MEDICINE

## 2021-03-09 PROCEDURE — 90732 PPSV23 VACC 2 YRS+ SUBQ/IM: CPT

## 2021-03-09 RX ORDER — FLUTICASONE FUROATE AND VILANTEROL 100; 25 UG/1; UG/1
1 POWDER RESPIRATORY (INHALATION) DAILY
Qty: 1 INHALER | Refills: 3 | Status: SHIPPED | OUTPATIENT
Start: 2021-03-09 | End: 2021-05-20

## 2021-03-09 NOTE — PATIENT INSTRUCTIONS
· Start Breo 1puff once a day, rinse mouth after each use  · Trial of Pepcid 20mg twice daily for heartburn symptoms  · [place] and get COVID-19 vaccine    Gastroesophageal Reflux Disease   WHAT YOU NEED TO KNOW:   Gastroesophageal reflux disease (GERD) is reflux that occurs more than twice a week for a few weeks  Reflux means acid and food in the stomach back up into the esophagus  It usually causes heartburn and other symptoms  GERD can cause other health problems over time if it is not treated  DISCHARGE INSTRUCTIONS:   Call your local emergency number (911 in the [address] [place] [address],[address]) if:   · You have severe chest pain and sudden trouble breathing  Return to the emergency department if:   · You have trouble breathing after you vomit  · You have trouble swallowing, or pain with swallowing  · Your bowel movements are black, bloody, or tarry-looking  · Your vomit looks like coffee grounds or has blood in it  Call your doctor or gastroenterologist if:   · You feel full and cannot burp or vomit  · You vomit large amounts, or you vomit often  · You are losing weight without trying  · Your symptoms get worse or do not improve with treatment  · You have questions or concerns about your condition or care  Medicines:   · Medicines  are used to decrease stomach acid  Medicine may also be used to help your lower esophageal sphincter and stomach contract (tighten) more  · Take your medicine as directed  Contact your healthcare provider if you think your medicine is not helping or if you have side effects  Tell him of her if you are allergic to any medicine  Keep a list of the medicines, vitamins, and herbs you take  Include the amounts, and when and why you take them  Bring the list or the pill bottles to follow-up visits  Carry your medicine list with you in case of an emergency  Manage GERD:   · Do not have foods or drinks that may increase heartburn    These include chocolate, peppermint, fried or fatty foods, drinks that contain caffeine, or carbonated drinks (soda)  Other foods include spicy foods, onions, tomatoes, and tomato-based foods  Do not have foods or drinks that can irritate your esophagus, such as citrus fruits, juices, and alcohol  · Do not eat large meals  When you eat a lot of food at one time, your stomach needs more acid to digest it  Eat 6 small meals each day instead of 3 large ones, and eat slowly  Do not eat meals 2 to 3 hours before bedtime  · Elevate the head of your bed  Place 6-inch blocks under the head of your bed frame  You may also use more than one pillow under your head and shoulders while you sleep  · Maintain a healthy weight  If you are overweight, weight loss may help relieve symptoms of GERD  · Do not smoke  Smoking weakens the lower esophageal sphincter and increases the risk of GERD  Ask your healthcare provider for information if you currently smoke and need help to quit  E-cigarettes or smokeless tobacco still contain nicotine  Talk to your healthcare provider before you use these products  · Do not wear clothing that is tight around your waist   Tight clothing can put pressure on your stomach and cause or worsen GERD symptoms  Follow up with your doctor or gastroenterologist as directed:  Write down your questions so you remember to ask them during your visits  © Copyright [address] Information is for End User's use only and may not be sold, redistributed or otherwise used for commercial purposes  All illustrations and images included in CareNotes® are the copyrighted property of A D A M , Inc  or [place]   The above information is an  only  It is not intended as medical advice for individual conditions or treatments  Talk to your doctor, nurse or pharmacist before following any medical regimen to see if it is safe and effective for you

## 2021-03-09 NOTE — PROGRESS NOTES
Pulmonary Follow Up Note   Leeroy Davidson 54 y o  male MRN: [id number]  3/9/2021      Assessment:  1  Dyspnea on exertion  · Suspect multifactorial to include COPD and deconditioning  · Will focus on each as listed below     2  Chronic obstructive pulmonary disease  · Mild obstruction on PFTs  · Intolerance to Spiriva  · Will give trial of Breo 100/25 1puff daily and continue PRN MARK  · Flu Vaccine 2020, pneumovax given today  · Registered for COVID-19 vaccine  · Follow up in 4 months     3  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 lobectomy 1/2021  · Further radiographic tracking per Thoracic Surgery     4  GERD - frequent symptoms  · Nonpharmacologic GERD therapies  · Start trial of OTC pepcid  · Consider PPI and GI referral if symptoms persist     Nicotine dependence in remission - continued to encourage him to remain tobacco free    Plan:    Diagnoses and all orders for this visit:    Chronic obstructive pulmonary disease, unspecified [facility] type [facility] )  -     Pneumococcal polysaccharide vaccine 23-valent greater than or equal to 1yo subcutaneous/IM  -     fluticasone-vilanterol (BREO ELLIPTA) 100-25 mcg/inh inhaler; Inhale 1 puff daily Rinse mouth after use  Need for 23-polyvalent pneumococcal polysaccharide vaccine  -     Pneumococcal polysaccharide vaccine 23-valent greater than or equal to 1yo subcutaneous/IM    Gastroesophageal reflux disease without esophagitis    Malignant neoplasm of upper lobe of right lung (HCC)    Cigarette nicotine dependence in remission        Return in about 4 months (around 7/9/2021) for Recheck  History of Present Illness   HPI:  Leeroy Davidson is a 54 y o  male who was previously seen by Dr Naty Crook for chronic bronchitis and lung nodles and was last seen in 2017 until recent admission for syncope  He was last seen by me in Dec 2020   Plans at that visit were for trial of Spiriva, complete cardiac evaluation and lung nodule resection by Thoracic Surgery      He underwent VATS RUL wedge resection with completion lobectomy on 1/6/21 with Dr Sarah Gurrola  He as found to have stage IB NSCLCa - adenocarcinoma  He reports he is doing well after surgery, some mild pleural/neuropathic pain persists  He has resumed walking  He notes continued dyspnea with showering but is able to perform ADLs independently  He reports he used Spiriva once but felt worse following am with cough and dyspnea and did not want to reattempt use  He denied sputum production but has NP cough, denied SSCP, no hemoptysis  He reports frequent GERD symptoms  He uses his MARK daily  He denied rest or nocturnal dyspnea symptoms  Review of Systems   Constitutional: Negative for activity change, chills, fatigue and fever  HENT: Negative for mouth sores, sore throat and trouble swallowing  Respiratory: Positive for cough and shortness of breath  Negative for chest tightness and wheezing  Cardiovascular: Negative for chest pain, palpitations and leg swelling  Gastrointestinal: Negative for abdominal pain, nausea and vomiting  Skin: Negative for rash  Allergic/Immunologic: Negative for immunocompromised state  Neurological: Negative for headaches  Hematological: Negative for adenopathy  Psychiatric/Behavioral: Negative for sleep disturbance  The patient is not nervous/anxious          Historical Information   Past Medical History:   Diagnosis Date    COPD [facility]chronic obstructive pulmonary disease) ([facility] [address] )     Cough     Emphysema of lung ([facility] [address] )     Liver lesion     Right upper lobe pulmonary nodule     Syncope and collapse      Past Surgical History:   Procedure Laterality Date    CHOLECYSTECTOMY      LUNG SEGMENTECTOMY Right 1/6/2021    Procedure: RESECTION WEDGE LUNG RIGHT UPPER LOBE;  Surgeon: Humphrey Berrios MD;  [facility];  Service: Thoracic    ID [address] N/A 1/6/2021    Procedure: BRONCHOSCOPY FLEXIBLE;  Surgeon: Humphrey Berrios MD;  [facility];  Service: Thoracic    MD THORACOSCOPY SURG LOBECTOMY Right 1/6/2021    Procedure: RIGHT UPPER LOBECTOMY LUNG;  Surgeon: Shiv Ravi MD;  [facility];  Service: Thoracic    MD THORACOSCOPY [place] [facility] RESEXN INITIAL UNILAT Right 1/6/2021    Procedure: THORACOSCOPY VIDEO ASSISTED SURGERY (VATS),;  Surgeon: Shiv Ravi MD;  [facility];  Service: Thoracic    TESTICLE SURGERY       Family History   Problem Relation Age of Onset    Diabetes Mother     Stroke Father     Heart attack Father     Dementia Father     Alzheimer's disease Father          Meds/Allergies     Current Outpatient Medications:     albuterol (PROVENTIL HFA,VENTOLIN HFA) 90 mcg/act inhaler, INHALE 2 PUFFS EVERY 6 (SIX) HOURS AS NEEDED FOR WHEEZING OR SHORTNESS OF BREATH, Disp: 8 5 g, Rfl: 3    fluticasone-vilanterol (BREO ELLIPTA) 100-25 mcg/inh inhaler, Inhale 1 puff daily Rinse mouth after use , Disp: 1 Inhaler, Rfl: 3    nitroglycerin (NITROSTAT) 0 4 mg SL tablet, PLACE 1 TABLET (0 4 MG TOTAL) UNDER THE TONGUE EVERY 5 (FIVE) MINUTES AS NEEDED FOR CHEST PAIN (Patient not taking: Reported on 1/19/2021), Disp: 25 tablet, Rfl: 6  No Known Allergies    Vitals: Blood pressure 130/70, pulse 88, temperature (!) 97 3 °F (36 3 °C), temperature source Tympanic, resp  rate 18, height 5' 10 5" (1 791 m), weight 106 kg (232 lb 9 6 oz), SpO2 98 %  Body mass index is 32 9 kg/m²  Oxygen Therapy  SpO2: 98 %      Physical Exam  Physical Exam  Vitals signs reviewed  Constitutional:       General: He is not in acute distress  Appearance: Normal appearance  He is well-developed  He is obese  He is not ill-appearing, toxic-appearing or diaphoretic  HENT:      Head: Normocephalic and atraumatic  Right Ear: External ear normal       Left Ear: External ear normal       Nose: Nose normal       Mouth/Throat:      Mouth: Mucous membranes are moist       Pharynx: No oropharyngeal exudate        Comments: Edentulous, no thrush  Eyes:      General: No scleral icterus  Right eye: No discharge  Left eye: No discharge  Conjunctiva/sclera: Conjunctivae normal       Pupils: Pupils are equal, round, and reactive to light  Neck:      Musculoskeletal: Neck supple  Vascular: No JVD  Trachea: No tracheal deviation  Cardiovascular:      Rate and Rhythm: Normal rate and regular rhythm  Heart sounds: Normal heart sounds  No murmur  Pulmonary:      Effort: Pulmonary effort is normal  No respiratory distress  Breath sounds: No stridor  No wheezing, rhonchi or rales  Comments: Mildly diminished BS, no wheeze, no rales  Abdominal:      General: Bowel sounds are normal  There is no distension  Palpations: Abdomen is soft  Tenderness: There is no abdominal tenderness  There is no guarding  Musculoskeletal:         General: No swelling or deformity  Right lower leg: No edema  Left lower leg: No edema  Lymphadenopathy:      Cervical: No cervical adenopathy  Skin:     General: Skin is warm and dry  Findings: No rash  Neurological:      General: No focal deficit present  Mental Status: He is alert and oriented to person, place, and time  Mental status is at baseline  Psychiatric:         Mood and Affect: Mood normal          Behavior: Behavior normal          Thought Content: Thought content normal          Labs: I have personally reviewed pertinent lab results    Lab Results   Component Value Date    WBC 12 58 (H) 01/07/2021    HGB 12 7 01/07/2021    HCT 38 4 01/07/2021    MCV 95 01/07/2021     01/07/2021     Lab Results   Component Value Date    CALCIUM 8 3 01/07/2021    K 4 0 01/07/2021    CO2 24 01/07/2021     01/07/2021    BUN 19 01/07/2021    CREATININE 1 06 01/07/2021     No results found for: IGE  Lab Results   Component Value Date    ALT 37 10/26/2020    AST 18 10/26/2020    ALKPHOS 70 10/26/2020     Imaging and other studies: new images and reports personally reviewed  CXR 2/15/21 - volume loss on right - post surgical changes, small right effusion, resolved right apical PTX, no focal infiltrates     CT/PET 11/10/2020 - 2 5x1 8cm RUL nodular lesion with SUV 1 8, hepatic hypodensities w/o FDG avidity        Pulmonary function testin2020 - Ratio 69%, FVC 4 07L, FEV1 2 80L (72%), post BD FEV1 inc 9%, TLC 92%, %, DLCO 66% - mild obstructive airflow defect, no response to BD normal lung volumes, mildly reduced diffusion     Spirometry  - Ratio 78%, FVC 4 20L (81%), FEV1 3 29L (82%) - normal spirometry     2009 - 6MWT on RA -  total walk distance 324 meters, Nik SpO2 95%, maximal HR 98bpm     EKG, Pathology, and Other Studies: I have personally reviewed pertinent reports     Myocardial perfusion scan 2020 - normal study after pharmacologic vasodilation, normal LV function 61%    Holter monitor 10/20/2020 - NSR, ectopy atrial cuplets, late beats, single PACs     TTE EF 60%, normal RV size and function    Nick Henry DO, [facility]

## 2021-03-10 DIAGNOSIS — Z23 ENCOUNTER FOR IMMUNIZATION: ICD-10-CM

## 2021-03-23 ENCOUNTER — TELEPHONE (OUTPATIENT)
Dept: GYNECOLOGIC ONCOLOGY | Facility: CLINIC | Age: 56
End: 2021-03-23

## 2021-03-23 DIAGNOSIS — R06.00 DYSPNEA ON EXERTION: ICD-10-CM

## 2021-03-23 RX ORDER — ALBUTEROL SULFATE 90 UG/1
2 AEROSOL, METERED RESPIRATORY (INHALATION) EVERY 6 HOURS PRN
Qty: 8.5 G | Refills: 3 | Status: SHIPPED | OUTPATIENT
Start: 2021-03-23 | End: 2021-06-08

## 2021-03-23 NOTE — TELEPHONE ENCOUNTER
Patient called today and needs a return to work note, He plans to return 3/30/21  The letter should be sent to [phone] Attn:Lili, any other questions the patient can be reached at [phone]

## 2021-03-26 ENCOUNTER — IMMUNIZATIONS (OUTPATIENT)
Dept: FAMILY MEDICINE CLINIC | Facility: HOSPITAL | Age: 56
End: 2021-03-26

## 2021-03-26 DIAGNOSIS — Z23 ENCOUNTER FOR IMMUNIZATION: Primary | ICD-10-CM

## 2021-03-26 PROCEDURE — 91300 SARS-COV-2 / COVID-19 MRNA VACCINE (PFIZER-BIONTECH) 30 MCG: CPT

## 2021-03-26 PROCEDURE — 0001A SARS-COV-2 / COVID-19 MRNA VACCINE (PFIZER-BIONTECH) 30 MCG: CPT

## 2021-04-16 ENCOUNTER — IMMUNIZATIONS (OUTPATIENT)
Dept: FAMILY MEDICINE CLINIC | Facility: HOSPITAL | Age: 56
End: 2021-04-16

## 2021-04-16 DIAGNOSIS — Z23 ENCOUNTER FOR IMMUNIZATION: Primary | ICD-10-CM

## 2021-04-16 PROCEDURE — 0002A SARS-COV-2 / COVID-19 MRNA VACCINE (PFIZER-BIONTECH) 30 MCG: CPT

## 2021-04-16 PROCEDURE — 91300 SARS-COV-2 / COVID-19 MRNA VACCINE (PFIZER-BIONTECH) 30 MCG: CPT

## 2021-05-11 ENCOUNTER — TRANSCRIBE ORDERS (OUTPATIENT)
Dept: ADMINISTRATIVE | Facility: HOSPITAL | Age: 56
End: 2021-05-11

## 2021-05-11 DIAGNOSIS — R06.02 SOB (SHORTNESS OF BREATH): Primary | ICD-10-CM

## 2021-05-20 DIAGNOSIS — J44.9 CHRONIC OBSTRUCTIVE PULMONARY DISEASE, UNSPECIFIED COPD TYPE (HCC): ICD-10-CM

## 2021-05-20 RX ORDER — FLUTICASONE FUROATE AND VILANTEROL TRIFENATATE 100; 25 UG/1; UG/1
POWDER RESPIRATORY (INHALATION)
Qty: 60 EACH | Refills: 5 | Status: SHIPPED | OUTPATIENT
Start: 2021-05-20 | End: 2021-07-27

## 2021-06-02 ENCOUNTER — TRANSCRIBE ORDERS (OUTPATIENT)
Dept: ADMINISTRATIVE | Facility: HOSPITAL | Age: 56
End: 2021-06-02

## 2021-06-02 ENCOUNTER — APPOINTMENT (OUTPATIENT)
Dept: LAB | Facility: MEDICAL CENTER | Age: 56
End: 2021-06-02
Payer: COMMERCIAL

## 2021-06-02 DIAGNOSIS — I10 ESSENTIAL HYPERTENSION, BENIGN: Primary | ICD-10-CM

## 2021-06-02 LAB
ANION GAP SERPL CALCULATED.3IONS-SCNC: 5 MMOL/L (ref 4–13)
BUN SERPL-MCNC: 13 MG/DL (ref 5–25)
CALCIUM SERPL-MCNC: 8.6 MG/DL (ref 8.3–10.1)
CHLORIDE SERPL-SCNC: 112 MMOL/L (ref 100–108)
CO2 SERPL-SCNC: 24 MMOL/L (ref 21–32)
CREAT SERPL-MCNC: 0.84 MG/DL (ref 0.6–1.3)
GFR SERPL CREATININE-BSD FRML MDRD: 99 ML/MIN/1.73SQ M
GLUCOSE SERPL-MCNC: 95 MG/DL (ref 65–140)
MAGNESIUM SERPL-MCNC: 2.2 MG/DL (ref 1.6–2.6)
POTASSIUM SERPL-SCNC: 4 MMOL/L (ref 3.5–5.3)
SODIUM SERPL-SCNC: 141 MMOL/L (ref 136–145)

## 2021-06-02 PROCEDURE — 36415 COLL VENOUS BLD VENIPUNCTURE: CPT | Performed by: INTERNAL MEDICINE

## 2021-06-02 PROCEDURE — 80048 BASIC METABOLIC PNL TOTAL CA: CPT | Performed by: INTERNAL MEDICINE

## 2021-06-02 PROCEDURE — 83735 ASSAY OF MAGNESIUM: CPT | Performed by: INTERNAL MEDICINE

## 2021-06-08 DIAGNOSIS — R06.00 DYSPNEA ON EXERTION: ICD-10-CM

## 2021-06-08 RX ORDER — ALBUTEROL SULFATE 90 UG/1
2 AEROSOL, METERED RESPIRATORY (INHALATION) EVERY 6 HOURS PRN
Qty: 8.5 G | Refills: 3 | Status: SHIPPED | OUTPATIENT
Start: 2021-06-08 | End: 2021-06-28

## 2021-06-17 ENCOUNTER — HOSPITAL ENCOUNTER (OUTPATIENT)
Dept: NON INVASIVE DIAGNOSTICS | Facility: CLINIC | Age: 56
Discharge: HOME | End: 2021-06-17
Payer: COMMERCIAL

## 2021-06-17 DIAGNOSIS — R06.02 SHORTNESS OF BREATH: ICD-10-CM

## 2021-06-17 DIAGNOSIS — R06.02 SOB (SHORTNESS OF BREATH): ICD-10-CM

## 2021-06-17 PROCEDURE — 93018 CV STRESS TEST I&R ONLY: CPT | Performed by: INTERNAL MEDICINE

## 2021-06-17 PROCEDURE — 93016 CV STRESS TEST SUPVJ ONLY: CPT | Performed by: INTERNAL MEDICINE

## 2021-06-17 PROCEDURE — 93017 CV STRESS TEST TRACING ONLY: CPT

## 2021-06-24 LAB
ARRHY DURING EX: NORMAL
CHEST PAIN STATEMENT: NORMAL
MAX DIASTOLIC BP: 64 MMHG
MAX HEART RATE: 131 BPM
MAX PREDICTED HEART RATE: 165 BPM
MAX. SYSTOLIC BP: 146 MMHG
PROTOCOL NAME: NORMAL
REASON FOR TERMINATION: NORMAL
TARGET HR FORMULA: NORMAL
TEST INDICATION: NORMAL
TIME IN EXERCISE PHASE: NORMAL

## 2021-06-28 DIAGNOSIS — R06.00 DYSPNEA ON EXERTION: ICD-10-CM

## 2021-06-28 RX ORDER — ALBUTEROL SULFATE 90 UG/1
2 AEROSOL, METERED RESPIRATORY (INHALATION) EVERY 6 HOURS PRN
Qty: 8.5 G | Refills: 3 | Status: SHIPPED | OUTPATIENT
Start: 2021-06-28 | End: 2021-10-27

## 2021-07-12 ENCOUNTER — HOSPITAL ENCOUNTER (OUTPATIENT)
Dept: CT IMAGING | Facility: CLINIC | Age: 56
Discharge: HOME | End: 2021-07-12
Payer: COMMERCIAL

## 2021-07-12 DIAGNOSIS — C34.11 MALIGNANT NEOPLASM OF UPPER LOBE OF RIGHT LUNG (HCC): ICD-10-CM

## 2021-07-12 PROCEDURE — 71250 CT THORAX DX C-: CPT

## 2021-07-12 PROCEDURE — G1004 CDSM NDSC: HCPCS

## 2021-07-24 NOTE — PROGRESS NOTES
Pulmonary Follow Up Note   Sherice Hoover 54 y o  male MRN: [id number]  7/27/2021      Assessment:  1  Dyspnea on exertion  · Suspect multifactorial to include COPD and deconditioning  · Will focus on each as listed below     2  Chronic obstructive pulmonary disease  · Mild obstruction on PFTs  · Intolerance to Spiriva  · Continue Breztri, continue PRN MARK and use prior to exertional activities  · Encouraged further diet and exercise  · Flu Vaccine 2020, pneumovax 2021, COVID-19 x 2  · Follow up in 6 months     3  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 lobectomy 1/2021  · Further radiographic tracking per Thoracic Surgery     4  GERD - frequent symptoms  · Nonpharmacologic GERD therapies  · Start 30 days of PPI, prn pepcid  · If persists, refer to GI     5  Nicotine dependence in remission - continued to encourage him to remain tobacco free  Plan:    Diagnoses and all orders for this visit:    Chronic obstructive pulmonary disease, unspecified COPD type ([facility] )    Gastroesophageal reflux disease without esophagitis  -     pantoprazole (PROTONIX) 20 mg tablet; Take 1 tablet (20 mg total) by mouth daily    Cigarette nicotine dependence in remission    Malignant neoplasm of upper lobe of right lung ([facility] )    Other orders  -     Breztri Aerosphere 160-9-4 8 MCG/ACT AERO; Inhale 2 puffs 2 (two) times a day  -     Famotidine (PEPCID AC MAXIMUM STRENGTH PO)  -     valsartan-hydrochlorothiazide (DIOVAN-HCT) 320-25 MG per tablet; TAKE ONE TABLET BY EVERY MORNING        Return in about 6 months (around 1/27/2022) for Recheck  History of Present Illness   HPI:  Sherice Hoover is a 54 y o  male who was previously seen by Dr Leslie Ford for chronic bronchitis and lung nodules  He has mild COPD and was diagnosed with IB NSCLCa post VATS RUL lobectomy 1/2021  He was last seen in March 2021 and given trial of Breo      He has since been placed on Breztri and reports tolerating this well   He continues to note REID especially when using straps on truck and in heat/humidity  He will use his rescue inhaler with some relief  He denied rest or nocturnal dyspnea  He reported no sputum production, no hemoptysis, no orthopnea  He does awaken at night with heartburn symptoms  He denied weight loss  He is walkign 2-5 miles regularly  He reported intermittent pleuritic pain at site of surgery but this resolves within 1-2 seconds  Review of Systems   Constitutional: Negative for activity change, appetite change, fatigue and fever  HENT: Negative for ear pain, mouth sores, postnasal drip, rhinorrhea, sneezing, sore throat and trouble swallowing  Respiratory: Positive for shortness of breath  Negative for cough, chest tightness and wheezing  Cardiovascular: Positive for chest pain  Negative for leg swelling  Gastrointestinal: Negative for abdominal pain and nausea  Endocrine: Positive for heat intolerance  Musculoskeletal: Positive for myalgias  Negative for back pain  Allergic/Immunologic: Negative for immunocompromised state  Neurological: Negative for headaches  Hematological: Negative for adenopathy  Psychiatric/Behavioral: Positive for sleep disturbance  The patient is not nervous/anxious          Historical Information   Past Medical History:   Diagnosis Date    [facility]     Cough     Emphyse[facility] of lung [facility]     Liver lesion     Right upper lobe pulmonary nodule     Syncope and collapse      Past Surgical History:   Procedure Laterality Date    CHOLECYSTECTOMY      LUNG SEGMENTECTOMY Right 1/6/2021    Procedure: RESECTION WEDGE LUNG RIGHT UPPER LOBE;  Surgeon: Raza Guillaume MD;  [facility];  Service: Thoracic    [place] [address] N/A 1/6/2021    Procedure: BRONCHOSCOPY FLEXIBLE;  Surgeon: Raza Guillaume MD;  [facility];  Service: Thoracic    AL THORACOSCOPY SURG LOBECTOMY Right 1/6/2021    Procedure: RIGHT UPPER LOBECTOMY LUNG; Surgeon: Tad Vazquez MD;  [facility];  Service: Thoracic    [facility] THORACOSCOPY Yoseph Arevalo WEDGE RESEXN INITIAL UNILAT Right 1/6/2021    Procedure: THORACOSCOPY VIDEO ASSISTED SURGERY (VATS),;  Surgeon: Tad Vazquez MD;  [facility];  Service: Thoracic    TESTICLE SURGERY       Family History   Problem Relation Age of Onset    Diabetes Mother     Stroke Father     Heart attack Father     Dementia Father     Alzheimer's disease Father          Meds/Allergies     Current Outpatient Medications:     albuterol (PROVENTIL HFA,VENTOLIN HFA) 90 mcg/act inhaler, INHALE 2 PUFFS EVERY 6 (SIX) HOURS AS NEEDED FOR WHEEZING OR SHORTNESS OF BREATH, Disp: 8 5 g, Rfl: 3    Breztri Aerosphere 160-9-4 8 MCG/ACT AERO, Inhale 2 puffs 2 (two) times a day, Disp: , Rfl:     Famotidine (PEPCID AC MAXIMUM STRENGTH PO), , Disp: , Rfl:     nitroglycerin (NITROSTAT) 0 4 mg SL tablet, PLACE 1 TABLET (0 4 MG TOTAL) UNDER THE TONGUE EVERY 5 (FIVE) MINUTES AS NEEDED FOR CHEST PAIN, Disp: 25 tablet, Rfl: 6    valsartan-hydrochlorothiazide (DIOVAN-HCT) 320-25 MG per tablet, TAKE ONE TABLET BY EVERY MORNING, Disp: , Rfl:     pantoprazole (PROTONIX) 20 mg tablet, Take 1 tablet (20 mg total) by mouth daily, Disp: 30 tablet, Rfl: 0  No Known Allergies    Vitals: Blood pressure 126/72, pulse 73, temperature 98 °F (36 7 °C), temperature source Temporal, height 5' 10" (1 778 m), weight 110 kg (243 lb 6 4 oz), SpO2 97 %  Body mass index is 34 92 kg/m²  Oxygen Therapy  SpO2: 97 %  Oxygen Therapy: None (Room air)      Physical Exam  Physical Exam  Constitutional:       General: He is not in acute distress  Appearance: Normal appearance  He is well-developed  He is obese  He is not ill-appearing, toxic-appearing or diaphoretic  HENT:      Head: Normocephalic and atraumatic  Right Ear: External ear normal       Left Ear: External ear normal       Nose: Nose normal  No congestion        Mouth/Throat: Mouth: Mucous membranes are moist       Pharynx: Oropharynx is clear  No oropharyngeal exudate  Eyes:      General: No scleral icterus  Right eye: No discharge  Left eye: No discharge  Conjunctiva/sclera: Conjunctivae normal       Pupils: Pupils are equal, round, and reactive to light  Neck:      Vascular: No JVD  Trachea: No tracheal deviation  Cardiovascular:      Rate and Rhythm: Normal rate and regular rhythm  Heart sounds: Normal heart sounds  No murmur heard  Pulmonary:      Effort: Pulmonary effort is normal  No respiratory distress  Breath sounds: Normal breath sounds  No stridor  No wheezing, rhonchi or rales  Abdominal:      General: Bowel sounds are normal  There is no distension  Palpations: Abdomen is soft  Tenderness: There is no abdominal tenderness  There is no guarding  Musculoskeletal:         General: No swelling or deformity  Cervical back: Neck supple  Right lower leg: No edema  Left lower leg: No edema  Lymphadenopathy:      Cervical: No cervical adenopathy  Skin:     General: Skin is warm and dry  Capillary Refill: Capillary refill takes less than 2 seconds  Findings: No rash  Neurological:      Mental Status: He is alert and oriented to person, place, and time  Psychiatric:         Mood and Affect: Mood normal          Behavior: Behavior normal          Thought Content: Thought content normal          Labs: I have personally reviewed pertinent lab results    Lab Results   Component Value Date    WBC 12 58 (H) 01/07/2021    HGB 12 7 01/07/2021    HCT 38 4 01/07/2021    MCV 95 01/07/2021     01/07/2021     Lab Results   Component Value Date    CALCIUM 8 6 06/02/2021    K 4 0 06/02/2021    CO2 24 06/02/2021     (H) 06/02/2021    BUN 13 06/02/2021    CREATININE 0 84 06/02/2021     No results found for: IGE  Lab Results   Component Value Date    ALT 37 10/26/2020    AST 18 10/26/2020    ALKPHOS 70 10/26/2020     Imaging and other studies: new images and reports personally reviewed  CT Chest 21 - post surgical changes on right, mild upper lobe/apical emphysematous changes, no new lesions appreciated    CXR 2/15/21 - volume loss on right - post surgical changes, small right effusion, resolved right apical PTX, no focal infiltrates      CT/PET 11/10/2020 - 2 5x1 8cm RUL nodular lesion with SUV 1 8, hepatic hypodensities w/o FDG avidity        Pulmonary function testin2020 - Ratio 69%, FVC 4 07L, FEV1 2 80L (72%), post BD FEV1 inc 9%, TLC 92%, %, DLCO 66% - mild obstructive airflow defect, no response to BD normal lung volumes, mildly reduced diffusion     Spirometry  - Ratio 78%, FVC 4 20L (81%), FEV1 3 29L (82%) - normal spirometry     2009 - 6MWT on RA -  total walk distance 324 meters, Nik SpO2 95%, maximal HR 98bpm     EKG, Pathology, and Other Studies: I have personally reviewed pertinent reports     Myocardial perfusion scan 2020 - normal study after pharmacologic vasodilation, normal LV function 61%     Holter monitor 10/20/2020 - NSR, ectopy atrial cuplets, late beats, single PACs     TTE EF 60%, normal RV size and function    Nick Henry DO, FACP  [facility]    Answers for HPI/ROS submitted by the patient on 2021  Do you have shortness of breath that occurs with effort or exertion?: Yes  Do you have ear congestion?: No  Do you have heartburn?: Yes  Do you have fatigue?: Yes  Do you have nasal congestion?: No  Do you have shortness of breath when lying flat?: No  Do you have shortness of breath when you wake up?: No  Do you have sweats?: No  Have you experienced weight loss?: No

## 2021-07-27 ENCOUNTER — OFFICE VISIT (OUTPATIENT)
Dept: PULMONOLOGY | Facility: CLINIC | Age: 56
End: 2021-07-27
Payer: COMMERCIAL

## 2021-07-27 VITALS
SYSTOLIC BLOOD PRESSURE: 126 MMHG | DIASTOLIC BLOOD PRESSURE: 72 MMHG | TEMPERATURE: 98 F | HEART RATE: 73 BPM | HEIGHT: 70 IN | BODY MASS INDEX: 34.84 KG/M2 | WEIGHT: 243.4 LBS | OXYGEN SATURATION: 97 %

## 2021-07-27 DIAGNOSIS — C34.11 MALIGNANT NEOPLASM OF UPPER LOBE OF RIGHT LUNG (HCC): ICD-10-CM

## 2021-07-27 DIAGNOSIS — K21.9 GASTROESOPHAGEAL REFLUX DISEASE WITHOUT ESOPHAGITIS: ICD-10-CM

## 2021-07-27 DIAGNOSIS — J44.9 CHRONIC OBSTRUCTIVE PULMONARY DISEASE, UNSPECIFIED COPD TYPE (HCC): Primary | ICD-10-CM

## 2021-07-27 DIAGNOSIS — F17.211 CIGARETTE NICOTINE DEPENDENCE IN REMISSION: ICD-10-CM

## 2021-07-27 PROCEDURE — 3008F BODY MASS INDEX DOCD: CPT | Performed by: INTERNAL MEDICINE

## 2021-07-27 PROCEDURE — 1036F TOBACCO NON-USER: CPT | Performed by: INTERNAL MEDICINE

## 2021-07-27 PROCEDURE — 99214 OFFICE O/P EST MOD 30 MIN: CPT | Performed by: INTERNAL MEDICINE

## 2021-07-27 RX ORDER — VALSARTAN AND HYDROCHLOROTHIAZIDE 320; 25 MG/1; MG/1
TABLET, FILM COATED ORAL
COMMUNITY
Start: 2021-07-20 | End: 2022-05-24 | Stop reason: ALTCHOICE

## 2021-07-27 RX ORDER — BUDESONIDE, GLYCOPYRROLATE, AND FORMOTEROL FUMARATE 160; 9; 4.8 UG/1; UG/1; UG/1
2 AEROSOL, METERED RESPIRATORY (INHALATION) 2 TIMES DAILY
COMMUNITY
Start: 2021-07-22 | End: 2022-01-07 | Stop reason: SDUPTHER

## 2021-07-27 RX ORDER — PANTOPRAZOLE SODIUM 20 MG/1
20 TABLET, DELAYED RELEASE ORAL DAILY
Qty: 30 TABLET | Refills: 0 | Status: SHIPPED | OUTPATIENT
Start: 2021-07-27 | End: 2022-05-24 | Stop reason: ALTCHOICE

## 2021-07-27 NOTE — PATIENT INSTRUCTIONS
· Continue Breztri  · Use rescue inhaler prior to exertional activities  · Follow low acid diet, use pepcid at night as needed  · Start protonix 20mg daily 30min prior to meal  GERD (Gastroesophageal Reflux Disease)   WHAT YOU NEED TO KNOW:   Gastroesophageal reflux disease (GERD) is reflux that occurs more than twice a week for a few weeks  Reflux means acid and food in the stomach back up into the esophagus  It usually causes heartburn and other symptoms  GERD can cause other health problems over time if it is not treated  DISCHARGE INSTRUCTIONS:   Call your local emergency number (911 in the [address] [place] [address],[address]) if:   You have severe chest pain and sudden trouble breathing  Return to the emergency department if:   You have trouble breathing after you vomit  You have trouble swallowing, or pain with swallowing  Your bowel movements are black, bloody, or tarry-looking  Your vomit looks like coffee grounds or has blood in it  Call your doctor or gastroenterologist if:   You feel full and cannot burp or vomit  You vomit large amounts, or you vomit often  You are losing weight without trying  Your symptoms get worse or do not improve with treatment  You have questions or concerns about your condition or care  Medicines:   Medicines  are used to decrease stomach acid  Medicine may also be used to help your lower esophageal sphincter and stomach contract (tighten) more  Take your medicine as directed  Contact your healthcare provider if you think your medicine is not helping or if you have side effects  Tell him of her if you are allergic to any medicine  Keep a list of the medicines, vitamins, and herbs you take  Include the amounts, and when and why you take them  Bring the list or the pill bottles to follow-up visits  Carry your medicine list with you in case of an emergency  Manage GERD:       Do not have foods or drinks that may increase heartburn    These include chocolate, peppermint, fried or fatty foods, drinks that contain caffeine, or carbonated drinks (soda)  Other foods include spicy foods, onions, tomatoes, and tomato-based foods  Do not have foods or drinks that can irritate your esophagus, such as citrus fruits, juices, and alcohol  Do not eat large meals  When you eat a lot of food at one time, your stomach needs more acid to digest it  Eat 6 small meals each day instead of 3 large ones, and eat slowly  Do not eat meals 2 to 3 hours before bedtime  Elevate the head of your bed  Place 6-inch blocks under the head of your bed frame  You may also use more than one pillow under your head and shoulders while you sleep  Maintain a healthy weight  If you are overweight, weight loss may help relieve symptoms of GERD  Do not smoke  Smoking weakens the lower esophageal sphincter and increases the risk of GERD  Ask your healthcare provider for information if you currently smoke and need help to quit  E-cigarettes or smokeless tobacco still contain nicotine  Talk to your healthcare provider before you use these products  Do not wear clothing that is tight around your waist   Tight clothing can put pressure on your stomach and cause or worsen GERD symptoms  Follow up with your doctor or gastroenterologist as directed:  Write down your questions so you remember to ask them during your visits  © Copyright [employer] 2021 Information is for End User's use only and may not be sold, redistributed or otherwise used for commercial purposes  All illustrations and images included in CareNotes® are the copyrighted property of [employer]  or [place]   The above information is an  only  It is not intended as medical advice for individual conditions or treatments  Talk to your doctor, nurse or pharmacist before following any medical regimen to see if it is safe and effective for you    ·

## 2021-08-30 DIAGNOSIS — K21.9 GASTROESOPHAGEAL REFLUX DISEASE WITHOUT ESOPHAGITIS: ICD-10-CM

## 2021-09-01 RX ORDER — PANTOPRAZOLE SODIUM 20 MG/1
20 TABLET, DELAYED RELEASE ORAL DAILY
Qty: 30 TABLET | Refills: 0 | OUTPATIENT
Start: 2021-09-01 | End: 2021-10-01

## 2021-09-01 NOTE — TELEPHONE ENCOUNTER
Cherri, please call patient and determine if his reflux symptoms have resolved  If so, he should stop medication  If persistent, I will renew and place referral to GI service      Thank you,  Cece Dunne, DO

## 2021-09-07 NOTE — TELEPHONE ENCOUNTER
Called and spoke to patient regarding above message  He still is having heartburn and he did run out of the protonix but is back to taking pepcid now  He does not want to see a GI doctor and he does not want to refill the protonix  I told him I will let Dr Mike Portillo know and to please call us or follow up with pcp if any questions or problem arises

## 2021-10-27 DIAGNOSIS — R06.00 DYSPNEA ON EXERTION: ICD-10-CM

## 2021-10-27 RX ORDER — ALBUTEROL SULFATE 90 UG/1
2 AEROSOL, METERED RESPIRATORY (INHALATION) EVERY 6 HOURS PRN
Qty: 18 G | Refills: 3 | Status: SHIPPED | OUTPATIENT
Start: 2021-10-27 | End: 2022-02-24

## 2021-10-27 RX ORDER — ALBUTEROL SULFATE 90 UG/1
2 AEROSOL, METERED RESPIRATORY (INHALATION) EVERY 6 HOURS PRN
Qty: 8.5 G | Refills: 3 | Status: SHIPPED | OUTPATIENT
Start: 2021-10-27 | End: 2022-02-24

## 2021-12-27 ENCOUNTER — TELEPHONE (OUTPATIENT)
Dept: PULMONOLOGY | Facility: CLINIC | Age: 56
End: 2021-12-27

## 2021-12-29 ENCOUNTER — TELEMEDICINE (OUTPATIENT)
Dept: PULMONOLOGY | Facility: CLINIC | Age: 56
End: 2021-12-29
Payer: COMMERCIAL

## 2021-12-29 VITALS
WEIGHT: 250 LBS | HEART RATE: 96 BPM | HEIGHT: 71 IN | SYSTOLIC BLOOD PRESSURE: 128 MMHG | DIASTOLIC BLOOD PRESSURE: 75 MMHG | BODY MASS INDEX: 35 KG/M2 | TEMPERATURE: 96.6 F | OXYGEN SATURATION: 94 %

## 2021-12-29 DIAGNOSIS — U07.1 COVID-19 VIRUS INFECTION: ICD-10-CM

## 2021-12-29 DIAGNOSIS — F17.211 CIGARETTE NICOTINE DEPENDENCE IN REMISSION: ICD-10-CM

## 2021-12-29 DIAGNOSIS — J44.1 CHRONIC OBSTRUCTIVE PULMONARY DISEASE WITH ACUTE EXACERBATION (HCC): Primary | ICD-10-CM

## 2021-12-29 PROCEDURE — 99213 OFFICE O/P EST LOW 20 MIN: CPT | Performed by: INTERNAL MEDICINE

## 2021-12-29 RX ORDER — PREDNISONE 10 MG/1
TABLET ORAL
Qty: 30 TABLET | Refills: 0 | Status: SHIPPED | OUTPATIENT
Start: 2021-12-29 | End: 2022-05-24 | Stop reason: ALTCHOICE

## 2021-12-30 ENCOUNTER — APPOINTMENT (OUTPATIENT)
Dept: RADIOLOGY | Facility: MEDICAL CENTER | Age: 56
End: 2021-12-30
Payer: COMMERCIAL

## 2021-12-30 DIAGNOSIS — J44.1 CHRONIC OBSTRUCTIVE PULMONARY DISEASE WITH ACUTE EXACERBATION (HCC): ICD-10-CM

## 2021-12-30 PROCEDURE — 71046 X-RAY EXAM CHEST 2 VIEWS: CPT

## 2022-01-03 NOTE — RESULT ENCOUNTER NOTE
Lucy Ruvalcaba,    Can you please call Charm Lefort and advise him his CXR is stable and without evidence of pneumonia      Thanks,  Esther Smalls, DO

## 2022-01-06 NOTE — PROGRESS NOTES
Pulmonary Follow Up Note   Corinne Baker 64 y o  male MRN: [id number]  1/7/2022      Assessment:  1  Dyspnea with palpitations  · Not responding to AECOPD therapies and no wheeze on exam  · CXR w/o significant infiltrates to explain symptoms  · Concerned given palpitations and LH possible VTE given recent COVID-19 infection vs possible cardiac dysfunction  · Will check CBC, BMP, BNP, CK, and STAT CT angio now  · Further plans based upon results, if negative for VTE, will plan on TTE     2  Chronic obstructive pulmonary disease with acute exacerbation  · Mild obstruction on PFTs  · Intolerance to Spiriva  · Complete prednisone taper  · Continue Breztri, continue PRN MARK and use prior to exertional activities  · Flu Vaccine 2021 - plan at next visit, pneumovax 2021, COVID-19 x 2  Not boosted  · Follow up based upon testing results     3  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 lobectomy 1/2021  · Further radiographic tracking per Thoracic Surgery     4  GERD - frequent symptoms  · Nonpharmacologic GERD therapies     5   Nicotine dependence in remission - continued to encourage him to remain tobacco free     6  COVID-19 infection - Resolved acute infection, will need booster once acute issues are resolved      Addendum:  I reviewed findings with the patient - mild leukocytosis, normal BMP, normal BNP and CK  CT angio w/o evidence of PE and no significant parenchymal changes  Suspect this may be more post-COVID fatigue  Will hold on TTE at this time given normal BNP and CK  He was in agreement with this plan and will return in 6 weeks  He will call me if symptoms progress or fail to improve  Plan:    Diagnoses and all orders for this visit:    Shortness of breath  -     CBC and differential; Future  -     NT-BNP PRO; Future  -     CK (with reflex to MB); Future  -     Basic metabolic panel; Future  -     CTA chest pe study; Future    COVID-19 virus infection  -     CTA chest pe study;  Future    Malignant neoplasm of upper lobe of right lung (HCC)    Chronic [facility] disease, [facility] ([facility] )  -     GrowBLOXphere 160-9-4 8 MCG/ACT AERO; Inhale 2 puffs 2 (two) times a day    Chronic obstructive pulmonary disease with acute exacerbation (HCC)        Return in 6 weeks (on 2/18/2022) for Recheck  History of Present Illness   HPI:  Corinne Baker is a 64 y o  male who was previously seen by Dr Amanda Rivera for chronic bronchitis and lung nodules  He has mild COPD and was diagnosed with IB NSCLCa post VATS RUL lobectomy 1/2021  [facility] had virtual visit last week  He was diagnosed with COVID-19 on 12/20/21, he did not require admission but did need OCS and antibiotics  Last week he had continued dyspnea and CXR and OCS was started  He presents today for follow up  He reports continued dyspnea with minimal exertion  He has noted LH and racing HR  He also reports diaphoresis at night  He denied sputum production, added cough or hemoptysis  He has noted slight leg discomfort but no swelling  He reports intermittent sharp "bee sting" pain in the chest  He denied fevers or chills  He is using MARK inhaler 2-3 times a day without relief  He has not found relief with further OCS taper  He denied GI symptoms  Review of Systems   Constitutional: Positive for activity change and fatigue  Negative for appetite change and fever  HENT: Negative for ear pain, postnasal drip, rhinorrhea, sneezing, sore throat and trouble swallowing  Respiratory: Positive for shortness of breath and wheezing  Negative for chest tightness  Cardiovascular: Positive for chest pain and palpitations  Negative for leg swelling  Gastrointestinal: Negative for abdominal pain, nausea and vomiting  Musculoskeletal: Negative for myalgias  Allergic/Immunologic: Negative for immunocompromised state  Neurological: Negative for light-headedness and headaches  Hematological: Negative for adenopathy  Psychiatric/Behavioral: Negative for sleep disturbance  The patient is not nervous/anxious          Historical Information   Past Medical History:   Diagnosis Date    COPD (chronic obstructive pulmonary disease) ([facility] )     Cough     Emphysema of lung ([facility] )     Liver lesion     Right upper lobe pulmonary nodule     Syncope and collapse      Past Surgical History:   Procedure Laterality Date    CHOLECYSTECTOMY      LUNG SEGMENTECTOMY Right 1/6/2021    Procedure: RESECTION WEDGE LUNG RIGHT UPPER LOBE;  Surgeon: Barbara Arvizu MD;  [facility];  Service: Thoracic    MS [address] N/A 1/6/2021    Procedure: BRONCHOSCOPY FLEXIBLE;  Surgeon: Barbara Arvizu MD;  [facility];  Service: Thoracic    MS THORACOSCOPY SURG LOBECTOMY Right 1/6/2021    Procedure: RIGHT UPPER LOBECTOMY LUNG;  Surgeon: Barbara Arvizu MD;  [facility];  Service: Thoracic    MS THORACOSCOPY Oren Olives WEDGE RESEXN INITIAL UNILAT Right 1/6/2021    Procedure: THORACOSCOPY VIDEO ASSISTED SURGERY (VATS),;  Surgeon: Barbara Arvizu MD;  [facility];  Service: Thoracic    TESTICLE SURGERY       Family History   Problem Relation Age of Onset    Diabetes Mother     Stroke Father     Heart attack Father     Dementia Father     Alzheimer's disease Father          Meds/Allergies     Current Outpatient Medications:     albuterol (PROVENTIL HFA,VENTOLIN HFA) 90 mcg/act inhaler, INHALE 2 PUFFS EVERY 6 (SIX) HOURS AS NEEDED FOR WHEEZING OR SHORTNESS OF BREATH, Disp: 8 5 g, Rfl: 3    albuterol (PROVENTIL HFA,VENTOLIN HFA) 90 mcg/act inhaler, INHALE 2 PUFFS EVERY 6 (SIX) HOURS AS NEEDED FOR WHEEZING OR SHORTNESS OF BREATH, Disp: 18 g, Rfl: 3    Breztri Aerosphere 160-9-4 8 MCG/ACT AERO, Inhale 2 puffs 2 (two) times a day, Disp: 32 1 g, Rfl: 3    Famotidine (PEPCID AC MAXIMUM STRENGTH PO), , Disp: , Rfl:     nitroglycerin (NITROSTAT) 0 4 mg SL tablet, PLACE 1 TABLET (0 4 MG TOTAL) UNDER THE TONGUE EVERY 5 (FIVE) MINUTES AS NEEDED FOR CHEST PAIN, Disp: 25 tablet, Rfl: 6    predniSONE 10 mg tablet, By mouth take 4 tablets daily x 3 days, then 3 tablets daily x 3 days, then 2 tablets daily x 3 days, then 1 tablet daily x 3 days, Disp: 30 tablet, Rfl: 0    valsartan-hydrochlorothiazide (DIOVAN-HCT) 320-25 MG per tablet, TAKE ONE TABLET BY EVERY MORNING, Disp: , Rfl:     pantoprazole (PROTONIX) 20 mg tablet, Take 1 tablet (20 mg total) by mouth daily, Disp: 30 tablet, Rfl: 0  No current facility-administered medications for this visit  No Known Allergies    Vitals: Blood pressure 138/80, pulse 78, temperature (!) 96 °F (35 6 °C), temperature source Tympanic, resp  rate 18, height 5' 10 5" (1 791 m), weight 116 kg (255 lb 12 8 oz), SpO2 96 %  Body mass index is 36 18 kg/m²  Oxygen Therapy  SpO2: 96 %      Physical Exam  Physical Exam  Constitutional:       General: He is not in acute distress  Appearance: Normal appearance  He is well-developed  He is obese  He is not ill-appearing, toxic-appearing or diaphoretic  HENT:      Head: Normocephalic and atraumatic  Right Ear: External ear normal       Left Ear: External ear normal       Nose: Nose normal       Mouth/Throat:      Mouth: Mucous membranes are moist       Pharynx: Oropharynx is clear  No oropharyngeal exudate  Comments: edentulous  Eyes:      General: No scleral icterus  Right eye: No discharge  Left eye: No discharge  Conjunctiva/sclera: Conjunctivae normal       Pupils: Pupils are equal, round, and reactive to light  Neck:      Vascular: No JVD  Trachea: No tracheal deviation  Cardiovascular:      Rate and Rhythm: Normal rate and regular rhythm  Heart sounds: Normal heart sounds  No murmur heard  Pulmonary:      Effort: Pulmonary effort is normal  No respiratory distress  Breath sounds: No stridor  No wheezing, rhonchi or rales        Comments: Mildly diminished BS, no wheeze  Abdominal:      General: Bowel sounds are normal  There is no distension  Palpations: Abdomen is soft  Tenderness: There is no abdominal tenderness  There is no guarding  Comments: obese   Musculoskeletal:         General: No swelling or deformity  Cervical back: Neck supple  Right lower leg: No edema  Left lower leg: No edema  Comments: No palpable cords   Lymphadenopathy:      Cervical: No cervical adenopathy  Skin:     General: Skin is warm and dry  Findings: No rash  Neurological:      General: No focal deficit present  Mental Status: He is alert and oriented to person, place, and time  Mental status is at baseline  Psychiatric:         Mood and Affect: Mood normal          Behavior: Behavior normal          Thought Content: Thought content normal          Labs: I have personally reviewed pertinent lab results    Lab Results   Component Value Date    WBC 11 83 (H) 01/07/2022    HGB 15 0 01/07/2022    HCT 45 9 01/07/2022    MCV 96 01/07/2022     01/07/2022     Lab Results   Component Value Date    CALCIUM 8 8 01/07/2022    K 4 4 01/07/2022    CO2 27 01/07/2022    CL 99 (L) 01/07/2022    BUN 17 01/07/2022    CREATININE 1 01 01/07/2022     No results found for: IGE  Lab Results   Component Value Date    ALT 37 10/26/2020    AST 18 10/26/2020    ALKPHOS 70 10/26/2020     01/07/2022 - BNP 55    Imaging and other studies: new images and reports personally reviewed  CT angio 01/07/2022 - no PE, mild emphysemtous changes, post surgical changes on right, no sig effusions or mediastinal adenopathy    CXR 12/30/2021 - stable mild volume loss on right and blunted CP angle, no new infiltrates, no effusions, no PTX    CT Chest 7/12/21 - post surgical changes on right, mild upper lobe/apical emphysematous changes, no new lesions appreciated     CXR 2/15/21 - volume loss on right - post surgical changes, small right effusion, resolved right apical PTX, no focal infiltrates      CT/PET 11/10/2020 - 2 5x1 8cm RUL nodular lesion with SUV 1 8, hepatic hypodensities w/o FDG avidity        Pulmonary function testin2020 - Ratio 69%, FVC 4 07L, FEV1 2 80L (72%), post BD FEV1 inc 9%, TLC 92%, %, DLCO 66% - mild obstructive airflow defect, no response to BD normal lung volumes, mildly reduced diffusion     Spirometry  - Ratio 78%, FVC 4 20L (81%), FEV1 3 29L (82%) - normal spirometry     2009 - 6MWT on RA -  total walk distance 324 meters, Nik SpO2 95%, maximal HR 98bpm     EKG, Pathology, and Other Studies: I have personally reviewed pertinent reports     Myocardial perfusion scan 2020 - normal study after pharmacologic vasodilation, normal LV function 61%     Holter monitor 10/20/2020 - NSR, ectopy atrial cuplets, late beats, single PACs     TTE EF 60%, normal RV size and function    Nick Henry, DO, FACP  [facility]    Answers for HPI/ROS submitted by the patient on 2022  Do you have chest tightness?: Yes  Chronicity: new  When did you first notice your symptoms?: 1 to 4 weeks ago  How often do your symptoms occur?: constantly  Since you first noticed this problem, how has it changed?: gradually worsening  Do you have shortness of breath that occurs with effort or exertion?: Yes  Do you have ear congestion?: No  Do you have heartburn?: Yes  Do you have fatigue?: Yes  Do you have nasal congestion?: No  Do you have shortness of breath when lying flat?: Yes  Do you have shortness of breath when you wake up?: Yes  Do you have sweats?: Yes  Have you experienced weight loss?: No  Which of the following makes your symptoms worse?: any activity, change in weather, exercise, minimal activity, strenuous activity  Which of the following makes your symptoms better?: nothing, change in position, rest

## 2022-01-07 ENCOUNTER — APPOINTMENT (OUTPATIENT)
Dept: LAB | Facility: CLINIC | Age: 57
End: 2022-01-07
Payer: COMMERCIAL

## 2022-01-07 ENCOUNTER — HOSPITAL ENCOUNTER (OUTPATIENT)
Dept: CT IMAGING | Facility: HOSPITAL | Age: 57
Discharge: HOME | End: 2022-01-07
Attending: INTERNAL MEDICINE
Payer: COMMERCIAL

## 2022-01-07 ENCOUNTER — OFFICE VISIT (OUTPATIENT)
Dept: PULMONOLOGY | Facility: CLINIC | Age: 57
End: 2022-01-07
Payer: COMMERCIAL

## 2022-01-07 ENCOUNTER — TELEPHONE (OUTPATIENT)
Dept: PULMONOLOGY | Facility: CLINIC | Age: 57
End: 2022-01-07

## 2022-01-07 VITALS
WEIGHT: 255.8 LBS | TEMPERATURE: 96 F | HEART RATE: 78 BPM | BODY MASS INDEX: 35.81 KG/M2 | DIASTOLIC BLOOD PRESSURE: 80 MMHG | SYSTOLIC BLOOD PRESSURE: 138 MMHG | OXYGEN SATURATION: 96 % | RESPIRATION RATE: 18 BRPM | HEIGHT: 71 IN

## 2022-01-07 DIAGNOSIS — R06.02 SHORTNESS OF BREATH: Primary | ICD-10-CM

## 2022-01-07 DIAGNOSIS — U07.1 COVID-19 VIRUS INFECTION: ICD-10-CM

## 2022-01-07 DIAGNOSIS — J44.1 CHRONIC OBSTRUCTIVE PULMONARY DISEASE WITH ACUTE EXACERBATION (HCC): ICD-10-CM

## 2022-01-07 DIAGNOSIS — R06.02 SHORTNESS OF BREATH: ICD-10-CM

## 2022-01-07 DIAGNOSIS — C34.11 MALIGNANT NEOPLASM OF UPPER LOBE OF RIGHT LUNG (HCC): ICD-10-CM

## 2022-01-07 DIAGNOSIS — J44.9 CHRONIC OBSTRUCTIVE PULMONARY DISEASE, UNSPECIFIED COPD TYPE (HCC): ICD-10-CM

## 2022-01-07 LAB
ANION GAP SERPL CALCULATED.3IONS-SCNC: 9 MMOL/L (ref 4–13)
BASOPHILS # BLD AUTO: 0.1 THOUSANDS/ΜL (ref 0–0.1)
BASOPHILS NFR BLD AUTO: 1 % (ref 0–1)
BUN SERPL-MCNC: 17 MG/DL (ref 5–25)
CALCIUM SERPL-MCNC: 8.8 MG/DL (ref 8.3–10.1)
CHLORIDE SERPL-SCNC: 99 MMOL/L (ref 100–108)
CK SERPL-CCNC: 93 U/L (ref 39–308)
CO2 SERPL-SCNC: 27 MMOL/L (ref 21–32)
CREAT SERPL-MCNC: 1.01 MG/DL (ref 0.6–1.3)
EOSINOPHIL # BLD AUTO: 0.07 THOUSAND/ΜL (ref 0–0.61)
EOSINOPHIL NFR BLD AUTO: 1 % (ref 0–6)
ERYTHROCYTE [DISTWIDTH] IN BLOOD BY AUTOMATED COUNT: 14.2 % (ref 11.6–15.1)
GFR SERPL CREATININE-BSD FRML MDRD: 82 ML/MIN/1.73SQ M
GLUCOSE SERPL-MCNC: 84 MG/DL (ref 65–140)
HCT VFR BLD AUTO: 45.9 % (ref 36.5–49.3)
HGB BLD-MCNC: 15 G/DL (ref 12–17)
IMM GRANULOCYTES # BLD AUTO: 0.24 THOUSAND/UL (ref 0–0.2)
IMM GRANULOCYTES NFR BLD AUTO: 2 % (ref 0–2)
LYMPHOCYTES # BLD AUTO: 2.29 THOUSANDS/ΜL (ref 0.6–4.47)
LYMPHOCYTES NFR BLD AUTO: 19 % (ref 14–44)
MCH RBC QN AUTO: 31.3 PG (ref 26.8–34.3)
MCHC RBC AUTO-ENTMCNC: 32.7 G/DL (ref 31.4–37.4)
MCV RBC AUTO: 96 FL (ref 82–98)
MONOCYTES # BLD AUTO: 1.37 THOUSAND/ΜL (ref 0.17–1.22)
MONOCYTES NFR BLD AUTO: 12 % (ref 4–12)
NEUTROPHILS # BLD AUTO: 7.76 THOUSANDS/ΜL (ref 1.85–7.62)
NEUTS SEG NFR BLD AUTO: 65 % (ref 43–75)
NRBC BLD AUTO-RTO: 0 /100 WBCS
NT-PROBNP SERPL-MCNC: 55 PG/ML
PLATELET # BLD AUTO: 260 THOUSANDS/UL (ref 149–390)
PMV BLD AUTO: 8.7 FL (ref 8.9–12.7)
POTASSIUM SERPL-SCNC: 4.4 MMOL/L (ref 3.5–5.3)
RBC # BLD AUTO: 4.8 MILLION/UL (ref 3.88–5.62)
SODIUM SERPL-SCNC: 135 MMOL/L (ref 136–145)
WBC # BLD AUTO: 11.83 THOUSAND/UL (ref 4.31–10.16)

## 2022-01-07 PROCEDURE — G1004 CDSM NDSC: HCPCS

## 2022-01-07 PROCEDURE — 36415 COLL VENOUS BLD VENIPUNCTURE: CPT

## 2022-01-07 PROCEDURE — 82550 ASSAY OF CK (CPK): CPT

## 2022-01-07 PROCEDURE — 80048 BASIC METABOLIC PNL TOTAL CA: CPT

## 2022-01-07 PROCEDURE — 83880 ASSAY OF NATRIURETIC PEPTIDE: CPT

## 2022-01-07 PROCEDURE — 71275 CT ANGIOGRAPHY CHEST: CPT

## 2022-01-07 PROCEDURE — 99215 OFFICE O/P EST HI 40 MIN: CPT | Performed by: INTERNAL MEDICINE

## 2022-01-07 PROCEDURE — 85025 COMPLETE CBC W/AUTO DIFF WBC: CPT

## 2022-01-07 RX ORDER — BUDESONIDE, GLYCOPYRROLATE, AND FORMOTEROL FUMARATE 160; 9; 4.8 UG/1; UG/1; UG/1
2 AEROSOL, METERED RESPIRATORY (INHALATION) 2 TIMES DAILY
Qty: 32.1 G | Refills: 3 | Status: SHIPPED | OUTPATIENT
Start: 2022-01-07 | End: 2023-01-07

## 2022-01-07 RX ADMIN — IOHEXOL 85 ML: 350 INJECTION, SOLUTION INTRAVENOUS at 10:35

## 2022-02-24 DIAGNOSIS — R06.00 DYSPNEA ON EXERTION: ICD-10-CM

## 2022-02-24 RX ORDER — ALBUTEROL SULFATE 90 UG/1
2 AEROSOL, METERED RESPIRATORY (INHALATION) EVERY 6 HOURS PRN
Qty: 18 G | Refills: 3 | Status: SHIPPED | OUTPATIENT
Start: 2022-02-24

## 2022-02-24 RX ORDER — ALBUTEROL SULFATE 90 UG/1
2 AEROSOL, METERED RESPIRATORY (INHALATION) EVERY 6 HOURS PRN
Qty: 8.5 G | Refills: 3 | Status: SHIPPED | OUTPATIENT
Start: 2022-02-24 | End: 2022-02-25 | Stop reason: SDUPTHER

## 2022-02-25 ENCOUNTER — OFFICE VISIT (OUTPATIENT)
Dept: PULMONOLOGY | Facility: CLINIC | Age: 57
End: 2022-02-25
Payer: COMMERCIAL

## 2022-02-25 VITALS
OXYGEN SATURATION: 96 % | TEMPERATURE: 98.8 F | HEART RATE: 94 BPM | BODY MASS INDEX: 36.22 KG/M2 | DIASTOLIC BLOOD PRESSURE: 84 MMHG | HEIGHT: 70 IN | SYSTOLIC BLOOD PRESSURE: 132 MMHG | WEIGHT: 253 LBS

## 2022-02-25 DIAGNOSIS — U07.1 COVID-19 VIRUS INFECTION: ICD-10-CM

## 2022-02-25 DIAGNOSIS — R06.02 SHORTNESS OF BREATH: ICD-10-CM

## 2022-02-25 DIAGNOSIS — C34.11 MALIGNANT NEOPLASM OF UPPER LOBE OF RIGHT LUNG (HCC): ICD-10-CM

## 2022-02-25 DIAGNOSIS — J43.2 CENTRILOBULAR EMPHYSEMA (HCC): Primary | ICD-10-CM

## 2022-02-25 DIAGNOSIS — F17.211 CIGARETTE NICOTINE DEPENDENCE IN REMISSION: ICD-10-CM

## 2022-02-25 PROCEDURE — 94010 BREATHING CAPACITY TEST: CPT | Performed by: INTERNAL MEDICINE

## 2022-02-25 PROCEDURE — 94618 PULMONARY STRESS TESTING: CPT | Performed by: INTERNAL MEDICINE

## 2022-02-25 PROCEDURE — 99215 OFFICE O/P EST HI 40 MIN: CPT | Performed by: INTERNAL MEDICINE

## 2022-02-25 RX ORDER — IPRATROPIUM BROMIDE AND ALBUTEROL SULFATE 2.5; .5 MG/3ML; MG/3ML
3 SOLUTION RESPIRATORY (INHALATION) 3 TIMES DAILY
Qty: 270 ML | Refills: 2 | Status: SHIPPED | OUTPATIENT
Start: 2022-02-25 | End: 2022-05-26

## 2022-02-25 RX ORDER — TIOTROPIUM BROMIDE AND OLODATEROL 3.124; 2.736 UG/1; UG/1
2 SPRAY, METERED RESPIRATORY (INHALATION) DAILY
Qty: 4 G | Refills: 0 | Status: SHIPPED | COMMUNITY
Start: 2022-02-25 | End: 2022-06-29

## 2022-02-25 NOTE — PROGRESS NOTES
Pulmonary Follow Up Note   Kristy Farias 64 y o  male MRN: [id number]  2/25/2022      Assessment:  1  Dyspnea with palpitations  · CT angio w/o PE or new parenchymal infiltrates  · Noted worsened obstruction on PFTs  · Escalate COPD therapy as listed below and if not improving, consider further cardiac testing     2  Chronic obstructive pulmonary disease with acute exacerbation  · Moderate obstructive airflow defect  · Completed prednisone, given lack of response, stop ICS component, will give trial of Stiolto now  · Add duonebs BID-TID  · Flu Vaccine 2021, pneumovax 2021, COVID-19 x 2  Not boosted  · Repeat Spirometry with next visit     3  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 lobectomy 1/2021  · Further radiographic tracking per Thoracic Surgery     4  GERD - frequent symptoms  · Nonpharmacologic GERD therapies     5   Nicotine dependence in remission - continued to encourage him to remain tobacco free     6  COVID-19 infection - Resolved acute infection, will need booster once acute issues are resolved  Plan:    Diagnoses and all orders for this visit:    Centrilobular emphysema ([address] )  -     POCT 6 minute walk  -     POCT spirometry  -     tiotropium-olodaterol (Stiolto Respimat) 2 5-2 5 MCG/ACT inhaler; Inhale 2 puffs daily  -     ipratropium-albuterol (DUO-NEB) 0 5-2 5 mg/3 mL nebulizer solution; Take 3 mL by nebulization 3 (three) times a day  -     Nebulizer Supplies    Malignant neoplasm of upper lobe of right lung (HCC)    COVID-19 virus infection    Shortness of breath  -     POCT 6 minute walk  -     POCT spirometry  -     tiotropium-olodaterol (Stiolto Respimat) 2 5-2 5 MCG/ACT inhaler; Inhale 2 puffs daily  -     ipratropium-albuterol (DUO-NEB) 0 5-2 5 mg/3 mL nebulizer solution; Take 3 mL by nebulization 3 (three) times a day  -     Nebulizer Supplies    Cigarette nicotine dependence in remission        Return in about 6 weeks (around 4/8/2022) for Recheck      History of Present Illness HPI:  Veronica Moser is a 64 y o  male who was previously seen by Dr Janine Mata for chronic bronchitis and lung nodules  He has mild COPD and was diagnosed with IB NSCLCa post VATS RUL lobectomy 1/2021  [facility] had virtual visit last week  He was diagnosed with COVID-19 on 12/20/21, he did not require admission but did need OCS and antibiotics  He had prolonged symptoms and was last seen in Jan 2022  He presents today for follow up      He reports he has continued exertional dyspnea  He noted dyspnea and chest tightness this am with shoveling and clearing his car  He notes dyspnea with shopping  He uses MARK 4-5 times a day but reports little relief  Most relief with rest, denied SSCP no palpitations  He denied purulent sputum production, remains on Breztri  He denied rest dyspnea, no reflux or GI symptoms  Review of Systems   Constitutional: Positive for activity change and fatigue  Negative for chills, fever and unexpected weight change  HENT: Negative for mouth sores, sore throat and trouble swallowing  Respiratory: Positive for cough, chest tightness and shortness of breath  Negative for wheezing  Cardiovascular: Negative for chest pain, palpitations and leg swelling  Gastrointestinal: Negative for abdominal pain, nausea and vomiting  Endocrine: Positive for cold intolerance  Musculoskeletal: Negative for arthralgias and gait problem  Allergic/Immunologic: Negative for immunocompromised state  Neurological: Negative for headaches  Hematological: Negative for adenopathy  Psychiatric/Behavioral: Negative for sleep disturbance  The patient is not nervous/anxious          Historical Information   Past Medical History:   Diagnosis Date    [facility]) [facility]     Cough     Emphy[facility]ma of lung [facility]     Liver lesion     Right upper lobe pulmonary nodule     Syncope and collapse      Past Surgical History:   Procedure Laterality Date    CHOLECYSTECTOMY      LUNG SEGMENTECTOMY Right 1/6/2021    Procedure: RESECTION WEDGE LUNG RIGHT UPPER LOBE;  Surgeon: Cristi Garcia MD;  [facility];  Service: Thoracic    [place] [address] N/A 1/6/2021    Procedure: BRONCHOSCOPY FLEXIBLE;  Surgeon: Cristi Garcia MD;  [facility];  Service: Thoracic    NC THORACOSCOPY SURG LOBECTOMY Right 1/6/2021    Procedure: RIGHT UPPER LOBECTOMY LUNG;  Surgeon: Cristi Garcia MD;  [facility];  Service: Thoracic    NC THORACOSCOPY Leidy Goodell WEDGE RESEXN INITIAL UNILAT Right 1/6/2021    Procedure: THORACOSCOPY VIDEO ASSISTED SURGERY (VATS),;  Surgeon: Cristi Garcia MD;  [facility];  Service: Thoracic    TESTICLE SURGERY       Family History   Problem Relation Age of Onset    Diabetes Mother     Stroke Father     Heart attack Father     Dementia Father     Alzheimer's disease Father          Meds/Allergies     Current Outpatient Medications:     albuterol (PROVENTIL HFA,VENTOLIN HFA) 90 mcg/act inhaler, INHALE 2 PUFFS EVERY 6 (SIX) HOURS AS NEEDED FOR WHEEZING OR SHORTNESS OF BREATH, Disp: 18 g, Rfl: 3    Breztri Aerosphere 160-9-4 8 MCG/ACT AERO, Inhale 2 puffs 2 (two) times a day, Disp: 32 1 g, Rfl: 3    Famotidine (PEPCID AC MAXIMUM STRENGTH PO), , Disp: , Rfl:     nitroglycerin (NITROSTAT) 0 4 mg SL tablet, PLACE 1 TABLET (0 4 MG TOTAL) UNDER THE TONGUE EVERY 5 (FIVE) MINUTES AS NEEDED FOR CHEST PAIN, Disp: 25 tablet, Rfl: 6    valsartan-hydrochlorothiazide (DIOVAN-HCT) 320-25 MG per tablet, TAKE ONE TABLET BY EVERY MORNING, Disp: , Rfl:     ipratropium-albuterol (DUO-NEB) 0 5-2 5 mg/3 mL nebulizer solution, Take 3 mL by nebulization 3 (three) times a day, Disp: 270 mL, Rfl: 2    pantoprazole (PROTONIX) 20 mg tablet, Take 1 tablet (20 mg total) by mouth daily, Disp: 30 tablet, Rfl: 0    predniSONE 10 mg tablet, By mouth take 4 tablets daily x 3 days, then 3 tablets daily x 3 days, then 2 tablets daily x 3 days, then 1 tablet daily x 3 days (Patient not taking: Reported on 2/25/2022 ), Disp: 30 tablet, Rfl: 0    tiotropium-olodaterol (Stiolto Respimat) 2 5-2 5 MCG/ACT inhaler, Inhale 2 puffs daily, Disp: 4 g, Rfl: 0  No Known Allergies    Vitals: Blood pressure 132/84, pulse 94, temperature 98 8 °F (37 1 °C), temperature source Tympanic, height 5' 10" (1 778 m), weight 115 kg (253 lb), SpO2 96 %  Body mass index is 36 3 kg/m²  Oxygen Therapy  SpO2: 96 %  Oxygen Therapy: None (Room air)      Physical Exam  Physical Exam  Vitals reviewed  Constitutional:       General: He is not in acute distress  Appearance: Normal appearance  He is well-developed  He is obese  He is not ill-appearing, toxic-appearing or diaphoretic  HENT:      Head: Normocephalic and atraumatic  Right Ear: External ear normal       Left Ear: External ear normal       Nose: Nose normal       Mouth/Throat:      Mouth: Mucous membranes are moist       Pharynx: Oropharynx is clear  No oropharyngeal exudate  Eyes:      General: No scleral icterus  Right eye: No discharge  Left eye: No discharge  Conjunctiva/sclera: Conjunctivae normal       Pupils: Pupils are equal, round, and reactive to light  Neck:      Vascular: No JVD  Trachea: No tracheal deviation  Cardiovascular:      Rate and Rhythm: Normal rate and regular rhythm  Heart sounds: Normal heart sounds  No murmur heard  Pulmonary:      Effort: Pulmonary effort is normal  No respiratory distress  Breath sounds: Normal breath sounds  No stridor  No wheezing, rhonchi or rales  Abdominal:      General: Bowel sounds are normal  There is no distension  Palpations: Abdomen is soft  Tenderness: There is no abdominal tenderness  There is no guarding  Musculoskeletal:         General: No swelling or deformity  Cervical back: Neck supple  Right lower leg: No edema  Left lower leg: No edema     Lymphadenopathy:      Cervical: No cervical adenopathy  Skin:     General: Skin is warm and dry  Findings: No rash  Neurological:      General: No focal deficit present  Mental Status: He is alert and oriented to person, place, and time  Mental status is at baseline  Psychiatric:         Mood and Affect: Mood normal          Behavior: Behavior normal          Thought Content: Thought content normal          Labs: I have personally reviewed pertinent lab results    Lab Results   Component Value Date    WBC 11 83 (H) 2022    HGB 15 0 2022    HCT 45 9 2022    MCV 96 2022     2022     Lab Results   Component Value Date    CALCIUM 8 8 2022    K 4 4 2022    CO2 27 2022    CL 99 (L) 2022    BUN 17 2022    CREATININE 1 01 2022     No results found for: IGE  Lab Results   Component Value Date    ALT 37 10/26/2020    AST 18 10/26/2020    ALKPHOS 70 10/26/2020       2022 - BNP 55     Imaging and other studies: new images and reports personally reviewed  CT angio 2022 - no PE, mild emphysemtous changes, post surgical changes on right, no sig effusions or mediastinal adenopathy     CXR 2021 - stable mild volume loss on right and blunted CP angle, no new infiltrates, no effusions, no PTX     CT Chest 21 - post surgical changes on right, mild upper lobe/apical emphysematous changes, no new lesions appreciated     CXR 2/15/21 - volume loss on right - post surgical changes, small right effusion, resolved right apical PTX, no focal infiltrates      CT/PET 11/10/2020 - 2 5x1 8cm RUL nodular lesion with SUV 1 8, hepatic hypodensities w/o FDG avidity        Pulmonary function testin2022 - Ratio 60%, FVC 3 24L (66%), FEV1 1 94L (52%) - moderate obstructive airflow defect with reduced VC    2020 - Ratio 69%, FVC 4 07L, FEV1 2 80L (72%), post BD FEV1 inc 9%, TLC 92%, %, DLCO 66% - mild obstructive airflow defect, no response to BD normal lung volumes, mildly reduced diffusion     Spirometry 2017 - Ratio 78%, FVC 4 20L (81%), FEV1 3 29L (82%) - normal spirometry    Ambulation Testing  2/25/2022 - 6MWT on RA - Total Walk distance 369 meters, initial SpO2 95%, chuck SpO2 93%, conclusion 96%, maximal HR 119bpm     11/2009 - 6MWT on RA -  total walk distance 324 meters, Chuck SpO2 95%, maximal HR 98bpm     EKG, Pathology, and Other Studies: I have personally reviewed pertinent reports     Myocardial perfusion scan 12/2020 - normal study after pharmacologic vasodilation, normal LV function 61%     Holter monitor 10/20/2020 - NSR, ectopy atrial cuplets, late beats, single PACs     TTE EF 60%, normal RV size and function    Nick Henry DO, [facility]

## 2022-02-25 NOTE — PATIENT INSTRUCTIONS
· Stop Breztri  · Start Stiolto 2puffs once a day  · Start Duoneb via nebulizer 2-3 times a day  · Follow up in 4-6 weeks or sooner as needed

## 2022-03-16 ENCOUNTER — HOSPITAL ENCOUNTER (OUTPATIENT)
Dept: RADIOLOGY | Facility: MEDICAL CENTER | Age: 57
Discharge: HOME | End: 2022-03-16
Payer: COMMERCIAL

## 2022-03-16 DIAGNOSIS — R06.02 SHORTNESS OF BREATH: ICD-10-CM

## 2022-03-16 PROCEDURE — 71250 CT THORAX DX C-: CPT

## 2022-03-16 PROCEDURE — G1004 CDSM NDSC: HCPCS

## 2022-03-25 ENCOUNTER — TELEPHONE (OUTPATIENT)
Dept: PULMONOLOGY | Facility: CLINIC | Age: 57
End: 2022-03-25

## 2022-03-25 ENCOUNTER — OFFICE VISIT (OUTPATIENT)
Dept: PULMONOLOGY | Facility: CLINIC | Age: 57
End: 2022-03-25
Payer: COMMERCIAL

## 2022-03-25 ENCOUNTER — APPOINTMENT (OUTPATIENT)
Dept: LAB | Facility: CLINIC | Age: 57
End: 2022-03-25
Payer: COMMERCIAL

## 2022-03-25 VITALS
WEIGHT: 255 LBS | BODY MASS INDEX: 35.7 KG/M2 | DIASTOLIC BLOOD PRESSURE: 82 MMHG | HEIGHT: 71 IN | HEART RATE: 68 BPM | OXYGEN SATURATION: 98 % | SYSTOLIC BLOOD PRESSURE: 142 MMHG | TEMPERATURE: 98.1 F

## 2022-03-25 DIAGNOSIS — J43.2 CENTRILOBULAR EMPHYSEMA (HCC): Primary | ICD-10-CM

## 2022-03-25 DIAGNOSIS — R55 SYNCOPE, UNSPECIFIED SYNCOPE TYPE: ICD-10-CM

## 2022-03-25 DIAGNOSIS — G47.33 OSA (OBSTRUCTIVE SLEEP APNEA): ICD-10-CM

## 2022-03-25 DIAGNOSIS — R55 SYNCOPE AND COLLAPSE: ICD-10-CM

## 2022-03-25 DIAGNOSIS — E66.9 OBESITY, UNSPECIFIED CLASSIFICATION, UNSPECIFIED OBESITY TYPE, UNSPECIFIED WHETHER SERIOUS COMORBIDITY PRESENT: ICD-10-CM

## 2022-03-25 LAB
D DIMER PPP FEU-MCNC: 0.59 UG/ML FEU
NT-PROBNP SERPL-MCNC: 60 PG/ML

## 2022-03-25 PROCEDURE — 85379 FIBRIN DEGRADATION QUANT: CPT

## 2022-03-25 PROCEDURE — 83880 ASSAY OF NATRIURETIC PEPTIDE: CPT

## 2022-03-25 PROCEDURE — 36415 COLL VENOUS BLD VENIPUNCTURE: CPT

## 2022-03-25 PROCEDURE — 94010 BREATHING CAPACITY TEST: CPT | Performed by: NURSE PRACTITIONER

## 2022-03-25 PROCEDURE — 99215 OFFICE O/P EST HI 40 MIN: CPT | Performed by: NURSE PRACTITIONER

## 2022-03-25 RX ORDER — FUROSEMIDE 40 MG/1
40 TABLET ORAL DAILY
COMMUNITY
Start: 2022-03-18 | End: 2022-06-29 | Stop reason: SDUPTHER

## 2022-03-25 RX ORDER — METOPROLOL TARTRATE 50 MG/1
50 TABLET, FILM COATED ORAL EVERY 12 HOURS
COMMUNITY
Start: 2022-03-24

## 2022-03-25 NOTE — TELEPHONE ENCOUNTER
I would prefer if patient could obtain all this testing prior to returning to work, if he needs any paperwork tell him we would be happy to accommodate

## 2022-03-25 NOTE — TELEPHONE ENCOUNTER
Pt called re: he was just in to see Richrd Rumps today and he just scheduled his tests but they were for April, may and July  He wanted to know if he's able to return to work or does he have to wait for all these tests to be done before he can    Please advise: [phone]

## 2022-03-25 NOTE — TELEPHONE ENCOUNTER
Patient called stating he will need a note for his work stating he has to be out of work until the testing is complete and what date the testing will be completed  He will download it from his [employer]   Please advise

## 2022-03-26 ENCOUNTER — TELEPHONE (OUTPATIENT)
Dept: PULMONOLOGY | Facility: CLINIC | Age: 57
End: 2022-03-26

## 2022-03-27 PROBLEM — E66.9 OBESITY: Status: ACTIVE | Noted: 2022-03-27

## 2022-03-27 PROBLEM — G47.33 OSA (OBSTRUCTIVE SLEEP APNEA): Status: ACTIVE | Noted: 2022-03-27

## 2022-03-27 NOTE — ASSESSMENT & PLAN NOTE
-  patient reports an approximate 40 lb weight gain in 1 year  -  he reports is likely secondary to him quitting smoking  -  I do feel that this may be contributing to his overall shortness of breath and possible sleep apnea  -  patient is aware and he appears very motivated to make some dietary and lifestyle changes

## 2022-03-27 NOTE — ASSESSMENT & PLAN NOTE
-  patient reports since being diagnosed with COVID-19 not only is he more dyspneic upon exertion however he has developed significant unprovoked episodes of dizziness and near-syncopal episodes  -  he reports a syncopal episode during his previous stress test ordered by his PCP on 2021  -  patient reports he has episodes happen approximately 4 to 5 times a week-they happen sporadically and unprovoked- he will experience dizziness, sweating, and tunnel vision  -  orthostatics performed in office:   Lying- 126/80  Sitting- 114/82  Standin/78  -  patient is agreeable to taking more frequent blood pressures at home and especially if he can not obtain a a blood pressure when these episodes are occurring  -  given completely normalized chest CT I am Concerned that his symptoms are more likely secondary to cardiovascular etiology  -  patient agreeable to:  Carotid study, echo w/ bubble, Holter monitor  -  may consider repeat stress test after further testing has been result  -  given the unpredictability and severity of his near syncopal episode in his occupation as being a  I do feel that he is not to return to work until further testing has been resulted  -  will provide patient with office note stating when he can return to work  -  referral has been placed to Cardiology to establish care

## 2022-03-27 NOTE — ASSESSMENT & PLAN NOTE
-  patient reports increased snoring and frequent nighttime awakenings  -  he does report an approximate 40 lb weight gain in approximately 1 year  -  patient agreeable to home sleep study

## 2022-03-27 NOTE — PROGRESS NOTES
Pulmonary Follow-Up Note   Yolanda Lagos 64 y o  male MRN: [id number]  3/27/2022      Assessment/Plan:    Problem List Items Addressed This Visit        Respiratory    Chronic Obstructive Pulmonary Disease - Primary     -  patient does report that since being diagnosed with COVID-19 his dyspnea on exertion has worsened  -  CT imaging overall stable/unchanged emphysema with noted right upper lobectomy  -  patient has trialed Cristina Omar and Stiolto with minimal improvement in overall dyspnea  -  will continue with albuterol MDI and DuoNeb as he feels some improvement with his shortness of breath with the short-acting inhalers  - in office spirometry:   Moderate restriction  -  will discuss pulmonary rehab once further testing is resulted to ensure patient safety      Addendum:  Obtain BNP/U-fgpmz-jqvzfulgeemo         Relevant Orders    POCT spirometry    MARIANA (obstructive sleep apnea)     -  patient reports increased snoring and frequent nighttime awakenings  -  he does report an approximate 40 lb weight gain in approximately 1 year  -  patient agreeable to home sleep study         Relevant Orders    Home Study       Other    Syncope and collapse     -  patient reports since being diagnosed with COVID-19 not only is he more dyspneic upon exertion however he has developed significant unprovoked episodes of dizziness and near-syncopal episodes  -  he reports a syncopal episode during his previous stress test ordered by his PCP on 2021  -  patient reports he has episodes happen approximately 4 to 5 times a week-they happen sporadically and unprovoked- he will experience dizziness, sweating, and tunnel vision  -  orthostatics performed in office:   Lying- 126/80  Sitting- 114/82  Standin/78  -  patient is agreeable to taking more frequent blood pressures at home and especially if he can not obtain a a blood pressure when these episodes are occurring  -  given completely normalized chest CT I am Concerned that his symptoms are more likely secondary to cardiovascular etiology  -  patient agreeable to:  Carotid study, echo w/ bubble, Holter monitor  -  may consider repeat stress test after further testing has been result  -  given the unpredictability and severity of his near syncopal episode in his occupation as being a  I do feel that he is not to return to work until further testing has been resulted  -  will provide patient with office note stating when he can return to work  -  referral has been placed to Cardiology to establish care         Obesity     -  patient reports an approximate 40 lb weight gain in 1 year  -  he reports is likely secondary to him quitting smoking  -  I do feel that this may be contributing to his overall shortness of breath and possible sleep apnea  -  patient is aware and he appears very motivated to make some dietary and lifestyle changes           Other Visit Diagnoses     Syncope, unspecified syncope type        Relevant Orders    NT-BNP PRO (Completed)    D-dimer, quantitative (Completed)    Echo complete w/ contrast if indicated    VAS carotid complete study    Ambulatory Referral to Cardiology    Holter monitor          Education provided at this visit:   Need for Vaccination:  Up-to-date   Pulmonary Rehab:  Not indicated at this point   Smoking Cessation:  Quit 10/21/2021   Lung Cancer Screening:  Annual screening   Inhaler Use:  Stiolto 2 5-2 5 mcg 2 puffs daily, albuterol nebulizer q 6h p r n , albuterol MDI 2 puffs q 6h p r n  No follow-ups on file  All of Randal questions were answered prior to leaving the office today  Charm Lefort will follow-up with Dr Fan Colvin in 6 weeks or sooner should the need arise  Charm Lefort is aware to call our office with any further questions or concerns  History of Present Illness   Reason for Visit:  6 week follow-up  Chief Complaint: "I am so dizzy"  HPI: Corinne Baker is a 64 y o  male who presents to the office today for 6 week follow-up  Patient reports that he was diagnosed with COVID-19 12/20/2021 did not require hospitalization however he did need prednisone and antibiotics  Patient reports since being diagnosed with COVID-19 he has had significant challenges in his overall health  He reports that his overall dyspnea has become significantly worsened over the last several weeks  Patient has trialed multiple inhalers including Stiolto and Breztri little to no benefit  The most notable concerning changes that he reports is multiple unpredictable episodes of near syncope that tend to come on randomly throughout his day  Patient reports that he will be walking through the supermarket when he begins to feel extremely dizzy flush, sweaty and he will begin seeing "stars" and he feels like he is going to "pass out"  Patient reports this happens approximately 3 to 4 times a week  Patient reports that his previous stress test that was ordered by his PCP (6/2021) he had a syncopal episode  Patient denies following with a cardiologist   I have reviewed with the patient in depth his recent CT scan  Overall it is unremarkable for any acute changes that could be related to his current symptoms  CT scan did show post right upper lobectomy secondary to NSCLC in 1/2021 with noted unchanged emphysema  I am concerned that his symptoms are cardiac/cardiovascular related  Patient is agreeable to carotid study, echo w/ bubble, Holter monitor, D-dimer/BNP, and may consider repeat stress test after this testing is resulted  Finally patient does report an approximate 40 lb weight gain after quitting smoking  This can be contributing to his shortness of breath  He reports snoring and frequent nighttime awakening  Patient agreeable to home sleep study  Given that patient is a , I am concerned for him returning to work at this time without further testing given the severity of his near syncopal episodes    Will provide work note that he can return after further testing is resulted  From a pulmonary standpoint, patient follows with jairo Henry for his COPD  Patient reports an approximate 1 pack per day 39 year smoking history  Patient reports he quit smoking on 10/26/2021  Patient has tried Stiolto and Breztri with minimal improvement  Patient reports some relief with his albuterol MDI  Patient is currently not maintained on oxygen therapy  Patient reports minor occupational exposures as he is a   Patient denies having any pets  Patient reports history of GERD in which he is maintained on Protonix  Patient denies any symptoms of seasonal allergies, postnasal drip, or dysphagia  Patient reports symptoms of MARIANA such as snoring and frequent nighttime awakenings  Patient denies any recent exposures to dust, mold, asbestos, or silica  Review of Systems   Constitutional: Negative for appetite change, chills and fever  HENT: Negative for ear pain, postnasal drip, rhinorrhea, sneezing, sore throat and trouble swallowing  Eyes: Negative for pain and visual disturbance  Respiratory: Positive for chest tightness and shortness of breath  Negative for cough  Cardiovascular: Negative for chest pain and palpitations  Gastrointestinal: Negative for abdominal pain and vomiting  Genitourinary: Negative for dysuria and hematuria  Musculoskeletal: Negative for arthralgias, back pain and myalgias  Skin: Negative for color change and rash  Neurological: Positive for dizziness and headaches  Negative for seizures and syncope  Psychiatric/Behavioral: Negative for agitation and behavioral problems  All other systems reviewed and are negative        Historical Information   Past Medical History:   Diagnosis Date    [facility] disease[facility] [address] [facility]     Cough     Emphysema of lung ([facility] [address] [facility]     Liver lesion     Right upper lobe pulmonary nodule     Syncope and collapse      Past Surgical History:   Procedure Laterality Date    CHOLECYSTECTOMY      LUNG SEGMENTECTOMY Right 2021    Procedure: RESECTION WEDGE LUNG RIGHT UPPER LOBE;  Surgeon: Adam Rod MD;  [facility];  Service: Thoracic    MS [address] N/A 2021    Procedure: Jasper Query;  Surgeon: Adam Rod MD;  [facility];  Service: Thoracic    MS THORACOSCOPY SURG LOBECTOMY Right 2021    Procedure: RIGHT UPPER LOBECTOMY LUNG;  Surgeon: Adam Rod MD;  [facility];  Service: Thoracic    MS THORACOSCOPY Jacqualin Teja WEDGE RESEXN INITIAL UNILAT Right 2021    Procedure: THORACOSCOPY VIDEO ASSISTED SURGERY (VATS),;  Surgeon: Adam Rod MD;  [facility];  Service: Thoracic    TESTICLE SURGERY       Family History   Problem Relation Age of Onset    Diabetes Mother     Stroke Father     Heart attack Father     Dementia Father     Alzheimer's disease Father      Social History   Social History     Substance and Sexual Activity   Alcohol Use Not Currently     Social History     Substance and Sexual Activity   Drug Use Not Currently     Social History     Tobacco Use   Smoking Status Former Smoker    Packs/day: 3 00    Years: 30 00    Pack years: 90 00    Types: Cigarettes    Quit date: 10/26/2020    Years since quittin 4   Smokeless Tobacco Never Used   Tobacco Comment    30 years-5 packs a day      E-Cigarette/Vaping    E-Cigarette Use Never User      E-Cigarette/Vaping Substances    Nicotine No     THC No     CBD No     Flavoring No     Other No     Unknown No        Meds/Allergies     Current Outpatient Medications:     albuterol (PROVENTIL HFA,VENTOLIN HFA) 90 mcg/act inhaler, INHALE 2 PUFFS EVERY 6 (SIX) HOURS AS NEEDED FOR WHEEZING OR SHORTNESS OF BREATH, Disp: 18 g, Rfl: 3    Famotidine (PEPCID AC MAXIMUM STRENGTH PO), , Disp: , Rfl:     furosemide (LASIX) 40 mg tablet, Take 40 mg by mouth daily, Disp: , Rfl:     metoprolol tartrate (LOPRESSOR) 50 mg tablet, Take 50 mg by mouth every 12 (twelve) hours, Disp: , Rfl:     tiotropium-olodaterol (Stiolto Respimat) 2 5-2 5 MCG/ACT inhaler, Inhale 2 puffs daily, Disp: 4 g, Rfl: 0    Breztri Aerosphere 160-9-4 8 MCG/ACT AERO, Inhale 2 puffs 2 (two) times a day, Disp: 32 1 g, Rfl: 3    ipratropium-albuterol (DUO-NEB) 0 5-2 5 mg/3 mL nebulizer solution, Take 3 mL by nebulization 3 (three) times a day (Patient not taking: Reported on 3/25/2022 ), Disp: 270 mL, Rfl: 2    nitroglycerin (NITROSTAT) 0 4 mg SL tablet, PLACE 1 TABLET (0 4 MG TOTAL) UNDER THE TONGUE EVERY 5 (FIVE) MINUTES AS NEEDED FOR CHEST PAIN (Patient not taking: Reported on 3/25/2022 ), Disp: 25 tablet, Rfl: 6    pantoprazole (PROTONIX) 20 mg tablet, Take 1 tablet (20 mg total) by mouth daily, Disp: 30 tablet, Rfl: 0    predniSONE 10 mg tablet, By mouth take 4 tablets daily x 3 days, then 3 tablets daily x 3 days, then 2 tablets daily x 3 days, then 1 tablet daily x 3 days (Patient not taking: Reported on 2/25/2022 ), Disp: 30 tablet, Rfl: 0    valsartan-hydrochlorothiazide (DIOVAN-HCT) 320-25 MG per tablet, TAKE ONE TABLET BY EVERY MORNING, Disp: , Rfl:   No Known Allergies    Vitals: Blood pressure 142/82, pulse 68, temperature 98 1 °F (36 7 °C), height 5' 10 5" (1 791 m), weight 116 kg (255 lb), SpO2 98 %  Body mass index is 36 07 kg/m²  Oxygen Therapy  SpO2: 98 %  Oxygen Therapy: None (Room air)    Physical Exam:  Physical Exam  Constitutional:       General: He is not in acute distress  Appearance: Normal appearance  He is normal weight  He is not ill-appearing  HENT:      Head: Normocephalic and atraumatic  Nose: Nose normal  No congestion or rhinorrhea  Mouth/Throat:      Mouth: Mucous membranes are dry  Pharynx: No oropharyngeal exudate or posterior oropharyngeal erythema  Cardiovascular:      Rate and Rhythm: Normal rate and regular rhythm  Pulses: Normal pulses        Heart sounds: Normal heart sounds  No murmur heard  No friction rub  No gallop  Pulmonary:      Effort: Pulmonary effort is normal  No tachypnea, bradypnea, accessory muscle usage or respiratory distress  Breath sounds: No stridor, decreased air movement or transmitted upper airway sounds  No decreased breath sounds, wheezing, rhonchi or rales  Comments: Overall clear breath sounds  Chest:      Chest wall: No tenderness  Abdominal:      General: Abdomen is flat  Bowel sounds are normal  There is no distension  Palpations: Abdomen is soft  There is no mass  Musculoskeletal:         General: No swelling or tenderness  Normal range of motion  Cervical back: Normal range of motion and neck supple  No rigidity or tenderness  Skin:     General: Skin is warm and dry  Coloration: Skin is not jaundiced or pale  Neurological:      General: No focal deficit present  Mental Status: He is alert  Mental status is at baseline  Psychiatric:         Mood and Affect: Mood normal          Behavior: Behavior normal              Imaging and other studies: I have personally reviewed pertinent films in PACS     Chest CT-emphysema, right upper lobectomy    Pulmonary Results (PFTs, PSG): I have personally reviewed pertinent films in PACS       Spirometry:  FEV1/FVC Ratio is 90% predicted  FEV1 is 2 23L which is 59% predicted  FVC is 3 26L which is 66% predicted  IMPRESSION:  Moderate restriction        RAY Gutierrez  [facility]        Portions of the record may have been created with voice recognition software  Occasional wrong word or "sound a like" substitutions may have occurred due to the inherent limitations of voice recognition software  Read the chart carefully and recognize, using context, where substitutions have occurred or contact the dictating provider    Answers for HPI/ROS submitted by the patient on 3/19/2022  Do you have chest tightness?: Yes  Do you have difficulty breathing?: Yes  Do you have shortness of breath that occurs with effort or exertion?: Yes  Do you have ear congestion?: No  Do you have heartburn?: No  Do you have fatigue?: Yes  Do you have nasal congestion?: No  Do you have shortness of breath when lying flat?: Yes  Do you have shortness of breath when you wake up?: Yes  Do you have sweats?: Yes  Have you experienced weight loss?: No

## 2022-04-08 ENCOUNTER — TELEPHONE (OUTPATIENT)
Dept: PULMONOLOGY | Facility: CLINIC | Age: 57
End: 2022-04-08

## 2022-04-08 NOTE — TELEPHONE ENCOUNTER
Patient is calling, he wants to know if his paperwork from [employer] for disability insurance  He said it should of been faxed over to the office on Monday or Tuesday  He would appreciate if he could get an update   Please advise

## 2022-04-08 NOTE — TELEPHONE ENCOUNTER
Called Pt back and L/M stating that we haven't received any disability papers but as soon I we get them I will let him know  Vernon Novak

## 2022-04-14 NOTE — TELEPHONE ENCOUNTER
Called Pt Back and L/M letting the Pt know that we have not received any paperwork from [employer] for disability insurance, Pls  Make sure to verify the fax # [phone]   We will notify you as soon as we

## 2022-04-18 NOTE — TELEPHONE ENCOUNTER
Patient is calling, he is asking if we received his paperwork yet  I advised him of our [employer] fax number and [employer]  He will be having his forms sent to both locations  He would appreciate a call to know we received it   Please advise

## 2022-04-19 ENCOUNTER — TELEPHONE (OUTPATIENT)
Dept: PULMONOLOGY | Facility: CLINIC | Age: 57
End: 2022-04-19

## 2022-04-20 ENCOUNTER — TELEPHONE (OUTPATIENT)
Dept: PULMONOLOGY | Facility: CLINIC | Age: 57
End: 2022-04-20

## 2022-04-20 ENCOUNTER — DOCUMENTATION (OUTPATIENT)
Dept: PULMONOLOGY | Facility: CLINIC | Age: 57
End: 2022-04-20

## 2022-04-20 NOTE — TELEPHONE ENCOUNTER
Called patient advised him that his paperwork is ready for pickup, He will be picking it up today 04/20/2022

## 2022-04-21 ENCOUNTER — HOSPITAL ENCOUNTER (OUTPATIENT)
Dept: NON INVASIVE DIAGNOSTICS | Facility: CLINIC | Age: 57
Discharge: HOME | End: 2022-04-21
Payer: COMMERCIAL

## 2022-04-21 VITALS
SYSTOLIC BLOOD PRESSURE: 142 MMHG | HEIGHT: 71 IN | HEART RATE: 85 BPM | WEIGHT: 255 LBS | BODY MASS INDEX: 35.7 KG/M2 | DIASTOLIC BLOOD PRESSURE: 82 MMHG

## 2022-04-21 DIAGNOSIS — R55 SYNCOPE, UNSPECIFIED SYNCOPE TYPE: ICD-10-CM

## 2022-04-21 LAB
AORTIC ROOT: 3.7 CM
APICAL FOUR CHAMBER EJECTION FRACTION: 60 %
ASCENDING AORTA: 3.5 CM (ref 2.24–3.35)
AV LVOT PEAK GRADIENT: 3 MMHG
AV PEAK GRADIENT: 9 MMHG
E WAVE DECELERATION TIME: 126 MS
FRACTIONAL SHORTENING: 29 % (ref 28–44)
INTERVENTRICULAR SEPTUM IN DIASTOLE (PARASTERNAL SHORT AXIS VIEW): 1.2 CM
INTERVENTRICULAR SEPTUM: 1.2 CM (ref 0.58–1.08)
LAAS-AP2: 15.2 CM2
LAAS-AP4: 17.7 CM2
LEFT ATRIUM AREA SYSTOLE SINGLE PLANE A4C: 18.1 CM2
LEFT ATRIUM SIZE: 4.6 CM
LEFT INTERNAL DIMENSION IN SYSTOLE: 3.4 CM (ref 5.81–8.81)
LEFT VENTRICULAR INTERNAL DIMENSION IN DIASTOLE: 4.8 CM (ref 9.82–14.64)
LEFT VENTRICULAR POSTERIOR WALL IN END DIASTOLE: 1 CM (ref 0.56–1.07)
LEFT VENTRICULAR STROKE VOLUME: 59 ML
LVSV (TEICH): 59 ML
MV E'TISSUE VEL-SEP: 13 CM/S
MV PEAK A VEL: 0.76 M/S
MV PEAK E VEL: 77 CM/S
MV STENOSIS PRESSURE HALF TIME: 36 MS
MV VALVE AREA P 1/2 METHOD: 6.11 CM2
PA SYSTOLIC PRESSURE: 38 MMHG
RIGHT ATRIAL 2D VOLUME: 61 ML
RIGHT ATRIUM AREA SYSTOLE A4C: 21.2 CM2
RIGHT VENTRICLE ID DIMENSION: 4.7 CM
SL CV LEFT ATRIUM LENGTH A2C: 5.1 CM
SL CV LV EF: 60
SL CV PED ECHO LEFT VENTRICLE DIASTOLIC VOLUME (MOD BIPLANE) 2D: 105 ML
SL CV PED ECHO LEFT VENTRICLE SYSTOLIC VOLUME (MOD BIPLANE) 2D: 46 ML
TR MAX PG: 34 MMHG
TR PEAK VELOCITY: 2.9 M/S
TRICUSPID VALVE PEAK REGURGITATION VELOCITY: 2.91 M/S
Z-SCORE OF ASCENDING AORTA: 2.5
Z-SCORE OF INTERVENTRICULAR SEPTUM IN END DIASTOLE: 2.89
Z-SCORE OF LEFT VENTRICULAR DIMENSION IN END DIASTOLE: -9.15
Z-SCORE OF LEFT VENTRICULAR DIMENSION IN END SYSTOLE: -5.88
Z-SCORE OF LEFT VENTRICULAR POSTERIOR WALL IN END DIASTOLE: 1.44

## 2022-04-21 PROCEDURE — 93306 TTE W/DOPPLER COMPLETE: CPT

## 2022-04-21 PROCEDURE — 93306 TTE W/DOPPLER COMPLETE: CPT | Performed by: INTERNAL MEDICINE

## 2022-04-26 ENCOUNTER — TELEPHONE (OUTPATIENT)
Dept: PULMONOLOGY | Facility: CLINIC | Age: 57
End: 2022-04-26

## 2022-04-26 NOTE — TELEPHONE ENCOUNTER
Spoke with patient echo did show some dilated RA with mild pulmonary hypertension possibly secondary to untreated MARIANA and body habitus- possibility of shunt cannot be ruled out    -  patient reports he is still very short of breath and bradycardia

## 2022-04-29 NOTE — ASSESSMENT & PLAN NOTE
-  patient does report that since being diagnosed with COVID-19 his dyspnea on exertion has worsened  -  CT imaging overall stable/unchanged emphysema with noted right upper lobectomy  -  patient has trialed Breztri and Stiolto with minimal improvement in overall dyspnea  -  will continue with albuterol MDI and DuoNeb as he feels some improvement with his shortness of breath with the short-acting inhalers  - in office spirometry:   Moderate restriction  -  will discuss pulmonary rehab once further testing is resulted to ensure patient safety      Addendum:  Obtain BNP/V-irsld-tvmzxyjivhov Called and gave verbal per dr. Carlton for tamiflu BID

## 2022-05-06 NOTE — TELEPHONE ENCOUNTER
Called pt and left a VM  informing him that Silvino Moreno will pt be in the [facility] office for the next 2 weeks   If he likes I can have the forms faxed over to the [facility] office if he is ok with the forms not being the originals

## 2022-05-06 NOTE — TELEPHONE ENCOUNTER
Patient is calling that he had dropped off paperwork at the office to get dates filled out on his return back to work form  He is in need of this to be able to start working  He is hoping to pick this up asap  Please contact patient as soon as possible when he can get it from the office   Thank you

## 2022-05-06 NOTE — TELEPHONE ENCOUNTER
Pt called re: he received Corrina's message about faxing the paperwork to [employer]  He said he is ok with that  Once they get filled out then we could fax back to the [place] office and he could pick them up     Please advise when ready: [phone]

## 2022-05-09 NOTE — TELEPHONE ENCOUNTER
Papers have been sent to [facility] for Rosy Blanca to complete when she is at the [facility] office tomorrow

## 2022-05-20 ENCOUNTER — HOSPITAL ENCOUNTER (OUTPATIENT)
Dept: NON INVASIVE DIAGNOSTICS | Facility: CLINIC | Age: 57
Discharge: HOME | End: 2022-05-20
Payer: COMMERCIAL

## 2022-05-20 DIAGNOSIS — R55 SYNCOPE, UNSPECIFIED SYNCOPE TYPE: ICD-10-CM

## 2022-05-20 PROCEDURE — 93880 EXTRACRANIAL BILAT STUDY: CPT

## 2022-05-20 PROCEDURE — 93226 XTRNL ECG REC<48 HR SCAN A/R: CPT

## 2022-05-20 PROCEDURE — 93225 XTRNL ECG REC<48 HRS REC: CPT

## 2022-05-20 PROCEDURE — 93886 INTRACRANIAL COMPLETE STUDY: CPT | Performed by: SURGERY

## 2022-05-23 ENCOUNTER — HOSPITAL ENCOUNTER (OUTPATIENT)
Dept: NEUROLOGY | Facility: CLINIC | Age: 57
Discharge: HOME | End: 2022-05-23
Payer: COMMERCIAL

## 2022-05-23 ENCOUNTER — TELEPHONE (OUTPATIENT)
Dept: PULMONOLOGY | Facility: CLINIC | Age: 57
End: 2022-05-23

## 2022-05-23 DIAGNOSIS — G58.9 MONONEUROPATHY, UNSPECIFIED: ICD-10-CM

## 2022-05-23 PROCEDURE — 95909 NRV CNDJ TST 5-6 STUDIES: CPT | Performed by: PSYCHIATRY & NEUROLOGY

## 2022-05-23 PROCEDURE — 95886 MUSC TEST DONE W/N TEST COMP: CPT | Performed by: PSYCHIATRY & NEUROLOGY

## 2022-05-24 ENCOUNTER — OFFICE VISIT (OUTPATIENT)
Dept: CARDIOLOGY CLINIC | Facility: CLINIC | Age: 57
End: 2022-05-24
Payer: COMMERCIAL

## 2022-05-24 VITALS
WEIGHT: 250 LBS | HEART RATE: 63 BPM | DIASTOLIC BLOOD PRESSURE: 80 MMHG | SYSTOLIC BLOOD PRESSURE: 142 MMHG | RESPIRATION RATE: 16 BRPM | BODY MASS INDEX: 35 KG/M2 | HEIGHT: 71 IN | OXYGEN SATURATION: 98 %

## 2022-05-24 DIAGNOSIS — R55 SYNCOPE, UNSPECIFIED SYNCOPE TYPE: ICD-10-CM

## 2022-05-24 DIAGNOSIS — R06.02 SHORTNESS OF BREATH ON EXERTION: Primary | ICD-10-CM

## 2022-05-24 DIAGNOSIS — E66.9 OBESITY (BMI 30-39.9): ICD-10-CM

## 2022-05-24 DIAGNOSIS — I10 ESSENTIAL HYPERTENSION: ICD-10-CM

## 2022-05-24 PROCEDURE — 93227 XTRNL ECG REC<48 HR R&I: CPT | Performed by: INTERNAL MEDICINE

## 2022-05-24 PROCEDURE — 99214 OFFICE O/P EST MOD 30 MIN: CPT | Performed by: INTERNAL MEDICINE

## 2022-05-24 NOTE — PROGRESS NOTES
CARDIOLOGY OFFICE VISIT  [facility]  [address], [address] [place] [address], [place], Formerly named [facility] Waldo Escoto  Tel: [phone]      NAME: Michelle Flowers  AGE: 64 y o  SEX: male  : 1965   MRN: [id number]      Chief Complaint:  Chief Complaint   Patient presents with    Follow-up         History of Present Illness:   Patient ref by pulmonologist to r/o cardiac causes of SOB  64 yr old with COPD, Stage IB NSCLCa - Adenocarcinoma - post VATS RUL lobectomy 2021, COVID-19 infection in Dec 2021, past smoker, HTN who states he has been short of breath for a while now  It got worsened post COVID-19 infection  Denies associated chest pain/pressure, nausea, diaphoresis  Patient also denies palpitations, swelling feet, orthopnea, PND, claudication  Essential hypertension -  Has been hypertensive for many years  Taking medications regularly  Denies lightheadedness, headache, medication side effects  Syncope - multiple episodes of sudden onset LOC of unclear etiology going back  and beyond  Some of those were associated with some shakiness of arms and legs  A couple of them were thought to be from dehydration  His 2D echocardiogram showed normal EF, normal diastolic function, mildly dilated RA and moderately dilated RV  His Holter monitor has shown no significant arrhythmia or pauses  His last syncopal episode was over 6 months ago  Obesity -  Trying to lose weight        Past Medical History:  Past Medical History:   Diagnosis Date    COPD (chronic obstructive pulmonary disease) ([address] )     Cough     Emphysema of lung ([address] )     Liver lesion     Right upper lobe pulmonary nodule     Syncope and collapse          Past Surgical History:  Past Surgical History:   Procedure Laterality Date    CHOLECYSTECTOMY      LUNG SEGMENTECTOMY Right 2021    Procedure: RESECTION WEDGE LUNG RIGHT UPPER LOBE;  Surgeon: Nora Badillo MD;  [facility];  Service: Thoracic    NE BRONCHOSCOPY,DIAGNOSTIC N/A 2021    Procedure: Richie Mansfield;  Surgeon: Bushra Galan MD;  [facility];  Service: Thoracic    NE THORACOSCOPY SURG LOBECTOMY Right 2021    Procedure: RIGHT UPPER LOBECTOMY LUNG;  Surgeon: Bushra Galan MD;  [facility];  Service: Thoracic    NE THORACOSCOPY Loc Roman WEDGE RESEXN INITIAL UNILAT Right 2021    Procedure: THORACOSCOPY VIDEO ASSISTED SURGERY (VATS),;  Surgeon: Bushra Galan MD;  [facility];  Service: Thoracic    TESTICLE SURGERY           Family History:  Family History   Problem Relation Age of Onset    Diabetes Mother     Stroke Father     Heart attack Father    Jose Antonio Roulette Dementia Father     Alzheimer's disease Father          Social History:  Social History     Socioeconomic History    Marital status: /Civil Union     Spouse name: None    Number of children: None    Years of education: None    Highest education level: None   Occupational History    None   Tobacco Use    Smoking status: Former Smoker     Packs/day: 3 00     Years: 30 00     Pack years: 90 00     Types: Cigarettes     Quit date: 10/26/2020     Years since quittin 5    Smokeless tobacco: Never Used    Tobacco comment: 30 years-5 packs a day    Vaping Use    Vaping Use: Never used   Substance and Sexual Activity    Alcohol use: Not Currently    Drug use: Not Currently    Sexual activity: Not Currently   Other Topics Concern    None   Social History Narrative    None     Social Determinants of Health     Financial Resource Strain: Not on file   Food Insecurity: Not on file   Transportation Needs: Not on file   Physical Activity: Not on file   Stress: Not on file   Social Connections: Not on file   Intimate Partner Violence: Not on file   Housing Stability: Not on file         Active Problems:  Patient Active Problem List   Diagnosis    Syncope and collapse    Knee injury    Chronic Obstructive Pulmonary Disease    Liver lesion    Cigarette nicotine dependence in remission    Malignant neoplasm of upper lobe of right lung (HCC)    Gastroesophageal reflux disease without esophagitis    COVID-19 virus infection    Shortness of breath    MARIANA (obstructive sleep apnea)    Obesity    Mononeuropathy, unspecified         The following portions of the patient's history were reviewed and updated as appropriate: past medical history, past surgical history, past family history,  past social history, current medications, allergies and problem list       Review of Systems:  Constitutional: Denies fever, chills  Eyes: Denies eye redness, eye discharge  ENT: Denies hearing loss, sneezing, nasal discharge, sore throat   Respiratory: +cough, +shortness of breath  Cardiovascular: Denies chest pain, palpitations, lower extremity swelling  Gastrointestinal: Denies abdominal pain, nausea, vomiting, diarrhea  Genito-Urinary: Denies dysuria, incontinence  Musculoskeletal: Denies back pain, joint pain, muscle pain  Neurologic: Denies lightheadedness, syncope, headache, seizures  Endocrine: Denies polydipsia, temperature intolerance  Allergy and Immunology: Denies hives, insect bite sensitivity  Hematological and Lymphatic: Denies bleeding problems, swollen glands   Psychological: Denies depression, suicidal ideation, anxiety, panic  Dermatological: Denies pruritus, rash, skin lesion changes      Vitals:  Vitals:    05/24/22 0902   BP: 142/80   Pulse: 63   Resp: 16   SpO2: 98%       Body mass index is 35 36 kg/m²  Weight (last 2 days)     Date/Time Weight    05/24/22 0902 113 (250)            Physical Examination:  General: Patient is not in acute distress  Awake, alert, oriented in time, place and person  Responding to commands  Head: Normocephalic  Atraumatic  Eyes: Both pupils normal sized, round and reactive to light  Nonicteric  ENT: Normal external ear canals  Neck: Supple  JVP not raised  Trachea central  No thyromegaly  Lungs: Bilateral bronchovascular breath sounds with no crackles or rhonchi  Chest wall: No tenderness  Cardiovascular: RRR  S1 and S2 normal  No murmur, rub or gallop  Gastrointestinal: Abdomen soft, nontender  No guarding or rigidity  Liver and spleen not palpable  Bowel sounds present  Neurologic: Patient is awake, alert, oriented in time, place and person  Responding to commands  Moving all extremities  Integumentary:  No skin rash  Lymphatic: No cervical lymphadenopathy  Back: Symmetric   No CVA tenderness  Extremities: No clubbing, cyanosis or edema      Laboratory Results:  CBC with diff:   Lab Results   Component Value Date    WBC 11 83 (H) 01/07/2022    RBC 4 80 01/07/2022    HGB 15 0 01/07/2022    HCT 45 9 01/07/2022    MCV 96 01/07/2022    MCH 31 3 01/07/2022    RDW 14 2 01/07/2022     01/07/2022       CMP:  Lab Results   Component Value Date    CREATININE 1 01 01/07/2022    BUN 17 01/07/2022    K 4 4 01/07/2022    CL 99 (L) 01/07/2022    CO2 27 01/07/2022    ALKPHOS 70 10/26/2020    ALT 37 10/26/2020    AST 18 10/26/2020       Lab Results   Component Value Date    MG 2 2 06/02/2021       Lab Results   Component Value Date    TROPONINI <0 02 10/26/2020    TROPONINI <0 02 10/26/2020    TROPONINI <0 02 10/26/2020    CKTOTAL 93 01/07/2022       Lipid Profile:   No results found for: CHOL  Lab Results   Component Value Date    HDL 35 (L) 05/01/2017     Lab Results   Component Value Date    LDLCALC 119 (H) 05/01/2017     Lab Results   Component Value Date    TRIG 179 (H) 05/01/2017       Cardiac testing:   Results for orders placed during the hospital encounter of 10/26/20    Echo complete with contrast if indicated    Narrative  [address] [place] [phone]    Transthoracic Echocardiogram  2D, M-mode, Doppler, and Color Doppler    Study date:  27-Oct-2020    Patient: Ayesha Srivastava  MR number: [id number]  Account number: [de-identified]  : 1965  Age: 47 years  Gender: Male  Status: Outpatient  Location: Bedside  Height: 70 5 in  Weight: 225 lb  BP: 130/ 78 mmHg    Indications: Syncope and collapse, Assess left ventricular function  Diagnoses: R55  - Syncope and collapse    Sonographer:   [facility] , RDCS  Referring Physician:  Bala Blanca MD  Group:  [facility]  Interpreting Physician:  Ilan Eastman MD    SUMMARY    LEFT VENTRICLE:  Systolic function was normal  Ejection fraction was estimated to be 60 %  There were no regional wall motion abnormalities  Wall thickness was at the upper limits of normal     VENTRICULAR SEPTUM:  There was paradoxical motion  These changes are consistent with a conduction abnormality or paced rhythm  RIGHT ATRIUM:  The atrium was mildly dilated  MITRAL VALVE:  There was mild regurgitation  TRICUSPID VALVE:  There was trace regurgitation  IVC, HEPATIC VEINS:  The inferior vena cava was mildly dilated  HISTORY: Syncope  PRIOR HISTORY: Cough, Risk factors: a history of current cigarette use (within the last month)  Chronic lung disease  PROCEDURE: The procedure was performed at the bedside  This was a routine study  The transthoracic approach was used  The study included complete 2D imaging, M-mode, complete spectral Doppler, and color Doppler  The heart rate was 75 bpm,  at the start of the study  Images were obtained from the parasternal, apical, subcostal, and suprasternal notch acoustic windows  Image quality was adequate  LEFT VENTRICLE: Size was normal  Systolic function was normal  Ejection fraction was estimated to be 60 %  There were no regional wall motion abnormalities  Wall thickness was at the upper limits of normal  DOPPLER: Left ventricular  diastolic function parameters were normal     VENTRICULAR SEPTUM: There was paradoxical motion   These changes are consistent with a conduction abnormality or paced rhythm  RIGHT VENTRICLE: The size was normal  Systolic function was normal  Wall thickness was normal     LEFT ATRIUM: Size was normal     RIGHT ATRIUM: The atrium was mildly dilated  MITRAL VALVE: Valve structure was normal  There was normal leaflet separation  DOPPLER: The transmitral velocity was within the normal range  There was no evidence for stenosis  There was mild regurgitation  AORTIC VALVE: The valve was trileaflet  Leaflets exhibited normal thickness and normal cuspal separation  DOPPLER: Transaortic velocity was within the normal range  There was no evidence for stenosis  There was no regurgitation  TRICUSPID VALVE: The valve structure was normal  There was normal leaflet separation  DOPPLER: The transtricuspid velocity was within the normal range  There was no evidence for stenosis  There was trace regurgitation  PULMONIC VALVE: Leaflets exhibited normal thickness, no calcification, and normal cuspal separation  DOPPLER: The transpulmonic velocity was within the normal range  There was no regurgitation  PERICARDIUM: There was no pericardial effusion  The pericardium was normal in appearance  AORTA: The root exhibited normal size  SYSTEMIC VEINS: IVC: The inferior vena cava was normal in size and course  The inferior vena cava was mildly dilated  Respirophasic changes were normal     SYSTEM MEASUREMENT TABLES    2D mode  AoR Diam (2D): 31 mm  AoR Diam; Mean (2D): 31 mm  LA Dimension (2D): 42 mm  LA Dimension; Mean (2D): 42 mm  LA/Ao (2D): 1 4  EDV (2D-Cubed): 892299 mm3  EF (2D-Cubed): 80 %  ESV (2D-Cubed): 12629 mm3  FS (2D-Cubed): 41 5 %  FS (2D-Teich): 41 5 %  IVS/LVPW (2D): 1  IVSd (2D): 11 2 mm  IVSd; Mean chosen (2D): 11 2 mm  LVIDd (2D): 49 6 mm  LVIDd; Mean (2D): 49 6 mm  LVIDs (2D): 29 mm  LVIDs; Mean (2D): 29 mm  LVPWd (2D): 10 8 mm  LVPWd; Mean (2D): 10 8 mm  Left Ventricular Ejection Fraction;  Teichholz; 2D mode;: 72 2 %  Left Ventricular End Diastolic Volume; Teichholz; 2D mode;: 790236 mm3  Left Ventricular End Systolic Volume; Teichholz; 2D mode;: 53231 mm3  SV (2D-Cubed): 95157 mm3  Stroke Volume; Teichholz; 2D mode;: 59433 mm3    Apical four chamber  Left Atrium MOD Diam; Most recent value chosen; End Systole; Apical four chamber;: 34 2 mm  Left Atrium MOD Diam; Most recent value chosen; End Systole; Apical four chamber;: 32 7 mm  Left Atrium MOD Diam; Most recent value chosen; End Systole; Apical four chamber;: 24 8 mm  Left Atrium MOD Diam; Most recent value chosen; End Systole; Apical four chamber;: 16 3 mm  Left Atrium MOD Diam; Most recent value chosen; End Systole; Apical four chamber;: 15 1 mm  Left Atrium MOD Diam; Most recent value chosen; End Systole; Apical four chamber;: 21 5 mm  Left Atrium MOD Diam; Most recent value chosen; End Systole; Apical four chamber;: 26 9 mm  Left Atrium MOD Diam; Most recent value chosen; End Systole; Apical four chamber;: 31 4 mm  Left Atrium MOD Diam; Most recent value chosen; End Systole; Apical four chamber;: 34 5 mm  Left Atrium MOD Diam; Most recent value chosen; End Systole; Apical four chamber;: 35 7 mm  Left Atrium MOD Diam; Most recent value chosen; End Systole; Apical four chamber;: 37 8 mm  Left Atrium MOD Diam; Most recent value chosen; End Systole; Apical four chamber;: 38 4 mm  Left Atrium MOD Diam; Most recent value chosen; End Systole; Apical four chamber;: 38 7 mm  Left Atrium MOD Diam; Most recent value chosen; End Systole; Apical four chamber;: 39 mm  Left Atrium MOD Diam; Most recent value chosen; End Systole; Apical four chamber;: 38 7 mm  Left Atrium MOD Diam; Most recent value chosen; End Systole; Apical four chamber;: 38 7 mm  Left Atrium MOD Diam; Most recent value chosen; End Systole; Apical four chamber;: 37 8 mm  Left Atrium MOD Diam; Most recent value chosen; End Systole; Apical four chamber;: 37 5 mm  Left Atrium MOD Diam; Most recent value chosen; End Systole;  Apical four chamber;: 36 6 mm  Left Atrium MOD Diam; Most recent value chosen; End Systole; Apical four chamber;: 35 4 mm  Left Atrium Systolic Area; Most recent value chosen; Method of Disks, Single Plane; 2D mode; Apical four chamber;: 1680 mm2  Left Atrium Systolic Volume; Most recent value chosen; Method of Disks, Single Plane; 2D mode; Apical four chamber;: 33503 mm3  Left Atrium systolic major axis; Most recent value chosen; Method of Disks, Single Plane; 2D mode; Apical four chamber;: 50 8 mm  EF (A4C): 59 7 %  LV MOD Diam; Recent value; End Diastole (A4C): 45 6 mm  LV MOD Diam; Recent value; End Diastole (A4C): 47 7 mm  LV MOD Diam; Recent value; End Diastole (A4C): 21 2 mm  LV MOD Diam; Recent value; End Diastole (A4C): 31 3 mm  LV MOD Diam; Recent value; End Diastole (A4C): 36 7 mm  LV MOD Diam; Recent value; End Diastole (A4C): 39 5 mm  LV MOD Diam; Recent value; End Diastole (A4C): 41 6 mm  LV MOD Diam; Recent value; End Diastole (A4C): 43 8 mm  LV MOD Diam; Recent value; End Diastole (A4C): 46 5 mm  LV MOD Diam; Recent value; End Diastole (A4C): 48 mm  LV MOD Diam; Recent value; End Diastole (A4C): 48 mm  LV MOD Diam; Recent value; End Diastole (A4C): 47 7 mm  LV MOD Diam; Recent value; End Diastole (A4C): 46 8 mm  LV MOD Diam; Recent value; End Diastole (A4C): 46 5 mm  LV MOD Diam; Recent value; End Diastole (A4C): 45 9 mm  LV MOD Diam; Recent value; End Diastole (A4C): 45 9 mm  LV MOD Diam; Recent value; End Diastole (A4C): 46 2 mm  LV MOD Diam; Recent value; End Diastole (A4C): 46 5 mm  LV MOD Diam; Recent value; End Diastole (A4C): 47 7 mm  LV MOD Diam; Recent value; End Diastole (A4C): 45 6 mm  LV MOD Diam; Recent value; End Systole (A4C): 18 2 mm  LV MOD Diam; Recent value; End Systole (A4C): 35 4 mm  LV MOD Diam; Recent value; End Systole (A4C): 35 1 mm  LV MOD Diam; Recent value; End Systole (A4C): 12 1 mm  LV MOD Diam; Recent value; End Systole (A4C): 18 2 mm  LV MOD Diam; Recent value;  End Systole (A4C): 23 mm  LV MOD Diam; Recent value; End Systole (A4C): 27 2 mm  LV MOD Diam; Recent value; End Systole (A4C): 29 4 mm  LV MOD Diam; Recent value; End Systole (A4C): 30 9 mm  LV MOD Diam; Recent value; End Systole (A4C): 31 5 mm  LV MOD Diam; Recent value; End Systole (A4C): 31 8 mm  LV MOD Diam; Recent value; End Systole (A4C): 32 1 mm  LV MOD Diam; Recent value; End Systole (A4C): 31 8 mm  LV MOD Diam; Recent value; End Systole (A4C): 31 8 mm  LV MOD Diam; Recent value; End Systole (A4C): 31 2 mm  LV MOD Diam; Recent value; End Systole (A4C): 31 2 mm  LV MOD Diam; Recent value; End Systole (A4C): 30 9 mm  LV MOD Diam; Recent value; End Systole (A4C): 31 8 mm  LV MOD Diam; Recent value; End Systole (A4C): 33 6 mm  LV MOD Diam; Recent value; End Systole (A4C): 34 2 mm  Left Ventricle diastolic major axis; Most recent value chosen; Method of Disks, Single Plane; 2D mode; Apical four chamber;: 90 5 mm  Left Ventricle systolic major axis; Most recent value chosen; Method of Disks, Single Plane; 2D mode; Apical four chamber;: 79 6 mm  Left Ventricular Diastolic Area; Most recent value chosen; Method of Disks, Single Plane; 2D mode; Apical four chamber;: 3840 mm2  Left Ventricular End Diastolic Volume; Most recent value chosen; Method of Disks, Single Plane; 2D mode; Apical four chamber;: 666964 mm3  Left Ventricular End Systolic Volume; Most recent value chosen; Method of Disks, Single Plane; 2D mode; Apical four chamber;: 76101 mm3  Left Ventricular Systolic Area; Most recent value chosen; Method of Disks, Single Plane; 2D mode; Apical four chamber;: 2320 mm2  SV (A4C): 62814 mm3  Right Atrium MOD Diam; Most recent value chosen; Method of Disks, Single Plane; End Systole; 2D mode; Apical four chamber;: 10 mm  Right Atrium MOD Diam; Most recent value chosen; Method of Disks, Single Plane; End Systole; 2D mode; Apical four chamber;: 15 7 mm  Right Atrium MOD Diam; Most recent value chosen; Method of Disks, Single Plane;  End Systole; 2D mode; Apical four chamber;: 19 4 mm  Right Atrium MOD Diam; Most recent value chosen; Method of Disks, Single Plane; End Systole; 2D mode; Apical four chamber;: 23 2 mm  Right Atrium MOD Diam; Most recent value chosen; Method of Disks, Single Plane; End Systole; 2D mode; Apical four chamber;: 25 5 mm  Right Atrium MOD Diam; Most recent value chosen; Method of Disks, Single Plane; End Systole; 2D mode; Apical four chamber;: 28 3 mm  Right Atrium MOD Diam; Most recent value chosen; Method of Disks, Single Plane; End Systole; 2D mode; Apical four chamber;: 31 3 mm  Right Atrium MOD Diam; Most recent value chosen; Method of Disks, Single Plane; End Systole; 2D mode; Apical four chamber;: 34 3 mm  Right Atrium MOD Diam; Most recent value chosen; Method of Disks, Single Plane; End Systole; 2D mode; Apical four chamber;: 37 7 mm  Right Atrium MOD Diam; Most recent value chosen; Method of Disks, Single Plane; End Systole; 2D mode; Apical four chamber;: 40 mm  Right Atrium MOD Diam; Most recent value chosen; Method of Disks, Single Plane; End Systole; 2D mode; Apical four chamber;: 42 mm  Right Atrium MOD Diam; Most recent value chosen; Method of Disks, Single Plane; End Systole; 2D mode; Apical four chamber;: 43 5 mm  Right Atrium MOD Diam; Most recent value chosen; Method of Disks, Single Plane; End Systole; 2D mode; Apical four chamber;: 43 9 mm  Right Atrium MOD Diam; Most recent value chosen; Method of Disks, Single Plane; End Systole; 2D mode; Apical four chamber;: 43 8 mm  Right Atrium MOD Diam; Most recent value chosen; Method of Disks, Single Plane; End Systole; 2D mode; Apical four chamber;: 43 2 mm  Right Atrium MOD Diam; Most recent value chosen; Method of Disks, Single Plane; End Systole; 2D mode; Apical four chamber;: 42 8 mm  Right Atrium MOD Diam; Most recent value chosen; Method of Disks, Single Plane; End Systole; 2D mode;  Apical four chamber;: 41 2 mm  Right Atrium MOD Diam; Most recent value chosen; Method of Disks, Single Plane; End Systole; 2D mode; Apical four chamber;: 38 8 mm  Right Atrium MOD Diam; Most recent value chosen; Method of Disks, Single Plane; End Systole; 2D mode; Apical four chamber;: 29 7 mm  Right Atrium MOD Diam; Most recent value chosen; Method of Disks, Single Plane; End Systole; 2D mode; Apical four chamber;: 19 7 mm  Right Atrium Systolic Area; Most recent value chosen; Method of Disks, Single Plane; End Systole; 2D mode; Apical four chamber;: 1780 mm2  Right Atrium Systolic Major Axis; Most recent value chosen; Method of Disks, Single Plane; End Systole; 2D mode; Apical four chamber;: 53 5 mm  Right Atrium Systolic Volume; Most recent value chosen; Method of Disks, Single Plane; End Systole; 2D mode; Apical four chamber;: 80138 mm3  Right Ventricle Basal Diameter; 2D mode; Apical four chamber;: 32 3 mm  Right Ventricle Basal Diameter; Mean; Mean value chosen; 2D mode; Apical four chamber;: 32 3 mm    M mode  Inferior Vena Cava Diameter; During Expiration; M mode;: 22 mm  Inferior Vena Cava Diameter; During Inspiration; M mode;: 6 7 mm  Inferior Vena Cava Diameter; Mean; Mean value chosen; During Expiration; M mode;: 22 mm  Inferior Vena Cava Diameter; Mean; Mean value chosen; During Inspiration; M mode;: 6 7 mm  Tricuspid Annular Plane Systolic Excursion; Mean; Mean value chosen; Tricuspid Annulus; M mode;: 23 3 mm  Tricuspid Annular Plane Systolic Excursion; Tricuspid Annulus; M mode;: 23 3 mm    Tissue Doppler Imaging  LV Peak Early Morton Tissue Chavez; Mean; Medial MA (TDI): 95 7 mm/s  LV Peak Early Morton Tissue Chavez; Medial MA (TDI): 95 7 mm/s    Unspecified Scan Mode  MV Peak Chavez/LV Peak Tissue Chavez E-Wave; Medial MA: 6 7  DT; Antegrade Flow: 141 ms  DT; Mean; Antegrade Flow: 141 ms  Dec [place]; Antegrade Flow: 4540 mm/s2  Dec [place]; Mean; Antegrade Flow: 4540 mm/s2  MV E/A: 1 1  MV Peak A Chavez: 606 mm/s  MV Peak A Chavez; Mean: 606 mm/s  MV Peak E Chavez;  Antegrade Flow: 639 mm/s  MV Peak E Chavez; Mean; Antegrade Flow: 639 mm/s  MVA (PHT): 537 mm2  PHT: 41 ms  PHT; Mean: 41 ms  Peak Grad; Mean; Regurgitant Flow: 18 mm[Hg]  Vmax; Mean; Regurgitant Flow: 2150 mm/s  Vmax; Regurgitant Flow: 2100 mm/s  Vmax; Regurgitant Flow: 2190 mm/s    [facility]    Prepared and electronically signed by    Ilan Eastman MD  Signed 27-Oct-2020 17:02:51      Medications:    Current Outpatient Medications:     albuterol (PROVENTIL HFA,VENTOLIN HFA) 90 mcg/act inhaler, INHALE 2 PUFFS EVERY 6 (SIX) HOURS AS NEEDED FOR WHEEZING OR SHORTNESS OF BREATH, Disp: 18 g, Rfl: 3    Breztri Aerosphere 160-9-4 8 MCG/ACT AERO, Inhale 2 puffs 2 (two) times a day, Disp: 32 1 g, Rfl: 3    Famotidine (PEPCID AC MAXIMUM STRENGTH PO), , Disp: , Rfl:     furosemide (LASIX) 40 mg tablet, Take 40 mg by mouth daily, Disp: , Rfl:     ipratropium-albuterol (DUO-NEB) 0 5-2 5 mg/3 mL nebulizer solution, Take 3 mL by nebulization 3 (three) times a day, Disp: 270 mL, Rfl: 2    metoprolol tartrate (LOPRESSOR) 50 mg tablet, Take 50 mg by mouth every 12 (twelve) hours, Disp: , Rfl:     nitroglycerin (NITROSTAT) 0 4 mg SL tablet, PLACE 1 TABLET (0 4 MG TOTAL) UNDER THE TONGUE EVERY 5 (FIVE) MINUTES AS NEEDED FOR CHEST PAIN, Disp: 25 tablet, Rfl: 6    tiotropium-olodaterol (Stiolto Respimat) 2 5-2 5 MCG/ACT inhaler, Inhale 2 puffs daily, Disp: 4 g, Rfl: 0      Allergies:  No Known Allergies      Assessment and Plan:  1  Shortness of breath on exertion  Recent 2D echocardiogram reviewed  Pharmacological nuclear stress test ordered for evaluation  ? Role of furosemide given no evidence of fluid overload / CHF    2  Essential hypertension  BP borderline  Will re-evaluate on next visit and adjust medication if needed    3  Syncope, unspecified syncope type  Likely multifactorial  Consider loop recorder     4  Obesity (BMI 30-39  9)  States he is trying to lose weight    Recommend aggressive risk factor modification and therapeutic lifestyle changes  Low-salt, low-calorie, low-fat, low-cholesterol diet with regular exercise and to optimize weight  I will defer the ordering and monitoring of necessity lab studies to you, but I am available and happy to review and manage any of the data at your request in the future  Discussed concepts of atherosclerosis, including signs and symptoms of cardiac disease  Previous studies were reviewed  Safety measures were reviewed  Questions were entertained and answered  Patient was advised to report any problems requiring medical attention  Follow-up with PCP and appropriate specialist and lab work as discussed  Return for follow up visit as scheduled or earlier, if needed  Thank you for allowing me to participate in the care and evaluation of your patient  Should you have any questions, please feel free to contact me        Andres Kumar MD  7/80/1377,4:98 AM

## 2022-06-06 ENCOUNTER — TELEPHONE (OUTPATIENT)
Dept: CARDIOLOGY CLINIC | Facility: CLINIC | Age: 57
End: 2022-06-06

## 2022-06-06 NOTE — TELEPHONE ENCOUNTER
Jenifer Rosenberg called to speak with you regarding the patient's denial of his NM Stress test     Please call back at [phone]   Reference # [id number]

## 2022-06-06 NOTE — TELEPHONE ENCOUNTER
Peer to peer is needed for nm stress  There is no recent EKG  This is a call in on your own  Please call [phone]   Reference # [id number]  [facility] when done  Thanks

## 2022-06-06 NOTE — TELEPHONE ENCOUNTER
Peer to peer done  Further information needs to be faxed to them  Could you please fax his EKG stress test from 06/17/2021 to [phone]  Thanks

## 2022-06-13 ENCOUNTER — HOSPITAL ENCOUNTER (OUTPATIENT)
Dept: NON INVASIVE DIAGNOSTICS | Facility: HOSPITAL | Age: 57
Discharge: HOME | End: 2022-06-13
Payer: COMMERCIAL

## 2022-06-13 ENCOUNTER — HOSPITAL ENCOUNTER (OUTPATIENT)
Dept: NUCLEAR MEDICINE | Facility: HOSPITAL | Age: 57
Discharge: HOME | End: 2022-06-13
Payer: COMMERCIAL

## 2022-06-13 DIAGNOSIS — R06.02 SHORTNESS OF BREATH ON EXERTION: ICD-10-CM

## 2022-06-13 LAB
BASELINE ST DEPRESSION: 0 MM
NUC STRESS EJECTION FRACTION: 59 %
RATE PRESSURE PRODUCT: NORMAL
SL CV REST NUCLEAR ISOTOPE DOSE: 10.7 MCI
SL CV STRESS NUCLEAR ISOTOPE DOSE: 30 MCI
SL CV STRESS RECOVERY BP: NORMAL MMHG
SL CV STRESS RECOVERY HR: 101 BPM
STRESS BASELINE BP: NORMAL MMHG
STRESS BASELINE HR: 87 BPM
STRESS O2 SAT REST: 95 %
STRESS PEAK HR: 126 BPM
STRESS POST O2 SAT PEAK: 97 %
STRESS POST PEAK BP: 153 MMHG
STRESS ST DEPRESSION: 0 MM

## 2022-06-13 PROCEDURE — 93018 CV STRESS TEST I&R ONLY: CPT | Performed by: INTERNAL MEDICINE

## 2022-06-13 PROCEDURE — 78452 HT MUSCLE IMAGE SPECT MULT: CPT

## 2022-06-13 PROCEDURE — 78452 HT MUSCLE IMAGE SPECT MULT: CPT | Performed by: INTERNAL MEDICINE

## 2022-06-13 PROCEDURE — A9502 TC99M TETROFOSMIN: HCPCS

## 2022-06-13 PROCEDURE — 93017 CV STRESS TEST TRACING ONLY: CPT

## 2022-06-13 PROCEDURE — 93016 CV STRESS TEST SUPVJ ONLY: CPT | Performed by: INTERNAL MEDICINE

## 2022-06-13 PROCEDURE — G1004 CDSM NDSC: HCPCS

## 2022-06-13 RX ADMIN — REGADENOSON 0.4 MG: 0.08 INJECTION, SOLUTION INTRAVENOUS at 09:01

## 2022-06-14 LAB
ARRHY DURING EX: NORMAL
CHEST PAIN STATEMENT: NORMAL
MAX DIASTOLIC BP: 86 MMHG
MAX HEART RATE: 126 BPM
MAX PREDICTED HEART RATE: 164 BPM
MAX. SYSTOLIC BP: 153 MMHG
PROTOCOL NAME: NORMAL
REASON FOR TERMINATION: NORMAL
TARGET HR FORMULA: NORMAL
TEST INDICATION: NORMAL
TIME IN EXERCISE PHASE: NORMAL

## 2022-06-16 ENCOUNTER — TELEPHONE (OUTPATIENT)
Dept: CARDIOLOGY CLINIC | Facility: CLINIC | Age: 57
End: 2022-06-16

## 2022-06-16 NOTE — TELEPHONE ENCOUNTER
----- Message from Meghan Roche MD sent at 6/15/2022  7:31 PM EDT -----  Please call and inform the patient that the stress test was normal

## 2022-06-29 ENCOUNTER — OFFICE VISIT (OUTPATIENT)
Dept: CARDIOLOGY CLINIC | Facility: CLINIC | Age: 57
End: 2022-06-29
Payer: COMMERCIAL

## 2022-06-29 VITALS
WEIGHT: 256 LBS | DIASTOLIC BLOOD PRESSURE: 80 MMHG | HEART RATE: 69 BPM | HEIGHT: 70 IN | OXYGEN SATURATION: 95 % | RESPIRATION RATE: 16 BRPM | SYSTOLIC BLOOD PRESSURE: 140 MMHG | BODY MASS INDEX: 36.65 KG/M2

## 2022-06-29 DIAGNOSIS — E66.9 OBESITY (BMI 30-39.9): ICD-10-CM

## 2022-06-29 DIAGNOSIS — I10 ESSENTIAL HYPERTENSION: ICD-10-CM

## 2022-06-29 DIAGNOSIS — R06.09 COVID-19 LONG HAULER MANIFESTING CHRONIC DYSPNEA: ICD-10-CM

## 2022-06-29 DIAGNOSIS — R55 SYNCOPE AND COLLAPSE: ICD-10-CM

## 2022-06-29 DIAGNOSIS — R06.02 SHORTNESS OF BREATH ON EXERTION: Primary | ICD-10-CM

## 2022-06-29 DIAGNOSIS — U09.9 COVID-19 LONG HAULER MANIFESTING CHRONIC DYSPNEA: ICD-10-CM

## 2022-06-29 PROCEDURE — 99214 OFFICE O/P EST MOD 30 MIN: CPT | Performed by: INTERNAL MEDICINE

## 2022-06-29 RX ORDER — FUROSEMIDE 40 MG/1
20 TABLET ORAL DAILY
Start: 2022-06-29

## 2022-06-29 NOTE — PROGRESS NOTES
CARDIOLOGY OFFICE VISIT  [facility]  [address], [address], [address]  Tel: [phone]      NAME: Ara Knowles  AGE: 64 y o  SEX: male  : 1965  MRN: [id number]      Chief Complaint:  Chief Complaint   Patient presents with    Follow-up         History of Present Illness:   Patient comes for follow up s/p cardiac testing  He still continues to feel short of breath but otherwise denies chest pain / pressure, palpitations, lightheadedness, syncope, swelling feet, orthopnea, PND, claudication  Essential hypertension -  Has been hypertensive for many years  Taking medications regularly  Denies lightheadedness, headache, medication side effects  Obesity - Would like to lose weight  Patient was referred by pulmonologist to r/o cardiac causes of SOB  64 yr old with COPD, Stage IB NSCLCa - Adenocarcinoma - post VATS RUL lobectomy 2021, COVID-19 infection in Dec 2021, past smoker, HTN who states he has been short of breath for a while now  It got worsened post COVID-19 infection  Syncope - multiple episodes of sudden onset LOC of unclear etiology going back  and beyond  Some of those were associated with some shakiness of arms and legs  A couple of them were thought to be from dehydration  His 2D echocardiogram showed normal EF, normal diastolic function, mildly dilated RA and moderately dilated RV  His Holter monitor has shown no significant arrhythmia or pauses  His last syncopal episode was over 6 months ago   Plan is for a loop recorder implantation      Past Medical History:  Past Medical History:   Diagnosis Date    COPD [facility]chronic obstructive pulmonary disease) ([facility] [address] [facility]     Cough     Emphysema of lung ([facility] [address] )     Liver lesion     Right upper lobe pulmonary nodule     Syncope and collapse          Past Surgical History:  Past Surgical History:   Procedure Laterality Date    CHOLECYSTECTOMY      LUNG SEGMENTECTOMY Right 2021    Procedure: RESECTION WEDGE LUNG RIGHT UPPER LOBE;  Surgeon: Quan Burroughs MD;  [facility];  Service: Thoracic    HI [address] N/A 2021    Procedure: Ivar Pope;  Surgeon: Quan Burroughs MD;  [facility];  Service: Thoracic    HI THORACOSCOPY SURG LOBECTOMY Right 2021    Procedure: RIGHT UPPER LOBECTOMY LUNG;  Surgeon: Quan Burroughs MD;  [facility];  Service: Thoracic    HI THORACOSCOPY Nanetta Cocker WEDGE RESEXN INITIAL UNILAT Right 2021    Procedure: THORACOSCOPY VIDEO ASSISTED SURGERY (VATS),;  Surgeon: Quan Burroughs MD;  [facility];  Service: Thoracic    TESTICLE SURGERY           Family History:  Family History   Problem Relation Age of Onset    Diabetes Mother     Stroke Father     Heart attack Father     Dementia Father     Alzheimer's disease Father          Social History:  Social History     Socioeconomic History    Marital status: /Civil Union     Spouse name: None    Number of children: None    Years of education: None    Highest education level: None   Occupational History    None   Tobacco Use    Smoking status: Former Smoker     Packs/day: 3 00     Years: 30 00     Pack years: 90 00     Types: Cigarettes     Quit date: 10/26/2020     Years since quittin 6    Smokeless tobacco: Never Used    Tobacco comment: 30 years-5 packs a day    Vaping Use    Vaping Use: Never used   Substance and Sexual Activity    Alcohol use: Not Currently    Drug use: Not Currently    Sexual activity: Not Currently   Other Topics Concern    None   Social History Narrative    None     Social Determinants of Health     Financial Resource Strain: Not on file   Food Insecurity: Not on file   Transportation Needs: Not on file   Physical Activity: Not on file   Stress: Not on file   Social Connections: Not on file   Intimate Partner Violence: Not on file   Housing Stability: Not on file         Active Problems:  Patient Active Problem List   Diagnosis    Syncope and collapse    Knee injury    Chronic Obstructive Pulmonary Disease    Liver lesion    Cigarette nicotine dependence in remission    Malignant neoplasm of upper lobe of right lung (HCC)    Gastroesophageal reflux disease without esophagitis    COVID-19 virus infection    Shortness of breath    MARIANA (obstructive sleep apnea)    Obesity    Mononeuropathy, unspecified         The following portions of the patient's history were reviewed and updated as appropriate: past medical history, past surgical history, past family history,  past social history, current medications, allergies and problem list       Review of Systems:  Constitutional: Denies fever, chills  Eyes: Denies eye redness, eye discharge  ENT: Denies hearing loss, sneezing, nasal discharge, sore throat   Respiratory: Denies cough, expectoration  +shortness of breath  Cardiovascular: Denies chest pain, palpitations, lower extremity swelling  Gastrointestinal: Denies abdominal pain, nausea, vomiting, diarrhea  Genito-Urinary: Denies dysuria, incontinence  Musculoskeletal: Denies back pain, joint pain, muscle pain  Neurologic: Denies lightheadedness, syncope, headache, seizures  Endocrine: Denies polydipsia, temperature intolerance  Allergy and Immunology: Denies hives, insect bite sensitivity  Hematological and Lymphatic: Denies bleeding problems, swollen glands   Psychological: Denies depression, suicidal ideation, anxiety, panic  Dermatological: Denies pruritus, rash, skin lesion changes      Vitals:  Vitals:    06/29/22 1313   BP: 140/80   Pulse: 69   Resp: 16   SpO2: 95%       Body mass index is 36 73 kg/m²  Weight (last 2 days)     Date/Time Weight    06/29/22 1313 116 (256)            Physical Examination:  General: Patient is not in acute distress  Awake, alert, oriented in time, place and person   Responding to commands  Head: Normocephalic  Atraumatic  Eyes: Both pupils normal sized, round and reactive to light  Nonicteric  ENT: Normal external ear canals  Neck: Supple  JVP not raised  Trachea central  No thyromegaly  Lungs: Bilateral bronchovascular breath sounds with no crackles or rhonchi  Chest wall: No tenderness  Cardiovascular: RRR  S1 and S2 normal  No murmur, rub or gallop  Gastrointestinal: Abdomen soft, nontender  No guarding or rigidity  Liver and spleen not palpable  Bowel sounds present  Neurologic: Patient is awake, alert, oriented in time, place and person  Responding to commands  Moving all extremities  Integumentary:  No skin rash  Lymphatic: No cervical lymphadenopathy  Back: Symmetric  No CVA tenderness  Extremities: No clubbing, cyanosis or edema      Laboratory Results:  CBC with diff:   Lab Results   Component Value Date    WBC 11 83 (H) 01/07/2022    RBC 4 80 01/07/2022    HGB 15 0 01/07/2022    HCT 45 9 01/07/2022    MCV 96 01/07/2022    MCH 31 3 01/07/2022    RDW 14 2 01/07/2022     01/07/2022       CMP:  Lab Results   Component Value Date    CREATININE 1 01 01/07/2022    BUN 17 01/07/2022    K 4 4 01/07/2022    CL 99 (L) 01/07/2022    CO2 27 01/07/2022    ALKPHOS 70 10/26/2020    ALT 37 10/26/2020    AST 18 10/26/2020       Lab Results   Component Value Date    MG 2 2 06/02/2021       Lab Results   Component Value Date    TROPONINI <0 02 10/26/2020    TROPONINI <0 02 10/26/2020    TROPONINI <0 02 10/26/2020    CKTOTAL 93 01/07/2022       Cardiac testing:   Results for orders placed during the hospital encounter of 04/21/22    Echo complete w/ contrast if indicated    Interpretation Summary    Left Ventricle: Left ventricular cavity size is normal  Wall thickness is mildly increased  The left ventricular ejection fraction is 60%  Systolic function is normal  Wall motion is normal  Diastolic function is normal     Right Ventricle: Right ventricular cavity size is moderately dilated    Right Atrium:  The atrium is mildly dilated    Bubble study performed  There was no definitive evidence of interatrial shunt however the possibility cannot be compeltely excluded on the basis of this study  Consider evaluation with CONCHIS if clinically indicated    Pulmonary Artery: The estimated pulmonary artery systolic pressure is 67 4 mmHg  The pulmonary artery systolic pressure is mildly increased    Cystic echolucency noted in the liver  Consider dedicated imaging if clinically indicated  Results for orders placed during the hospital encounter of 22    NM myocardial perfusion spect (rx stress and/or rest)    Interpretation Summary    Stress ECG: No ST deviation is noted  The ECG was negative for ischemia    Perfusion Defect Conclusion: There is no evidence of transient ischemic dilation (TID)  TID index 1 02     Perfusion: There is a left ventricular perfusion defect that is medium in size present in the basal to mid inferior location(s) that is fixed  This likely represents diaphragmatic attenuation  Clears with prone imaging  No reversible defects suggesting ischemia identified  There is also tissue artifact noted during resting images    Stress Function: Left ventricular function post-stress is normal  Post-stress ejection fraction is 59 %    Stress Combined Conclusion: The ECG and SPECT imaging portions of the stress study are concordant with no evidence of stress induced myocardial ischemia  Left ventricular perfusion is probably normal     No results found for this or any previous visit      Results for orders placed during the hospital encounter of 21    Stress test only, exercise    Narrative   [address] [place] [address]  [phone]    Stress Electrocardiography during Exercise    Name: Rosario Hayes  MR #: [id number]  Account #: [de-identified]  Study date: 2021  : 1965  Age: 54 years  Gender: Male  Height: 70 5 in  Weight: 232 lb  BSA: 2 24 mï¾²    Allergies: NO KNOWN ALLERGIES    Diagnosis: R06 02 - Shortness of breath    Technician:  Dionne Mendiola MS, CCT  GROUP:  [facility]  Interpreting Physician:  Olive Nogueira MD  Referring Physician:  Ollie Anderson MD  Primary Physician:  Ollie Anderson MD    CLINICAL QUESTION: Detection of coronary artery disease  HISTORY: The patient is a 54year old  male  Chest pain status: no chest pain  Other symptoms: dyspnea  Coronary artery disease risk factors: hypertension and family history of premature coronary artery disease  Cardiovascular  history: none significant  Co-morbidity: history of lung CA, COPD and obesity  Medications: a diuretic and an antihypertensive agent, albuterol, Breo Ellipta  REST ECG: Normal baseline ECG  PROCEDURE: Treadmill exercise testing was performed, using the Major protocol  Stress electrocardiographic evaluation was performed  MAJOR PROTOCOL:  HR bpm SBP mmHg DBP mmHg Symptoms Rhythm/conduct  Baseline 74 120 70 none NSR  Stage 1 117 136 60 mild dyspnea sinus tach  Stage 2 131 146 64 severe dyspnea sinus tach  Immediate 131 -- -- severe dyspnea sinus tach  Recovery 1 113 -- -- subsiding sinus tach  Recovery 2 94 118 64 none NSR  Recovery 3 90 -- -- none NSR  Recovery 5 83 116 62 none NSR  No medications or fluids given  STRESS SUMMARY: O2 Sat rest 94%  O2 Sat peak 95%  Duration of pharmacologic stress was 3 min  Functional capacity was decreased (greater than 40%)  Maximal heart rate during stress was 131 bpm ( 79 % of maximal predicted heart rate)  The  heart rate response to stress was normal  There was normal resting blood pressure with an appropriate response to stress  The rate-pressure product for the peak heart rate and blood pressure was 35892  There was no chest pain during stress  for the level of stress achieved  The stress test was terminated due to severe dyspnea   There were no stress arrhythmias or conduction abnormalities for the level of stress achieved  Non-diagnostic ECG due to submaximal heart rate response  SUMMARY:  -  Stress results: Functional capacity was decreased (greater than 40%)  Target heart rate was not achieved  There was no chest pain during stress for the level of stress achieved  -  ECG conclusions: There were no stress arrhythmias or conduction abnormalities for the level of stress achieved  Non-diagnostic ECG due to submaximal heart rate response  IMPRESSIONS: Nondiagnostic ECG due to submaximal heart rate response  Pharmacological imaging stress test recommended for further evaluation  Diagnostic sensitivity was limited by submaximal stress  Prepared and signed by    Byron Cervantes MD  Signed 06/17/2021 14:17:21    Results for orders placed during the hospital encounter of 05/20/22    Holter monitor    Interpretation Summary  INDICATIONS:  Syncope    Impression  This Holter monitoring and analysis was done for a period of 47 hours and 59 minutes  The rhythm was sinus  Minimum heart rate was 45 bpm  Average heart rate was 68 bpm  Maximum heart rate was 118 bpm   Ventricular ectopic activity consisted of 3 beats, of which 2 were in couplets, 1 was single PVC  Supraventricular ectopic activity consisted of 53 beats, of which 3 were in 1 runs, 6 were in atrial couplets, 13 were late beats, 31 were single PACs  The fastest and longest supraventricular run consisted of 3 beats at the maximum rate of 103 beats per minute  No irregular rhythm episodes were detected  Patient's rhythm included 18 hours 56 minutes 21 seconds of bradycardia  The slowest single episode of bradycardia lasted 5 minutes and 22 seconds with a minimum heart rate of 43 bpm The longest R-R interval 2 4 seconds  Patient's rhythm included 20 minutes 11 seconds of tachycardia   The fastest single episode of tachycardia lasted 1 minutes 10 seconds with a maximum heart rate of 118 bpm   Patient's 2 complaints of shortness of breath had underlying sinus rhythm (72 bpm)  CONCLUSIONS:  Sinus rhythm with ectopic activity as detailed above  Patient's 2 complaints of shortness of breath had underlying sinus rhythm (72 bpm)  Medications:    Current Outpatient Medications:     albuterol (PROVENTIL HFA,VENTOLIN HFA) 90 mcg/act inhaler, INHALE 2 PUFFS EVERY 6 (SIX) HOURS AS NEEDED FOR WHEEZING OR SHORTNESS OF BREATH, Disp: 18 g, Rfl: 3    Breztri Aerosphere 160-9-4 8 MCG/ACT AERO, Inhale 2 puffs 2 (two) times a day, Disp: 32 1 g, Rfl: 3    Famotidine (PEPCID AC MAXIMUM STRENGTH PO), , Disp: , Rfl:     furosemide (LASIX) 40 mg tablet, Take 0 5 tablets (20 mg total) by mouth daily, Disp: , Rfl:     metoprolol tartrate (LOPRESSOR) 50 mg tablet, Take 50 mg by mouth every 12 (twelve) hours, Disp: , Rfl:       Allergies:  No Known Allergies      Assessment and Plan:  1  Shortness of breath on exertion  2D echocardiogram, stress test findings reviewed with patient and spouse  He was reassured  No apparent cardiac cause for his shortness of breath was found  Likely multifactorial from COPD and COVID-19    2  Syncope and collapse  Loop recorder procedure discussed  Patient agreed  Will have staff set it up    3  COVID-19 long hauler manifesting chronic dyspnea    4  Essential hypertension  BP stable  Continue current medications  Continue to monitor BP and call if abnormal    5  Obesity (BMI 30-39  9)  Losing weight will help decrease your shortness of breath    Recommend aggressive risk factor modification and therapeutic lifestyle changes  Low-salt, low-calorie, low-fat, low-cholesterol diet with regular exercise and to optimize weight  I will defer the ordering and monitoring of necessity lab studies to you, but I am available and happy to review and manage any of the data at your request in the future  Discussed concepts of atherosclerosis, including signs and symptoms of cardiac disease  Previous studies were reviewed      Safety measures were reviewed  Questions were entertained and answered  Patient was advised to report any problems requiring medical attention  Follow-up with PCP and appropriate specialist and lab work as discussed  Return for follow up visit as scheduled or earlier, if needed  Thank you for allowing me to participate in the care and evaluation of your patient  Should you have any questions, please feel free to contact me        Familia Rojas MD  6/29/2022,2:08 PM

## 2022-07-01 ENCOUNTER — TELEPHONE (OUTPATIENT)
Dept: CARDIOLOGY CLINIC | Facility: CLINIC | Age: 57
End: 2022-07-01

## 2022-07-01 NOTE — TELEPHONE ENCOUNTER
----- Message from Travon Trent MD sent at 6/29/2022  2:08 PM EDT -----  Patient needs loop recorder for syncope please

## 2022-07-05 ENCOUNTER — PREP FOR PROCEDURE (OUTPATIENT)
Dept: CARDIOLOGY CLINIC | Facility: CLINIC | Age: 57
End: 2022-07-05

## 2022-07-05 DIAGNOSIS — R55 SYNCOPE AND COLLAPSE: Primary | ICD-10-CM

## 2022-07-05 NOTE — TELEPHONE ENCOUNTER
S/w patient, prescreening completed  Aware to not take Furosemide the morning of procedure  Loop implant prep/info will be sent to gee  Can we have auth for loop implant at [facility] on 7/26/22? Please and thank you!

## 2022-07-19 ENCOUNTER — HOSPITAL ENCOUNTER (OUTPATIENT)
Dept: SLEEP CENTER | Facility: CLINIC | Age: 57
Discharge: HOME | End: 2022-07-19
Payer: COMMERCIAL

## 2022-07-19 DIAGNOSIS — G47.33 OSA (OBSTRUCTIVE SLEEP APNEA): ICD-10-CM

## 2022-07-19 PROCEDURE — G0399 HOME SLEEP TEST/TYPE 3 PORTA: HCPCS

## 2022-07-20 ENCOUNTER — TELEPHONE (OUTPATIENT)
Dept: SLEEP CENTER | Facility: CLINIC | Age: 57
End: 2022-07-20

## 2022-07-20 NOTE — PROGRESS NOTES
Home Sleep Study Documentation    HOME STUDY DEVICE: Noxturnal yes                                           Chasity G3 no      Pre-Sleep Home Study:    Set-up and instructions performed by: Gabrielle Salgado performed demonstration for Patient: yes    Return demonstration performed by Patient: yes    Written instructions provided to Patient: yes    Patient signed consent form: yes        Post-Sleep Home Study:    Additional comments by Patient: none    Home Sleep Study Failed:no:    Failure reason: N/A    Reported or Detected: N/A    Scored by: ROSANNE Desai, RPSGT

## 2022-07-21 NOTE — TELEPHONE ENCOUNTER
I called the pt because I do see that we completed it and scanned it into his chart  He informed me that he needs 2 dates added on there  I explained to him that as per our last conversation Kalyani Craig requested for him to come in and have a visit in order to edit the papers  He would like me to reach out to see if he  actually does need a visit  I still do have the original forms   Please advise

## 2022-07-21 NOTE — TELEPHONE ENCOUNTER
Patient calling stating a form is being faxed to the [facility] fax that needs to be filled out for his disability  He is asking for confirmation we received it and wants to know how long it's going to take   Please advise [phone]

## 2022-07-22 DIAGNOSIS — G47.33 OSA (OBSTRUCTIVE SLEEP APNEA): Primary | ICD-10-CM

## 2022-07-23 PROCEDURE — 95806 SLEEP STUDY UNATT&RESP EFFT: CPT | Performed by: INTERNAL MEDICINE

## 2022-07-25 ENCOUNTER — DOCUMENTATION (OUTPATIENT)
Dept: PULMONOLOGY | Facility: HOSPITAL | Age: 57
End: 2022-07-25

## 2022-07-25 ENCOUNTER — TELEPHONE (OUTPATIENT)
Dept: SLEEP CENTER | Facility: CLINIC | Age: 57
End: 2022-07-25

## 2022-07-25 NOTE — PROGRESS NOTES
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

## 2022-07-25 NOTE — TELEPHONE ENCOUNTER
Ihsan Haile can you please call pt and inform him that he is cleared to return to work  Shannen Quach He can  the paperwork on Thursday or I can mail them to him if he prefers   Thank you

## 2022-07-25 NOTE — TELEPHONE ENCOUNTER
Patient at [address] [place] [address] patient sleep study shows severe MARIANA and per the order he is to follow up with Dr Chandrika Fuchs   Provided phone number

## 2022-07-26 ENCOUNTER — HOSPITAL ENCOUNTER (OUTPATIENT)
Facility: HOSPITAL | Age: 57
Setting detail: OUTPATIENT SURGERY
Discharge: HOME | End: 2022-07-26
Attending: INTERNAL MEDICINE | Admitting: INTERNAL MEDICINE
Payer: COMMERCIAL

## 2022-07-26 VITALS
SYSTOLIC BLOOD PRESSURE: 127 MMHG | DIASTOLIC BLOOD PRESSURE: 82 MMHG | WEIGHT: 258.38 LBS | HEART RATE: 78 BPM | RESPIRATION RATE: 16 BRPM | HEIGHT: 70 IN | TEMPERATURE: 98 F | BODY MASS INDEX: 36.99 KG/M2 | OXYGEN SATURATION: 98 %

## 2022-07-26 DIAGNOSIS — R55 SYNCOPE AND COLLAPSE: ICD-10-CM

## 2022-07-26 PROCEDURE — 33285 INSJ SUBQ CAR RHYTHM MNTR: CPT | Performed by: INTERNAL MEDICINE

## 2022-07-26 PROCEDURE — C1764 EVENT RECORDER, CARDIAC: HCPCS | Performed by: INTERNAL MEDICINE

## 2022-07-26 DEVICE — ICM LNQ22 LINQ II USA
Type: IMPLANTABLE DEVICE | Site: CHEST  WALL | Status: FUNCTIONAL
Brand: LINQ II™

## 2022-07-26 NOTE — TELEPHONE ENCOUNTER
Patient called to make appt, he has been seeing Quinton Nixon, does he need to now see Dr GRIFFIN as well or can he continue to follow with Dr Henry?

## 2022-08-08 ENCOUNTER — TELEPHONE (OUTPATIENT)
Dept: SLEEP CENTER | Facility: CLINIC | Age: 57
End: 2022-08-08

## 2022-08-08 LAB
DME PARACHUTE DELIVERY DATE ACTUAL: NORMAL
DME PARACHUTE DELIVERY DATE EXPECTED: NORMAL
DME PARACHUTE DELIVERY DATE REQUESTED: NORMAL
DME PARACHUTE DELIVERY NOTE: NORMAL
DME PARACHUTE ITEM DESCRIPTION: NORMAL
DME PARACHUTE ORDER STATUS: NORMAL
DME PARACHUTE SUPPLIER NAME: NORMAL
DME PARACHUTE SUPPLIER PHONE: NORMAL

## 2022-08-11 ENCOUNTER — IN-CLINIC DEVICE VISIT (OUTPATIENT)
Dept: CARDIOLOGY CLINIC | Facility: CLINIC | Age: 57
End: 2022-08-11
Payer: COMMERCIAL

## 2022-08-11 DIAGNOSIS — R06.02 SHORTNESS OF BREATH: ICD-10-CM

## 2022-08-11 DIAGNOSIS — Z95.818 PRESENCE OF OTHER CARDIAC IMPLANTS AND GRAFTS: Primary | ICD-10-CM

## 2022-08-11 DIAGNOSIS — J43.2 CENTRILOBULAR EMPHYSEMA (HCC): ICD-10-CM

## 2022-08-11 PROCEDURE — 93285 PRGRMG DEV EVAL SCRMS IP: CPT | Performed by: INTERNAL MEDICINE

## 2022-08-11 RX ORDER — IPRATROPIUM BROMIDE AND ALBUTEROL SULFATE 2.5; .5 MG/3ML; MG/3ML
3 SOLUTION RESPIRATORY (INHALATION) 3 TIMES DAILY
Qty: 270 ML | Refills: 2 | Status: SHIPPED | OUTPATIENT
Start: 2022-08-11 | End: 2022-11-09

## 2022-08-11 NOTE — PROGRESS NOTES
MDT LNQ22/ACTIVE SYSTEM IS MRI CONDITIONAL   DEVICE INTERROGATED IN THE [place] OFFICE:  BATTERY VOLTAGE ADEQUATE   NO NEW PATIENT OR DEVICE ACTIVATED EPISODES   LISTED EPISODE REPORTED PREVIOUSLY   NO PROGRAMMING CHANGES MADE TO DEVICE PARAMETERS   INCISION CLEAN AND DRY WITH EDGES APPROXIMATED; WOUND CARE AND RESTRICTIONS REVIEWED WITH PATIENT   NORMAL DEVICE FUNCTION  Alfredito Powell

## 2022-08-19 RX ORDER — AMLODIPINE BESYLATE 5 MG/1
5 TABLET ORAL DAILY
COMMUNITY
Start: 2022-05-16

## 2022-08-23 ENCOUNTER — OFFICE VISIT (OUTPATIENT)
Dept: PULMONOLOGY | Facility: CLINIC | Age: 57
End: 2022-08-23
Payer: COMMERCIAL

## 2022-08-23 VITALS
HEIGHT: 70 IN | HEART RATE: 77 BPM | DIASTOLIC BLOOD PRESSURE: 80 MMHG | BODY MASS INDEX: 37.51 KG/M2 | SYSTOLIC BLOOD PRESSURE: 132 MMHG | WEIGHT: 262 LBS | TEMPERATURE: 99.6 F

## 2022-08-23 DIAGNOSIS — F17.211 CIGARETTE NICOTINE DEPENDENCE IN REMISSION: ICD-10-CM

## 2022-08-23 DIAGNOSIS — U07.1 COVID-19 VIRUS INFECTION: ICD-10-CM

## 2022-08-23 DIAGNOSIS — J43.2 CENTRILOBULAR EMPHYSEMA (HCC): Primary | ICD-10-CM

## 2022-08-23 DIAGNOSIS — G47.33 OSA (OBSTRUCTIVE SLEEP APNEA): ICD-10-CM

## 2022-08-23 DIAGNOSIS — R06.02 SHORTNESS OF BREATH: ICD-10-CM

## 2022-08-23 PROCEDURE — 99215 OFFICE O/P EST HI 40 MIN: CPT | Performed by: INTERNAL MEDICINE

## 2022-08-23 NOTE — PROGRESS NOTES
Pulmonary Follow Up Note   Chaka Rodriguez 64 y o  male MRN: [id number]  8/23/2022      Assessment:  1  Dyspnea with palpitations  · CT angio w/o PE or new parenchymal infiltrates  · Noted worsened obstruction on PFTs from prior testing  · Cardiac testing currently negative  · Suspect multifactorial to include his COPD, obesity, untreated MARIANA, and possibly post-COVID fatigue and weight gain with deconditioning  · Will give trial of pulmonary rehab to improve symptoms - however cost/insurance coverage may preclude starting at this time - encouraged continued attempts at daily walks and exercise     2  Chronic obstructive pulmonary disease without acute exacerbation  · Moderate obstructive airflow defect  · Continue Breztri and prn MARK   · Flu Vaccine 2021, pneumovax 2021, COVID-19 x 2  booster encouraged     3  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 lobectomy 1/2021  · Further radiographic tracking per Thoracic Surgery     4  GERD - frequent symptoms  · Nonpharmacologic GERD therapies     5   Nicotine dependence in remission - continued to encourage him to remain tobacco free     6  MARIANA - continue with CPAP therapy, will check ESS and compliance report with next visit with Dr Miri De Jesus (30days after initiation) and then he can continue to follow up with me     7  COVID-19 infection - suspect some degree of long-hauler symptoms - however evaluation to date has been negative    Plan:    Diagnoses and all orders for this visit:    Centrilobular emphysema (HCC)    MARIANA (obstructive sleep apnea)    Shortness of breath    COVID-19 virus infection    Cigarette nicotine dependence in remission    Other orders  -     amLODIPine (NORVASC) 5 mg tablet; Take 5 mg by mouth daily        Return in about 3 months (around 11/23/2022) for Recheck  History of Present Illness   HPI:  Chaka Rodriguez is a 64 y o  male who was previously seen by Dr Armand Sterling for chronic bronchitis and lung nodules   He has mild COPD and was diagnosed with IB NSCLCa post VATS RUL lobectomy 1/2021  [facility] had virtual visit last week  [facility] was diagnosed with COVID-19 on 12/20/21, he did not require admission but did need OCS and antibiotics  He had prolonged symptoms and was last seen by me in Feb 2022 and by [facility] and referred for sleep study and cardiac evaluation  He presents today for follow up  His cardiac evaluation has been unremarkable and he has loop recorder implanted  He started CPAP and just had new machine  He is using full face mask  He notes less sense of dyspnea at night but has not noted less fatigue yet  He denied purulent sputum production  He remains on Breztri  He denied nebulizer use  He attempts to walk 1 mile a day but does have dyspnea with this, especially hot and humid days  He otherwise reports level of dyspnea is unchanged  He is awaiting determination on short term disability  Review of Systems   Constitutional: Positive for fatigue and unexpected weight change  Negative for activity change, chills and fever  HENT: Negative for mouth sores, sore throat and trouble swallowing  Respiratory: Positive for shortness of breath and wheezing  Cardiovascular: Positive for palpitations  Negative for chest pain and leg swelling  Gastrointestinal: Negative for abdominal pain, nausea and vomiting  Endocrine: Positive for heat intolerance  Negative for cold intolerance  Musculoskeletal: Positive for arthralgias  Negative for gait problem and joint swelling  Allergic/Immunologic: Negative for immunocompromised state  Neurological: Positive for weakness  Negative for syncope and light-headedness  Psychiatric/Behavioral: Positive for sleep disturbance  The patient is not nervous/anxious          Historical Information   Past Medical History:   Diagnosis Date    COPD [facility]chronic obstructive [facility] disease) [facility] [address] [facility]     Cough     Emphysema of lung ([facility] [address] [facility]     Liver lesion     Right upper lobe pulmonary nodule     Shortness of breath     Syncope and collapse      Past Surgical History:   Procedure Laterality Date    CARDIAC ELECTROPHYSIOLOGY PROCEDURE N/A 7/26/2022    Procedure: Cardiac loop recorder implant;  Surgeon: Anjelica Solis MD;  Location: [address] [place] [address] CATH LAB;   Service: Cardiology    CHOLECYSTECTOMY      LUNG SEGMENTECTOMY Right 1/6/2021    Procedure: RESECTION WEDGE LUNG RIGHT UPPER LOBE;  Surgeon: Harrison Villa MD;  [facility] MA[facility];  Service: Thoracic    PA [address] N/A 1/6/2021    Procedure: BRONCHOSCOPY FLEXIBLE;  Surgeon: Harrison Villa MD;  [facility];  Service: Thoracic    PA THORACOSCOPY SURG LOBECTOMY Right 1/6/2021    Procedure: RIGHT UPPER LOBECTOMY LUNG;  Surgeon: Harrison Villa MD;  [facility];  Service: Thoracic    PA THORACOSCOPY Onnie Primrose WEDGE RESEXN INITIAL UNILAT Right 1/6/2021    Procedure: THORACOSCOPY VIDEO ASSISTED SURGERY (VATS),;  Surgeon: Harrison Villa MD;  [facility];  Service: Thoracic    TESTICLE SURGERY       Family History   Problem Relation Age of Onset    Diabetes Mother     Stroke Father     Heart attack Father     Dementia Father     Alzheimer's disease Father          Meds/Allergies     Current Outpatient Medications:     albuterol (PROVENTIL HFA,VENTOLIN HFA) 90 mcg/act inhaler, INHALE 2 PUFFS EVERY 6 (SIX) HOURS AS NEEDED FOR WHEEZING OR SHORTNESS OF BREATH, Disp: 18 g, Rfl: 3    amLODIPine (NORVASC) 5 mg tablet, Take 5 mg by mouth daily, Disp: , Rfl:     Breztri Aerosphere 160-9-4 8 MCG/ACT AERO, Inhale 2 puffs 2 (two) times a day, Disp: 32 1 g, Rfl: 3    Famotidine (PEPCID AC MAXIMUM STRENGTH PO), , Disp: , Rfl:     furosemide (LASIX) 40 mg tablet, Take 0 5 tablets (20 mg total) by mouth daily, Disp: , Rfl:     ipratropium-albuterol (DUO-NEB) 0 5-2 5 mg/3 mL nebulizer solution, TAKE 3 ML BY NEBULIZATION 3 (THREE) TIMES A DAY, Disp: 270 mL, Rfl: 2    metoprolol tartrate (LOPRESSOR) 50 mg tablet, Take 50 mg by mouth every 12 (twelve) hours, Disp: , Rfl:   No Known Allergies    Vitals: Blood pressure 132/80, pulse 77, temperature 99 6 °F (37 6 °C), height 5' 10" (1 778 m), weight 119 kg (262 lb), peak flow 97 L/min  Body mass index is 37 59 kg/m²  Oxygen Therapy  Oxygen Therapy: None (Room air)      Physical Exam  Physical Exam  Vitals reviewed  Constitutional:       General: He is not in acute distress  Appearance: Normal appearance  He is well-developed  He is obese  He is not ill-appearing, toxic-appearing or diaphoretic  HENT:      Head: Normocephalic and atraumatic  Right Ear: External ear normal       Left Ear: External ear normal       Nose: Nose normal       Mouth/Throat:      Mouth: Mucous membranes are dry  Pharynx: Oropharynx is clear  No oropharyngeal exudate  Eyes:      General: No scleral icterus  Right eye: No discharge  Left eye: No discharge  Conjunctiva/sclera: Conjunctivae normal       Pupils: Pupils are equal, round, and reactive to light  Neck:      Vascular: No JVD  Trachea: No tracheal deviation  Cardiovascular:      Rate and Rhythm: Normal rate and regular rhythm  Heart sounds: Normal heart sounds  No murmur heard  Pulmonary:      Effort: Pulmonary effort is normal  No respiratory distress  Breath sounds: No stridor  No wheezing, rhonchi or rales  Comments: Mildly diminished BS, no wheeze, no rales  Abdominal:      General: Bowel sounds are normal  There is no distension  Palpations: Abdomen is soft  Tenderness: There is no abdominal tenderness  There is no guarding  Musculoskeletal:         General: No swelling or deformity  Cervical back: Neck supple  Right lower leg: No edema  Left lower leg: No edema  Lymphadenopathy:      Cervical: No cervical adenopathy  Skin:     General: Skin is warm and dry  Findings: No rash     Neurological:      General: No focal deficit present  Mental Status: He is alert and oriented to person, place, and time  Mental status is at baseline  Psychiatric:         Mood and Affect: Mood normal          Behavior: Behavior normal          Thought Content: Thought content normal          Labs: I have personally reviewed pertinent lab results    Lab Results   Component Value Date    WBC 11 83 (H) 01/07/2022    HGB 15 0 01/07/2022    HCT 45 9 01/07/2022    MCV 96 01/07/2022     01/07/2022     Lab Results   Component Value Date    CALCIUM 8 8 01/07/2022    K 4 4 01/07/2022    CO2 27 01/07/2022    CL 99 (L) 01/07/2022    BUN 17 01/07/2022    CREATININE 1 01 01/07/2022     No results found for: IGE  Lab Results   Component Value Date    ALT 37 10/26/2020    AST 18 10/26/2020    ALKPHOS 70 10/26/2020       01/07/2022 - BNP 55     Imaging and other studies: new images and reports personally reviewed  CT Chest 3/16/2022 - post RUL lobectomy, right basilar atelectasis/scar, background emphysematous changes, no sig mediastinal adenoapthy    CT angio 01/07/2022 - no PE, mild emphysemtous changes, post surgical changes on right, no sig effusions or mediastinal adenopathy     CXR 12/30/2021 - stable mild volume loss on right and blunted CP angle, no new infiltrates, no effusions, no PTX     CT Chest 7/12/21 - post surgical changes on right, mild upper lobe/apical emphysematous changes, no new lesions appreciated     CXR 2/15/21 - volume loss on right - post surgical changes, small right effusion, resolved right apical PTX, no focal infiltrates      CT/PET 11/10/2020 - 2 5x1 8cm RUL nodular lesion with SUV 1 8, hepatic hypodensities w/o FDG avidity        Pulmonary function testing:   3/25/2022 - Ratio 68%, FVC 3 26L (66%), FEV1 2 25 (59%) - moderate obstructive airflow defect    2/25/2022 - Ratio 60%, FVC 3 24L (66%), FEV1 1 94L (52%) - moderate obstructive airflow defect with reduced VC     11/13/2020 - Ratio 69%, FVC 4 07L, FEV1 2 80L (72%), post BD FEV1 inc 9%, TLC 92%, %, DLCO 66% - mild obstructive airflow defect, no response to BD normal lung volumes, mildly reduced diffusion     Spirometry 2017 - Ratio 78%, FVC 4 20L (81%), FEV1 3 29L (82%) - normal spirometry     Ambulation Testing  2/25/2022 - 6MWT on RA - Total Walk distance 369 meters, initial SpO2 95%, chuck SpO2 93%, conclusion 96%, maximal HR 119bpm     11/2009 - 6MWT on RA -  total walk distance 324 meters, Chuck SpO2 95%, maximal HR 98bpm    Sleep Testing  PSG 7/20/2022 - CHAY 38 9, severe MARIANA, recommended auto-titrating PSG [id number]     EKG, Pathology, and Other Studies: I have personally reviewed pertinent reports     Myocardial perfusions scan 6/2022 - EF 59%, no evidence of stress induced myocardial ischemia    Myocardial perfusion scan 12/2020 - normal study after pharmacologic vasodilation, normal LV function 61%     Holter monitor 10/20/2020 - NSR, ectopy atrial cuplets, late beats, single PACs     TTE 4/2022 - EF 60%, mod dilated RV, no definitive interatrial shunt, PASP 38mmHg    Nick Henry, DO, FACP  [facility]    Answers for HPI/ROS submitted by the patient on 8/18/2022  Do you have chest tightness?: Yes  Do you experience frequent throat clearing?: Yes  When did you first notice your symptoms?: more than 1 month ago  How often do your symptoms occur?: constantly  Since you first noticed this problem, how has it changed?: gradually worsening  Do you have shortness of breath that occurs with effort or exertion?: Yes  Do you have heartburn?: Yes  Do you have fatigue?: Yes  Which of the following makes your symptoms worse?: any activity, change in weather, exposure to fumes

## 2022-09-21 ENCOUNTER — CONSULT (OUTPATIENT)
Dept: PULMONOLOGY | Facility: CLINIC | Age: 57
End: 2022-09-21
Payer: COMMERCIAL

## 2022-09-21 VITALS
TEMPERATURE: 97 F | BODY MASS INDEX: 38.22 KG/M2 | WEIGHT: 267 LBS | DIASTOLIC BLOOD PRESSURE: 78 MMHG | HEIGHT: 70 IN | RESPIRATION RATE: 20 BRPM | HEART RATE: 74 BPM | SYSTOLIC BLOOD PRESSURE: 128 MMHG | OXYGEN SATURATION: 95 %

## 2022-09-21 DIAGNOSIS — F51.12 INSUFFICIENT SLEEP SYNDROME: ICD-10-CM

## 2022-09-21 DIAGNOSIS — G47.33 OSA (OBSTRUCTIVE SLEEP APNEA): Primary | ICD-10-CM

## 2022-09-21 DIAGNOSIS — G47.00 INSOMNIA, UNSPECIFIED TYPE: ICD-10-CM

## 2022-09-21 PROCEDURE — 99214 OFFICE O/P EST MOD 30 MIN: CPT | Performed by: INTERNAL MEDICINE

## 2022-09-21 NOTE — LETTER
September 21, 2022     Mark Olson MD  [address] [facility] [address] [facility]    Patient: Kristy Farias   YOB: 1965   Date of Visit: 9/21/2022       Dear Dr Akila Sim: Thank you for referring Kristy Farias to me for evaluation  Below are my notes for this consultation  If you have questions, please do not hesitate to call me  I look forward to following your patient along with you  Sincerely,        Gem Gudino,         CC: MD Gem Modi,   9/21/2022  2:03 PM  Sign when Signing Visit  Sleep Consultation   Kristy Farias 64 y o  male MRN: [id number]      Reason for consultation: MARIANA    Requesting physician: Dr Akila Sim    Assessment/Plan  64 y o  M with PMHx of COPD, Stage 1B Adneocarcinoma of RUL s/p resection, GERD who comes in for management of MARIANA  1  Severe MARIANA (AHI - 38 9) - on AutoPAP 8-20 ([facility])  He has only had the new machine for 2 weeks but during those weeks he has 93% compliance with some mild clinical improvement      -  Continue autoPAP 8-20 today, follow compliance with Dr Thiago Jenkins at his next visit 11/15  It seems he is going to remain compliant with CPAP        -  He does struggle some with dry mouth  I explained that the higher pressure may be part of the problem there  We did discuss how to adjust his humidifier to see if that helps        -   He does note mask leak as well  However his compliance showed only 1 day of bad leak  I encouraged him to be compliant         -  Discussed in depth the results of the sleep study and treatment pitfalls prior to initiation  Answered all questions regarding treatment      -  I also discussed in depth the risk of leaving sleep apnea untreated including hypertension, heart failure, arrhythmia, MI and stroke      2   Sleep onset insomnia/Insufficient sleep       -  We discussed sleep hygiene, stimulus control and keeping a regular schedule       -  We discussed sleep restriction therapy to avoid laying in bed for extended periods       -  We discussed relaxation techniques including noise making apps       - we also discussed usage of melatonin but he is hesitant  History of Present Illness   HPI:  Danna Long is a 64 y o  male with PMHx as below who comes in for management of newly diagnosed MARIANA  He had episodes of syncope and his cardiologist recommended evaluation from sleep physician for potential apnea  He was diagnosed in July and got a machine in august   Unfortunately the machine broke during that time and he did not get a new machine until 9/4  Since that time he has been using CPAP everyday since that time  He does feel that it is extending his sleep but he does not note significant improvement during the day  He does not feel significant daytime sleepiness, memory or concentration issues  As a result he does not feel the CPAP has significantly changed any daytime symptoms  He also admits that he is only sleeping 4 hrs a day  He understands that this is insufficient to truly see a benefit  Patient notes weight gain and symptoms of difficulty falling asleep, difficulty staying asleep, snoring, and witnessed apnea  He denies excessive daytime sleepiness, awakenings with gasping, witnessed apneas, morning headaches, awakenings with dry mouth  he denies symptoms of restless legs  he denies symptoms of cataplexy, sleep paralysis, hypnopompic or hypnagogic hallucinations  Sleep History:  he goes to bed at approximately 9 pm but does not fall asleep until 12 am, will get out of bed at 4 am   he will get up 1 -2  times at night to go to the bathroom or to fight the mask leak  It will then take a few minutes to fall back asleep    he does not typically nap during the day  ROS:   Review of Systems   Constitutional: Positive for fatigue  Negative for appetite change  HENT: Negative  Eyes: Negative      Respiratory: Positive for shortness of breath and wheezing  Negative for chest tightness  Cardiovascular: Negative  Gastrointestinal: Negative  Endocrine: Negative  Genitourinary: Negative  Musculoskeletal: Negative  Skin: Negative  Allergic/Immunologic: Negative  Neurological: Negative  Hematological: Negative  Psychiatric/Behavioral: Negative  Historical Information   Past Medical History:   Diagnosis Date    COPD [facility]chronic obstructive pulmonary disease) ([facility] [address] )     Cough     Emphysema of lung ([facility] [address] )     Liver lesion     Right upper lobe pulmonary nodule     Shortness of breath     Syncope and collapse      Past Surgical History:   Procedure Laterality Date    CARDIAC ELECTROPHYSIOLOGY PROCEDURE N/A 7/26/2022    Procedure: Cardiac loop recorder implant;  Surgeon: Jenny Severance, MD;  [facility]: [facility] CARD[facility] C[facility] LAB;   Service: Cardiology    CHOLECYSTECTOMY      LUNG SEGMENTECTOMY Right 1/6/2021    Procedure: RESECTION WEDGE LUNG RIGHT UPPER LOBE;  Surgeon: Abena Wallis MD;  [facility];  Service: Thoracic    MS [address] N/A 1/6/2021    Procedure: Georges Delaney;  Surgeon: Abena Wallis MD;  [facility];  Service: Thoracic    MS THORACOSCOPY SURG LOBECTOMY Right 1/6/2021    Procedure: RIGHT UPPER LOBECTOMY LUNG;  Surgeon: Abena Wallis MD;  [facility];  Service: Thoracic    MS THORACOSCOPY Lashell Bigger WEDGE RESEXN INITIAL UNILAT Right 1/6/2021    Procedure: THORACOSCOPY VIDEO ASSISTED SURGERY (VATS),;  Surgeon: Abena Wallis MD;  [facility];  Service: Thoracic    TESTICLE SURGERY       Family History   Problem Relation Age of Onset    Diabetes Mother     Stroke Father     Heart attack Father     Dementia Father     Alzheimer's disease Father      Social History     Socioeconomic History    Marital status: /Civil Union     Spouse name: Not on file    Number of children: Not on file    Years of education: Not on file    Highest education level: Not on file   Occupational History    Not on file   Tobacco Use    Smoking status: Former Smoker     Packs/day: 3 00     Years: 30 00     Pack years: 90 00     Types: Cigarettes     Quit date: 10/26/2020     Years since quittin 9    Smokeless tobacco: Never Used    Tobacco comment: 30 years-5 packs a day    Vaping Use    Vaping Use: Never used   Substance and Sexual Activity    Alcohol use: Not Currently    Drug use: Not Currently    Sexual activity: Not Currently   Other Topics Concern    Not on file   Social History Narrative    Not on file     Social Determinants of Health     Financial Resource Strain: Not on file   Food Insecurity: Not on file   Transportation Needs: Not on file   Physical Activity: Not on file   Stress: Not on file   Social Connections: Not on file   Intimate Partner Violence: Not on file   Housing Stability: Not on file       Occupational History: former , now out on disability    Meds/Allergies   No Known Allergies    Home medications:  Prior to Admission medications    Medication Sig Start Date End Date Taking?  Authorizing Provider   albuterol (PROVENTIL HFA,VENTOLIN HFA) 90 mcg/act inhaler INHALE 2 PUFFS EVERY 6 (SIX) HOURS AS NEEDED FOR WHEEZING OR SHORTNESS OF BREATH 22  Yes Daniel Robles MD   amLODIPine (NORVASC) 5 mg tablet Take 5 mg by mouth daily 22  Yes Historical Provider, MD Aguila Puentes 160-9-4 8 MCG/ACT AERO Inhale 2 puffs 2 (two) times a day 22 Yes Ashley Henry DO   Famotidine (PEPCID AC MAXIMUM STRENGTH PO)  21  Yes Historical Provider, MD   furosemide (LASIX) 40 mg tablet Take 0 5 tablets (20 mg total) by mouth daily 22  Yes Veto Murphy MD   ipratropium-albuterol (DUO-NEB) 0 5-2 5 mg/3 mL nebulizer solution TAKE 3 ML BY NEBULIZATION 3 (THREE) TIMES A DAY 22 Yes Ashley Henry DO   metoprolol tartrate (LOPRESSOR) 50 mg tablet Take 50 mg by mouth every 12 (twelve) hours 3/24/22  Yes Historical Provider, MD       Vitals:   Blood pressure 128/78, pulse 74, temperature (!) 97 °F (36 1 °C), temperature source Tympanic, resp  rate 20, height 5' 10" (1 778 m), weight 121 kg (267 lb), SpO2 95 % , RA, Body mass index is 38 31 kg/m²  Physical Exam  General: Pleasant, Awake alert and oriented x 3, conversant without conversational dyspnea, NAD, normal affect  HEENT:  PERRL, Sclera noninjected, nonicteric OU, Nares patent,  no craniofacial abnormalities, Mucous membranes, moist, no oral lesions, normal dentition, Mallampati class 4  NECK: Trachea midline, no accessory muscle use, no stridor, no cervical or supraclavicular adenopathy, JVP not elevated  CARDIAC: Reg, single s1/S2, no m/r/g  PULM: CTA bilaterally no wheezing, rhonchi or rales  ABD: Normoactive bowel sounds, soft nontender, nondistended, no rebound, no rigidity, no guarding  EXT: No cyanosis, no clubbing, no edema, normal capillary refill  NEURO: no focal neurologic deficits, AAOx3, moving all extremities appropriately    Labs: I have personally reviewed pertinent lab results  Lab Results   Component Value Date    WBC 11 83 (H) 01/07/2022    HGB 15 0 01/07/2022    HCT 45 9 01/07/2022    MCV 96 01/07/2022     01/07/2022      Lab Results   Component Value Date    CALCIUM 8 8 01/07/2022    K 4 4 01/07/2022    CO2 27 01/07/2022    CL 99 (L) 01/07/2022    BUN 17 01/07/2022    CREATININE 1 01 01/07/2022     No results found for: IRON, TIBC, FERRITIN  No results found for: [id number]  No results found for: FOLATE        Sleep studies:  Home study (7/20/22)  IMPRESSION:  Mr Bipin John had an increased number of sleep related respiratory events (CHAY - 38 9) which is consistent with severe obstructive sleep apnea  Oxygenation was adequate through most of the study  Therefore, I recommend a trial of auto-titrating CPAP 5-20 cc of H2O pressure with heated humidification    Compliance should be assessed in 1-2 months         Compliance Data:  9/6/22 - 9/20/22 (only 2 weeks)                                  Type of CPAP:  autoPAP 8-20, mean 10 1, max 14 1                                   Percent usage: 93%                                   Average time used: 7 hrs 59 mins                                   Leak: 5 3 - 61 2                                   Residual AHI: 1 3                                       Jamal Collet, DO [facility]

## 2022-09-21 NOTE — PROGRESS NOTES
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, GERD who comes in for management of MARIANA  1  Severe MARIANA (AHI - 38 9) - on AutoPAP 8-20 ([facility])  He has only had the new machine for 2 weeks but during those weeks he has 93% compliance with some mild clinical improvement      -  Continue autoPAP 8-20 today, follow compliance with Dr Fan Colvin at his next visit 11/15  It seems he is going to remain compliant with CPAP        -  He does struggle some with dry mouth  I explained that the higher pressure may be part of the problem there  We did discuss how to adjust his humidifier to see if that helps        -   He does note mask leak as well  However his compliance showed only 1 day of bad leak  I encouraged him to be compliant         -  Discussed in depth the results of the sleep study and treatment pitfalls prior to initiation  Answered all questions regarding treatment      -  I also discussed in depth the risk of leaving sleep apnea untreated including hypertension, heart failure, arrhythmia, MI and stroke  2   Sleep onset insomnia/Insufficient sleep       -  We discussed sleep hygiene, stimulus control and keeping a regular schedule       -  We discussed sleep restriction therapy to avoid laying in bed for extended periods       -  We discussed relaxation techniques including noise making apps       - we also discussed usage of melatonin but he is hesitant  History of Present Illness   HPI:  Corinne Baker is a 64 y o  male with PMHx as below who comes in for management of newly diagnosed MARIANA  He had episodes of syncope and his cardiologist recommended evaluation from sleep physician for potential apnea    He was diagnosed in July and got a machine in august   Unfortunately the machine broke during that time and he did not get a new machine until 9/4  Since that time he has been using CPAP everyday since that time  He does feel that it is extending his sleep but he does not note significant improvement during the day  He does not feel significant daytime sleepiness, memory or concentration issues  As a result he does not feel the CPAP has significantly changed any daytime symptoms  He also admits that he is only sleeping 4 hrs a day  He understands that this is insufficient to truly see a benefit  Patient notes weight gain and symptoms of difficulty falling asleep, difficulty staying asleep, snoring, and witnessed apnea  He denies excessive daytime sleepiness, awakenings with gasping, witnessed apneas, morning headaches, awakenings with dry mouth  he denies symptoms of restless legs  he denies symptoms of cataplexy, sleep paralysis, hypnopompic or hypnagogic hallucinations  Sleep History:  he goes to bed at approximately 9 pm but does not fall asleep until 12 am, will get out of bed at 4 am   he will get up 1 -2  times at night to go to the bathroom or to fight the mask leak  It will then take a few minutes to fall back asleep    he does not typically nap during the day  ROS:   Review of Systems   Constitutional: Positive for fatigue  Negative for appetite change  HENT: Negative  Eyes: Negative  Respiratory: Positive for shortness of breath and wheezing  Negative for chest tightness  Cardiovascular: Negative  Gastrointestinal: Negative  Endocrine: Negative  Genitourinary: Negative  Musculoskeletal: Negative  Skin: Negative  Allergic/Immunologic: Negative  Neurological: Negative  Hematological: Negative  Psychiatric/Behavioral: Negative            Historical Information   Past Medical History:   Diagnosis Date    [facility] ([facility] )     Cough     [facility]     Liver lesion     Right upper lobe pulmonary nodule     Shortness of breath     Syncope and collapse      Past Surgical History:   Procedure Laterality Date    CARDIAC ELECTROPHYSIOLOGY PROCEDURE N/A 2022    Procedure: Cardiac loop recorder implant;  Surgeon: Christian Mendoza MD;  Location: [address] [place] [address] CATH LAB;   Service: Cardiology    CHOLECYSTECTOMY      LUNG SEGMENTECTOMY Right 2021    Procedure: RESECTION WEDGE LUNG RIGHT UPPER LOBE;  Surgeon: Adenike Ivy MD;  [facility];  Service: Thoracic    IN [address] N/A 2021    Procedure: Alessandra Caroltler;  Surgeon: Adenike Ivy MD;  [facility];  Service: Thoracic    IN THORACOSCOPY SURG LOBECTOMY Right 2021    Procedure: RIGHT UPPER LOBECTOMY LUNG;  Surgeon: Adenike Ivy MD;  [facility];  Service: Thoracic    IN THORACOSCOPY [place] WEDGE RESEXN INITIAL UNILAT Right 2021    Procedure: THORACOSCOPY VIDEO ASSISTED SURGERY (VATS),;  Surgeon: Adenike Ivy MD;  [facility];  Service: Thoracic    TESTICLE SURGERY       Family History   Problem Relation Age of Onset    Diabetes Mother     Stroke Father     Heart attack Father     Dementia Father     Alzheimer's disease Father      Social History     Socioeconomic History    Marital status: /Civil Union     Spouse name: Not on file    Number of children: Not on file    Years of education: Not on file    Highest education level: Not on file   Occupational History    Not on file   Tobacco Use    Smoking status: Former Smoker     Packs/day: 3 00     Years: 30 00     Pack years: 90 00     Types: Cigarettes     Quit date: 10/26/2020     Years since quittin 9    Smokeless tobacco: Never Used    Tobacco comment: 30 years-5 packs a day    Vaping Use    Vaping Use: Never used   Substance and Sexual Activity    Alcohol use: Not Currently    Drug use: Not Currently    Sexual activity: Not Currently   Other Topics Concern    Not on file   Social History Narrative    Not on file     Social Determinants of Health     Financial Resource Strain: Not on file   Food Insecurity: Not on file   Transportation Needs: Not on file   Physical Activity: Not on file   Stress: Not on file   Social Connections: Not on file   Intimate Partner Violence: Not on file   Housing Stability: Not on file       Occupational History: former , now out on disability    Meds/Allergies   No Known Allergies    Home medications:  Prior to Admission medications    Medication Sig Start Date End Date Taking? Authorizing Provider   albuterol (PROVENTIL HFA,VENTOLIN HFA) 90 mcg/act inhaler INHALE 2 PUFFS EVERY 6 (SIX) HOURS AS NEEDED FOR WHEEZING OR SHORTNESS OF BREATH 2/24/22  Yes Marquis Mahmood MD   amLODIPine (NORVASC) 5 mg tablet Take 5 mg by mouth daily 5/16/22  Yes Historical Provider, MD Petr Carrington 160-9-4 8 MCG/ACT AERO Inhale 2 puffs 2 (two) times a day 1/7/22 1/7/23 Yes Belkys Henry, DO   Famotidine (PEPCID AC MAXIMUM STRENGTH PO)  6/6/21  Yes Historical Provider, MD   furosemide (LASIX) 40 mg tablet Take 0 5 tablets (20 mg total) by mouth daily 6/29/22  Yes Bev Burris MD   ipratropium-albuterol (DUO-NEB) 0 5-2 5 mg/3 mL nebulizer solution TAKE 3 ML BY NEBULIZATION 3 (THREE) TIMES A DAY 8/11/22 11/9/22 Yes Belkys Henry DO   metoprolol tartrate (LOPRESSOR) 50 mg tablet Take 50 mg by mouth every 12 (twelve) hours 3/24/22  Yes Historical Provider, MD       Vitals:   Blood pressure 128/78, pulse 74, temperature (!) 97 °F (36 1 °C), temperature source Tympanic, resp  rate 20, height 5' 10" (1 778 m), weight 121 kg (267 lb), SpO2 95 % , RA, Body mass index is 38 31 kg/m²         Physical Exam  General: Pleasant, Awake alert and oriented x 3, conversant without conversational dyspnea, NAD, normal affect  HEENT:  PERRL, Sclera noninjected, nonicteric OU, Nares patent,  no craniofacial abnormalities, Mucous membranes, moist, no oral lesions, normal dentition, Mallampati class 4  NECK: Trachea midline, no accessory muscle use, no stridor, no cervical or supraclavicular adenopathy, JVP not elevated  CARDIAC: Reg, single s1/S2, no m/r/g  PULM: CTA bilaterally no wheezing, rhonchi or rales  ABD: Normoactive bowel sounds, soft nontender, nondistended, no rebound, no rigidity, no guarding  EXT: No cyanosis, no clubbing, no edema, normal capillary refill  NEURO: no focal neurologic deficits, AAOx3, moving all extremities appropriately    Labs: I have personally reviewed pertinent lab results  Lab Results   Component Value Date    WBC 11 83 (H) 01/07/2022    HGB 15 0 01/07/2022    HCT 45 9 01/07/2022    MCV 96 01/07/2022     01/07/2022      Lab Results   Component Value Date    CALCIUM 8 8 01/07/2022    K 4 4 01/07/2022    CO2 27 01/07/2022    CL 99 (L) 01/07/2022    BUN 17 01/07/2022    CREATININE 1 01 01/07/2022     No results found for: IRON, TIBC, FERRITIN  No results found for: [id number]  No results found for: FOLATE        Sleep studies:  Home study (7/20/22)  IMPRESSION:  Mr Kellie Guillen had an increased number of sleep related respiratory events (CHAY - 38 9) which is consistent with severe obstructive sleep apnea  Oxygenation was adequate through most of the study  Therefore, I recommend a trial of auto-titrating CPAP 5-20 cc of H2O pressure with heated humidification  Compliance should be assessed in 1-2 months         Compliance Data:  9/6/22 - 9/20/22 (only 2 weeks)                                  Type of CPAP:  autoPAP 8-20, mean 10 1, max 14 1                                   Percent usage: 93%                                   Average time used: 7 hrs 59 mins                                   Leak: 5 3 - 61 2                                   Residual AHI: 1 3                                       Antony Bailey DO  [facility] Sleep Physician

## 2022-11-15 ENCOUNTER — OFFICE VISIT (OUTPATIENT)
Dept: PULMONOLOGY | Facility: CLINIC | Age: 57
End: 2022-11-15

## 2022-11-15 ENCOUNTER — REMOTE DEVICE CLINIC VISIT (OUTPATIENT)
Dept: CARDIOLOGY CLINIC | Facility: CLINIC | Age: 57
End: 2022-11-15

## 2022-11-15 VITALS
HEIGHT: 70 IN | DIASTOLIC BLOOD PRESSURE: 78 MMHG | HEART RATE: 70 BPM | SYSTOLIC BLOOD PRESSURE: 132 MMHG | BODY MASS INDEX: 38.94 KG/M2 | OXYGEN SATURATION: 97 % | WEIGHT: 272 LBS | TEMPERATURE: 97.4 F

## 2022-11-15 DIAGNOSIS — J43.2 CENTRILOBULAR EMPHYSEMA (HCC): Primary | ICD-10-CM

## 2022-11-15 DIAGNOSIS — R06.02 SHORTNESS OF BREATH: ICD-10-CM

## 2022-11-15 DIAGNOSIS — Z95.818 PRESENCE OF OTHER CARDIAC IMPLANTS AND GRAFTS: Primary | ICD-10-CM

## 2022-11-15 DIAGNOSIS — Z23 NEEDS FLU SHOT: ICD-10-CM

## 2022-11-15 DIAGNOSIS — G47.33 OSA (OBSTRUCTIVE SLEEP APNEA): ICD-10-CM

## 2022-11-15 DIAGNOSIS — E66.9 OBESITY, UNSPECIFIED CLASSIFICATION, UNSPECIFIED OBESITY TYPE, UNSPECIFIED WHETHER SERIOUS COMORBIDITY PRESENT: ICD-10-CM

## 2022-11-15 NOTE — PROGRESS NOTES
Pulmonary Follow Up Note   Corinne Baker 64 y o  male MRN: [id number]  11/15/2022      Assessment:  1  Dyspnea with palpitations  · CT angio w/o PE or new parenchymal infiltrates  · Prior worsened obstruction on PFTs from prior testing  · Cardiac testing currently negative  · Suspect multifactorial to include his COPD, increased obesity, prior untreated MARIANA, and possibly post-COVID fatigue and weight gain with deconditioning  · Strongly encouraged diet and exercise for weight loss - encouraged increasing walking to 25-30min five days a week - encouraged consistent walking indoors/malls during winter - if not improving in spring will need to pursue pulmonary rehab further      2  Chronic obstructive pulmonary disease without acute exacerbation  · Moderate obstructive airflow defect  · Continue Breztri and prn MARK   · Flu Vaccine 2022 given today, pneumovax 2021, COVID-19 x 2  booster encouraged     3  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 lobectomy 1/2021  · Further radiographic tracking per Thoracic Surgery     4  GERD - frequent symptoms  · Nonpharmacologic GERD therapies     5   Nicotine dependence in remission -  remain tobacco free     6  MARIANA   · PSG 7/20/2022 - CHAY 38 9, severe MARIANA, recommended auto-titrating PSG [id number]  · Compliance  Report 10/16-11/14 2022 - 80% days used, 67% days used over 4 hours, 7hr 40 min, AHI 1 2, 95th percentile pressure 11 8cmH2O  · Encouraged continued CPAP compliance and MARIANA precautions reviewed  · Check ESS with next visit     7    COVID-19 infection - suspect some degree of long-hauler symptoms - however evaluation to date has been negative    Plan:    Diagnoses and all orders for this visit:    Centrilobular emphysema (HCC)    MARIANA (obstructive sleep apnea)    Obesity, unspecified classification, unspecified obesity type, unspecified whether serious comorbidity present    Shortness of breath    Needs flu shot  -     influenza vaccine, quadrivalent, recombinant, PF, 0 5 mL, for patients 18 yr+ (FLUBLOK)        Return in about 6 months (around 5/15/2023)  History of Present Illness   HPI:  Odalis Roberson is a 64 y o  male who was previously seen by Dr Hayde Grant for chronic bronchitis and lung nodules  He has mild COPD and was diagnosed with IB NSCLCa post VATS RUL lobectomy 1/2021  Burke Toscano had virtual visit last week  Burke Toscano was diagnosed with COVID-19 on 12/20/21, he did not require admission but did need OCS and antibiotics  He had prolonged symptoms with negative cardiac evaluation  He was last seen by me in August 2022 with plans to continue Breztri and continue CPAP therapy  He presents today for follow up      Overall feels stable  Tolerating CPAP well with full face mask, some AM dryness, resolved snoring per his wife  He reports continued dyspnea with walking through grocery store  Noted weight gain and he does admit to being primarily sedentary  He has gained 40+ lbs since last year  He denied SSCP, no LE edema, no fevers or chills, no purulent sputum production  Review of Systems   Constitutional: Positive for activity change and unexpected weight change  Negative for appetite change, chills and fever  HENT: Negative for ear pain, mouth sores, postnasal drip, rhinorrhea, sneezing, sore throat and trouble swallowing  Respiratory: Positive for shortness of breath and wheezing  Negative for chest tightness  Cardiovascular: Negative for chest pain and leg swelling  Gastrointestinal: Negative for abdominal pain, nausea and vomiting  Endocrine: Negative for cold intolerance and heat intolerance  Musculoskeletal: Negative for gait problem and myalgias  Neurological: Negative for headaches  Hematological: Negative for adenopathy  Psychiatric/Behavioral: Negative for sleep disturbance  The patient is not nervous/anxious          Historical Information   Past Medical History:   Diagnosis Date   • COPD (chronic obstructive pulmonary disease) ([facility] )    • Cough    • Emphysema of lung (HCC)    • Liver lesion    • Right upper lobe pulmonary nodule    • Shortness of breath    • Sleep apnea, obstructive    • Syncope and collapse      Past Surgical History:   Procedure Laterality Date   • CARDIAC ELECTROPHYSIOLOGY PROCEDURE N/A 7/26/2022    Procedure: Cardiac loop recorder implant;  Surgeon: Toy Kebede MD;  Location: [facility] CARDIAC CATH LAB;   Service: Cardiology   • CHOLECYSTECTOMY     • LUNG SEGMENTECTOMY Right 1/6/2021    Procedure: RESECTION WEDGE LUNG RIGHT UPPER LOBE;  Surgeon: Jennifer Knowles MD;  [facility];  Service: Thoracic   • [place] BRONCHOSCOPY,DIAGNOSTIC N/A 1/6/2021    Procedure: BRONCHOSCOPY FLEXIBLE;  Surgeon: Jennifer Knowles MD;  [facility];  Service: Thoracic   • [place] THORACOSCOPY SURG LOBECTOMY Right 1/6/2021    Procedure: RIGHT UPPER LOBECTOMY LUNG;  Surgeon: Jennifer Knowles MD;  [facility];  Service: Thoracic   • [place] THORACOSCOPY W/THERA WEDGE RESEXN INITIAL UNILAT Right 1/6/2021    Procedure: THORACOSCOPY VIDEO ASSISTED SURGERY (VATS),;  Surgeon: Jennifer Knowles MD;  [facility];  Service: Thoracic   • TESTICLE SURGERY       Family History   Problem Relation Age of Onset   • Diabetes Mother    • Stroke Father    • Heart attack Father    • Dementia Father    • Alzheimer's disease Father    • Clotting disorder Father          Meds/Allergies     Current Outpatient Medications:   •  albuterol (PROVENTIL HFA,VENTOLIN HFA) 90 mcg/act inhaler, INHALE 2 PUFFS EVERY 6 (SIX) HOURS AS NEEDED FOR WHEEZING OR SHORTNESS OF BREATH, Disp: 18 g, Rfl: 3  •  amLODIPine (NORVASC) 5 mg tablet, Take 5 mg by mouth daily, Disp: , Rfl:   •  Breztri Aerosphere 160-9-4 8 MCG/ACT AERO, Inhale 2 puffs 2 (two) times a day, Disp: 32 1 g, Rfl: 3  •  Famotidine (PEPCID AC MAXIMUM STRENGTH PO), , Disp: , Rfl:   •  furosemide (LASIX) 40 mg tablet, Take 0 5 tablets (20 mg total) by mouth daily, Disp: , Rfl:   •  metoprolol tartrate (LOPRESSOR) 50 mg tablet, Take 50 mg by mouth every 12 (twelve) hours, Disp: , Rfl:   No Known Allergies    Vitals: Blood pressure 132/78, pulse 70, temperature (!) 97 4 °F (36 3 °C), height 5' 10" (1 778 m), weight 123 kg (272 lb), SpO2 97 %  Body mass index is 39 03 kg/m²  Oxygen Therapy  SpO2: 97 %      Physical Exam  Physical Exam  Vitals reviewed  Constitutional:       General: He is not in acute distress  Appearance: Normal appearance  He is well-developed  He is obese  He is not ill-appearing, toxic-appearing or diaphoretic  HENT:      Head: Normocephalic and atraumatic  Right Ear: External ear normal       Left Ear: External ear normal       Nose: Nose normal       Mouth/Throat:      Mouth: Mucous membranes are moist       Pharynx: Oropharynx is clear  No oropharyngeal exudate  Comments: No thrush  Eyes:      General: No scleral icterus  Right eye: No discharge  Left eye: No discharge  Conjunctiva/sclera: Conjunctivae normal       Pupils: Pupils are equal, round, and reactive to light  Neck:      Vascular: No JVD  Trachea: No tracheal deviation  Cardiovascular:      Rate and Rhythm: Normal rate and regular rhythm  Heart sounds: Normal heart sounds  No murmur heard  Pulmonary:      Effort: Pulmonary effort is normal  No respiratory distress  Breath sounds: No stridor  No wheezing, rhonchi or rales  Comments: Mildly diminished BS, no wheeze or rales  Abdominal:      General: Bowel sounds are normal  There is no distension  Palpations: Abdomen is soft  Tenderness: There is no abdominal tenderness  There is no guarding  Musculoskeletal:         General: No swelling or deformity  Cervical back: Neck supple  Right lower leg: No edema  Left lower leg: No edema  Lymphadenopathy:      Cervical: No cervical adenopathy  Skin:     General: Skin is warm and dry  Coloration: Skin is not jaundiced  Findings: No rash  Neurological:      General: No focal deficit present  Mental Status: He is alert and oriented to person, place, and time  Mental status is at baseline  Psychiatric:         Mood and Affect: Mood normal          Behavior: Behavior normal          Thought Content: Thought content normal          Labs: I have personally reviewed pertinent lab results    Lab Results   Component Value Date    WBC 11 83 (H) 01/07/2022    HGB 15 0 01/07/2022    HCT 45 9 01/07/2022    MCV 96 01/07/2022     01/07/2022     Lab Results   Component Value Date    CALCIUM 8 8 01/07/2022    K 4 4 01/07/2022    CO2 27 01/07/2022    CL 99 (L) 01/07/2022    BUN 17 01/07/2022    CREATININE 1 01 01/07/2022     No results found for: IGE  Lab Results   Component Value Date    ALT 37 10/26/2020    AST 18 10/26/2020    ALKPHOS 70 10/26/2020     01/07/2022 - BNP 55     Imaging and other studies: new images and reports personally reviewed  CT Chest 3/16/2022 - post RUL lobectomy, right basilar atelectasis/scar, background emphysematous changes, no sig mediastinal adenoapthy     CT angio 01/07/2022 - no PE, mild emphysemtous changes, post surgical changes on right, no sig effusions or mediastinal adenopathy     CXR 12/30/2021 - stable mild volume loss on right and blunted CP angle, no new infiltrates, no effusions, no PTX     CT Chest 7/12/21 - post surgical changes on right, mild upper lobe/apical emphysematous changes, no new lesions appreciated     CXR 2/15/21 - volume loss on right - post surgical changes, small right effusion, resolved right apical PTX, no focal infiltrates      CT/PET 11/10/2020 - 2 5x1 8cm RUL nodular lesion with SUV 1 8, hepatic hypodensities w/o FDG avidity        Pulmonary function testing:   3/25/2022 - Ratio 68%, FVC 3 26L (66%), FEV1 2 25 (59%) - moderate obstructive airflow defect     2/25/2022 - Ratio 60%, FVC 3 24L (66%), FEV1 1 94L (52%) - moderate obstructive airflow defect with reduced VC     11/13/2020 - Ratio 69%, FVC 4 07L, FEV1 2 80L (72%), post BD FEV1 inc 9%, TLC 92%, %, DLCO 66% - mild obstructive airflow defect, no response to BD normal lung volumes, mildly reduced diffusion     Spirometry 2017 - Ratio 78%, FVC 4 20L (81%), FEV1 3 29L (82%) - normal spirometry     Ambulation Testing  2/25/2022 - 6MWT on RA - Total Walk distance 369 meters, initial SpO2 95%, chuck SpO2 93%, conclusion 96%, maximal HR 119bpm     11/2009 - 6MWT on RA -  total walk distance 324 meters, Chuck SpO2 95%, maximal HR 98bpm     Sleep Testing  PSG 7/20/2022 - CHAY 38 9, severe MARIANA, recommended auto-titrating PSG [id number]     EKG, Pathology, and Other Studies: I have personally reviewed pertinent reports     Myocardial perfusions scan 6/2022 - EF 59%, no evidence of stress induced myocardial ischemia     Myocardial perfusion scan 12/2020 - normal study after pharmacologic vasodilation, normal LV function 61%     Holter monitor 10/20/2020 - NSR, ectopy atrial cuplets, late beats, single PACs     TTE 4/2022 - EF 60%, mod dilated RV, no definitive interatrial shunt, PASP 38mmHg    Nick Henry, DO, FACP  [facility]    Answers for HPI/ROS submitted by the patient on 11/14/2022  Chronicity: chronic  When did you first notice your symptoms?: more than 1 month ago  How often do your symptoms occur?: constantly  Since you first noticed this problem, how has it changed?: unchanged  Do you have shortness of breath that occurs with effort or exertion?: Yes  Do you have ear congestion?: No  Do you have heartburn?: Yes  Do you have fatigue?: Yes  Do you have nasal congestion?: No  Do you have shortness of breath when lying flat?: Yes  Do you have shortness of breath when you wake up?: Yes  Do you have sweats?: Yes  Have you experienced weight loss?: No  Which of the following makes your symptoms worse?: any activity, change in weather, exercise, minimal activity  Which of the following makes your symptoms better?: rest

## 2022-11-15 NOTE — PROGRESS NOTES
MDT [id number]/ACTIVE SYSTEM IS MRI CONDITIONAL   CARELINK TRANSMISSION: LOOP RECORDER  PRESENTING RHYTHM SB @ 54 BPM  BATTERY STATUS "OK " NO PATIENT OR DEVICE ACTIVATED EPISODES  NORMAL DEVICE FUNCTION   DL

## 2022-11-15 NOTE — PATIENT INSTRUCTIONS
CPAP   WHAT YOU NEED TO KNOW:   What is CPAP? Continuous positive airway pressure (CPAP) is a treatment that uses air pressure to keep your airways open while you sleep  CPAP is used to treat obstructive sleep apnea (MARIANA)  A CPAP machine connects the mask to the machine with a hose  Air pressure prevents your airway from collapsing or becoming blocked during sleep  Use your CPAP machine when you sleep, even when you nap  You may need to use a CPAP machine for the rest of your life  What are the benefits of CPAP? Improves quality of sleep    Relieves daytime sleepiness    Improves memory    Reduces the risk of heart disease    Improves your mood and quality of life    What can I do to make CPAP easier to use? At first, try to use your CPAP for a few hours every night  Then slowly increase the length of time you use your machine  It takes time to adjust to CPAP treatment  You may need to use a special moisturizer made for CPAP users  You may need a mask that is a different size, shape, or material  Talk to your healthcare provider if your mask feels uncomfortable or irritates your skin  Use a saline nasal spray at bedtime to help relieve nasal irritation  A chin strap to help keep your mouth closed or a different type of mask can help dry mouth  Some machines come with a heated humidifier to help relieve these symptoms  Talk to your healthcare provider if you are having problems adjusting to the air pressure  He or she can tell you how to adjust the air pressure on your CPAP  You may need to start at a lower pressure and slowly increase it over time  Go to follow-up appointments to adjust your CPAP  Tell your healthcare provider if your mask no longer fits properly  Write down your questions so you remember to ask them during your visits  When should I call my doctor? You continue to feel very sleepy during the day, even after you wear your CPAP device as directed      Your CPAP is causing a problem, such as a rash, that does not improve  You have questions or concerns about your condition, care, or equipment  CARE AGREEMENT:   You have the right to help plan your care  Learn about your health condition and how it may be treated  Discuss treatment options with your healthcare providers to decide what care you want to receive  You always have the right to refuse treatment  The above information is an  only  It is not intended as medical advice for individual conditions or treatments  Talk to your doctor, nurse or pharmacist before following any medical regimen to see if it is safe and effective for you  © Copyright [address] 2022 Information is for End User's use only and may not be sold, redistributed or otherwise used for commercial purposes   All illustrations and images included in CareNotes® are the copyrighted property of A D A M , [employer]  or [address] [place], [place] [address]

## 2022-11-23 ENCOUNTER — TELEPHONE (OUTPATIENT)
Dept: PULMONOLOGY | Facility: CLINIC | Age: 57
End: 2022-11-23

## 2022-11-23 NOTE — TELEPHONE ENCOUNTER
Patient calling stating he needs a letter for his work that states he is out of work at least until he follows up in spring  He will get it from his mychart once written   Please advise

## 2022-12-07 DIAGNOSIS — J44.9 CHRONIC OBSTRUCTIVE PULMONARY DISEASE, UNSPECIFIED COPD TYPE (HCC): ICD-10-CM

## 2022-12-07 DIAGNOSIS — J43.2 CENTRILOBULAR EMPHYSEMA (HCC): ICD-10-CM

## 2022-12-07 DIAGNOSIS — R06.02 SHORTNESS OF BREATH: ICD-10-CM

## 2022-12-08 RX ORDER — BUDESONIDE, GLYCOPYRROLATE, AND FORMOTEROL FUMARATE 160; 9; 4.8 UG/1; UG/1; UG/1
2 AEROSOL, METERED RESPIRATORY (INHALATION) 2 TIMES DAILY
Qty: 32.1 G | Refills: 3 | Status: SHIPPED | OUTPATIENT
Start: 2022-12-08 | End: 2023-12-08

## 2022-12-08 RX ORDER — IPRATROPIUM BROMIDE AND ALBUTEROL SULFATE 2.5; .5 MG/3ML; MG/3ML
3 SOLUTION RESPIRATORY (INHALATION) 3 TIMES DAILY
Qty: 270 ML | Refills: 2 | Status: SHIPPED | OUTPATIENT
Start: 2022-12-08 | End: 2023-03-08

## 2022-12-15 ENCOUNTER — APPOINTMENT (OUTPATIENT)
Dept: LAB | Facility: MEDICAL CENTER | Age: 57
End: 2022-12-15

## 2022-12-15 DIAGNOSIS — I10 ESSENTIAL HYPERTENSION, MALIGNANT: ICD-10-CM

## 2022-12-15 DIAGNOSIS — E78.5 HYPERLIPIDEMIA, UNSPECIFIED HYPERLIPIDEMIA TYPE: ICD-10-CM

## 2022-12-15 LAB
ALBUMIN SERPL BCP-MCNC: 3.7 G/DL (ref 3.5–5)
ALP SERPL-CCNC: 65 U/L (ref 46–116)
ALT SERPL W P-5'-P-CCNC: 76 U/L (ref 12–78)
ANION GAP SERPL CALCULATED.3IONS-SCNC: 5 MMOL/L (ref 4–13)
AST SERPL W P-5'-P-CCNC: 31 U/L (ref 5–45)
BASOPHILS # BLD AUTO: 0.09 THOUSANDS/ÂΜL (ref 0–0.1)
BASOPHILS NFR BLD AUTO: 2 % (ref 0–1)
BILIRUB SERPL-MCNC: 0.49 MG/DL (ref 0.2–1)
BUN SERPL-MCNC: 16 MG/DL (ref 5–25)
CALCIUM SERPL-MCNC: 9.2 MG/DL (ref 8.3–10.1)
CHLORIDE SERPL-SCNC: 109 MMOL/L (ref 96–108)
CHOLEST SERPL-MCNC: 199 MG/DL
CO2 SERPL-SCNC: 26 MMOL/L (ref 21–32)
CREAT SERPL-MCNC: 1.08 MG/DL (ref 0.6–1.3)
EOSINOPHIL # BLD AUTO: 0.13 THOUSAND/ÂΜL (ref 0–0.61)
EOSINOPHIL NFR BLD AUTO: 2 % (ref 0–6)
ERYTHROCYTE [DISTWIDTH] IN BLOOD BY AUTOMATED COUNT: 13.8 % (ref 11.6–15.1)
GFR SERPL CREATININE-BSD FRML MDRD: 75 ML/MIN/1.73SQ M
GLUCOSE P FAST SERPL-MCNC: 102 MG/DL (ref 65–99)
HCT VFR BLD AUTO: 47.1 % (ref 36.5–49.3)
HDLC SERPL-MCNC: 39 MG/DL
HGB BLD-MCNC: 15 G/DL (ref 12–17)
IMM GRANULOCYTES # BLD AUTO: 0.03 THOUSAND/UL (ref 0–0.2)
IMM GRANULOCYTES NFR BLD AUTO: 1 % (ref 0–2)
LDLC SERPL CALC-MCNC: 123 MG/DL (ref 0–100)
LYMPHOCYTES # BLD AUTO: 1.68 THOUSANDS/ÂΜL (ref 0.6–4.47)
LYMPHOCYTES NFR BLD AUTO: 29 % (ref 14–44)
MCH RBC QN AUTO: 31.2 PG (ref 26.8–34.3)
MCHC RBC AUTO-ENTMCNC: 31.8 G/DL (ref 31.4–37.4)
MCV RBC AUTO: 98 FL (ref 82–98)
MONOCYTES # BLD AUTO: 0.49 THOUSAND/ÂΜL (ref 0.17–1.22)
MONOCYTES NFR BLD AUTO: 9 % (ref 4–12)
NEUTROPHILS # BLD AUTO: 3.32 THOUSANDS/ÂΜL (ref 1.85–7.62)
NEUTS SEG NFR BLD AUTO: 57 % (ref 43–75)
NONHDLC SERPL-MCNC: 160 MG/DL
NRBC BLD AUTO-RTO: 0 /100 WBCS
NT-PROBNP SERPL-MCNC: 46 PG/ML
PLATELET # BLD AUTO: 313 THOUSANDS/UL (ref 149–390)
PMV BLD AUTO: 9.9 FL (ref 8.9–12.7)
POTASSIUM SERPL-SCNC: 4 MMOL/L (ref 3.5–5.3)
PROT SERPL-MCNC: 8 G/DL (ref 6.4–8.4)
RBC # BLD AUTO: 4.81 MILLION/UL (ref 3.88–5.62)
SODIUM SERPL-SCNC: 140 MMOL/L (ref 135–147)
TRIGL SERPL-MCNC: 187 MG/DL
TSH SERPL DL<=0.05 MIU/L-ACNC: 2.31 UIU/ML (ref 0.45–4.5)
WBC # BLD AUTO: 5.74 THOUSAND/UL (ref 4.31–10.16)

## 2022-12-21 ENCOUNTER — HOSPITAL ENCOUNTER (OUTPATIENT)
Dept: RADIOLOGY | Facility: MEDICAL CENTER | Age: 57
Discharge: HOME | End: 2022-12-21

## 2022-12-21 DIAGNOSIS — E04.1 NONTOXIC UNINODULAR GOITER: ICD-10-CM

## 2023-02-14 ENCOUNTER — REMOTE DEVICE CLINIC VISIT (OUTPATIENT)
Dept: CARDIOLOGY CLINIC | Facility: CLINIC | Age: 58
End: 2023-02-14

## 2023-02-14 DIAGNOSIS — Z95.818 PRESENCE OF OTHER CARDIAC IMPLANTS AND GRAFTS: Primary | ICD-10-CM

## 2023-02-14 NOTE — PROGRESS NOTES
MDT LNQ22/ACTIVE SYSTEM IS MRI CONDITIONAL   CARELINK TRANSMISSION: LOOP RECORDER  PRESENTING RHYTHM SB @ 48 BPM  BATTERY STATUS "OK " NO PATIENT OR DEVICE ACTIVATED EPISODES  NORMAL DEVICE FUNCTION   DL

## 2023-02-22 NOTE — TELEPHONE ENCOUNTER
----- Message from Kody Nair MD sent at 7/19/2022  1:54 PM EDT -----  Charity Chilel      ----- Message -----  From: Radha Pemberton  Sent: 7/19/2022   7:50 AM EDT  To: Kody Nair MD    This Home sleep study needs approval      If approved please sign and return to clerical pool  If denied please include reasons why  Also provide alternative testing if warranted  Please sign and return to clerical pool  [address]   Progress Note and Procedure Note   NO Procedure      Marnie Segundo  MEDICAL RECORD NUMBER:  [id number]  AGE: 66 y.o. RACE BLACK/  GENDER: female  : 1944  EPISODE DATE:  2023    Subjective:   C/o L lateral leg ache  Chief Complaint   Patient presents with    Wound Check     R and L leg          HISTORY of PRESENT ILLNESS HPI    Marnie Segundo is a 66 y.o. female who presents today for wound/ulcer evaluation.    History of Wound Context: RLE  Wound/Ulcer Pain Timing/Severity: moderate  Quality of pain: aching  Severity:  3 / 10   Modifying Factors: None  Associated Signs/Symptoms: edema    Ulcer Identification:  Ulcer Type: venous    Contributing Factors: venous stasis    Wound: RLE         PAST MEDICAL HISTORY    Past Medical History:   Diagnosis Date    Adenocarcinoma, renal cell ([address] 2020    Arthritis     CAD (coronary artery disease)     Chronic kidney disease     Diabetes ([address]     Gout     Hypercholesterolemia     Hypertension     Mental depression     Pulmonary embolism ([address]     Seizures (HCC)     St[facility]     Thyroid disease         PAST SURGICAL HISTORY    Past Surgical History:   Procedure Laterality Date    COLONOSCOPY N/A 10/24/2022    COLONOSCOPY performed by Shelley Thomas MD at [address] [place] [address] ENDOSCOPY    HX GYN      HX HYSTERECTOMY      HX NEPHRECTOMY Left 2015    HX ORTHOPAEDIC      HX UROLOGICAL  2015    PARTIAL URETERECTOMY     MD UNLISTED PROCEDURE CARDIAC SURGERY      stents placed        FAMILY HISTORY    Family History   Problem Relation Age of Onset    Heart Disease Mother     Heart Disease Father     Heart Disease Sister     Cancer Sister     Heart Disease Brother     Cancer Maternal Grandmother     Stroke Son     Cancer Sister        SOCIAL HISTORY    Social History     Tobacco Use    Smoking status: Former     Years: 15.00     Types: Cigarettes    Smokeless tobacco: Never   Vaping Use    Vaping Use: Never used Substance Use Topics    Alcohol use: Not Currently    Drug use: Never       ALLERGIES    No Known Allergies    MEDICATIONS    Current Outpatient Medications on File Prior to Encounter   Medication Sig Dispense Refill    gabapentin (NEURONTIN) 100 mg capsule Take 100 mg by mouth three (3) times daily. latanoprost (XALATAN) 0.005 % ophthalmic solution Administer 1 Drop to both eyes nightly. senna (SENOKOT) 8.6 mg tablet Take 1 Tablet by mouth daily. Nebulizer & Compressor machine as directed. aspirin 81 mg chewable tablet CHEW AND SWALLOW 1 TABLET BY MOUTH ONCE DAILY FOR PREVENTION      FeroSuL 325 mg (65 mg iron) tablet TAKE 1 TABLET BY MOUTH ONCE DAILY FOR SUPPLEMENTATION      fluticasone propionate (FLONASE) 50 mcg/actuation nasal spray 1 spray in each nostril Nasally Once a day      nitroglycerin (NITROSTAT) 0.4 mg SL tablet DISSOLVE ONE TABLET UNDER THE TONGUE EVERY 5 MINUTES AS NEEDED FOR CHEST PAIN. DO NOT EXCEED A TOTAL OF 3 DOSES IN 15 MINUTES NOW      acetaminophen (TYLENOL) 500 mg tablet 2 tablets as needed Orally every 6 hrs      melatonin 5 mg tablet Take 5 mg by mouth nightly. insulin aspart U-100 (NOVOLOG) 100 unit/mL injection by SubCUTAneous route Before breakfast, lunch, dinner and at bedtime. SS: 150-200=2 units 201-250=4 units 251-300=6 units 301-350=8 units 351-400=10 units      insulin detemir U-100 (LEVEMIR) 100 unit/mL injection 10 Units by SubCUTAneous route nightly. plecanatide (Trulance) 3 mg tab Take 3 mg by mouth daily. pantoprazole (PROTONIX) 40 mg tablet Take 1 Tablet by mouth two (2) times a day. 60 Tablet 0    albuterol (PROVENTIL VENTOLIN) 2.5 mg /3 mL (0.083 %) nebu 2.5 mg by Nebulization route every six (6) hours as needed for Shortness of Breath. carvediloL (COREG) 6.25 mg tablet Take 6.25 mg by mouth two (2) times daily (with meals).       calcitRIOL (ROCALTROL) 0.25 mcg capsule Take 0.25 mcg by mouth BID Mon Wed & Fri.      donepeziL (ARICEPT) 10 mg tablet Take 10 mg by mouth nightly. Venlafaxine-ER 24 HR (EFFEXOR-ER) 75 mg tr24 tablet Take 75 mg by mouth daily. pravastatin (PRAVACHOL) 80 mg tablet Take 80 mg by mouth nightly. No current facility-administered medications on file prior to encounter. REVIEW OF SYSTEMS    Pertinent items are noted in HPI. Objective:     Visit Vitals  BP (!) 171/69 (BP 1 Location: Right arm, BP Patient Position: At rest;Sitting)   Pulse 66   Temp (!) 95.7 °F (35.4 °C)   Resp 18       Wt Readings from Last 3 Encounters:   02/15/23 83 kg (183 lb)   12/19/22 84.8 kg (187 lb)   11/15/22 85.1 kg (187 lb 9.8 oz)       PHYSICAL EXAM  Left leg wound is healed  The patient's area of complaint regarding the left lateral leg may have underlying varicose veins  The right leg wound is small  Pertinent items are noted in HPI. Assessment:   Likely venous ulcers    Problem List Items Addressed This Visit       Non-pressure chronic ulcer of other part of right lower leg limited to breakdown of skin (HCC) - Primary    Relevant Medications    lidocaine (XYLOCAINE) 2 % viscous solution 15 mL (Completed) (Start on 2/22/2023 11:00 AM)    Other Relevant Orders    INITIATE OUTPATIENT WOUND CARE PROTOCOL    Non-pressure chronic ulcer of other part of left lower leg limited to breakdown of skin (HCC)    Relevant Medications    lidocaine (XYLOCAINE) 2 % viscous solution 15 mL (Completed) (Start on 2/22/2023 11:00 AM)    Other Relevant Orders    INITIATE OUTPATIENT WOUND CARE PROTOCOL       Procedure Note  Indications:  Based on my examination of this patient's wound(s)/ulcer(s) today, debridement is not required to promote healing and evaluate the wound base. Wound Leg Right;Posterior #2 02/15/23 (Active)   Wound Image   02/22/23 1030   Wound Etiology Venous 02/22/23 1030   Dressing Status Clean;Dry; Intact 02/22/23 1030   Cleansed Cleansed with saline 02/22/23 1030   Wound Length (cm) 0.5 cm 02/22/23 1030 Wound Width (cm) 0.3 cm 02/22/23 1030   Wound Depth (cm) 0.1 cm 02/22/23 1030   Wound Surface Area (cm^2) 0.15 cm^2 02/22/23 1030   Change in Wound Size % 40 02/22/23 1030   Wound Volume (cm^3) 0.015 cm^3 02/22/23 1030   Wound Healing % 40 02/22/23 1030   Wound Assessment Slough 02/22/23 1030   Drainage Amount None 02/22/23 1030   Drainage Description Serosanguinous 02/15/23 1027   Wound Odor None 02/22/23 1030   Radha-Wound/Incision Assessment Dry/flaky 02/22/23 1030   Edges Attached edges 02/22/23 1030   Wound Thickness Description Partial thickness 02/22/23 1030   Number of days: 7        Plan:   Continue Medihoney gel to the right leg wound  Follow-up with Dr. Penny Varela    Treatment Note please see attached Discharge Instructions    Written patient dismissal instructions given to patient or POA.          Electronically signed by Jayjay Brown MD on 2/22/2023 at 10:59 AM

## 2023-02-28 DIAGNOSIS — J43.2 CENTRILOBULAR EMPHYSEMA (HCC): ICD-10-CM

## 2023-02-28 DIAGNOSIS — R06.02 SHORTNESS OF BREATH: ICD-10-CM

## 2023-02-28 RX ORDER — IPRATROPIUM BROMIDE AND ALBUTEROL SULFATE 2.5; .5 MG/3ML; MG/3ML
3 SOLUTION RESPIRATORY (INHALATION) 3 TIMES DAILY
Qty: 270 ML | Refills: 2 | Status: SHIPPED | OUTPATIENT
Start: 2023-02-28 | End: 2023-05-29

## 2023-04-05 ENCOUNTER — TELEPHONE (OUTPATIENT)
Dept: PULMONOLOGY | Facility: CLINIC | Age: 58
End: 2023-04-05

## 2023-04-05 DIAGNOSIS — J44.9 CHRONIC OBSTRUCTIVE PULMONARY DISEASE, UNSPECIFIED COPD TYPE (HCC): ICD-10-CM

## 2023-04-05 RX ORDER — BUDESONIDE, GLYCOPYRROLATE, AND FORMOTEROL FUMARATE 160; 9; 4.8 UG/1; UG/1; UG/1
2 AEROSOL, METERED RESPIRATORY (INHALATION) 2 TIMES DAILY
Qty: 32.1 G | Refills: 3 | Status: SHIPPED | OUTPATIENT
Start: 2023-04-05 | End: 2024-04-04

## 2023-04-05 NOTE — TELEPHONE ENCOUNTER
Medication request has been sent to provider for approval  Dr Luis Carrington has nothing available coming up in June  Should we put him on the recall list for July?

## 2023-04-05 NOTE — TELEPHONE ENCOUNTER
Patient is calling asking for a med refill on his Beatrice Blank to his [address] [place] [address] [address]  He asked to be scheduled for a follow up as he was never contacted from the recall  Dr Henry had no openings in [place] at this time and he would like to only see him at [place] when he has an opening   Please advise

## 2023-05-12 ENCOUNTER — TELEPHONE (OUTPATIENT)
Dept: PULMONOLOGY | Facility: CLINIC | Age: 58
End: 2023-05-12

## 2023-05-12 NOTE — TELEPHONE ENCOUNTER
Patients wife lvm stating that [employer] will no longer cover the [employer] inhaler  She asked if Brian Johnson could be put on a different medication  Please advise

## 2023-05-16 ENCOUNTER — REMOTE DEVICE CLINIC VISIT (OUTPATIENT)
Dept: CARDIOLOGY CLINIC | Facility: CLINIC | Age: 58
End: 2023-05-16

## 2023-05-16 DIAGNOSIS — Z95.818 PRESENCE OF CARDIAC DEVICE: Primary | ICD-10-CM

## 2023-05-16 NOTE — PROGRESS NOTES
"Results for orders placed or performed in visit on 05/16/23   Cardiac EP device report    Narrative     [facility] [address]: BATTERY STATUS \"OK  \" NO PATIENT OR DEVICE ACTIVATED EPISODES --KERN       "

## 2023-05-24 NOTE — TELEPHONE ENCOUNTER
Patients wife called in regards to him not being able to afford Breztri and if there is any update   Please advise

## 2023-05-26 NOTE — TELEPHONE ENCOUNTER
I received a separate message from pt requesting trelegy samples   I left him a VM and will requesting a call back

## 2023-05-31 ENCOUNTER — TELEPHONE (OUTPATIENT)
Dept: PULMONOLOGY | Facility: CLINIC | Age: 58
End: 2023-05-31

## 2023-05-31 DIAGNOSIS — J43.2 CENTRILOBULAR EMPHYSEMA (HCC): Primary | ICD-10-CM

## 2023-05-31 RX ORDER — FLUTICASONE FUROATE, UMECLIDINIUM BROMIDE AND VILANTEROL TRIFENATATE 200; 62.5; 25 UG/1; UG/1; UG/1
1 POWDER RESPIRATORY (INHALATION) DAILY
Qty: 60 BLISTER | Refills: 5 | Status: SHIPPED | OUTPATIENT
Start: 2023-05-31 | End: 2023-06-30

## 2023-05-31 NOTE — TELEPHONE ENCOUNTER
Pt calling in stating his insurance is no longer covering the [employer] and is requesting pt switch to Trelegy   Please advise

## 2023-08-09 ENCOUNTER — OFFICE VISIT (OUTPATIENT)
Dept: PULMONOLOGY | Facility: CLINIC | Age: 58
End: 2023-08-09
Payer: COMMERCIAL

## 2023-08-09 VITALS
RESPIRATION RATE: 18 BRPM | WEIGHT: 274 LBS | OXYGEN SATURATION: 98 % | SYSTOLIC BLOOD PRESSURE: 118 MMHG | BODY MASS INDEX: 39.22 KG/M2 | HEART RATE: 58 BPM | TEMPERATURE: 98.4 F | HEIGHT: 70 IN | DIASTOLIC BLOOD PRESSURE: 70 MMHG

## 2023-08-09 DIAGNOSIS — G47.33 OSA (OBSTRUCTIVE SLEEP APNEA): ICD-10-CM

## 2023-08-09 DIAGNOSIS — C34.11 MALIGNANT NEOPLASM OF UPPER LOBE OF RIGHT LUNG (HCC): ICD-10-CM

## 2023-08-09 DIAGNOSIS — F17.211 CIGARETTE NICOTINE DEPENDENCE IN REMISSION: ICD-10-CM

## 2023-08-09 DIAGNOSIS — J34.89 NASAL LESION: ICD-10-CM

## 2023-08-09 DIAGNOSIS — J43.2 CENTRILOBULAR EMPHYSEMA (HCC): Primary | ICD-10-CM

## 2023-08-09 DIAGNOSIS — Z23 IMMUNIZATION DUE: ICD-10-CM

## 2023-08-09 PROCEDURE — 99214 OFFICE O/P EST MOD 30 MIN: CPT | Performed by: INTERNAL MEDICINE

## 2023-08-09 PROCEDURE — 90677 PCV20 VACCINE IM: CPT

## 2023-08-09 PROCEDURE — 90471 IMMUNIZATION ADMIN: CPT

## 2023-08-09 RX ORDER — PREDNISONE 20 MG/1
40 TABLET ORAL DAILY
Qty: 10 TABLET | Refills: 0 | Status: SHIPPED | OUTPATIENT
Start: 2023-08-09 | End: 2023-08-14

## 2023-08-09 RX ORDER — AZITHROMYCIN 250 MG/1
TABLET, FILM COATED ORAL
Qty: 6 TABLET | Refills: 0 | Status: SHIPPED | OUTPATIENT
Start: 2023-08-09 | End: 2023-08-13

## 2023-08-09 RX ORDER — FAMOTIDINE 40 MG/1
40 TABLET, FILM COATED ORAL DAILY
COMMUNITY
Start: 2023-06-13

## 2023-08-09 NOTE — PATIENT INSTRUCTIONS
Continue Trelegy  Have prednisone and azithromycin at home for exacerbation  Continue CPAP use  You obtained Prevnar 20 today  Get nasal lesion evaluated
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
## 2023-08-16 ENCOUNTER — REMOTE DEVICE CLINIC VISIT (OUTPATIENT)
Dept: CARDIOLOGY CLINIC | Facility: CLINIC | Age: 58
End: 2023-08-16
Payer: COMMERCIAL

## 2023-08-16 DIAGNOSIS — Z95.818 PRESENCE OF OTHER CARDIAC IMPLANTS AND GRAFTS: Primary | ICD-10-CM

## 2023-08-16 PROCEDURE — G2066 INTER DEVC REMOTE 30D: HCPCS | Performed by: INTERNAL MEDICINE

## 2023-08-16 PROCEDURE — 93298 REM INTERROG DEV EVAL SCRMS: CPT | Performed by: INTERNAL MEDICINE

## 2023-08-16 NOTE — PROGRESS NOTES
MDT LNQ22/ACTIVE SYSTEM IS MRI CONDITIONAL   CARELINK TRANSMISSION: LOOP RECORDER. PRESENTING RHYTHM SB @ 42 BPM. BATTERY STATUS "OK." NO PATIENT OR DEVICE ACTIVATED EPISODES. NORMAL DEVICE FUNCTION.  DL

## 2023-10-07 DIAGNOSIS — J43.2 CENTRILOBULAR EMPHYSEMA (HCC): ICD-10-CM

## 2023-10-10 RX ORDER — FLUTICASONE FUROATE, UMECLIDINIUM BROMIDE AND VILANTEROL TRIFENATATE 200; 62.5; 25 UG/1; UG/1; UG/1
1 POWDER RESPIRATORY (INHALATION) DAILY
Qty: 60 EACH | Refills: 5 | Status: SHIPPED | OUTPATIENT
Start: 2023-10-10 | End: 2023-11-09

## 2023-11-15 ENCOUNTER — REMOTE DEVICE CLINIC VISIT (OUTPATIENT)
Dept: CARDIOLOGY CLINIC | Facility: CLINIC | Age: 58
End: 2023-11-15
Payer: COMMERCIAL

## 2023-11-15 DIAGNOSIS — Z95.818 PRESENCE OF OTHER CARDIAC IMPLANTS AND GRAFTS: Primary | ICD-10-CM

## 2023-11-15 PROCEDURE — 93298 REM INTERROG DEV EVAL SCRMS: CPT | Performed by: INTERNAL MEDICINE

## 2023-11-15 PROCEDURE — G2066 INTER DEVC REMOTE 30D: HCPCS | Performed by: INTERNAL MEDICINE

## 2023-11-15 NOTE — PROGRESS NOTES
MDT LNQ22/ACTIVE SYSTEM IS MRI CONDITIONAL   CARELINK TRANSMISSION: BATTERY STATUS "OK."; PRESENTING RHYTHM SHOWS SR, 60 BPM; NO PATIENT OR DEVICE ACTIVATED EPISODES. PVC BURDEN 0.1%; NORMAL DEVICE FUNCTION.   ES

## 2023-12-11 ENCOUNTER — APPOINTMENT (OUTPATIENT)
Dept: LAB | Facility: MEDICAL CENTER | Age: 58
End: 2023-12-11
Payer: COMMERCIAL

## 2023-12-11 DIAGNOSIS — E04.1 NONTOXIC UNINODULAR GOITER: ICD-10-CM

## 2023-12-11 DIAGNOSIS — Z12.5 SPECIAL SCREENING, PROSTATE CANCER: ICD-10-CM

## 2023-12-11 DIAGNOSIS — E78.2 MIXED HYPERLIPIDEMIA: ICD-10-CM

## 2023-12-11 DIAGNOSIS — Z79.899 ENCOUNTER FOR LONG-TERM (CURRENT) USE OF OTHER MEDICATIONS: ICD-10-CM

## 2023-12-11 DIAGNOSIS — I10 ESSENTIAL HYPERTENSION, BENIGN: ICD-10-CM

## 2023-12-11 LAB
ALBUMIN SERPL BCP-MCNC: 4.3 G/DL (ref 3.5–5)
ALP SERPL-CCNC: 53 U/L (ref 34–104)
ALT SERPL W P-5'-P-CCNC: 57 U/L (ref 7–52)
ANION GAP SERPL CALCULATED.3IONS-SCNC: 11 MMOL/L
AST SERPL W P-5'-P-CCNC: 37 U/L (ref 13–39)
BASOPHILS # BLD AUTO: 0.09 THOUSANDS/ÂΜL (ref 0–0.1)
BASOPHILS NFR BLD AUTO: 2 % (ref 0–1)
BILIRUB SERPL-MCNC: 0.54 MG/DL (ref 0.2–1)
BNP SERPL-MCNC: 58 PG/ML (ref 0–100)
BUN SERPL-MCNC: 16 MG/DL (ref 5–25)
CALCIUM SERPL-MCNC: 9.1 MG/DL (ref 8.4–10.2)
CHLORIDE SERPL-SCNC: 111 MMOL/L (ref 96–108)
CHOLEST SERPL-MCNC: 188 MG/DL
CO2 SERPL-SCNC: 27 MMOL/L (ref 21–32)
CREAT SERPL-MCNC: 1 MG/DL (ref 0.6–1.3)
EOSINOPHIL # BLD AUTO: 0.14 THOUSAND/ÂΜL (ref 0–0.61)
EOSINOPHIL NFR BLD AUTO: 3 % (ref 0–6)
ERYTHROCYTE [DISTWIDTH] IN BLOOD BY AUTOMATED COUNT: 14 % (ref 11.6–15.1)
GFR SERPL CREATININE-BSD FRML MDRD: 82 ML/MIN/1.73SQ M
GLUCOSE P FAST SERPL-MCNC: 88 MG/DL (ref 65–99)
HCT VFR BLD AUTO: 45 % (ref 36.5–49.3)
HDLC SERPL-MCNC: 41 MG/DL
HGB BLD-MCNC: 14.6 G/DL (ref 12–17)
IMM GRANULOCYTES # BLD AUTO: 0.03 THOUSAND/UL (ref 0–0.2)
IMM GRANULOCYTES NFR BLD AUTO: 1 % (ref 0–2)
LDLC SERPL CALC-MCNC: 117 MG/DL (ref 0–100)
LYMPHOCYTES # BLD AUTO: 1.82 THOUSANDS/ÂΜL (ref 0.6–4.47)
LYMPHOCYTES NFR BLD AUTO: 35 % (ref 14–44)
MCH RBC QN AUTO: 31.2 PG (ref 26.8–34.3)
MCHC RBC AUTO-ENTMCNC: 32.4 G/DL (ref 31.4–37.4)
MCV RBC AUTO: 96 FL (ref 82–98)
MONOCYTES # BLD AUTO: 0.52 THOUSAND/ÂΜL (ref 0.17–1.22)
MONOCYTES NFR BLD AUTO: 10 % (ref 4–12)
NEUTROPHILS # BLD AUTO: 2.64 THOUSANDS/ÂΜL (ref 1.85–7.62)
NEUTS SEG NFR BLD AUTO: 49 % (ref 43–75)
NONHDLC SERPL-MCNC: 147 MG/DL
NRBC BLD AUTO-RTO: 0 /100 WBCS
PLATELET # BLD AUTO: 295 THOUSANDS/UL (ref 149–390)
PMV BLD AUTO: 10.2 FL (ref 8.9–12.7)
POTASSIUM SERPL-SCNC: 4.3 MMOL/L (ref 3.5–5.3)
PROT SERPL-MCNC: 7.2 G/DL (ref 6.4–8.4)
PSA SERPL-MCNC: 23.25 NG/ML (ref 0–4)
RBC # BLD AUTO: 4.68 MILLION/UL (ref 3.88–5.62)
SODIUM SERPL-SCNC: 149 MMOL/L (ref 135–147)
TRIGL SERPL-MCNC: 152 MG/DL
TSH SERPL DL<=0.05 MIU/L-ACNC: 4.16 UIU/ML (ref 0.45–4.5)
WBC # BLD AUTO: 5.24 THOUSAND/UL (ref 4.31–10.16)

## 2023-12-11 PROCEDURE — G0103 PSA SCREENING: HCPCS

## 2023-12-11 PROCEDURE — 80053 COMPREHEN METABOLIC PANEL: CPT

## 2023-12-11 PROCEDURE — 36415 COLL VENOUS BLD VENIPUNCTURE: CPT

## 2023-12-11 PROCEDURE — 85025 COMPLETE CBC W/AUTO DIFF WBC: CPT

## 2023-12-11 PROCEDURE — 84443 ASSAY THYROID STIM HORMONE: CPT

## 2023-12-11 PROCEDURE — 83880 ASSAY OF NATRIURETIC PEPTIDE: CPT

## 2023-12-11 PROCEDURE — 80061 LIPID PANEL: CPT

## 2023-12-15 ENCOUNTER — NURSE TRIAGE (OUTPATIENT)
Age: 58
End: 2023-12-15

## 2023-12-15 NOTE — TELEPHONE ENCOUNTER
Patient calling to schedule a NP appt due to patient's PSA being elevated. Patient had a PSA done on 12/11 that resulted at 23.25    As per decision tree, please triage. Patient would like to be scheduled at the [facility] location and has never seen a urologist before.     Patient can be reached at [phone]

## 2023-12-15 NOTE — TELEPHONE ENCOUNTER
Patient's wife called stating she is returning the call . Message given in regards to scheduling new pt appointment at another office for elevated psa 23.25. Patient does not wants to travel to another office . He would like to come to [facility]. No appointments available within the appropriate time frame. Please review and advise .     Patient can be reached at [phone]

## 2023-12-15 NOTE — TELEPHONE ENCOUNTER
Reason for Disposition   Message left on unidentified voice mail. Phone number verified. Protocols used: No Contact or Duplicate Contact Call-ADULT-[place]  No availability with physician in [place], if client calls back, please ask if they are willing to travel to another location. Thank you.

## 2023-12-28 RX ORDER — AMOXICILLIN 500 MG/1
CAPSULE ORAL
COMMUNITY
Start: 2023-10-31

## 2024-01-02 ENCOUNTER — OFFICE VISIT (OUTPATIENT)
Dept: UROLOGY | Facility: CLINIC | Age: 59
End: 2024-01-02
Payer: COMMERCIAL

## 2024-01-02 VITALS
HEIGHT: 70 IN | HEART RATE: 93 BPM | SYSTOLIC BLOOD PRESSURE: 130 MMHG | WEIGHT: 270 LBS | OXYGEN SATURATION: 97 % | DIASTOLIC BLOOD PRESSURE: 90 MMHG | BODY MASS INDEX: 38.65 KG/M2

## 2024-01-02 DIAGNOSIS — R97.20 ELEVATED PSA: Primary | ICD-10-CM

## 2024-01-02 LAB
POST-VOID RESIDUAL VOLUME, ML POC: 32 ML
SL AMB  POCT GLUCOSE, UA: NORMAL
SL AMB LEUKOCYTE ESTERASE,UA: NORMAL
SL AMB POCT BILIRUBIN,UA: NORMAL
SL AMB POCT BLOOD,UA: NORMAL
SL AMB POCT CLARITY,UA: CLEAR
SL AMB POCT COLOR,UA: YELLOW
SL AMB POCT KETONES,UA: NORMAL
SL AMB POCT NITRITE,UA: NORMAL
SL AMB POCT PH,UA: 5
SL AMB POCT SPECIFIC GRAVITY,UA: 1.01
SL AMB POCT URINE PROTEIN: 0.15
SL AMB POCT UROBILINOGEN: 3.5

## 2024-01-02 PROCEDURE — 51798 US URINE CAPACITY MEASURE: CPT | Performed by: PHYSICIAN ASSISTANT

## 2024-01-02 PROCEDURE — 81002 URINALYSIS NONAUTO W/O SCOPE: CPT | Performed by: PHYSICIAN ASSISTANT

## 2024-01-02 PROCEDURE — 99204 OFFICE O/P NEW MOD 45 MIN: CPT | Performed by: PHYSICIAN ASSISTANT

## 2024-01-02 NOTE — PROGRESS NOTES
1/2/2024      Chief Complaint   Patient presents with    Elevated PSA     Assessment and Plan    58 y.o. male new patient     Elevated PSA  - PSA from 12/11/23 was 23.25, no other PSAs for review  - LILLIANA negative  - Obtain repeat PSA now  - Given very high PSA, will arrange further work-up with TRUS prostate biopsy at this time. Unable to have MRI due to cardiac implant. Reviewed biopsy details, risks and post procedural expectations. He is agreeable with plan.       History of Present Illness  Randal Romeo is a 58 y.o. male here for new patient evaluation of elevated PSA. He believes he has had prostate cancer screening many years ago which was reportedly normal. No other PSAs on file for review. He denies any bothersome urinary symptoms. Prior  surgical manipulation includes surgery to left testicle, possibly varicocelectomy. He denies any family history of  malignancy.     Urine dip negative  PVR=32 mL     Review of Systems   Constitutional:  Negative for chills and fever.   Respiratory:  Negative for shortness of breath.    Cardiovascular:  Negative for chest pain.   Gastrointestinal:  Negative for abdominal pain.   Genitourinary:  Negative for difficulty urinating, dysuria, flank pain, frequency, hematuria, testicular pain and urgency.   Neurological:  Negative for dizziness.           AUA SYMPTOM SCORE      Flowsheet Row Most Recent Value   AUA SYMPTOM SCORE    How often have you had a sensation of not emptying your bladder completely after you finished urinating? 2 (P)    How often have you had to urinate again less than two hours after you finished urinating? 4 (P)    How often have you found you stopped and started again several times when you urinate? 4 (P)    How often have you found it difficult to postpone urination? 5 (P)    How often have you had a weak urinary stream? 0 (P)    How often have you had to push or strain to begin urination? 4 (P)    How many times did you most typically get up to  urinate from the time you went to bed at night until the time you got up in the morning? 4 (P)    Quality of Life: If you were to spend the rest of your life with your urinary condition just the way it is now, how would you feel about that? 4 (P)    AUA SYMPTOM SCORE 23 (P)                Past Medical History  Past Medical History:   Diagnosis Date    COPD (chronic obstructive pulmonary disease) (HCC)     Cough     Emphysema of lung (HCC)     Liver lesion     Lung cancer (HCC)     Right upper lobe pulmonary nodule     Shortness of breath     Sleep apnea, obstructive     Syncope and collapse        Past Social History  Past Surgical History:   Procedure Laterality Date    CARDIAC ELECTROPHYSIOLOGY PROCEDURE N/A 7/26/2022    Procedure: Cardiac loop recorder implant;  Surgeon: Daniel Carmona MD;  Location: [facility];  Service: Cardiology    CHOLECYSTECTOMY      LUNG SEGMENTECTOMY Right 1/6/2021    Procedure: RESECTION WEDGE LUNG RIGHT UPPER LOBE;  Surgeon: Mila Joya MD;  Location: [facility];  Service: Thoracic    [facility] INCL FLUOR GDNCE DX W/CELL WASHG SPX N/A 1/6/2021    Procedure: BRONCHOSCOPY FLEXIBLE;  Surgeon: Mila Joya MD;  [facility];  Service: Thoracic    HI THORACOSCOPY W/LOBECTOMY SINGLE LOBE Right 1/6/2021    Procedure: RIGHT UPPER LOBECTOMY LUNG;  Surgeon: Mila Joya MD;  [facility]: [facility];  Service: Thoracic    HI THORACOSCOPY W/THERA WEDGE RESEXN INITIAL UNILAT Right 1/6/2021    Procedure: THORACOSCOPY VIDEO ASSISTED SURGERY (VATS),;  Surgeon: Mila Joya MD;  [facility]: [facility];  Service: Thoracic    TESTICLE SURGERY       Social History     Tobacco Use   Smoking Status Former    Current packs/day: 0.00    Average packs/day: 3.0 packs/day for 30.0 years (90.0 ttl pk-yrs)    Types: Cigarettes    Start date: 10/26/1990    Quit date: 10/26/2020    Years since quitting: 3.1   Smokeless Tobacco Never   Tobacco Comments    30 years-5  packs a day        Past Family History  Family History   Problem Relation Age of Onset    Stroke Father     Heart attack Father     Dementia Father     Alzheimer's disease Father     Clotting disorder Father     Diabetes Mother        Past Social history  Social History     Socioeconomic History    Marital status: /Civil Union     Spouse name: Not on file    Number of children: Not on file    Years of education: Not on file    Highest education level: Not on file   Occupational History    Not on file   Tobacco Use    Smoking status: Former     Current packs/day: 0.00     Average packs/day: 3.0 packs/day for 30.0 years (90.0 ttl pk-yrs)     Types: Cigarettes     Start date: 10/26/1990     Quit date: 10/26/2020     Years since quitting: 3.1    Smokeless tobacco: Never    Tobacco comments:     30 years-5 packs a day    Vaping Use    Vaping status: Never Used   Substance and Sexual Activity    Alcohol use: Not Currently    Drug use: Not Currently    Sexual activity: Not Currently     Partners: Female   Other Topics Concern    Not on file   Social History Narrative    Not on file     Social Determinants of Health     Financial Resource Strain: Not on file   Food Insecurity: Not on file   Transportation Needs: Not on file   Physical Activity: Not on file   Stress: Not on file   Social Connections: Not on file   Intimate Partner Violence: Not on file   Housing Stability: Not on file       Current Medications  Current Outpatient Medications   Medication Sig Dispense Refill    albuterol (PROVENTIL HFA,VENTOLIN HFA) 90 mcg/act inhaler INHALE 2 PUFFS EVERY 6 (SIX) HOURS AS NEEDED FOR WHEEZING OR SHORTNESS OF BREATH 18 g 3    amLODIPine (NORVASC) 5 mg tablet Take 5 mg by mouth daily      amoxicillin (AMOXIL) 500 mg capsule TAKE ONE CAPSULE BY MOUTH TWICE DAILY FOR 7 DAYS      famotidine (PEPCID) 40 MG tablet Take 40 mg by mouth daily      furosemide (LASIX) 40 mg tablet Take 0.5 tablets (20 mg total) by mouth daily       "metoprolol tartrate (LOPRESSOR) 50 mg tablet Take 50 mg by mouth every 12 (twelve) hours      Famotidine (PEPCID AC MAXIMUM STRENGTH PO)  (Patient not taking: Reported on 1/2/2024)      Trelegy Ellipta 200-62.5-25 MCG/ACT AEPB inhaler INHALE 1 PUFF DAILY. RINSE MOUTH AFTER USE. 60 each 5     No current facility-administered medications for this visit.       Allergies  No Known Allergies      The following portions of the patient's history were reviewed and updated as appropriate: allergies, current medications, past medical history, past social history, past surgical history and problem list.      Vitals  Vitals:    01/02/24 1407   BP: 130/90   Pulse: 93   SpO2: 97%   Weight: 122 kg (270 lb)   Height: 5' 10\" (1.778 m)           Physical Exam  Physical Exam  Constitutional:       Appearance: Normal appearance. He is obese.   HENT:      Head: Normocephalic and atraumatic.      Right Ear: External ear normal.      Left Ear: External ear normal.      Nose: Nose normal.   Eyes:      General: No scleral icterus.     Conjunctiva/sclera: Conjunctivae normal.   Cardiovascular:      Pulses: Normal pulses.   Pulmonary:      Effort: Pulmonary effort is normal.   Genitourinary:     Comments: Prostate exam slightly limited due to body habitus, portion of gland palpated without nodularity.   Musculoskeletal:         General: Normal range of motion.      Cervical back: Normal range of motion.   Skin:     General: Skin is warm and dry.   Neurological:      General: No focal deficit present.      Mental Status: He is alert and oriented to person, place, and time.   Psychiatric:         Mood and Affect: Mood normal.         Behavior: Behavior normal.         Thought Content: Thought content normal.         Judgment: Judgment normal.           Results  Recent Results (from the past 1 hour(s))   POCT urine dip    Collection Time: 01/02/24  2:09 PM   Result Value Ref Range    LEUKOCYTE ESTERASE,UA -     NITRITE,UA -     SL AMB POCT " UROBILINOGEN 3.5     POCT URINE PROTEIN 0.15      PH,UA 5.0     BLOOD,UA -     SPECIFIC GRAVITY,UA 1.015     KETONES,UA -     BILIRUBIN,UA -     GLUCOSE, UA -      COLOR,UA yellow     CLARITY,UA clear    POCT Measure PVR    Collection Time: 01/02/24  2:10 PM   Result Value Ref Range    POST-VOID RESIDUAL VOLUME, ML POC 32 mL   ]  Lab Results   Component Value Date    PSA 23.25 (H) 12/11/2023     Lab Results   Component Value Date    CALCIUM 9.1 12/11/2023    K 4.3 12/11/2023    CO2 27 12/11/2023     (H) 12/11/2023    BUN 16 12/11/2023    CREATININE 1.00 12/11/2023     Lab Results   Component Value Date    WBC 5.24 12/11/2023    HGB 14.6 12/11/2023    HCT 45.0 12/11/2023    MCV 96 12/11/2023     12/11/2023           Orders  Orders Placed This Encounter   Procedures    POCT urine dip    POCT Measure PVR     Becky Snyder

## 2024-01-03 ENCOUNTER — TELEPHONE (OUTPATIENT)
Dept: PULMONOLOGY | Facility: CLINIC | Age: 59
End: 2024-01-03

## 2024-01-03 DIAGNOSIS — G47.33 OSA (OBSTRUCTIVE SLEEP APNEA): Primary | ICD-10-CM

## 2024-01-03 NOTE — TELEPHONE ENCOUNTER
Ruy from [facility] called in request for a prescription on the CPAP Supplies to be sent to [facility].     Ruy-[facility]  Contact:  [phone]

## 2024-01-04 NOTE — TELEPHONE ENCOUNTER
----- Message from Rosina Webb MD sent at 8/12/2021  3:47 PM CDT -----  Results are within normal limits, except high glucose of 221 and slight abnormal kidney function  please advise patient.   ordered

## 2024-01-05 ENCOUNTER — APPOINTMENT (OUTPATIENT)
Dept: LAB | Facility: MEDICAL CENTER | Age: 59
End: 2024-01-05
Payer: COMMERCIAL

## 2024-01-05 DIAGNOSIS — R97.20 ELEVATED PSA: ICD-10-CM

## 2024-01-05 PROCEDURE — 36415 COLL VENOUS BLD VENIPUNCTURE: CPT

## 2024-01-05 PROCEDURE — 84153 ASSAY OF PSA TOTAL: CPT

## 2024-01-05 PROCEDURE — 84154 ASSAY OF PSA FREE: CPT

## 2024-01-06 LAB
PSA FREE MFR SERPL: 18.5 %
PSA FREE SERPL-MCNC: 1.72 NG/ML
PSA SERPL-MCNC: 9.3 NG/ML (ref 0–4)

## 2024-01-09 ENCOUNTER — TELEPHONE (OUTPATIENT)
Dept: UROLOGY | Facility: CLINIC | Age: 59
End: 2024-01-09

## 2024-01-09 NOTE — TELEPHONE ENCOUNTER
received repeat psa. down from 23 to 9 but still very high for his age. he is scheduled for trus bx in march. should keep that appt and proceed

## 2024-01-09 NOTE — TELEPHONE ENCOUNTER
Called and left VM informing pt Cynthia Queen PA-C recommendation   received repeat psa. down from 23 to 9 but still very high for his age. he is scheduled for trus bx in march. should keep that appt and proceed   Provided office number.

## 2024-01-26 ENCOUNTER — TELEPHONE (OUTPATIENT)
Age: 59
End: 2024-01-26

## 2024-01-26 NOTE — TELEPHONE ENCOUNTER
Vitor from [facility] states they need a script for CPAP supplies to [facility]    Her phone is [phone]

## 2024-02-02 ENCOUNTER — REMOTE DEVICE CLINIC VISIT (OUTPATIENT)
Dept: CARDIOLOGY CLINIC | Facility: CLINIC | Age: 59
End: 2024-02-02
Payer: COMMERCIAL

## 2024-02-02 DIAGNOSIS — Z95.818 PRESENCE OF OTHER CARDIAC IMPLANTS AND GRAFTS: Primary | ICD-10-CM

## 2024-02-02 PROCEDURE — 93298 REM INTERROG DEV EVAL SCRMS: CPT | Performed by: INTERNAL MEDICINE

## 2024-02-02 NOTE — PROGRESS NOTES
"MDT LNQ22/ACTIVE SYSTEM IS MRI CONDITIONAL   CARELINK TRANSMISSION: LOOP RECORDER. PRESENTING RHYTHM SB @ 48 BPM. BATTERY STATUS \"OK.\" NO PATIENT OR DEVICE ACTIVATED EPISODES. NORMAL DEVICE FUNCTION. DL   "

## 2024-03-06 DIAGNOSIS — G47.33 OSA (OBSTRUCTIVE SLEEP APNEA): Primary | ICD-10-CM

## 2024-03-10 NOTE — PROGRESS NOTES
Pulmonary Follow Up Note   Randal Romeo 58 y.o. male MRN: [id number]  3/12/2024      Assessment:  Dyspnea with palpitations  Palpitations have improved since his last visit, but his REID has worsened  Patient with recent weight gain, about 10 lbs  Suspect related to COPD, under treated MARIANA and COVID long hauler vs. CHF vs. 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  Nicotine dependence in remission -  remains tobacco free     6.  MARIANA   PSG 7/20/2022 - CHAY 38.9, severe MARIANA, recommended auto-titrating PSG 5-20 cmH2O  Patient reports that he has not been able to wear his machine recently because of his mask not fitting  AHI 0.1  New supplies ordered  Encouraged continued CPAP compliance and MARIANA precautions reviewed  ESS 16/24, up from 8 at his last visit - trend with next visit     7.  Nasal lesion  S/p biopsy, negative for malignancy      Plan:    Diagnoses and all orders for this visit:    Centrilobular emphysema (HCC)    Dyspnea on exertion  -     B-Type Natriuretic Peptide(BNP); Future  -     Comprehensive metabolic panel; Future  -     Echo complete w/ contrast if indicated; Future  -     TSH, 3rd generation with Free T4 reflex; Future    Malignant neoplasm of upper lobe of right lung  (Formerly [facility])  -     CT chest wo contrast; Future    MARIANA (obstructive sleep apnea)  -     PAP DME Resupply/Reorder    Gastroesophageal reflux disease without esophagitis    Cigarette nicotine dependence in remission    Return in about 3 months (around 6/12/2024).    History of Present Illness   HPI:  Randal Romeo is a 58 y.o. male who was previously seen by Dr. Tucker for chronic bronchitis and lung nodules. 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  He was diagnosed with COVID-19 on 12/20/21, he did not require admission but did need OCS and antibiotics. He had prolonged symptoms with negative cardiac evaluation.  He was last seen in Aug 2023 with plans to continue Trelegy and CPAP support. He presents today for follow up.    The patient reports some recent weight gain and REID. He states that this has been going on for some time now and is worse than it used to be. Reports that his wife has been sick recently, but he has not had any symptoms. He reports that he has gained about 10 pounds. It does not feel like a COPD exacerbation that he's had in the past.    He also states that he feels as though the Trelegy is not working as effectively as the Breztri that he was previously on.    He states that he recently underwent blood work that revealed elevated PSA levels for which is being investigated with a biopsy on the 25th of this month.    Review of Systems   Constitutional:  Negative for appetite change and fever.   HENT:  Negative for ear pain, postnasal drip, rhinorrhea, sneezing, sore throat and trouble swallowing.    Respiratory:  Positive for shortness of breath.    Cardiovascular:  Positive for palpitations and leg swelling. Negative for chest pain.   Musculoskeletal:  Negative for myalgias.   Neurological:  Negative for headaches.       Historical Information   Past Medical History:   Diagnosis Date    COPD (chronic obstructive pulmonary disease) (Formerly [facility])     Cough     Emphysema  of lung (HCC)     Liver lesion     Lung cancer (HCC)     Right upper lobe pulmonary nodule     Shortness of breath     Sleep apnea, obstructive     Syncope and collapse      Past Surgical History:   Procedure Laterality Date    CARDIAC ELECTROPHYSIOLOGY PROCEDURE N/A 7/26/2022    Procedure: Cardiac loop recorder implant;  Surgeon: Daniel Carmona MD;  Location: [facility];  Service: Cardiology    CHOLECYSTECTOMY      LUNG SEGMENTECTOMY Right 1/6/2021    Procedure: RESECTION WEDGE LUNG RIGHT UPPER LOBE;  Surgeon: Mila Joya MD;  Location: [facility];  Service: Thoracic    MI [facility] INCL FLUOR GDNCE DX W/CELL WASHG SPX N/A 1/6/2021    Procedure: BRONCHOSCOPY FLEXIBLE;  Surgeon: Mila Joya MD;  Location: [facility];  Service: Thoracic    MI THORACOSCOPY W/LOBECTOMY SINGLE LOBE Right 1/6/2021    Procedure: RIGHT UPPER LOBECTOMY LUNG;  Surgeon: Mila Joya MD;  Location: [facility];  Service: Thoracic    MI THORACOSCOPY W/THERA WEDGE RESEXN INITIAL UNILAT Right 1/6/2021    Procedure: THORACOSCOPY VIDEO ASSISTED SURGERY (VATS),;  Surgeon: Mila Joya MD;  Location: [facility];  Service: Thoracic    TESTICLE SURGERY       Family History   Problem Relation Age of Onset    Stroke Father     Heart attack Father     Dementia Father     Alzheimer's disease Father     Clotting disorder Father     Diabetes Mother          Meds/Allergies     Current Outpatient Medications:     albuterol (PROVENTIL HFA,VENTOLIN HFA) 90 mcg/act inhaler, INHALE 2 PUFFS EVERY 6 (SIX) HOURS AS NEEDED FOR WHEEZING OR SHORTNESS OF BREATH, Disp: 18 g, Rfl: 3    famotidine (PEPCID) 40 MG tablet, Take 40 mg by mouth daily, Disp: , Rfl:     furosemide (LASIX) 40 mg tablet, Take 0.5 tablets (20 mg total) by mouth daily, Disp: , Rfl:     metoprolol tartrate (LOPRESSOR) 50 mg tablet, Take 50 mg by mouth every 12 (twelve) hours, Disp: , Rfl:     Trelegy Ellipta 200-62.5-25 MCG/ACT AEPB inhaler, INHALE 1 PUFF  "DAILY. RINSE MOUTH AFTER USE., Disp: 60 each, Rfl: 5  No Known Allergies    Vitals: Blood pressure 128/84, pulse 68, temperature 98 °F (36.7 °C), temperature source Temporal, resp. rate 12, height 5' 10\" (1.778 m), weight 129 kg (284 lb), SpO2 97%. Body mass index is 40.75 kg/m². Oxygen Therapy  SpO2: 97 %      Physical Exam  Physical Exam  Vitals reviewed.   Constitutional:       General: He is not in acute distress.     Appearance: He is obese.   HENT:      Head: Normocephalic and atraumatic.      Right Ear: External ear normal.      Left Ear: External ear normal.      Nose: Nose normal.      Mouth/Throat:      Mouth: Mucous membranes are moist.      Pharynx: Oropharynx is clear.   Eyes:      Extraocular Movements: Extraocular movements intact.      Pupils: Pupils are equal, round, and reactive to light.   Cardiovascular:      Rate and Rhythm: Normal rate and regular rhythm.      Pulses: Normal pulses.      Heart sounds: Normal heart sounds.   Pulmonary:      Effort: Pulmonary effort is normal.      Comments: Patient with decreased breath sounds B/L at the bases  Abdominal:      General: Abdomen is flat. Bowel sounds are normal.   Musculoskeletal:      Right lower leg: Edema present.      Left lower leg: Edema present.   Skin:     General: Skin is warm and dry.      Capillary Refill: Capillary refill takes less than 2 seconds.   Neurological:      General: No focal deficit present.      Mental Status: He is alert and oriented to person, place, and time.   Psychiatric:         Mood and Affect: Mood normal.         Behavior: Behavior normal.         Labs: I have personally reviewed pertinent lab results.  Lab Results   Component Value Date    WBC 5.24 12/11/2023    HGB 14.6 12/11/2023    HCT 45.0 12/11/2023    MCV 96 12/11/2023     12/11/2023     Lab Results   Component Value Date    CALCIUM 9.1 12/11/2023    K 4.3 12/11/2023    CO2 27 12/11/2023     (H) 12/11/2023    BUN 16 12/11/2023    CREATININE " "1.00 12/11/2023     No results found for: \"IGE\"  Lab Results   Component Value Date    ALT 57 (H) 12/11/2023    AST 37 12/11/2023    ALKPHOS 53 12/11/2023 01/07/2022 - BNP 55     Imaging and other studies: new images and reports personally reviewed  CT Chest 3/16/2022 - post RUL lobectomy, right basilar atelectasis/scar, background emphysematous changes, no sig mediastinal adenoapthy     CT angio 01/07/2022 - no PE, mild emphysemtous changes, post surgical changes on right, no sig effusions or mediastinal adenopathy     CXR 12/30/2021 - stable mild volume loss on right and blunted CP angle, no new infiltrates, no effusions, no PTX     CT Chest 7/12/21 - post surgical changes on right, mild upper lobe/apical emphysematous changes, no new lesions appreciated     CXR 2/15/21 - volume loss on right - post surgical changes, small right effusion, resolved right apical PTX, no focal infiltrates      CT/PET 11/10/2020 - 2.5x1.8cm RUL nodular lesion with SUV 1.8, hepatic hypodensities w/o FDG avidity        Pulmonary function testing:   3/25/2022 - Ratio 68%, FVC 3.26L (66%), FEV1 2.25 (59%) - moderate obstructive airflow defect     2/25/2022 - Ratio 60%, FVC 3.24L (66%), FEV1 1.94L (52%) - moderate obstructive airflow defect with reduced VC     11/13/2020 - Ratio 69%, FVC 4.07L, FEV1 2.80L (72%), post BD FEV1 inc 9%, TLC 92%, %, DLCO 66% - mild obstructive airflow defect, no response to BD normal lung volumes, mildly reduced diffusion     Spirometry 2017 - Ratio 78%, FVC 4.20L (81%), FEV1 3.29L (82%) - normal spirometry     Ambulation Testing  2/25/2022 - 6MWT on RA - Total Walk distance 369 meters, initial SpO2 95%, chuck SpO2 93%, conclusion 96%, maximal HR 119bpm     11/2009 - 6MWT on RA -  total walk distance 324 meters, Chuck SpO2 95%, maximal HR 98bpm     Sleep Testing  PSG 7/20/2022 - CHAY 38.9, severe MARIANA, recommended auto-titrating PSG [id number]     EKG, Pathology, and Other Studies: I have personally " reviewed pertinent reports.    Myocardial perfusions scan 6/2022 - EF 59%, no evidence of stress induced myocardial ischemia     Myocardial perfusion scan 12/2020 - normal study after pharmacologic vasodilation, normal LV function 61%     Holter monitor 10/20/2020 - NSR, ectopy atrial cuplets, late beats, single PACs     TTE 4/2022 - EF 60%, mod dilated RV, no definitive interatrial shunt, PASP 38mmHg    Jaskaran Gomez,   PGY-4, Pulmonary/Critical Care  [facility]    I interviewed, took the history and examined the patient.  I discussed the case with the Fellow and reviewed the Fellow’s note , prescribed medications, and orders placed.  I supervised the Fellow and I agree with the Fellow management plan as it was presented to me.  I was present in the clinic and examined the patient.    Noted feels trelegy w/o relief like breztri, using MARK multiple times daily.  Noted weight gain and LE edema likely contributing.  Noted worsened fatigue as well, good PAP compliance and AHI control.    Vital Signs Reviewed  Exam  GEN Obese man, conversant, nontoxic  HEENT MMM, no thrush, no oral lesions  Neck No accessory muscle use, JVP not elevated  CV reg, single s1/2, no m/r  Pulm slightly diminished BS, no wheeze or rales  ABD +BS soft NTND, no rebound, obese  EXT warm, brisk cap refill, 1+ LE edema    Assessment  Dyspnea on exertion  COPD  MARIANA  Lung cancer  Edema/weight gain    PLAN as listed  Repeat trial of Breztri  Check CT chest, BNP, CMP, TFTs, TTE   New PAP mask/supplies, trend AHI  Follow up in 3 months or sooner as needed      Nick Henry,  03/12/24      Answers submitted by the patient for this visit:  Pulmonology Questionnaire (Submitted on 3/10/2024)  Chief Complaint: Primary symptoms  Do you have shortness of breath that occurs with effort or exertion?: Yes  Do you have ear congestion?: No  Do you have heartburn?: Yes  Do you have fatigue?: Yes  Do you have nasal congestion?: No  Do you have shortness of  breath when lying flat?: Yes  Do you have shortness of breath when you wake up?: No  Do you have sweats?: No  Have you experienced weight loss?: No

## 2024-03-12 ENCOUNTER — OFFICE VISIT (OUTPATIENT)
Dept: PULMONOLOGY | Facility: CLINIC | Age: 59
End: 2024-03-12
Payer: COMMERCIAL

## 2024-03-12 VITALS
BODY MASS INDEX: 40.66 KG/M2 | HEIGHT: 70 IN | DIASTOLIC BLOOD PRESSURE: 84 MMHG | TEMPERATURE: 98 F | SYSTOLIC BLOOD PRESSURE: 128 MMHG | WEIGHT: 284 LBS | HEART RATE: 68 BPM | OXYGEN SATURATION: 97 % | RESPIRATION RATE: 12 BRPM

## 2024-03-12 DIAGNOSIS — R06.09 DYSPNEA ON EXERTION: ICD-10-CM

## 2024-03-12 DIAGNOSIS — G47.33 OSA (OBSTRUCTIVE SLEEP APNEA): ICD-10-CM

## 2024-03-12 DIAGNOSIS — K21.9 GASTROESOPHAGEAL REFLUX DISEASE WITHOUT ESOPHAGITIS: ICD-10-CM

## 2024-03-12 DIAGNOSIS — F17.211 CIGARETTE NICOTINE DEPENDENCE IN REMISSION: ICD-10-CM

## 2024-03-12 DIAGNOSIS — J43.2 CENTRILOBULAR EMPHYSEMA (HCC): Primary | ICD-10-CM

## 2024-03-12 DIAGNOSIS — C34.11 MALIGNANT NEOPLASM OF UPPER LOBE OF RIGHT LUNG (HCC): ICD-10-CM

## 2024-03-12 PROCEDURE — 99215 OFFICE O/P EST HI 40 MIN: CPT | Performed by: INTERNAL MEDICINE

## 2024-03-12 NOTE — LETTER
March 12, 2024     Patient: Randal Romeo  YOB: 1965  Date of Visit: 3/12/2024      To Whom it May Concern:    Randal Romeo is under my professional care. Randal was seen in my office on 3/12/2024. Due to Randal's medical conditions, I recommend that he be allowed to park closer to his home if possible.    If you have any questions or concerns, please don't hesitate to call.         Sincerely,        Jaskaran Gomez, DO

## 2024-03-13 LAB

## 2024-03-15 ENCOUNTER — APPOINTMENT (OUTPATIENT)
Dept: LAB | Facility: MEDICAL CENTER | Age: 59
End: 2024-03-15
Payer: COMMERCIAL

## 2024-03-15 DIAGNOSIS — R06.09 DYSPNEA ON EXERTION: ICD-10-CM

## 2024-03-15 LAB
ALBUMIN SERPL BCP-MCNC: 4.3 G/DL (ref 3.5–5)
ALP SERPL-CCNC: 55 U/L (ref 34–104)
ALT SERPL W P-5'-P-CCNC: 79 U/L (ref 7–52)
ANION GAP SERPL CALCULATED.3IONS-SCNC: 11 MMOL/L (ref 4–13)
AST SERPL W P-5'-P-CCNC: 50 U/L (ref 13–39)
BILIRUB SERPL-MCNC: 0.55 MG/DL (ref 0.2–1)
BNP SERPL-MCNC: 29 PG/ML (ref 0–100)
BUN SERPL-MCNC: 16 MG/DL (ref 5–25)
CALCIUM SERPL-MCNC: 9 MG/DL (ref 8.4–10.2)
CHLORIDE SERPL-SCNC: 104 MMOL/L (ref 96–108)
CO2 SERPL-SCNC: 26 MMOL/L (ref 21–32)
CREAT SERPL-MCNC: 0.96 MG/DL (ref 0.6–1.3)
GFR SERPL CREATININE-BSD FRML MDRD: 86 ML/MIN/1.73SQ M
GLUCOSE P FAST SERPL-MCNC: 105 MG/DL (ref 65–99)
POTASSIUM SERPL-SCNC: 3.8 MMOL/L (ref 3.5–5.3)
PROT SERPL-MCNC: 7.3 G/DL (ref 6.4–8.4)
SODIUM SERPL-SCNC: 141 MMOL/L (ref 135–147)
T4 FREE SERPL-MCNC: 0.89 NG/DL (ref 0.61–1.12)
TSH SERPL DL<=0.05 MIU/L-ACNC: 4.58 UIU/ML (ref 0.45–4.5)

## 2024-03-15 PROCEDURE — 84443 ASSAY THYROID STIM HORMONE: CPT

## 2024-03-15 PROCEDURE — 83880 ASSAY OF NATRIURETIC PEPTIDE: CPT

## 2024-03-15 PROCEDURE — 36415 COLL VENOUS BLD VENIPUNCTURE: CPT

## 2024-03-15 PROCEDURE — 80053 COMPREHEN METABOLIC PANEL: CPT

## 2024-03-15 PROCEDURE — 84439 ASSAY OF FREE THYROXINE: CPT

## 2024-03-25 ENCOUNTER — TELEPHONE (OUTPATIENT)
Age: 59
End: 2024-03-25

## 2024-03-25 ENCOUNTER — PROCEDURE VISIT (OUTPATIENT)
Dept: UROLOGY | Facility: CLINIC | Age: 59
End: 2024-03-25
Payer: COMMERCIAL

## 2024-03-25 VITALS
HEIGHT: 70 IN | OXYGEN SATURATION: 98 % | WEIGHT: 279 LBS | HEART RATE: 78 BPM | TEMPERATURE: 98.6 F | DIASTOLIC BLOOD PRESSURE: 70 MMHG | BODY MASS INDEX: 39.94 KG/M2 | SYSTOLIC BLOOD PRESSURE: 134 MMHG

## 2024-03-25 DIAGNOSIS — R97.20 ELEVATED PSA: Primary | ICD-10-CM

## 2024-03-25 PROCEDURE — G0416 PROSTATE BIOPSY, ANY MTHD: HCPCS | Performed by: PATHOLOGY

## 2024-03-25 PROCEDURE — 76942 ECHO GUIDE FOR BIOPSY: CPT | Performed by: UROLOGY

## 2024-03-25 PROCEDURE — 55700 PR PROSTATE NEEDLE BIOPSY ANY APPROACH: CPT | Performed by: UROLOGY

## 2024-03-25 PROCEDURE — 88344 IMHCHEM/IMCYTCHM EA MLT ANTB: CPT | Performed by: PATHOLOGY

## 2024-03-25 PROCEDURE — 96372 THER/PROPH/DIAG INJ SC/IM: CPT

## 2024-03-25 RX ORDER — CEFTRIAXONE 1 G/1
1000 INJECTION, POWDER, FOR SOLUTION INTRAMUSCULAR; INTRAVENOUS ONCE
Status: COMPLETED | OUTPATIENT
Start: 2024-03-25 | End: 2024-03-25

## 2024-03-25 RX ADMIN — CEFTRIAXONE 1000 MG: 1 INJECTION, POWDER, FOR SOLUTION INTRAMUSCULAR; INTRAVENOUS at 14:32

## 2024-03-25 NOTE — PATIENT INSTRUCTIONS
Transrectal prostate biopsy post-procedure instructions    You had a transrectal prostate biopsy today. We took tissue biopsy cores of your prostate through multiple needle pokes that went through your rectal mucosa directly into your prostate.    It is normal to have blood in your urine, semen, and stool for the next few days to weeks. Please call office if any of this bleeding is constant or high volume. Also call office if you are passing blood clots in your urine and/or you are not able to urinate.    Some men can have erectile dysfunction for a few weeks after the procedure. If you experience this issue, do not be alarmed. If problem persists after a month or so, let your provider know.    There is a low, but non-zero risk of developing a serious infection after this procedure. You received a strong antibiotic before the procedure to minimize this risk. If you experience fevers, chills, nausea, vomiting, or any other concerning symptoms, call the office or present to ED right away.    Diet    You may resume your regular diet after the procedure.    Please stay hydrated and drink plenty of fluids.    Activity    For the next few days you should try to take it easy and avoid lifting anything heavier than carton of milk. Avoid long car rides for the next few days. Please do your best to not strain when urinating or having a bowel movement, as this can make bleeding worse. Take over the counter stool softener if needed.    For the next few weeks, please do not do anything that puts extra pressure on your perineum. These activities include but are not limited to: bike riding, motorcycle riding, strenuous running/jumping/lifting exercises, horse riding.    About 1 week after you experience no bleeding in urine and stool, you can start to run and do light weight exercises.    It is best to avoid sex or ejaculation for about 1 week after procedure. The first time you ejaculate, you may see a lot of blood in the semen.  Do not be alarmed. This is normal. In subsequent ejaculations, you should see less and less blood. Call office if you have concerns.    Walking is okay and is encouraged after your procedure. Try to not to do too much movement the day of your procedure, but starting the day after procedure, please try to move around.    Hygiene    You can shower as normal starting the day of your procedure. Avoid baths/hot tubs/pools/standing body of water for 5 days after procedure.    If you are having some spot bleeding from your rectum after procedure, you can place gauze or protective pad in your underwear.    Medications    Take over the counter Tylenol as needed for pain or discomfort.    Take all of your other pre-existing medications as scheduled.    Follow-up    You will follow up in the office on 4/8/24 to review your results.

## 2024-03-25 NOTE — TELEPHONE ENCOUNTER
Nguyen from [facility] called in stating that a prior auth for the patients cpap supplies needs to be submitted. Please advise.

## 2024-03-25 NOTE — PROGRESS NOTES
"58M with ePSA    No AC    PSA 23.25 on 12/11/23    PSA 9.3 on 1/5/24            Biopsy prostate     Date/Time  3/25/2024 2:00 PM     Performed by  Fuad Worley DO   Authorized by  Fuad Worley DO     Universal Protocol   Consent: Verbal consent obtained. Written consent obtained.  Risks and benefits: risks, benefits and alternatives were discussed  Consent given by: patient  Time out: Immediately prior to procedure a \"time out\" was called to verify the correct patient, procedure, equipment, support staff and site/side marked as required.  Timeout called at: 3/25/2024 2:31 PM.  Patient understanding: patient states understanding of the procedure being performed  Patient consent: the patient's understanding of the procedure matches consent given  Procedure consent: procedure consent matches procedure scheduled  Relevant documents: relevant documents present and verified  Required items: required blood products, implants, devices, and special equipment available  Patient identity confirmed: verbally with patient      Local anesthesia used: yes      Anesthesia: local infiltration     Anesthesia   Local anesthesia used: yes  Local Anesthetic: lidocaine 2% without epinephrine  Anesthetic total: 10 mL     Sedation   Patient sedated: no        Specimen: yes (12 standard cores)    Culture: no   Procedure Details   Procedure Notes: Office TRUS-guided Prostate Biopsy Procedure Note    Indication    Elevated PSA    Informed consent   The risks, benefits and alternatives to this procedures were discussed with the patient and he has participated in the informed consent process and wishes to proceed    Antibiotic prophylaxis   Ancef    Rectal cleansing  The patient was instructed to perform an evacuating rectal enema 1-2 hours prior to biopsy.    Local anesthesia  Topical 2% lidocaine jelly was applied liberally to the anus and rectum and allowed to dwell for at least 5 min prior to starting the procedure.  " After insertion of the TRUS probe, 10 mL of 2% lidocaine solution was injected with ultrasound guidance at the  junction of the prostate and seminal vesicles. The anesthetic was allowed to dwell for at least 2 minutes prior to biopsy.    Transrectal ultrasonography  The patient was placed in the left lateral decubitus position. After an attentive digital rectal examination, a 7.5 mHz sidefire ultrasound probe was gently inserted into the rectum and biplanar imaging of the prostate was done with the findings noted below. Images were taken of any abnormal findings and also to document prostate size.    Bladder  The bladder base appeared normal.    Prostate      Ultrasound size measurements:  -Volume:  107.38 cm3    Ultrasound findings:  -Cysts: None  -Masses: None  -Median lobe: present    Clinical stage (assuming a positive biopsy):   -T1c.    TRUS-guided needle biopsy  Using an 18 gauge biopsy needle and ultrasound guidance, the following biopsies were taken:    1 core(s) from the left lateral base.  1 core(s) from the left lateral mid-gland.  1 core(s) from the right middle base.  1 core(s) from the right lateral base.  1 core(s) from the left lateral mid-gland.  1 core(s) from the left middle mid-gland.  1 core(s) from the right middle mid-gland.  1 core(s) from the right lateral mid-gland.  1 core(s) from the left lateral apex.  1 core(s) from the left middle apex.  1 core(s) from the right middle apex.  1 core(s) from the right lateral apex    Total number of cores: 12                Complications  There were no procedural complications.    Disposition  The patient was dismissed to home     Post-procedure instructions:     Today he underwent an uncomplicated transrectal ultrasound-guided biopsy of the prostate, following a parth-prostatic nerve block.I reviewed the normal post-procedure course including bleeding per rectum, hematuria, and hematospermia.  . Instructed him to call with fever greater than 101,  chills, nausea, vomiting, and poorly controlled pain. His followup was scheduled in 2 to 4 weeks' time to review the pathology.            Patient Transportation: confirmed  Patient tolerance: patient tolerated the procedure well with no immediate complications                   PLAN  -Has path review fu with me on 4/8/24 at 11:00 AM

## 2024-03-26 ENCOUNTER — TELEPHONE (OUTPATIENT)
Age: 59
End: 2024-03-26

## 2024-03-26 DIAGNOSIS — J43.2 CENTRILOBULAR EMPHYSEMA (HCC): Primary | ICD-10-CM

## 2024-03-26 DIAGNOSIS — J43.9 PULMONARY EMPHYSEMA, UNSPECIFIED EMPHYSEMA TYPE (HCC): Primary | ICD-10-CM

## 2024-03-26 RX ORDER — BUDESONIDE, GLYCOPYRROLATE, AND FORMOTEROL FUMARATE 160; 9; 4.8 UG/1; UG/1; UG/1
2 AEROSOL, METERED RESPIRATORY (INHALATION) 2 TIMES DAILY
Qty: 31.1 G | Refills: 3 | Status: SHIPPED | OUTPATIENT
Start: 2024-03-26 | End: 2024-03-27 | Stop reason: SDUPTHER

## 2024-03-26 RX ORDER — BUDESONIDE, GLYCOPYRROLATE, AND FORMOTEROL FUMARATE 160; 9; 4.8 UG/1; UG/1; UG/1
2 AEROSOL, METERED RESPIRATORY (INHALATION) 2 TIMES DAILY
Qty: 10.7 G | Refills: 5 | Status: CANCELLED | OUTPATIENT
Start: 2024-03-26 | End: 2024-04-25

## 2024-03-26 NOTE — TELEPHONE ENCOUNTER
Pt states to let  know that the samples of Breztri he was given is working better than the Trelegy .    Pt would like to get prior autho started for Breztri medication

## 2024-03-27 DIAGNOSIS — J43.2 CENTRILOBULAR EMPHYSEMA (HCC): ICD-10-CM

## 2024-03-27 PROCEDURE — G0416 PROSTATE BIOPSY, ANY MTHD: HCPCS | Performed by: PATHOLOGY

## 2024-03-27 PROCEDURE — 88344 IMHCHEM/IMCYTCHM EA MLT ANTB: CPT | Performed by: PATHOLOGY

## 2024-03-27 RX ORDER — BUDESONIDE, GLYCOPYRROLATE, AND FORMOTEROL FUMARATE 160; 9; 4.8 UG/1; UG/1; UG/1
2 AEROSOL, METERED RESPIRATORY (INHALATION) 2 TIMES DAILY
Qty: 31.1 G | Refills: 3 | Status: SHIPPED | OUTPATIENT
Start: 2024-03-27 | End: 2025-03-27

## 2024-03-27 NOTE — TELEPHONE ENCOUNTER
Keyana from [facility] called in regarding the patient medication Breztri has been approve .

## 2024-03-27 NOTE — TELEPHONE ENCOUNTER
PA for BREZTRI    Submitted via    []CMM-KEY   []Surescripts-Case ID #   []Faxed to plan     [x]Other website [facility] [facility] [id number]    []Phone call Case ID #     Office notes sent, clinical questions answered. Awaiting determination    Turnaround time for your insurance to make a decision on your Prior Authorization can take 7-21 business days.

## 2024-03-27 NOTE — TELEPHONE ENCOUNTER
PA for BREZTRI Approved   Date(s) approved 03/27/2024      Patient advised by [x] [employer] Message                      [] Phone call       Pharmacy advised by [x]Fax                                     []Phone call    Approval letter scanned into Media Yes

## 2024-04-08 ENCOUNTER — OFFICE VISIT (OUTPATIENT)
Dept: UROLOGY | Facility: CLINIC | Age: 59
End: 2024-04-08
Payer: COMMERCIAL

## 2024-04-08 ENCOUNTER — TELEPHONE (OUTPATIENT)
Age: 59
End: 2024-04-08

## 2024-04-08 VITALS
TEMPERATURE: 97.2 F | SYSTOLIC BLOOD PRESSURE: 132 MMHG | OXYGEN SATURATION: 96 % | HEART RATE: 68 BPM | BODY MASS INDEX: 40.09 KG/M2 | RESPIRATION RATE: 16 BRPM | WEIGHT: 280 LBS | HEIGHT: 70 IN | DIASTOLIC BLOOD PRESSURE: 83 MMHG

## 2024-04-08 DIAGNOSIS — R97.20 ELEVATED PSA: Primary | ICD-10-CM

## 2024-04-08 DIAGNOSIS — R31.29 MICROHEMATURIA: ICD-10-CM

## 2024-04-08 LAB
POST-VOID RESIDUAL VOLUME, ML POC: 80 ML
SL AMB  POCT GLUCOSE, UA: NORMAL
SL AMB LEUKOCYTE ESTERASE,UA: NORMAL
SL AMB POCT BILIRUBIN,UA: NORMAL
SL AMB POCT BLOOD,UA: NORMAL
SL AMB POCT CLARITY,UA: CLEAR
SL AMB POCT COLOR,UA: NORMAL
SL AMB POCT KETONES,UA: NORMAL
SL AMB POCT NITRITE,UA: NORMAL
SL AMB POCT PH,UA: 5
SL AMB POCT SPECIFIC GRAVITY,UA: 1.02
SL AMB POCT URINE PROTEIN: 15
SL AMB POCT UROBILINOGEN: 0.2

## 2024-04-08 PROCEDURE — 51798 US URINE CAPACITY MEASURE: CPT | Performed by: UROLOGY

## 2024-04-08 PROCEDURE — 99214 OFFICE O/P EST MOD 30 MIN: CPT | Performed by: UROLOGY

## 2024-04-08 PROCEDURE — 81002 URINALYSIS NONAUTO W/O SCOPE: CPT | Performed by: UROLOGY

## 2024-04-08 NOTE — TELEPHONE ENCOUNTER
"Patient called Rx refill line requesting that his C Pap equipment be faxed to [facility]. I informed patient that I can take the message and send it to the office. Patient said \"I will call the office myself \" and he hung the phone up on me.   "

## 2024-04-08 NOTE — PROGRESS NOTES
UROLOGY FOLLOW-UP ENCOUNTER    Randal Romeo is a 58 y.o. male with elevated PSA    Hx lung CA s/p VATS lobectomy    Anticoagulation: none    PSA 23.25 on 12/11/23     PSA 9.3 on 1/5/24    TRUS Pbx 3/25/24: neg  -Prostate measure 107.38 g    PVR 80 cc on 4/8/24    U dip 4/8/24: -LE, -N, ++B    Overall happy with voiding. Feeling well since biopsy.          Assessment and plan:     Elevated PSA    Patient presents to the office today with his wife to review his pathology from his prostate biopsy.    Of note he had a PSA of over 20 in December 2023.  This was repeated and was about 9.3 on 1/5/2024.    His prostate biopsy with me on 3/25/2024 was negative for cancer.  Also of note, his prostate was measured at about 107 g with the ultrasound.    I reviewed with the patient that his elevated PSA was likely secondary to his enlarged prostate rather than any cancer process.    With that being said, we will continue him on PSA surveillance.  He will come back in about 6 months with PSA beforehand.  If the PSA remains around 9, he will not need additional imaging or biopsy at that time.    He is overall happy with his voiding and is feeling well since the biopsy.  Not having any residual issues.    Also of note, his urine dip demonstrated some blood today.  I reviewed this with the patient and his wife.  I explained that the blood in the urine is likely still secondary to him recovering from his prostate biopsy.  He stated that he saw blood in his urine for about 2 days after surgery but then this resolved after that and he has not seen any blood since then.    I will therefore have him get a UA micro before his next visit in about 6 months.  If that is still demonstrating blood in the urine, greater than 3 RBCs, we will need to discuss possible microhematuria workup.  Patient understands this plan.          PLAN  -+B on u dip today. Likely residual from prostate biopsy. Will get UA micro before next visit.  -Will have him  "come back in around 6 months with PSA and UA micro        Patient's wife, Shaila, present in office today and assisted with history      Portions of the above record have been created with voice recognition software.  Occasional wrong word or \"sound alike\" substitution may have occurred due to the inherent limitations of voice recognition software.  Read the chart carefully and recognize, using context, where substitution may have occurred.      Fuad Worley, DO        Chief Complaint     ePSA      History of Present Illness     See summary above    No fevers or chills        The following portions of the patient's history were reviewed and updated as appropriate: allergies, current medications, past family history, past medical history, past social history, past surgical history and problem list.        AUA SYMPTOM SCORE      Flowsheet Row Most Recent Value   AUA SYMPTOM SCORE    How often have you had a sensation of not emptying your bladder completely after you finished urinating? 2 (P)    How often have you had to urinate again less than two hours after you finished urinating? 5 (P)    How often have you found you stopped and started again several times when you urinate? 3 (P)    How often have you found it difficult to postpone urination? 0 (P)    How often have you had a weak urinary stream? 1 (P)    How often have you had to push or strain to begin urination? 0 (P)    How many times did you most typically get up to urinate from the time you went to bed at night until the time you got up in the morning? 3 (P)    Quality of Life: If you were to spend the rest of your life with your urinary condition just the way it is now, how would you feel about that? 2 (P)    AUA SYMPTOM SCORE 14 (P)                 Review of Systems     Review of Systems   Constitutional:  Negative for chills and fever.   Respiratory:  Negative for cough and shortness of breath.    Genitourinary:  Negative for dysuria and hematuria. " "  Neurological:  Negative for dizziness and headaches.   Psychiatric/Behavioral:  Negative for agitation and behavioral problems.            Allergies     No Known Allergies    Physical Exam     Physical Exam  Constitutional:       General: He is not in acute distress.  HENT:      Head: Normocephalic and atraumatic.   Pulmonary:      Effort: Pulmonary effort is normal. No respiratory distress.   Abdominal:      General: Abdomen is flat.      Palpations: Abdomen is soft.   Skin:     General: Skin is warm and dry.   Neurological:      General: No focal deficit present.      Mental Status: He is alert and oriented to person, place, and time.   Psychiatric:         Mood and Affect: Mood normal.         Behavior: Behavior normal.             Vital Signs  Vitals:    04/08/24 1046   BP: 132/83   Pulse: 68   Resp: 16   Temp: (!) 97.2 °F (36.2 °C)   SpO2: 96%   Weight: 127 kg (280 lb)   Height: 5' 10\" (1.778 m)         Current Medications       Current Outpatient Medications:     albuterol (PROVENTIL HFA,VENTOLIN HFA) 90 mcg/act inhaler, INHALE 2 PUFFS EVERY 6 (SIX) HOURS AS NEEDED FOR WHEEZING OR SHORTNESS OF BREATH, Disp: 18 g, Rfl: 3    Budeson-Glycopyrrol-Formoterol (Breztri Aerosphere) 160-9-4.8 MCG/ACT AERO, Inhale 2 puffs 2 (two) times a day Rinse mouth after use., Disp: 31.1 g, Rfl: 3    famotidine (PEPCID) 40 MG tablet, Take 40 mg by mouth daily, Disp: , Rfl:     furosemide (LASIX) 40 mg tablet, Take 0.5 tablets (20 mg total) by mouth daily, Disp: , Rfl:     metoprolol tartrate (LOPRESSOR) 50 mg tablet, Take 50 mg by mouth every 12 (twelve) hours, Disp: , Rfl:       Active Problems     Patient Active Problem List   Diagnosis    Syncope and collapse    Knee injury    Chronic Obstructive Pulmonary Disease    Liver lesion    Cigarette nicotine dependence in remission    Malignant neoplasm of upper lobe of right lung (HCC)    Gastroesophageal reflux disease without esophagitis    COVID-19 virus infection    Shortness of " breath    MARIANA (obstructive sleep apnea)    Obesity    Mononeuropathy, unspecified    Nasal lesion         Past Medical History     Past Medical History:   Diagnosis Date    COPD (chronic obstructive pulmonary disease) (HCC)     Cough     Emphysema of lung (HCC)     Liver lesion     Lung cancer (HCC)     Right upper lobe pulmonary nodule     Shortness of breath     Sleep apnea, obstructive     Syncope and collapse          Surgical History     Past Surgical History:   Procedure Laterality Date    CARDIAC ELECTROPHYSIOLOGY PROCEDURE N/A 7/26/2022    Procedure: Cardiac loop recorder implant;  Surgeon: Daniel Carmona MD;  Location: [facility];  Service: Cardiology    CHOLECYSTECTOMY      LUNG SEGMENTECTOMY Right 1/6/2021    Procedure: RESECTION WEDGE LUNG RIGHT UPPER LOBE;  Surgeon: Mila Joya MD;  Location: [facility];  Service: Thoracic    TN BRNCC INCL FLUOR GDNCE DX W/CELL WASHG SPX N/A 1/6/2021    Procedure: BRONCHOSCOPY FLEXIBLE;  Surgeon: Mila Joya MD;  Location: [facility];  Service: Thoracic    TN THORACOSCOPY W/LOBECTOMY SINGLE LOBE Right 1/6/2021    Procedure: RIGHT UPPER LOBECTOMY LUNG;  Surgeon: Mila Joya MD;  Location: [facility];  Service: Thoracic    TN THORACOSCOPY W/THERA WEDGE RESEXN INITIAL UNILAT Right 1/6/2021    Procedure: THORACOSCOPY VIDEO ASSISTED SURGERY (VATS),;  Surgeon: Mila Joya MD;  Location: [facility];  Service: Thoracic    TESTICLE SURGERY           Family History     Family History   Problem Relation Age of Onset    Stroke Father     Heart attack Father     Dementia Father     Alzheimer's disease Father     Clotting disorder Father     Diabetes Mother          Social History     Social History     Social History     Tobacco Use   Smoking Status Former    Current packs/day: 0.00    Average packs/day: 3.0 packs/day for 30.0 years (90.0 ttl pk-yrs)    Types: Cigarettes    Start date: 10/26/1990    Quit date: 10/26/2020    Years  since quitting: 3.4   Smokeless Tobacco Never   Tobacco Comments    30 years-5 packs a day          Pertinent Lab Values     Lab Results   Component Value Date    CREATININE 0.96 03/15/2024       Lab Results   Component Value Date    PSA 9.3 (H) 01/05/2024    PSA 23.25 (H) 12/11/2023         Pertinent Imaging     N/A      Pertinent Pathology     TRUS Pbx 3/25/24: neg  A.  Left lateral base, prostate (biopsy):     - Benign prostatic tissue.      B.  Left lateral mid, prostate (biopsy):     - Benign prostatic tissue.      C.  Left lateral apex, prostate (biopsy):     - Benign prostatic tissue.      D.  Left base, prostate (biopsy):     - Benign prostatic tissue.      E.  Left mid, prostate (biopsy):     - Benign prostatic tissue.      F.  Left apex, prostate (biopsy):     - Benign prostatic tissue.      G.  Right base, prostate (biopsy):     - Benign squamous-lined fibrous tissue.     H.  Right mid, prostate (biopsy):     - Benign prostatic tissue.      I.  Right apex, prostate (biopsy):     - Benign prostatic tissue.      J.  Right lateral base, prostate (biopsy):     - Benign prostatic tissue.      K.  Right lateral mid, prostate (biopsy):     - Benign prostatic tissue.      L.  Right lateral apex, prostate (biopsy):     - Benign prostatic tissue.           I have spent 30 minutes with Patient and family today in which greater than 50% of this time was spent in counseling/coordination of care regarding Diagnostic results, Prognosis, Risks and benefits of tx options, Instructions for management, Patient and family education, Importance of tx compliance, Impressions, Counseling / Coordination of care, Documenting in the medical record, Reviewing / ordering tests, medicine, procedures  , and Obtaining or reviewing history  .    Please note this time includes cumulative time on the day of encounter, including reviewing medical records and/or coordinating care among the patient's other specialists.

## 2024-04-08 NOTE — PATIENT INSTRUCTIONS
Come back in around 6 months with your PSA blood test and urine test  Do not need to fast for this test

## 2024-04-08 NOTE — TELEPHONE ENCOUNTER
Patient calling stating he just spoke with [employer] regarding his cpap supplies and they stated they received the order but the doctor did not sign or date the script. They are asking for the script to be sent to the new fax number [phone]. Please advise and call patient once sent as he states he has not used his cpap in awhile and needs his supplies.

## 2024-04-09 ENCOUNTER — TELEPHONE (OUTPATIENT)
Age: 59
End: 2024-04-09

## 2024-04-09 NOTE — TELEPHONE ENCOUNTER
Randal called in regarding his [employer] handicap parking form . Patient would like to know if  complete the form .      Please advise

## 2024-04-09 NOTE — TELEPHONE ENCOUNTER
[facility] requesting script for CPAP supplies and 3/12/24 office note.    (Fax) [phone]

## 2024-04-25 ENCOUNTER — HOSPITAL ENCOUNTER (OUTPATIENT)
Dept: NON INVASIVE DIAGNOSTICS | Facility: HOSPITAL | Age: 59
Discharge: HOME | End: 2024-04-25
Payer: COMMERCIAL

## 2024-04-25 ENCOUNTER — HOSPITAL ENCOUNTER (OUTPATIENT)
Dept: CT IMAGING | Facility: HOSPITAL | Age: 59
End: 2024-04-25
Payer: COMMERCIAL

## 2024-04-25 VITALS
HEART RATE: 72 BPM | HEIGHT: 70 IN | SYSTOLIC BLOOD PRESSURE: 132 MMHG | DIASTOLIC BLOOD PRESSURE: 83 MMHG | WEIGHT: 280 LBS | BODY MASS INDEX: 40.09 KG/M2

## 2024-04-25 DIAGNOSIS — R06.09 DYSPNEA ON EXERTION: ICD-10-CM

## 2024-04-25 DIAGNOSIS — C34.11 MALIGNANT NEOPLASM OF UPPER LOBE OF RIGHT LUNG (HCC): ICD-10-CM

## 2024-04-25 LAB
AORTIC ROOT: 3.3 CM
APICAL FOUR CHAMBER EJECTION FRACTION: 68 %
ASCENDING AORTA: 3.2 CM
AV LVOT MEAN GRADIENT: 3 MMHG
AV LVOT PEAK GRADIENT: 5 MMHG
AV PEAK GRADIENT: 7 MMHG
BSA FOR ECHO PROCEDURE: 2.41 M2
DOP CALC LVOT PEAK VEL VTI: 22.73 CM
DOP CALC LVOT PEAK VEL: 1.15 M/S
E WAVE DECELERATION TIME: 210 MS
E/A RATIO: 0.7
FRACTIONAL SHORTENING: 38 (ref 28–44)
INTERVENTRICULAR SEPTUM IN DIASTOLE (PARASTERNAL SHORT AXIS VIEW): 1 CM
INTERVENTRICULAR SEPTUM: 1 CM (ref 0.6–1.1)
LAAS-AP2: 17 CM2
LAAS-AP4: 19.2 CM2
LEFT ATRIUM AREA SYSTOLE SINGLE PLANE A4C: 20.4 CM2
LEFT ATRIUM SIZE: 4.1 CM
LEFT ATRIUM VOLUME (MOD BIPLANE): 49 ML
LEFT ATRIUM VOLUME INDEX (MOD BIPLANE): 20.3 ML/M2
LEFT INTERNAL DIMENSION IN SYSTOLE: 3.1 CM (ref 2.1–4)
LEFT VENTRICULAR INTERNAL DIMENSION IN DIASTOLE: 5 CM (ref 3.5–6)
LEFT VENTRICULAR POSTERIOR WALL IN END DIASTOLE: 0.9 CM
LEFT VENTRICULAR STROKE VOLUME: 79 ML
LVSV (TEICH): 79 ML
MV E'TISSUE VEL-LAT: 12 CM/S
MV E'TISSUE VEL-SEP: 9 CM/S
MV PEAK A VEL: 0.74 M/S
MV PEAK E VEL: 52 CM/S
MV STENOSIS PRESSURE HALF TIME: 61 MS
MV VALVE AREA P 1/2 METHOD: 3.61
RIGHT ATRIUM AREA SYSTOLE A4C: 22.5 CM2
RIGHT VENTRICLE ID DIMENSION: 4.7 CM
SL CV LEFT ATRIUM LENGTH A2C: 5.4 CM
SL CV LV EF: 55
SL CV PED ECHO LEFT VENTRICLE DIASTOLIC VOLUME (MOD BIPLANE) 2D: 117 ML
SL CV PED ECHO LEFT VENTRICLE SYSTOLIC VOLUME (MOD BIPLANE) 2D: 38 ML
TRICUSPID ANNULAR PLANE SYSTOLIC EXCURSION: 2.4 CM

## 2024-04-25 PROCEDURE — 93306 TTE W/DOPPLER COMPLETE: CPT | Performed by: INTERNAL MEDICINE

## 2024-04-25 PROCEDURE — 93306 TTE W/DOPPLER COMPLETE: CPT

## 2024-04-25 PROCEDURE — 71250 CT THORAX DX C-: CPT

## 2024-05-03 ENCOUNTER — REMOTE DEVICE CLINIC VISIT (OUTPATIENT)
Dept: CARDIOLOGY CLINIC | Facility: CLINIC | Age: 59
End: 2024-05-03
Payer: COMMERCIAL

## 2024-05-03 DIAGNOSIS — Z95.818 PRESENCE OF OTHER CARDIAC IMPLANTS AND GRAFTS: Primary | ICD-10-CM

## 2024-05-03 PROCEDURE — 93298 REM INTERROG DEV EVAL SCRMS: CPT | Performed by: INTERNAL MEDICINE

## 2024-05-22 ENCOUNTER — TELEPHONE (OUTPATIENT)
Age: 59
End: 2024-05-22

## 2024-06-03 ENCOUNTER — OFFICE VISIT (OUTPATIENT)
Dept: FAMILY MEDICINE CLINIC | Facility: CLINIC | Age: 59
End: 2024-06-03
Payer: MEDICARE

## 2024-06-03 ENCOUNTER — TELEPHONE (OUTPATIENT)
Age: 59
End: 2024-06-03

## 2024-06-03 VITALS
SYSTOLIC BLOOD PRESSURE: 116 MMHG | BODY MASS INDEX: 37.52 KG/M2 | HEIGHT: 72 IN | HEART RATE: 58 BPM | OXYGEN SATURATION: 98 % | TEMPERATURE: 98.1 F | WEIGHT: 277 LBS | DIASTOLIC BLOOD PRESSURE: 72 MMHG

## 2024-06-03 DIAGNOSIS — E66.01 OBESITY, MORBID (HCC): ICD-10-CM

## 2024-06-03 DIAGNOSIS — J41.1 MUCOPURULENT CHRONIC BRONCHITIS (HCC): Primary | ICD-10-CM

## 2024-06-03 DIAGNOSIS — G47.33 OSA (OBSTRUCTIVE SLEEP APNEA): ICD-10-CM

## 2024-06-03 DIAGNOSIS — R79.89 ELEVATED TSH: ICD-10-CM

## 2024-06-03 DIAGNOSIS — R74.8 ELEVATED LIVER ENZYMES: ICD-10-CM

## 2024-06-03 PROCEDURE — 99204 OFFICE O/P NEW MOD 45 MIN: CPT | Performed by: NURSE PRACTITIONER

## 2024-06-03 NOTE — TELEPHONE ENCOUNTER
The patient called he has [facility] insurance  they could not give him an Id number over the phone they told him you could check the portal with his name and date of birth

## 2024-06-03 NOTE — PROGRESS NOTES
Assessment/Plan:      Diagnoses and all orders for this visit:    Mucopurulent chronic bronchitis (HCC)  -     CBC and differential; Future  -     Lipid panel; Future  -     UA w Reflex to Microscopic w Reflex to Culture; Future  -     TSH + Free T4; Future    BMI 38.0-38.9,adult  -     CBC and differential; Future  -     Comprehensive metabolic panel; Future  -     Lipid panel; Future  -     UA w Reflex to Microscopic w Reflex to Culture; Future  -     TSH + Free T4; Future    Elevated TSH  -     CBC and differential; Future  -     Lipid panel; Future  -     UA w Reflex to Microscopic w Reflex to Culture; Future  -     TSH + Free T4; Future    Elevated liver enzymes  -     Comprehensive metabolic panel; Future    Obesity, morbid (HCC)    MARIANA (obstructive sleep apnea)        Physical assessment unremarkable except where noted. Labs reviewed also VS were well controlled. Pt does see multiple specialists. Urology manages his elevated PSA and BPH. Cardiology managing his slight HF and Pulmonary manages his lung cancer and pulmonary fibrosis. Has not hd colonoscopy and Cologuard was discussed. Pt states he will do none of the testing for colon health. Risks of death were discussed and he states he accepts all risks.Labs given is to complete for possible fu discussion. RTO as needed or for next scheduled appt. All questions answered.  Dosing all possible side effects of the prescribed medications or medications that had been prescribed in the past were reviewed and all questions were answered.  Patient verbalized agreement and understanding of the plan of care as outlined during the office visit today return to office as indicated or sooner if a problem arises.    Subjective:     Patient ID: Randal Romeo is a 58 y.o. male.      Patient is here for routine visit to establish care.  To review most recent labs and order what is warranted  To also discuss current state of health and any new problems that they may be  experiencing.  Patient states that medications taken as prescribed and very well tolerated no new complaints at this time.          Review of Systems   Constitutional:  Negative for appetite change and fever.   HENT:  Negative for congestion and trouble swallowing.    Respiratory:  Negative for shortness of breath.    Cardiovascular:  Negative for chest pain.   Gastrointestinal:  Negative for abdominal pain.   Genitourinary:  Negative for difficulty urinating.   Musculoskeletal:  Negative for myalgias.   Neurological:  Negative for dizziness.   Psychiatric/Behavioral:  The patient is not nervous/anxious.          Objective:     Physical Exam  Vitals and nursing note reviewed.   Constitutional:       General: He is not in acute distress.     Appearance: He is well-developed.   HENT:      Head: Normocephalic.      Right Ear: External ear normal.      Left Ear: External ear normal.   Eyes:      Pupils: Pupils are equal, round, and reactive to light.   Cardiovascular:      Rate and Rhythm: Normal rate and regular rhythm.      Heart sounds: Normal heart sounds.   Pulmonary:      Effort: Pulmonary effort is normal.      Breath sounds: Normal breath sounds.   Abdominal:      Palpations: Abdomen is soft.   Musculoskeletal:         General: Normal range of motion.      Cervical back: Neck supple.   Skin:     General: Skin is warm and dry.   Neurological:      Mental Status: He is alert and oriented to person, place, and time.   Psychiatric:         Behavior: Behavior normal.         Thought Content: Thought content normal.         Judgment: Judgment normal.

## 2024-06-04 ENCOUNTER — TELEPHONE (OUTPATIENT)
Age: 59
End: 2024-06-04

## 2024-06-04 NOTE — TELEPHONE ENCOUNTER
Patient calling asking for results of sleep study be faxed to [facility] so that he can get his C-Pap supplies    (Fax) [phone]

## 2024-06-06 ENCOUNTER — APPOINTMENT (OUTPATIENT)
Dept: LAB | Facility: MEDICAL CENTER | Age: 59
End: 2024-06-06
Payer: MEDICARE

## 2024-06-06 DIAGNOSIS — R74.8 ELEVATED LIVER ENZYMES: ICD-10-CM

## 2024-06-06 DIAGNOSIS — R79.89 ELEVATED TSH: ICD-10-CM

## 2024-06-06 DIAGNOSIS — J41.1 MUCOPURULENT CHRONIC BRONCHITIS (HCC): ICD-10-CM

## 2024-06-06 LAB
ALBUMIN SERPL BCP-MCNC: 4.1 G/DL (ref 3.5–5)
ALP SERPL-CCNC: 61 U/L (ref 34–104)
ALT SERPL W P-5'-P-CCNC: 65 U/L (ref 7–52)
ANION GAP SERPL CALCULATED.3IONS-SCNC: 12 MMOL/L (ref 4–13)
AST SERPL W P-5'-P-CCNC: 39 U/L (ref 13–39)
BACTERIA UR QL AUTO: ABNORMAL /HPF
BASOPHILS # BLD AUTO: 0.09 THOUSANDS/ÂΜL (ref 0–0.1)
BASOPHILS NFR BLD AUTO: 2 % (ref 0–1)
BILIRUB SERPL-MCNC: 0.67 MG/DL (ref 0.2–1)
BILIRUB UR QL STRIP: NEGATIVE
BUN SERPL-MCNC: 13 MG/DL (ref 5–25)
CALCIUM SERPL-MCNC: 8.8 MG/DL (ref 8.4–10.2)
CHLORIDE SERPL-SCNC: 102 MMOL/L (ref 96–108)
CHOLEST SERPL-MCNC: 186 MG/DL
CLARITY UR: CLEAR
CO2 SERPL-SCNC: 25 MMOL/L (ref 21–32)
COLOR UR: YELLOW
CREAT SERPL-MCNC: 0.89 MG/DL (ref 0.6–1.3)
EOSINOPHIL # BLD AUTO: 0.16 THOUSAND/ÂΜL (ref 0–0.61)
EOSINOPHIL NFR BLD AUTO: 3 % (ref 0–6)
ERYTHROCYTE [DISTWIDTH] IN BLOOD BY AUTOMATED COUNT: 14.2 % (ref 11.6–15.1)
GFR SERPL CREATININE-BSD FRML MDRD: 94 ML/MIN/1.73SQ M
GLUCOSE P FAST SERPL-MCNC: 89 MG/DL (ref 65–99)
GLUCOSE UR STRIP-MCNC: NEGATIVE MG/DL
HCT VFR BLD AUTO: 45.6 % (ref 36.5–49.3)
HDLC SERPL-MCNC: 39 MG/DL
HGB BLD-MCNC: 14.5 G/DL (ref 12–17)
HGB UR QL STRIP.AUTO: NEGATIVE
IMM GRANULOCYTES # BLD AUTO: 0.01 THOUSAND/UL (ref 0–0.2)
IMM GRANULOCYTES NFR BLD AUTO: 0 % (ref 0–2)
KETONES UR STRIP-MCNC: NEGATIVE MG/DL
LDLC SERPL CALC-MCNC: 116 MG/DL (ref 0–100)
LEUKOCYTE ESTERASE UR QL STRIP: NEGATIVE
LYMPHOCYTES # BLD AUTO: 1.39 THOUSANDS/ÂΜL (ref 0.6–4.47)
LYMPHOCYTES NFR BLD AUTO: 27 % (ref 14–44)
MCH RBC QN AUTO: 31.2 PG (ref 26.8–34.3)
MCHC RBC AUTO-ENTMCNC: 31.8 G/DL (ref 31.4–37.4)
MCV RBC AUTO: 98 FL (ref 82–98)
MONOCYTES # BLD AUTO: 0.63 THOUSAND/ÂΜL (ref 0.17–1.22)
MONOCYTES NFR BLD AUTO: 12 % (ref 4–12)
MUCOUS THREADS UR QL AUTO: ABNORMAL
NEUTROPHILS # BLD AUTO: 2.94 THOUSANDS/ÂΜL (ref 1.85–7.62)
NEUTS SEG NFR BLD AUTO: 56 % (ref 43–75)
NITRITE UR QL STRIP: NEGATIVE
NON-SQ EPI CELLS URNS QL MICRO: ABNORMAL /HPF
NONHDLC SERPL-MCNC: 147 MG/DL
NRBC BLD AUTO-RTO: 0 /100 WBCS
PH UR STRIP.AUTO: 6 [PH]
PLATELET # BLD AUTO: 261 THOUSANDS/UL (ref 149–390)
PMV BLD AUTO: 10 FL (ref 8.9–12.7)
POTASSIUM SERPL-SCNC: 3.5 MMOL/L (ref 3.5–5.3)
PROT SERPL-MCNC: 7 G/DL (ref 6.4–8.4)
PROT UR STRIP-MCNC: ABNORMAL MG/DL
RBC # BLD AUTO: 4.65 MILLION/UL (ref 3.88–5.62)
RBC #/AREA URNS AUTO: ABNORMAL /HPF
SODIUM SERPL-SCNC: 139 MMOL/L (ref 135–147)
SP GR UR STRIP.AUTO: 1.03 (ref 1–1.03)
TRIGL SERPL-MCNC: 153 MG/DL
UROBILINOGEN UR STRIP-ACNC: <2 MG/DL
WBC # BLD AUTO: 5.22 THOUSAND/UL (ref 4.31–10.16)
WBC #/AREA URNS AUTO: ABNORMAL /HPF

## 2024-06-06 PROCEDURE — 81001 URINALYSIS AUTO W/SCOPE: CPT

## 2024-06-06 PROCEDURE — 85025 COMPLETE CBC W/AUTO DIFF WBC: CPT

## 2024-06-06 PROCEDURE — 80061 LIPID PANEL: CPT

## 2024-06-06 PROCEDURE — 80053 COMPREHEN METABOLIC PANEL: CPT

## 2024-06-06 PROCEDURE — 36415 COLL VENOUS BLD VENIPUNCTURE: CPT

## 2024-06-11 ENCOUNTER — OFFICE VISIT (OUTPATIENT)
Dept: FAMILY MEDICINE CLINIC | Facility: CLINIC | Age: 59
End: 2024-06-11
Payer: MEDICARE

## 2024-06-11 VITALS
BODY MASS INDEX: 37.65 KG/M2 | RESPIRATION RATE: 20 BRPM | SYSTOLIC BLOOD PRESSURE: 118 MMHG | DIASTOLIC BLOOD PRESSURE: 66 MMHG | HEART RATE: 66 BPM | HEIGHT: 72 IN | WEIGHT: 278 LBS | OXYGEN SATURATION: 98 % | TEMPERATURE: 98.7 F

## 2024-06-11 DIAGNOSIS — Z00.00 MEDICARE ANNUAL WELLNESS VISIT, SUBSEQUENT: ICD-10-CM

## 2024-06-11 DIAGNOSIS — J43.8 OTHER EMPHYSEMA (HCC): Primary | ICD-10-CM

## 2024-06-11 DIAGNOSIS — E66.01 CLASS 3 SEVERE OBESITY DUE TO EXCESS CALORIES IN ADULT, UNSPECIFIED BMI, UNSPECIFIED WHETHER SERIOUS COMORBIDITY PRESENT (HCC): ICD-10-CM

## 2024-06-11 DIAGNOSIS — R06.02 SHORTNESS OF BREATH ON EXERTION: ICD-10-CM

## 2024-06-11 DIAGNOSIS — K21.9 GASTROESOPHAGEAL REFLUX DISEASE WITHOUT ESOPHAGITIS: ICD-10-CM

## 2024-06-11 PROCEDURE — G0402 INITIAL PREVENTIVE EXAM: HCPCS | Performed by: NURSE PRACTITIONER

## 2024-06-11 PROCEDURE — 99213 OFFICE O/P EST LOW 20 MIN: CPT | Performed by: NURSE PRACTITIONER

## 2024-06-11 RX ORDER — FAMOTIDINE 40 MG/1
40 TABLET, FILM COATED ORAL DAILY
Qty: 90 TABLET | Refills: 1 | Status: SHIPPED | OUTPATIENT
Start: 2024-06-11

## 2024-06-11 RX ORDER — METOPROLOL TARTRATE 50 MG/1
50 TABLET, FILM COATED ORAL EVERY 12 HOURS
Qty: 180 TABLET | Refills: 3 | Status: SHIPPED | OUTPATIENT
Start: 2024-06-11 | End: 2024-06-12

## 2024-06-11 RX ORDER — FUROSEMIDE 40 MG/1
20 TABLET ORAL DAILY
Qty: 90 TABLET | Refills: 3 | Status: SHIPPED | OUTPATIENT
Start: 2024-06-11

## 2024-06-11 NOTE — PROGRESS NOTES
Ambulatory Visit  Name: Randal Romeo      : 1965      MRN: [id number]  Encounter Provider: RAY Redmond  Encounter Date: 2024   Encounter department: [facility]    Assessment & Plan   1. Other emphysema (HCC)  -     CBC and differential; Future  -     Comprehensive metabolic panel; Future  -     Lipid panel; Future  Stable will continue to monitor through lab work and physical assessment.    2. Gastroesophageal reflux disease without esophagitis  -     CBC and differential; Future  -     Comprehensive metabolic panel; Future  -     Lipid panel; Future  -     famotidine (PEPCID) 40 MG tablet; Take 1 tablet (40 mg total) by mouth daily  Stable will continue to monitor through lab work and physical assessment.    3. Class 3 severe obesity due to excess calories in adult, unspecified BMI, unspecified whether serious comorbidity present (HCC)  -     CBC and differential; Future  -     Comprehensive metabolic panel; Future  -     Lipid panel; Future  4. Shortness of breath on exertion  -     metoprolol tartrate (LOPRESSOR) 50 mg tablet; Take 1 tablet (50 mg total) by mouth every 12 (twelve) hours  -     furosemide (LASIX) 40 mg tablet; Take 0.5 tablets (20 mg total) by mouth daily  Stable will continue to monitor through lab work and physical assessment.           Dosing all possible side effects of the prescribed medications or medications that had been prescribed in the past were reviewed and all questions were answered.  Patient verbalized agreement and understanding of the plan of care as outlined during the office visit today return to office as indicated or sooner if a problem arises.  Preventive health issues were discussed with patient, and age appropriate screening tests were ordered as noted in patient's After Visit Summary. Personalized health advice and appropriate referrals for health education or preventive services given if needed, as noted in patient's After  Visit Summary.    History of Present Illness       Patient is here for routine follow-up.  To review most recent labs.  To also discuss current state of health and any new problems that they may be experiencing.  Patient states that medications taken as prescribed and very well tolerated no new complaints at this time.           Patient Care Team:  RAY Herbert as PCP - General (Nurse Practitioner)  Mina Flor MD as PCP - PCP-[facility] ([facility])  MD Natasha Martinez MD John Kintzer, MD Meredith A Harrison, MD as Surgeon (Thoracic Surgery)  Ana Rosa Hernandez PA-C as Physician Assistant (Physician Assistant)    Review of Systems   Constitutional:  Negative for appetite change and fever.   HENT:  Negative for congestion and trouble swallowing.    Respiratory:  Negative for shortness of breath.    Cardiovascular:  Negative for chest pain.   Gastrointestinal:  Negative for abdominal pain.   Genitourinary:  Negative for difficulty urinating.   Musculoskeletal:  Negative for myalgias.   Neurological:  Negative for dizziness.   Psychiatric/Behavioral:  The patient is not nervous/anxious.      Medical History Reviewed by provider this encounter:  Tobacco  Allergies  Meds  Problems  Med Hx  Surg Hx  Fam Hx       Annual Wellness Visit Questionnaire   Randal is here for his Subsequent Wellness visit.     Health Risk Assessment:   Patient rates overall health as good. Patient feels that their physical health rating is same. Patient is satisfied with their life. Eyesight was rated as same. Hearing was rated as same. Patient feels that their emotional and mental health rating is same. Patients states they are never, rarely angry. Patient states they are sometimes unusually tired/fatigued. Pain experienced in the last 7 days has been none. Patient states that he has experienced no weight loss or gain in last 6 months.     Depression Screening:   PHQ-2 Score: 0      Fall Risk Screening:   In the past year,  patient has experienced: no history of falling in past year      Home Safety:  Patient does not have trouble with stairs inside or outside of their home. Patient has working smoke alarms and has working carbon monoxide detector. Home safety hazards include: none.     Nutrition:   Current diet is Regular.     Medications:   Patient is not currently taking any over-the-counter supplements. Patient is able to manage medications.     Activities of Daily Living (ADLs)/Instrumental Activities of Daily Living (IADLs):   Walk and transfer into and out of bed and chair?: Yes  Dress and groom yourself?: Yes    Bathe or shower yourself?: Yes    Feed yourself? Yes  Do your laundry/housekeeping?: No  Manage your money, pay your bills and track your expenses?: Yes  Make your own meals?: Yes    Do your own shopping?: Yes    Durable Medical Equipment Suppliers  Cpap    Previous Hospitalizations:   Any hospitalizations or ED visits within the last 12 months?: No      Advance Care Planning:   Living will: No    Durable POA for healthcare: No    Advanced directive: No    Advanced directive counseling given: No    Five wishes given: No    Patient declined ACP directive: No      Cognitive Screening:   Provider or family/friend/caregiver concerned regarding cognition?: No    PREVENTIVE SCREENINGS      Cardiovascular Screening:    General: Screening Not Indicated and History Lipid Disorder      Diabetes Screening:     General: Screening Current      Prostate Cancer Screening:    General: Screening Current      Abdominal Aortic Aneurysm (AAA) Screening:    Risk factors include: tobacco use        Lung Cancer Screening:     General: Screening Not Indicated and History Lung Cancer    Screening, Brief Intervention, and Referral to Treatment (SBIRT)    Screening  Typical number of drinks in a day: 0  Typical number of drinks in a week: 0  Interpretation: Low risk drinking behavior.    AUDIT-C Screenin) How often did you have a drink  "containing alcohol in the past year? never  2) How many drinks did you have on a typical day when you were drinking in the past year? 0  3) How often did you have 6 or more drinks on one occasion in the past year? never    AUDIT-C Score: 0  Interpretation: Score 0-3 (male): Negative screen for alcohol misuse    Single Item Drug Screening:  How often have you used an illegal drug (including marijuana) or a prescription medication for non-medical reasons in the past year? never    Single Item Drug Screen Score: 0  Interpretation: Negative screen for possible drug use disorder    Social Determinants of Health     Food Insecurity: No Food Insecurity (6/11/2024)    Hunger Vital Sign     Worried About Running Out of Food in the Last Year: Never true     Ran Out of Food in the Last Year: Never true   Transportation Needs: No Transportation Needs (6/11/2024)    PRAPARE - Transportation     Lack of Transportation (Medical): No     Lack of Transportation (Non-Medical): No   Housing Stability: Low Risk  (6/11/2024)    Housing Stability Vital Sign     Unable to Pay for Housing in the Last Year: No     Number of Times Moved in the Last Year: 1     Homeless in the Last Year: No   Utilities: Not At Risk (6/11/2024)    [facility] Utilities     Threatened with loss of utilities: No     No results found.    Objective     /66 (BP Location: Left arm, Patient Position: Sitting, Cuff Size: Large)   Pulse 66   Temp 98.7 °F (37.1 °C) (Temporal)   Resp 20   Ht 5' 11.5\" (1.816 m)   Wt 126 kg (278 lb)   SpO2 98%   BMI 38.23 kg/m²     Physical Exam  Cardiovascular:      Rate and Rhythm: Normal rate and regular rhythm.      Heart sounds: Normal heart sounds.   Pulmonary:      Effort: Pulmonary effort is normal.      Breath sounds: Normal breath sounds.       Administrative Statements           "

## 2024-06-11 NOTE — PATIENT INSTRUCTIONS
Medicare Preventive Visit Patient Instructions  Thank you for completing your Welcome to Medicare Visit or Medicare Annual Wellness Visit today. Your next wellness visit will be due in one year (6/12/2025).  The screening/preventive services that you may require over the next 5-10 years are detailed below. Some tests may not apply to you based off risk factors and/or age. Screening tests ordered at today's visit but not completed yet may show as past due. Also, please note that scanned in results may not display below.  Preventive Screenings:  Service Recommendations Previous Testing/Comments   Colorectal Cancer Screening  Colonoscopy    Fecal Occult Blood Test (FOBT)/Fecal Immunochemical Test (FIT)  Fecal DNA/Cologuard Test  Flexible Sigmoidoscopy Age: 45-75 years old   Colonoscopy: every 10 years (May be performed more frequently if at higher risk)  OR  FOBT/FIT: every 1 year  OR  Cologuard: every 3 years  OR  Sigmoidoscopy: every 5 years  Screening may be recommended earlier than age 45 if at higher risk for colorectal cancer. Also, an individualized decision between you and your healthcare provider will decide whether screening between the ages of 76-85 would be appropriate. Colonoscopy: Not on file  FOBT/FIT: Not on file  Cologuard: Not on file  Sigmoidoscopy: Not on file          Prostate Cancer Screening Individualized decision between patient and health care provider in men between ages of 55-69   Medicare will cover every 12 months beginning on the day after your 50th birthday PSA: 9.3 ng/mL     Screening Current     Hepatitis C Screening Once for adults born between 1945 and 1965  More frequently in patients at high risk for Hepatitis C Hep C Antibody: Not on file        Diabetes Screening 1-2 times per year if you're at risk for diabetes or have pre-diabetes Fasting glucose: 89 mg/dL (6/6/2024)  A1C: No results in last 5 years (No results in last 5 years)  Screening Current   Cholesterol Screening Once  every 5 years if you don't have a lipid disorder. May order more often based on risk factors. Lipid panel: 06/06/2024  Screening Not Indicated  History Lipid Disorder      Other Preventive Screenings Covered by Medicare:  Abdominal Aortic Aneurysm (AAA) Screening: covered once if your at risk. You're considered to be at risk if you have a family history of AAA or a male between the age of 65-75 who smoking at least 100 cigarettes in your lifetime.  Lung Cancer Screening: covers low dose CT scan once per year if you meet all of the following conditions: (1) Age 55-77; (2) No signs or symptoms of lung cancer; (3) Current smoker or have quit smoking within the last 15 years; (4) You have a tobacco smoking history of at least 20 pack years (packs per day x number of years you smoked); (5) You get a written order from a healthcare provider.  Glaucoma Screening: covered annually if you're considered high risk: (1) You have diabetes OR (2) Family history of glaucoma OR (3)  aged 50 and older OR (4)  American aged 65 and older  Osteoporosis Screening: covered every 2 years if you meet one of the following conditions: (1) Have a vertebral abnormality; (2) On glucocorticoid therapy for more than 3 months; (3) Have primary hyperparathyroidism; (4) On osteoporosis medications and need to assess response to drug therapy.  HIV Screening: covered annually if you're between the age of 15-65. Also covered annually if you are younger than 15 and older than 65 with risk factors for HIV infection. For pregnant patients, it is covered up to 3 times per pregnancy.    Immunizations:  Immunization Recommendations   Influenza Vaccine Annual influenza vaccination during flu season is recommended for all persons aged >= 6 months who do not have contraindications   Pneumococcal Vaccine   * Pneumococcal conjugate vaccine = PCV13 (Prevnar 13), PCV15 (Vaxneuvance), PCV20 (Prevnar 20)  * Pneumococcal polysaccharide vaccine  = PPSV23 (Pneumovax) Adults 19-63 yo with certain risk factors or if 65+ yo  If never received any pneumonia vaccine: recommend Prevnar 20 (PCV20)  Give PCV20 if previously received 1 dose of PCV13 or PPSV23   Hepatitis B Vaccine 3 dose series if at intermediate or high risk (ex: diabetes, end stage renal disease, liver disease)   Respiratory syncytial virus (RSV) Vaccine - COVERED BY MEDICARE PART D  * RSVPreF3 (Arexvy) CDC recommends that adults 60 years of age and older may receive a single dose of RSV vaccine using shared clinical decision-making (SCDM)   Tetanus (Td) Vaccine - COST NOT COVERED BY MEDICARE PART B Following completion of primary series, a booster dose should be given every 10 years to maintain immunity against tetanus. Td may also be given as tetanus wound prophylaxis.   Tdap Vaccine - COST NOT COVERED BY MEDICARE PART B Recommended at least once for all adults. For pregnant patients, recommended with each pregnancy.   Shingles Vaccine (Shingrix) - COST NOT COVERED BY MEDICARE PART B  2 shot series recommended in those 19 years and older who have or will have weakened immune systems or those 50 years and older     Health Maintenance Due:      Topic Date Due   • Hepatitis C Screening  Never done   • HIV Screening  Never done   • Colorectal Cancer Screening  Never done     Immunizations Due:      Topic Date Due   • Hepatitis A Vaccine (1 of 2 - Risk 2-dose series) Never done   • COVID-19 Vaccine (3 - 2023-24 season) 09/01/2023     Advance Directives   What are advance directives?  Advance directives are legal documents that state your wishes and plans for medical care. These plans are made ahead of time in case you lose your ability to make decisions for yourself. Advance directives can apply to any medical decision, such as the treatments you want, and if you want to donate organs.   What are the types of advance directives?  There are many types of advance directives, and each state has rules  about how to use them. You may choose a combination of any of the following:  Living will:  This is a written record of the treatment you want. You can also choose which treatments you do not want, which to limit, and which to stop at a certain time. This includes surgery, medicine, IV fluid, and tube feedings.   Durable power of  for healthcare (DPAHC):  This is a written record that states who you want to make healthcare choices for you when you are unable to make them for yourself. This person, called a proxy, is usually a family member or a friend. You may choose more than 1 proxy.  Do not resuscitate (DNR) order:  A DNR order is used in case your heart stops beating or you stop breathing. It is a request not to have certain forms of treatment, such as CPR. A DNR order may be included in other types of advance directives.  Medical directive:  This covers the care that you want if you are in a coma, near death, or unable to make decisions for yourself. You can list the treatments you want for each condition. Treatment may include pain medicine, surgery, blood transfusions, dialysis, IV or tube feedings, and a ventilator (breathing machine).  Values history:  This document has questions about your views, beliefs, and how you feel and think about life. This information can help others choose the care that you would choose.  Why are advance directives important?  An advance directive helps you control your care. Although spoken wishes may be used, it is better to have your wishes written down. Spoken wishes can be misunderstood, or not followed. Treatments may be given even if you do not want them. An advance directive may make it easier for your family to make difficult choices about your care.   Weight Management   Why it is important to manage your weight:  Being overweight increases your risk of health conditions such as heart disease, high blood pressure, type 2 diabetes, and certain types of cancer. It  can also increase your risk for osteoarthritis, sleep apnea, and other respiratory problems. Aim for a slow, steady weight loss. Even a small amount of weight loss can lower your risk of health problems.  How to lose weight safely:  A safe and healthy way to lose weight is to eat fewer calories and get regular exercise. You can lose up about 1 pound a week by decreasing the number of calories you eat by 500 calories each day.   Healthy meal plan for weight management:  A healthy meal plan includes a variety of foods, contains fewer calories, and helps you stay healthy. A healthy meal plan includes the following:  Eat whole-grain foods more often.  A healthy meal plan should contain fiber. Fiber is the part of grains, fruits, and vegetables that is not broken down by your body. Whole-grain foods are healthy and provide extra fiber in your diet. Some examples of whole-grain foods are whole-wheat breads and pastas, oatmeal, brown rice, and bulgur.  Eat a variety of vegetables every day.  Include dark, leafy greens such as spinach, kale, gerry greens, and mustard greens. Eat yellow and orange vegetables such as carrots, sweet potatoes, and winter squash.   Eat a variety of fruits every day.  Choose fresh or canned fruit (canned in its own juice or light syrup) instead of juice. Fruit juice has very little or no fiber.  Eat low-fat dairy foods.  Drink fat-free (skim) milk or 1% milk. Eat fat-free yogurt and low-fat cottage cheese. Try low-fat cheeses such as mozzarella and other reduced-fat cheeses.  Choose meat and other protein foods that are low in fat.  Choose beans or other legumes such as split peas or lentils. Choose fish, skinless poultry (chicken or turkey), or lean cuts of red meat (beef or pork). Before you cook meat or poultry, cut off any visible fat.   Use less fat and oil.  Try baking foods instead of frying them. Add less fat, such as margarine, sour cream, regular salad dressing and mayonnaise to  foods. Eat fewer high-fat foods. Some examples of high-fat foods include french fries, doughnuts, ice cream, and cakes.  Eat fewer sweets.  Limit foods and drinks that are high in sugar. This includes candy, cookies, regular soda, and sweetened drinks.  Exercise:  Exercise at least 30 minutes per day on most days of the week. Some examples of exercise include walking, biking, dancing, and swimming. You can also fit in more physical activity by taking the stairs instead of the elevator or parking farther away from stores. Ask your healthcare provider about the best exercise plan for you.      © Copyright [employer] 2018 Information is for End User's use only and may not be sold, redistributed or otherwise used for commercial purposes. All illustrations and images included in CareNotes® are the copyrighted property of [employer] or [employer]     Last HD on 1/26 tolerated well with 1.4L removed. Plan for next maintenance HD on 1/29. Monitor electrolytes. Follow up urology evaluation regarding renal cysts/mass.

## 2024-06-12 ENCOUNTER — OFFICE VISIT (OUTPATIENT)
Dept: CARDIOLOGY CLINIC | Facility: CLINIC | Age: 59
End: 2024-06-12
Payer: MEDICARE

## 2024-06-12 VITALS
WEIGHT: 279 LBS | OXYGEN SATURATION: 96 % | BODY MASS INDEX: 37.79 KG/M2 | RESPIRATION RATE: 16 BRPM | HEART RATE: 75 BPM | SYSTOLIC BLOOD PRESSURE: 110 MMHG | DIASTOLIC BLOOD PRESSURE: 70 MMHG | HEIGHT: 72 IN

## 2024-06-12 DIAGNOSIS — I51.89 DIASTOLIC DYSFUNCTION: ICD-10-CM

## 2024-06-12 DIAGNOSIS — E66.9 OBESITY (BMI 30-39.9): ICD-10-CM

## 2024-06-12 DIAGNOSIS — I10 PRIMARY HYPERTENSION: Primary | ICD-10-CM

## 2024-06-12 PROCEDURE — 99214 OFFICE O/P EST MOD 30 MIN: CPT | Performed by: INTERNAL MEDICINE

## 2024-06-12 RX ORDER — METOPROLOL SUCCINATE 50 MG/1
50 TABLET, EXTENDED RELEASE ORAL DAILY
Qty: 90 TABLET | Refills: 3 | Status: SHIPPED | OUTPATIENT
Start: 2024-06-12

## 2024-06-12 NOTE — PROGRESS NOTES
CARDIOLOGY OFFICE VISIT  [facility]  [address], [place] [address]  Tel: [phone]      NAME: Randal Romeo  AGE: 58 y.o.  SEX: male  : 1965  MRN: [id number]      Chief Complaint:  Chief Complaint   Patient presents with    Follow-up         History of Present Illness:   Patient comes for follow up. States he is doing well from cardiac stand point and denies chest pain / pressure, SOB, palpitations, lightheadedness, syncope, swelling feet, orthopnea, PND, claudication.    Essential hypertension -  Has been hypertensive for many years.  Taking medications regularly.  Denies lightheadedness, headache, medication side effects.      Obesity -  Trying to lose weight.    Syncope - multiple episodes of sudden onset LOC of unclear etiology going back  and beyond. Some of those were associated with some shakiness of arms and legs.  A couple of them were thought to be from dehydration.  His 2D echocardiogram showed normal EF, normal diastolic function, mildly dilated RA and moderately dilated RV.  His Holter monitor has shown no significant arrhythmia or pauses. He is s/p loop recorder implantation picked up no arrhythmia so far    Past Medical History:  Past Medical History:   Diagnosis Date    COPD (chronic obstructive pulmonary disease) (HCC)     Cough     Emphysema of lung (HCC)     Liver lesion     Lung cancer (HCC)     Right upper lobe pulmonary nodule     Shortness of breath     Sleep apnea, obstructive     Syncope and collapse          Past Surgical History:  Past Surgical History:   Procedure Laterality Date    CARDIAC ELECTROPHYSIOLOGY PROCEDURE N/A 2022    Procedure: Cardiac loop recorder implant;  Surgeon: Daniel Carmona MD;  Location: [facility];  Service: Cardiology    CHOLECYSTECTOMY      LUNG SEGMENTECTOMY Right 2021    Procedure: RESECTION WEDGE LUNG RIGHT UPPER LOBE;  Surgeon: Mila PAULA  MD Toan;  Location: [facility];  Service: Thoracic    CA [facility] INCL FLUOR GDNCE DX W/CELL WASHG SPX N/A 1/6/2021    Procedure: BRONCHOSCOPY FLEXIBLE;  Surgeon: Mila Joya MD;  Location: [facility];  Service: Thoracic    CA THORACOSCOPY W/LOBECTOMY SINGLE LOBE Right 1/6/2021    Procedure: RIGHT UPPER LOBECTOMY LUNG;  Surgeon: Mila Joya MD;  Location: [facility];  Service: Thoracic    CA THORACOSCOPY W/THERA WEDGE RESEXN INITIAL UNILAT Right 1/6/2021    Procedure: THORACOSCOPY VIDEO ASSISTED SURGERY (VATS),;  Surgeon: Mila Joya MD;  Location: [facility];  Service: Thoracic    TESTICLE SURGERY           Family History:  Family History   Problem Relation Age of Onset    Stroke Father     Heart attack Father     Dementia Father     Alzheimer's disease Father     Clotting disorder Father     Diabetes Mother          Social History:  Social History     Socioeconomic History    Marital status: /Civil Union     Spouse name: None    Number of children: None    Years of education: None    Highest education level: None   Occupational History    None   Tobacco Use    Smoking status: Former     Current packs/day: 0.00     Average packs/day: 3.0 packs/day for 30.0 years (90.0 ttl pk-yrs)     Types: Cigarettes     Start date: 10/26/1990     Quit date: 10/26/2020     Years since quitting: 3.6    Smokeless tobacco: Never    Tobacco comments:     30 years-5 packs a day    Vaping Use    Vaping status: Never Used   Substance and Sexual Activity    Alcohol use: Not Currently    Drug use: Not Currently    Sexual activity: Not Currently     Partners: Female   Other Topics Concern    None   Social History Narrative    None     Social Determinants of Health     Financial Resource Strain: Not on file   Food Insecurity: No Food Insecurity (6/11/2024)    Hunger Vital Sign     Worried About Running Out of Food in the Last Year: Never true     Ran Out of Food in the Last Year: Never true    Transportation Needs: No Transportation Needs (6/11/2024)    PRAPARE - Transportation     Lack of Transportation (Medical): No     Lack of Transportation (Non-Medical): No   Physical Activity: Not on file   Stress: Not on file   Social Connections: Not on file   Intimate Partner Violence: Not on file   Housing Stability: Low Risk  (6/11/2024)    Housing Stability Vital Sign     Unable to Pay for Housing in the Last Year: No     Number of Times Moved in the Last Year: 1     Homeless in the Last Year: No         Active Problems:  Patient Active Problem List   Diagnosis    Syncope and collapse    Knee injury    Chronic Obstructive Pulmonary Disease    Liver lesion    Cigarette nicotine dependence in remission    Malignant neoplasm of upper lobe of right lung (HCC)    Gastroesophageal reflux disease without esophagitis    COVID-19 virus infection    Shortness of breath    MARIANA (obstructive sleep apnea)    Obesity    Mononeuropathy, unspecified    Nasal lesion    Obesity, morbid (HCC)         The following portions of the patient's history were reviewed and updated as appropriate: past medical history, past surgical history, past family history,  past social history, current medications, allergies and problem list.      Review of Systems:  Constitutional: Denies fever, chills  Eyes: Denies eye redness, eye discharge  ENT: Denies hearing loss, sneezing, nasal discharge, sore throat   Respiratory: Denies cough, expectoration, shortness of breath  Cardiovascular: Denies chest pain, palpitations, lower extremity swelling  Gastrointestinal: Denies abdominal pain, nausea, vomiting, diarrhea  Genito-Urinary: Denies dysuria, incontinence  Musculoskeletal: Denies back pain, joint pain, muscle pain  Neurologic: Denies lightheadedness, syncope, headache, seizures  Endocrine: Denies polydipsia, temperature intolerance  Allergy and Immunology: Denies hives, insect bite sensitivity  Hematological and Lymphatic: Denies bleeding  problems, swollen glands   Psychological: Denies depression, suicidal ideation, anxiety, panic  Dermatological: Denies pruritus, rash, skin lesion changes      Vitals:  Vitals:    06/12/24 1119   BP: 110/70   Pulse: 75   Resp: 16   SpO2: 96%       Body mass index is 38.37 kg/m².    Weight (last 2 days)       Date/Time Weight    06/12/24 1119 127 (279)              Physical Examination:  General: Patient is not in acute distress. Awake, alert, oriented in time, place and person. Responding to commands  Head: Normocephalic. Atraumatic  Eyes: Both pupils normal sized, round and reactive to light. Nonicteric  ENT: Normal external ear canals  Neck: Supple. JVP not raised. Trachea central. No thyromegaly  Lungs: Bilateral bronchovascular breath sounds with no crackles or rhonchi  Chest wall: No tenderness  Cardiovascular: RRR. S1 and S2 normal. No murmur, rub or gallop  Gastrointestinal: Abdomen soft, nontender. No guarding or rigidity. Liver and spleen not palpable. Bowel sounds present  Neurologic: Patient is awake, alert, oriented in time, place and person. Responding to commands. Moving all extremities  Integumentary:  No skin rash  Lymphatic: No cervical lymphadenopathy  Back: Symmetric. No CVA tenderness  Extremities: No clubbing, cyanosis or edema      Laboratory Results:  CBC with diff:   Lab Results   Component Value Date    WBC 5.22 06/06/2024    RBC 4.65 06/06/2024    HGB 14.5 06/06/2024    HCT 45.6 06/06/2024    MCV 98 06/06/2024    MCH 31.2 06/06/2024    RDW 14.2 06/06/2024     06/06/2024       CMP:  Lab Results   Component Value Date    CREATININE 0.89 06/06/2024    BUN 13 06/06/2024    K 3.5 06/06/2024     06/06/2024    CO2 25 06/06/2024    ALKPHOS 61 06/06/2024    ALT 65 (H) 06/06/2024    AST 39 06/06/2024       Lab Results   Component Value Date    MG 2.2 06/02/2021       Lab Results   Component Value Date    TROPONINI <0.02 10/26/2020    TROPONINI <0.02 10/26/2020    TROPONINI <0.02  "10/26/2020    CKTOTAL 93 01/07/2022       Lipid Profile:   No results found for: \"CHOL\"  Lab Results   Component Value Date    HDL 39 (L) 06/06/2024    HDL 41 12/11/2023    HDL 39 (L) 12/15/2022     Lab Results   Component Value Date    LDLCALC 116 (H) 06/06/2024    LDLCALC 117 (H) 12/11/2023    LDLCALC 123 (H) 12/15/2022     Lab Results   Component Value Date    TRIG 153 (H) 06/06/2024    TRIG 152 (H) 12/11/2023    TRIG 187 (H) 12/15/2022       Cardiac testing:   Results for orders placed during the hospital encounter of 04/25/24    Echo complete w/ contrast if indicated    Interpretation Summary    Left Ventricle: Left ventricular cavity size is normal. Wall thickness is normal. The left ventricular ejection fraction is 55%. Systolic function is normal. Wall motion is normal. Diastolic function is mildly abnormal, consistent with grade I (abnormal) relaxation.    Right Ventricle: Right ventricular cavity size is mildly dilated. Systolic function is normal.    Tricuspid Valve: There is trace regurgitation.    No significant change since prior echo 4/21/2022.      Results for orders placed during the hospital encounter of 06/13/22    NM myocardial perfusion spect (rx stress and/or rest)    Interpretation Summary    Stress ECG: No ST deviation is noted. The ECG was negative for ischemia.    Perfusion Defect Conclusion: There is no evidence of transient ischemic dilation (TID). TID index 1.02.    Perfusion: There is a left ventricular perfusion defect that is medium in size present in the basal to mid inferior location(s) that is fixed. This likely represents diaphragmatic attenuation. Clears with prone imaging. No reversible defects suggesting ischemia identified. There is also tissue artifact noted during resting images.    Stress Function: Left ventricular function post-stress is normal. Post-stress ejection fraction is 59 %.    Stress Combined Conclusion: The ECG and SPECT imaging portions of the stress study are " concordant with no evidence of stress induced myocardial ischemia. Left ventricular perfusion is probably normal.    No results found for this or any previous visit.    Results for orders placed during the hospital encounter of 21    Stress test only, exercise    Narrative  [facility]  [address] [place], [place] [id number] [phone]    Stress Electrocardiography during Exercise    Name: BETTY LEWIS  MR #: [id number]  Account #: [id number]  Study date: 2021  : 1965  Age: 55 years  Gender: Male  Height: 70.5 in  Weight: 232 lb  BSA: 2.24 mï¾²    Allergies: NO KNOWN ALLERGIES    Diagnosis: R06.02 - Shortness of breath    Technician:  Jen Peterson MS, CCT  GROUP:  [facility]  Interpreting Physician:  Adam Hendricks MD  Referring Physician:  Rehan Forman MD  Primary Physician:  Rehan Forman MD    CLINICAL QUESTION: Detection of coronary artery disease.    HISTORY: The patient is a 55 year old  male. Chest pain status: no chest pain. Other symptoms: dyspnea. Coronary artery disease risk factors: hypertension and family history of premature coronary artery disease. Cardiovascular  history: none significant. Co-morbidity: history of lung CA, COPD and obesity. Medications: a diuretic and an antihypertensive agent, albuterol, Breo Ellipta.    REST ECG: Normal baseline ECG.    PROCEDURE: Treadmill exercise testing was performed, using the Major protocol. Stress electrocardiographic evaluation was performed.    MAJOR PROTOCOL:  HR bpm SBP mmHg DBP mmHg Symptoms Rhythm/conduct  Baseline 74 120 70 none NSR  Stage 1 117 136 60 mild dyspnea sinus tach  Stage 2 131 146 64 severe dyspnea sinus tach  Immediate 131 -- -- severe dyspnea sinus tach  Recovery 1 113 -- -- subsiding sinus tach  Recovery 2 94 118 64 none NSR  Recovery 3 90 -- -- none NSR  Recovery 5 83 116 62 none NSR  No medications or fluids given.    STRESS SUMMARY: O2 Sat rest 94%. O2 Sat peak 95%.  Duration of pharmacologic stress was 3 min. Functional capacity was decreased (greater than 40%). Maximal heart rate during stress was 131 bpm ( 79 % of maximal predicted heart rate). The  heart rate response to stress was normal. There was normal resting blood pressure with an appropriate response to stress. The rate-pressure product for the peak heart rate and blood pressure was [id number]. There was no chest pain during stress  for the level of stress achieved. The stress test was terminated due to severe dyspnea. There were no stress arrhythmias or conduction abnormalities for the level of stress achieved. Non-diagnostic ECG due to submaximal heart rate response.    SUMMARY:  -  Stress results: Functional capacity was decreased (greater than 40%). Target heart rate was not achieved. There was no chest pain during stress for the level of stress achieved.  -  ECG conclusions: There were no stress arrhythmias or conduction abnormalities for the level of stress achieved. Non-diagnostic ECG due to submaximal heart rate response.    IMPRESSIONS: Nondiagnostic ECG due to submaximal heart rate response. Pharmacological imaging stress test recommended for further evaluation. Diagnostic sensitivity was limited by submaximal stress.    Prepared and signed by    Adam Hendricks MD  Signed 06/17/2021 14:17:21    Results for orders placed during the hospital encounter of 05/20/22    Holter monitor    Interpretation Summary  INDICATIONS:  Syncope    Impression  This Holter monitoring and analysis was done for a period of 47 hours and 59 minutes.  The rhythm was sinus. Minimum heart rate was 45 bpm. Average heart rate was 68 bpm. Maximum heart rate was 118 bpm.  Ventricular ectopic activity consisted of 3 beats, of which 2 were in couplets, 1 was single PVC.  Supraventricular ectopic activity consisted of 53 beats, of which 3 were in 1 runs, 6 were in atrial couplets, 13 were late beats, 31 were single PACs.  The fastest and longest  supraventricular run consisted of 3 beats at the maximum rate of 103 beats per minute.  No irregular rhythm episodes were detected.  Patient's rhythm included 18 hours 56 minutes 21 seconds of bradycardia. The slowest single episode of bradycardia lasted 5 minutes and 22 seconds with a minimum heart rate of 43 bpm.The longest R-R interval 2.4 seconds.  Patient's rhythm included 20 minutes 11 seconds of tachycardia. The fastest single episode of tachycardia lasted 1 minutes 10 seconds with a maximum heart rate of 118 bpm.  Patient's 2 complaints of shortness of breath had underlying sinus rhythm (72 bpm).    CONCLUSIONS:  Sinus rhythm with ectopic activity as detailed above.  Patient's 2 complaints of shortness of breath had underlying sinus rhythm (72 bpm).    Medications:    Current Outpatient Medications:     albuterol (PROVENTIL HFA,VENTOLIN HFA) 90 mcg/act inhaler, INHALE 2 PUFFS EVERY 6 (SIX) HOURS AS NEEDED FOR WHEEZING OR SHORTNESS OF BREATH, Disp: 18 g, Rfl: 3    Budeson-Glycopyrrol-Formoterol (Breztri Aerosphere) 160-9-4.8 MCG/ACT AERO, Inhale 2 puffs 2 (two) times a day Rinse mouth after use., Disp: 31.1 g, Rfl: 3    famotidine (PEPCID) 40 MG tablet, Take 1 tablet (40 mg total) by mouth daily, Disp: 90 tablet, Rfl: 1    furosemide (LASIX) 40 mg tablet, Take 0.5 tablets (20 mg total) by mouth daily, Disp: 90 tablet, Rfl: 3    metoprolol succinate (TOPROL-XL) 50 mg 24 hr tablet, Take 1 tablet (50 mg total) by mouth daily, Disp: 90 tablet, Rfl: 3      Allergies:  No Known Allergies      Assessment and Plan:  1. Primary hypertension  BP soft.  Change metoprolol to tartrate 50 mg twice daily to metoprolol succinate 50 mg daily.  Monitor BP at home and call if abnormal  - metoprolol succinate (TOPROL-XL) 50 mg 24 hr tablet; Take 1 tablet (50 mg total) by mouth daily  Dispense: 90 tablet; Refill: 3    2. Diastolic dysfunction  Tight BP control    3. Obesity (BMI 30-39.9)  Try to lose weight    Recommend  aggressive risk factor modification and therapeutic lifestyle changes.  Low-salt, low-calorie, low-fat, low-cholesterol diet with regular exercise and to optimize weight.  I will defer the ordering and monitoring of necessity lab studies to you, but I am available and happy to review and manage any of the data at your request in the future.    Discussed concepts of atherosclerosis, including signs and symptoms of cardiac disease.    Previous studies were reviewed.    Safety measures were reviewed.  Questions were entertained and answered.  Patient was advised to report any problems requiring medical attention.    Follow-up with PCP and appropriate specialist and lab work as discussed.    Return for follow up visit as scheduled or earlier, if needed.  Thank you for allowing me to participate in the care and evaluation of your patient.  Should you have any questions, please feel free to contact me.    Adam Hendricks MD  6/12/2024,1:28 PM

## 2024-07-16 ENCOUNTER — TELEPHONE (OUTPATIENT)
Age: 59
End: 2024-07-16

## 2024-07-16 NOTE — TELEPHONE ENCOUNTER
Florentin calling to verify patient's last office visit, as well as any upcoming appointments.  All questions were answered.

## 2024-07-25 DIAGNOSIS — K21.9 GASTROESOPHAGEAL REFLUX DISEASE WITHOUT ESOPHAGITIS: ICD-10-CM

## 2024-07-25 RX ORDER — FAMOTIDINE 40 MG/1
40 TABLET, FILM COATED ORAL DAILY
Qty: 100 TABLET | Refills: 1 | Status: SHIPPED | OUTPATIENT
Start: 2024-07-25

## 2024-08-02 ENCOUNTER — REMOTE DEVICE CLINIC VISIT (OUTPATIENT)
Dept: CARDIOLOGY CLINIC | Facility: CLINIC | Age: 59
End: 2024-08-02
Payer: MEDICARE

## 2024-08-02 DIAGNOSIS — R55 SYNCOPE AND COLLAPSE: Primary | ICD-10-CM

## 2024-08-02 PROCEDURE — 93298 REM INTERROG DEV EVAL SCRMS: CPT | Performed by: INTERNAL MEDICINE

## 2024-08-02 NOTE — PROGRESS NOTES
"MDT LNQ22/ACTIVE SYSTEM IS MRI CONDITIONAL   CARELINK TRANSMISSION: LOOP RECORDER. PRESENTING RHYTHM SB @ 50 BPM. BATTERY STATUS \"OK.\" NO PATIENT OR DEVICE ACTIVATED EPISODES. NORMAL DEVICE FUNCTION. DL   "

## 2024-10-02 ENCOUNTER — TELEPHONE (OUTPATIENT)
Dept: UROLOGY | Facility: CLINIC | Age: 59
End: 2024-10-02

## 2024-10-02 NOTE — TELEPHONE ENCOUNTER
Called and spoke to the PT. PSA and Urine sample is needed PTV. PT stated will get done tomorrow and verbalized understanding.

## 2024-10-03 ENCOUNTER — APPOINTMENT (OUTPATIENT)
Dept: LAB | Facility: MEDICAL CENTER | Age: 59
End: 2024-10-03
Payer: COMMERCIAL

## 2024-10-03 DIAGNOSIS — R31.29 MICROHEMATURIA: ICD-10-CM

## 2024-10-03 DIAGNOSIS — R97.20 ELEVATED PSA: ICD-10-CM

## 2024-10-03 LAB
BACTERIA UR QL AUTO: ABNORMAL /HPF
BILIRUB UR QL STRIP: NEGATIVE
CLARITY UR: CLEAR
COLOR UR: YELLOW
GLUCOSE UR STRIP-MCNC: NEGATIVE MG/DL
HGB UR QL STRIP.AUTO: NEGATIVE
KETONES UR STRIP-MCNC: NEGATIVE MG/DL
LEUKOCYTE ESTERASE UR QL STRIP: NEGATIVE
MUCOUS THREADS UR QL AUTO: ABNORMAL
NITRITE UR QL STRIP: NEGATIVE
NON-SQ EPI CELLS URNS QL MICRO: ABNORMAL /HPF
PH UR STRIP.AUTO: 6 [PH]
PROT UR STRIP-MCNC: ABNORMAL MG/DL
PSA SERPL-MCNC: 11.85 NG/ML (ref 0–4)
RBC #/AREA URNS AUTO: ABNORMAL /HPF
SP GR UR STRIP.AUTO: 1.02 (ref 1–1.03)
UROBILINOGEN UR STRIP-ACNC: <2 MG/DL
WBC #/AREA URNS AUTO: ABNORMAL /HPF

## 2024-10-03 PROCEDURE — 84153 ASSAY OF PSA TOTAL: CPT

## 2024-10-03 PROCEDURE — 81001 URINALYSIS AUTO W/SCOPE: CPT

## 2024-10-03 PROCEDURE — 36415 COLL VENOUS BLD VENIPUNCTURE: CPT

## 2024-10-08 ENCOUNTER — OFFICE VISIT (OUTPATIENT)
Dept: UROLOGY | Facility: CLINIC | Age: 59
End: 2024-10-08
Payer: COMMERCIAL

## 2024-10-08 VITALS
DIASTOLIC BLOOD PRESSURE: 76 MMHG | TEMPERATURE: 97.8 F | HEART RATE: 60 BPM | HEIGHT: 72 IN | SYSTOLIC BLOOD PRESSURE: 128 MMHG | OXYGEN SATURATION: 97 % | WEIGHT: 272 LBS | BODY MASS INDEX: 36.84 KG/M2

## 2024-10-08 DIAGNOSIS — R97.20 ELEVATED PSA: Primary | ICD-10-CM

## 2024-10-08 PROCEDURE — 99214 OFFICE O/P EST MOD 30 MIN: CPT | Performed by: PHYSICIAN ASSISTANT

## 2024-10-08 NOTE — PROGRESS NOTES
10/8/2024      Chief Complaint   Patient presents with    Follow-up         Assessment and Plan    Elevated PSA  - PSA of over 20 in December 2023.   - Repeat PSA was 9.3 on 1/5/2024.   - TRUS prostate biopsy on 3/25/24- Negative. TRUS prostate measurement of 107 g  - UA micro from 10/3/24 showing only 1-2 RBCs- no further work-up needed at this time  - Most recent PSA from 10/3/24 increased to 11.851  - LILLIANA declined by patient doay   - He has cardiac loop recorder. Has device card with him today which lists that device is MRI compatible. Will proceed with prostate MRI for further evaluation which will be utilize for fusion guided prostate biopsy if concerning lesion. Will follow up to review imaging results    History of Present Illness  Randal Romeo is a 58 y.o. male here for follow up evaluation of elevated PSA. Initially seen in consultation with PSA elevation of 23. Repeat PSA 9.3. He underwent prostate biopsy in March of 2024 which was fortunately negative. Most recent PSA 11. He denies any urinary complaints or issues.     Review of Systems   Constitutional:  Negative for chills and fever.   Respiratory:  Negative for shortness of breath.    Cardiovascular:  Negative for chest pain.   Gastrointestinal:  Negative for abdominal pain.   Genitourinary:  Negative for difficulty urinating, dysuria, flank pain, frequency, hematuria and urgency.   Neurological:  Negative for dizziness.           AUA SYMPTOM SCORE      Flowsheet Row Most Recent Value   AUA SYMPTOM SCORE    How often have you had a sensation of not emptying your bladder completely after you finished urinating? 0 (P)     How often have you had to urinate again less than two hours after you finished urinating? 5 (P)     How often have you found you stopped and started again several times when you urinate? 5 (P)     How often have you found it difficult to postpone urination? 5 (P)     How often have you had a weak urinary stream? 0 (P)     How often have  you had to push or strain to begin urination? 0 (P)     How many times did you most typically get up to urinate from the time you went to bed at night until the time you got up in the morning? 0 (P)     Quality of Life: If you were to spend the rest of your life with your urinary condition just the way it is now, how would you feel about that? 1 (P)     AUA SYMPTOM SCORE 15 (P)                 Past Medical History  Past Medical History:   Diagnosis Date    COPD (chronic obstructive pulmonary disease) (HCC)     Cough     Emphysema of lung (HCC)     Liver lesion     Lung cancer (HCC)     Right upper lobe pulmonary nodule     Shortness of breath     Sleep apnea, obstructive     Syncope and collapse        Past Social History  Past Surgical History:   Procedure Laterality Date    CARDIAC ELECTROPHYSIOLOGY PROCEDURE N/A 7/26/2022    Procedure: Cardiac loop recorder implant;  Surgeon: Daniel Carmona MD;  Location: [facility];  Service: Cardiology    CHOLECYSTECTOMY      LUNG SEGMENTECTOMY Right 1/6/2021    Procedure: RESECTION WEDGE LUNG RIGHT UPPER LOBE;  Surgeon: Mila Joya MD;  Location: [facility];  Service: Thoracic    [facility] INCL FLUOR GDNCE DX W/CELL WASHG SPX N/A 1/6/2021    Procedure: BRONCHOSCOPY FLEXIBLE;  Surgeon: Mila Joya MD;  Location: [facility];  Service: Thoracic    [facility] THORACOSCOPY W/LOBECTOMY SINGLE LOBE Right 1/6/2021    Procedure: RIGHT UPPER LOBECTOMY LUNG;  Surgeon: Mila Joya MD;  Location: [facility];  Service: Thoracic    [facility] THORACOSCOPY W/THERA WEDGE RESEXN INITIAL UNILAT Right 1/6/2021    Procedure: THORACOSCOPY VIDEO ASSISTED SURGERY (VATS),;  Surgeon: Mila Joya MD;  Location: [facility];  Service: Thoracic    TESTICLE SURGERY       Social History     Tobacco Use   Smoking Status Former    Current packs/day: 0.00    Average packs/day: 3.0 packs/day for 30.0 years (90.0 ttl pk-yrs)    Types: Cigarettes    Start date: 10/26/1990    Quit  date: 10/26/2020    Years since quitting: 3.9    Passive exposure: Past   Smokeless Tobacco Never   Tobacco Comments    30 years-5 packs a day        Past Family History  Family History   Problem Relation Age of Onset    Stroke Father     Heart attack Father     Dementia Father     Alzheimer's disease Father     Clotting disorder Father     Diabetes Mother        Past Social history  Social History     Socioeconomic History    Marital status: /Civil Union     Spouse name: Not on file    Number of children: Not on file    Years of education: Not on file    Highest education level: Not on file   Occupational History    Not on file   Tobacco Use    Smoking status: Former     Current packs/day: 0.00     Average packs/day: 3.0 packs/day for 30.0 years (90.0 ttl pk-yrs)     Types: Cigarettes     Start date: 10/26/1990     Quit date: 10/26/2020     Years since quitting: 3.9     Passive exposure: Past    Smokeless tobacco: Never    Tobacco comments:     30 years-5 packs a day    Vaping Use    Vaping status: Never Used   Substance and Sexual Activity    Alcohol use: Not Currently    Drug use: Not Currently    Sexual activity: Not Currently     Partners: Female   Other Topics Concern    Not on file   Social History Narrative    Not on file     Social Determinants of Health     Financial Resource Strain: Not on file   Food Insecurity: No Food Insecurity (6/11/2024)    Hunger Vital Sign     Worried About Running Out of Food in the Last Year: Never true     Ran Out of Food in the Last Year: Never true   Transportation Needs: No Transportation Needs (6/11/2024)    PRAPARE - Transportation     Lack of Transportation (Medical): No     Lack of Transportation (Non-Medical): No   Physical Activity: Not on file   Stress: Not on file   Social Connections: Not on file   Intimate Partner Violence: Not on file   Housing Stability: Low Risk  (6/11/2024)    Housing Stability Vital Sign     Unable to Pay for Housing in the Last Year:  "No     Number of Times Moved in the Last Year: 1     Homeless in the Last Year: No       Current Medications  Current Outpatient Medications   Medication Sig Dispense Refill    albuterol (PROVENTIL HFA,VENTOLIN HFA) 90 mcg/act inhaler INHALE 2 PUFFS EVERY 6 (SIX) HOURS AS NEEDED FOR WHEEZING OR SHORTNESS OF BREATH 18 g 3    Budeson-Glycopyrrol-Formoterol (Breztri Aerosphere) 160-9-4.8 MCG/ACT AERO Inhale 2 puffs 2 (two) times a day Rinse mouth after use. 31.1 g 3    famotidine (PEPCID) 40 MG tablet TAKE 1 TABLET (40 MG TOTAL) BY MOUTH DAILY 100 tablet 1    furosemide (LASIX) 40 mg tablet Take 0.5 tablets (20 mg total) by mouth daily 90 tablet 3    metoprolol succinate (TOPROL-XL) 50 mg 24 hr tablet Take 1 tablet (50 mg total) by mouth daily 90 tablet 3     No current facility-administered medications for this visit.       Allergies  No Known Allergies      The following portions of the patient's history were reviewed and updated as appropriate: allergies, current medications, past medical history, past social history, past surgical history and problem list.      Vitals  Vitals:    10/08/24 1016   BP: 128/76   Pulse: 60   Temp: 97.8 °F (36.6 °C)   TempSrc: Temporal   SpO2: 97%   Weight: 123 kg (272 lb)   Height: 5' 11.5\" (1.816 m)           Physical Exam  Physical Exam  Constitutional:       Appearance: Normal appearance. He is obese.   HENT:      Head: Normocephalic and atraumatic.      Right Ear: External ear normal.      Left Ear: External ear normal.      Nose: Nose normal.   Eyes:      Conjunctiva/sclera: Conjunctivae normal.   Cardiovascular:      Pulses: Normal pulses.   Pulmonary:      Effort: Pulmonary effort is normal.   Musculoskeletal:         General: Normal range of motion.      Cervical back: Normal range of motion.   Neurological:      General: No focal deficit present.      Mental Status: He is alert and oriented to person, place, and time.   Psychiatric:         Mood and Affect: Mood normal.         " Behavior: Behavior normal.         Thought Content: Thought content normal.         Judgment: Judgment normal.           Results  No results found for this or any previous visit (from the past 1 hour(s)).]  Lab Results   Component Value Date    PSA 11.851 (H) 10/03/2024    PSA 9.3 (H) 01/05/2024    PSA 23.25 (H) 12/11/2023     Lab Results   Component Value Date    CALCIUM 8.8 06/06/2024    K 3.5 06/06/2024    CO2 25 06/06/2024     06/06/2024    BUN 13 06/06/2024    CREATININE 0.89 06/06/2024     Lab Results   Component Value Date    WBC 5.22 06/06/2024    HGB 14.5 06/06/2024    HCT 45.6 06/06/2024    MCV 98 06/06/2024     06/06/2024           Orders  No orders of the defined types were placed in this encounter.      Becky Snyder

## 2024-10-10 ENCOUNTER — TELEPHONE (OUTPATIENT)
Age: 59
End: 2024-10-10

## 2024-10-10 NOTE — TELEPHONE ENCOUNTER
Jessica from [facility] called in to confirm the pt last office visit and any future appts . Information was provided to Jessica

## 2024-11-01 ENCOUNTER — REMOTE DEVICE CLINIC VISIT (OUTPATIENT)
Dept: CARDIOLOGY CLINIC | Facility: CLINIC | Age: 59
End: 2024-11-01
Payer: COMMERCIAL

## 2024-11-01 DIAGNOSIS — R55 SYNCOPE AND COLLAPSE: Primary | ICD-10-CM

## 2024-11-01 PROCEDURE — 93298 REM INTERROG DEV EVAL SCRMS: CPT | Performed by: INTERNAL MEDICINE

## 2024-11-05 ENCOUNTER — APPOINTMENT (OUTPATIENT)
Dept: LAB | Facility: MEDICAL CENTER | Age: 59
End: 2024-11-05
Payer: COMMERCIAL

## 2024-11-05 ENCOUNTER — TELEPHONE (OUTPATIENT)
Age: 59
End: 2024-11-05

## 2024-11-05 DIAGNOSIS — J43.8 OTHER EMPHYSEMA (HCC): ICD-10-CM

## 2024-11-05 DIAGNOSIS — K21.9 GASTROESOPHAGEAL REFLUX DISEASE WITHOUT ESOPHAGITIS: ICD-10-CM

## 2024-11-05 DIAGNOSIS — E66.813 CLASS 3 SEVERE OBESITY DUE TO EXCESS CALORIES IN ADULT, UNSPECIFIED BMI, UNSPECIFIED WHETHER SERIOUS COMORBIDITY PRESENT (HCC): ICD-10-CM

## 2024-11-05 DIAGNOSIS — E66.01 CLASS 3 SEVERE OBESITY DUE TO EXCESS CALORIES IN ADULT, UNSPECIFIED BMI, UNSPECIFIED WHETHER SERIOUS COMORBIDITY PRESENT (HCC): ICD-10-CM

## 2024-11-05 DIAGNOSIS — R97.20 ELEVATED PSA: ICD-10-CM

## 2024-11-05 LAB
ALBUMIN SERPL BCG-MCNC: 4 G/DL (ref 3.5–5)
ALP SERPL-CCNC: 61 U/L (ref 34–104)
ALT SERPL W P-5'-P-CCNC: 56 U/L (ref 7–52)
ANION GAP SERPL CALCULATED.3IONS-SCNC: 10 MMOL/L (ref 4–13)
AST SERPL W P-5'-P-CCNC: 33 U/L (ref 13–39)
BASOPHILS # BLD AUTO: 0.06 THOUSANDS/ΜL (ref 0–0.1)
BASOPHILS NFR BLD AUTO: 1 % (ref 0–1)
BILIRUB SERPL-MCNC: 0.6 MG/DL (ref 0.2–1)
BUN SERPL-MCNC: 9 MG/DL (ref 5–25)
CALCIUM SERPL-MCNC: 8.9 MG/DL (ref 8.4–10.2)
CHLORIDE SERPL-SCNC: 107 MMOL/L (ref 96–108)
CHOLEST SERPL-MCNC: 169 MG/DL
CO2 SERPL-SCNC: 25 MMOL/L (ref 21–32)
CREAT SERPL-MCNC: 1 MG/DL (ref 0.6–1.3)
EOSINOPHIL # BLD AUTO: 0.1 THOUSAND/ΜL (ref 0–0.61)
EOSINOPHIL NFR BLD AUTO: 2 % (ref 0–6)
ERYTHROCYTE [DISTWIDTH] IN BLOOD BY AUTOMATED COUNT: 14.2 % (ref 11.6–15.1)
GFR SERPL CREATININE-BSD FRML MDRD: 82 ML/MIN/1.73SQ M
GLUCOSE P FAST SERPL-MCNC: 89 MG/DL (ref 65–99)
HCT VFR BLD AUTO: 44.6 % (ref 36.5–49.3)
HDLC SERPL-MCNC: 43 MG/DL
HGB BLD-MCNC: 14.3 G/DL (ref 12–17)
IMM GRANULOCYTES # BLD AUTO: 0.02 THOUSAND/UL (ref 0–0.2)
IMM GRANULOCYTES NFR BLD AUTO: 0 % (ref 0–2)
LDLC SERPL CALC-MCNC: 100 MG/DL (ref 0–100)
LYMPHOCYTES # BLD AUTO: 1.73 THOUSANDS/ΜL (ref 0.6–4.47)
LYMPHOCYTES NFR BLD AUTO: 34 % (ref 14–44)
MCH RBC QN AUTO: 31 PG (ref 26.8–34.3)
MCHC RBC AUTO-ENTMCNC: 32.1 G/DL (ref 31.4–37.4)
MCV RBC AUTO: 97 FL (ref 82–98)
MONOCYTES # BLD AUTO: 0.49 THOUSAND/ΜL (ref 0.17–1.22)
MONOCYTES NFR BLD AUTO: 10 % (ref 4–12)
NEUTROPHILS # BLD AUTO: 2.73 THOUSANDS/ΜL (ref 1.85–7.62)
NEUTS SEG NFR BLD AUTO: 53 % (ref 43–75)
NONHDLC SERPL-MCNC: 126 MG/DL
NRBC BLD AUTO-RTO: 0 /100 WBCS
PLATELET # BLD AUTO: 236 THOUSANDS/UL (ref 149–390)
PMV BLD AUTO: 10.2 FL (ref 8.9–12.7)
POTASSIUM SERPL-SCNC: 3.8 MMOL/L (ref 3.5–5.3)
PROT SERPL-MCNC: 6.9 G/DL (ref 6.4–8.4)
RBC # BLD AUTO: 4.62 MILLION/UL (ref 3.88–5.62)
SODIUM SERPL-SCNC: 142 MMOL/L (ref 135–147)
TRIGL SERPL-MCNC: 128 MG/DL
WBC # BLD AUTO: 5.13 THOUSAND/UL (ref 4.31–10.16)

## 2024-11-05 PROCEDURE — 80061 LIPID PANEL: CPT

## 2024-11-05 PROCEDURE — 85025 COMPLETE CBC W/AUTO DIFF WBC: CPT

## 2024-11-05 PROCEDURE — 36415 COLL VENOUS BLD VENIPUNCTURE: CPT

## 2024-11-05 PROCEDURE — 80053 COMPREHEN METABOLIC PANEL: CPT

## 2024-11-05 NOTE — TELEPHONE ENCOUNTER
Patient called asking why is he only to take 1/2 of his lasix?  He was under the impression that it was still a full tablet?  Please advise

## 2024-11-07 NOTE — TELEPHONE ENCOUNTER
Spoke with pt and he stated that he has 40 mg tablets and takes 1/2 tablet daily. Pt BP's are about 130's/80's, neg for lower leg edema, finger swelling only. Pt will remain on 1/2 tablet and schedule a sooner appointment in sx worsen.

## 2024-11-14 ENCOUNTER — HOSPITAL ENCOUNTER (OUTPATIENT)
Dept: RADIOLOGY | Age: 59
Discharge: HOME | End: 2024-11-14
Payer: COMMERCIAL

## 2024-11-14 DIAGNOSIS — R97.20 ELEVATED PSA: ICD-10-CM

## 2024-11-14 PROCEDURE — A9585 GADOBUTROL INJECTION: HCPCS | Performed by: PHYSICIAN ASSISTANT

## 2024-11-14 PROCEDURE — 72197 MRI PELVIS W/O & W/DYE: CPT

## 2024-11-14 PROCEDURE — 76377 3D RENDER W/INTRP POSTPROCES: CPT

## 2024-11-14 RX ORDER — GADOBUTROL 604.72 MG/ML
12 INJECTION INTRAVENOUS
Status: COMPLETED | OUTPATIENT
Start: 2024-11-14 | End: 2024-11-14

## 2024-11-14 RX ADMIN — GADOBUTROL 12 ML: 604.72 INJECTION INTRAVENOUS at 10:29

## 2024-11-15 ENCOUNTER — TELEPHONE (OUTPATIENT)
Dept: CARDIOLOGY CLINIC | Facility: CLINIC | Age: 59
End: 2024-11-15

## 2024-11-15 ENCOUNTER — OFFICE VISIT (OUTPATIENT)
Dept: CARDIOLOGY CLINIC | Facility: CLINIC | Age: 59
End: 2024-11-15
Payer: COMMERCIAL

## 2024-11-15 ENCOUNTER — PREP FOR PROCEDURE (OUTPATIENT)
Dept: CARDIOLOGY CLINIC | Facility: CLINIC | Age: 59
End: 2024-11-15

## 2024-11-15 VITALS
WEIGHT: 270.5 LBS | DIASTOLIC BLOOD PRESSURE: 82 MMHG | HEART RATE: 50 BPM | HEIGHT: 71 IN | BODY MASS INDEX: 37.87 KG/M2 | SYSTOLIC BLOOD PRESSURE: 132 MMHG

## 2024-11-15 DIAGNOSIS — Z87.898 HISTORY OF SYNCOPE: Primary | ICD-10-CM

## 2024-11-15 DIAGNOSIS — R42 LIGHTHEADEDNESS: ICD-10-CM

## 2024-11-15 DIAGNOSIS — E66.9 OBESITY, UNSPECIFIED CLASS, UNSPECIFIED OBESITY TYPE, UNSPECIFIED WHETHER SERIOUS COMORBIDITY PRESENT: ICD-10-CM

## 2024-11-15 DIAGNOSIS — I45.5 SINUS PAUSE: ICD-10-CM

## 2024-11-15 DIAGNOSIS — R00.1 SINUS BRADYCARDIA: ICD-10-CM

## 2024-11-15 DIAGNOSIS — G47.33 OSA (OBSTRUCTIVE SLEEP APNEA): ICD-10-CM

## 2024-11-15 PROCEDURE — 99214 OFFICE O/P EST MOD 30 MIN: CPT | Performed by: STUDENT IN AN ORGANIZED HEALTH CARE EDUCATION/TRAINING PROGRAM

## 2024-11-15 PROCEDURE — 93000 ELECTROCARDIOGRAM COMPLETE: CPT | Performed by: STUDENT IN AN ORGANIZED HEALTH CARE EDUCATION/TRAINING PROGRAM

## 2024-11-15 NOTE — TELEPHONE ENCOUNTER
Patient referred by Dr Newton, scheduled for Dual chamber pacer/loop explant  at [facility] on  11/27/2024   with Dr Parra.  Patient aware of general instructions.  Labs in chart from 11/05/24    Patient wife mentioned  his provider stop his BP medication ( metoprolol) and they want to have procedure to be done as soon as possible.    Meds holds: lasix to hold the morning of the procedure.

## 2024-11-15 NOTE — H&P (VIEW-ONLY)
HEART AND VASCULAR  CARDIAC ELECTROPHYSIOLOGY   HEART RHYTHM CENTER  [facility]    Outpatient New Consult  for evaluation for pacemaker implant  Today's Date: 11/15/24        Patient name: Randal Romeo  YOB: 1965  Sex: male         Chief Complaint: as above      ASSESSMENT:  Problem List Items Addressed This Visit       MARIANA (obstructive sleep apnea)    Obesity     Other Visit Diagnoses         History of syncope    -  Primary    Relevant Orders    POCT ECG      Lightheadedness                      PLAN:  History of Syncope, lightheadedness  -Loop recorder implanted  -Recent loop recorder interrogation 11/12/24 revealed 1 pause episode lasting 4 seconds @ 1302 hours  - Patient was symptomatic during this event with lightheadedness  -Sinus bradycardia with heart rates in the 40s at today's visit  - 04/2024 ECHO w/ preserved EF 55%  -Plan for dual chamber pacemaker implant + loop explantation  - Risk vs benefit of this procedure, procedure details, complication rates, and post-op restrictions/wound care were discussed in detail today    Essential hypertension  -BP at today's visit 132/82  - Continue general cardiology f/u     Obesity  -BMI 37.41  -Encouraged weight loss via diet and exercise    MARIANA  - Recommend compliance with CPAP        Follow up post-procedure    Orders Placed This Encounter   Procedures    POCT ECG     There are no discontinued medications.      .............................................................................................    HPI/Subjective:     Mr. Randal Romeo is a 58-year-old gentleman with a past medical history of hypertension, obesity, and syncope.  With regards to syncope, loop recorder was implanted for further evaluation.  More recently, patient noted he was walking when he felt his heart racing and slow down.  It was associated with lightheadedness forcing him to sit down until it passed, and on interrogation of his loop recorder, a 4  second pause was noted.  There do appear to be nonconducted P waves as well. He stated that he has received notifications in the past for pauses that were also symptomatic.     Today, he presents in sinus bradycardia.  Endorses episodes of lightheadedness as described above    Given the above, we discussed indication for dual-chamber pacemaker implant.  Given he presents with sinus bradycardia, has had symptomatic pauses, and syncope in the past, he meets criteria for dual chamber pacemaker implant.  We discussed the procedure in great detail including procedural steps and postprocedural care.  After discussion, he was in agreement with proceeding.  He will be scheduled for pacemaker implant.    Complete 12 point ROS reviewed and otherwise non pertinent or negative except as per HPI pertinent positives in Cardiovascular and Respiratory emphasized. Please see paper chart for outpatient clinic patients where the patient completed the 12 point ROS survey.           Past Medical History:   Diagnosis Date    COPD (chronic obstructive pulmonary disease) (HCC)     Cough     Emphysema of lung (HCC)     Liver lesion     Lung cancer (HCC)     Right upper lobe pulmonary nodule     Shortness of breath     Sleep apnea, obstructive     Syncope and collapse        No Known Allergies  I reviewed the Home Medication list and Allergies in the chart.   Scheduled Meds:  Current Outpatient Medications   Medication Sig Dispense Refill    albuterol (PROVENTIL HFA,VENTOLIN HFA) 90 mcg/act inhaler INHALE 2 PUFFS EVERY 6 (SIX) HOURS AS NEEDED FOR WHEEZING OR SHORTNESS OF BREATH 18 g 3    Budeson-Glycopyrrol-Formoterol (Breztri Aerosphere) 160-9-4.8 MCG/ACT AERO Inhale 2 puffs 2 (two) times a day Rinse mouth after use. 31.1 g 3    famotidine (PEPCID) 40 MG tablet TAKE 1 TABLET (40 MG TOTAL) BY MOUTH DAILY 100 tablet 1    furosemide (LASIX) 40 mg tablet Take 0.5 tablets (20 mg total) by mouth daily 90 tablet 3    metoprolol succinate  (TOPROL-XL) 50 mg 24 hr tablet Take 1 tablet (50 mg total) by mouth daily 90 tablet 3     No current facility-administered medications for this visit.     PRN Meds:.        Family History   Problem Relation Age of Onset    Stroke Father     Heart attack Father     Dementia Father     Alzheimer's disease Father     Clotting disorder Father     Diabetes Mother        Social History     Socioeconomic History    Marital status: /Civil Union     Spouse name: Not on file    Number of children: Not on file    Years of education: Not on file    Highest education level: Not on file   Occupational History    Not on file   Tobacco Use    Smoking status: Former     Current packs/day: 0.00     Average packs/day: 3.0 packs/day for 30.0 years (90.0 ttl pk-yrs)     Types: Cigarettes     Start date: 10/26/1990     Quit date: 10/26/2020     Years since quittin.0     Passive exposure: Past    Smokeless tobacco: Never    Tobacco comments:     30 years-5 packs a day    Vaping Use    Vaping status: Never Used   Substance and Sexual Activity    Alcohol use: Not Currently    Drug use: Not Currently    Sexual activity: Not Currently     Partners: Female   Other Topics Concern    Not on file   Social History Narrative    Not on file     Social Drivers of Health     Financial Resource Strain: Not on file   Food Insecurity: No Food Insecurity (2024)    Nursing - Inadequate Food Risk Classification     Worried About Running Out of Food in the Last Year: Never true     Ran Out of Food in the Last Year: Never true     Ran Out of Food in the Last Year: Not on file   Transportation Needs: No Transportation Needs (2024)    PRAPARE - Transportation     Lack of Transportation (Medical): No     Lack of Transportation (Non-Medical): No   Physical Activity: Not on file   Stress: Not on file   Social Connections: Not on file   Intimate Partner Violence: Not on file   Housing Stability: Low Risk  (2024)    Housing Stability Vital  "Sign     Unable to Pay for Housing in the Last Year: No     Number of Times Moved in the Last Year: 1     Homeless in the Last Year: No         OBJECTIVE:    /82 (BP Location: Right arm, Patient Position: Sitting, Cuff Size: Standard)   Pulse (!) 50   Ht 5' 11\" (1.803 m)   Wt 123 kg (270 lb 8 oz)   BMI 37.73 kg/m²   Vitals:    11/15/24 1238   Weight: 123 kg (270 lb 8 oz)     GEN: No acute distress, Alert and oriented, well appearing  HEENT:Normocephalic, atraumatic  CARDIOVASCULAR:  RRR, No murmur, rub, gallops S1,S2, sinus bradycardia  LUNGS: Clear To auscultation bilaterally, normal effort, no rales, rhonchi, crackles   ABDOMEN:  nondistended, non-tender, soft  EXTREMITIES/VASCULAR:  No edema. warm an well perfused.  PSYCH: Normal Affect,  linear speech pattern without evidence of psychosis.   NEURO: Grossly intact, moving all extremiteis equal, face symmetric, alert and responsive, no obvious focal defecits   GAIT:  Ambulates normally without difficulty  HEME: No bleeding, bruising, petechia, purpura   SKIN: No significant rashes on visibile skin, warm, no diaphoresis or pallor.     Lab Results:       LABS:      Chemistry        Component Value Date/Time    K 3.8 11/05/2024 0852     11/05/2024 0852    CO2 25 11/05/2024 0852    BUN 9 11/05/2024 0852    CREATININE 1.00 11/05/2024 0852        Component Value Date/Time    CALCIUM 8.9 11/05/2024 0852    ALKPHOS 61 11/05/2024 0852    AST 33 11/05/2024 0852    ALT 56 (H) 11/05/2024 0852            No results found for: \"CHOL\"  Lab Results   Component Value Date    HDL 43 11/05/2024    HDL 39 (L) 06/06/2024    HDL 41 12/11/2023     Lab Results   Component Value Date    LDLCALC 100 11/05/2024    LDLCALC 116 (H) 06/06/2024    LDLCALC 117 (H) 12/11/2023     Lab Results   Component Value Date    TRIG 128 11/05/2024    TRIG 153 (H) 06/06/2024    TRIG 152 (H) 12/11/2023     No results found for: \"CHOLHDL\"    IMAGING: Cardiac EP device report  Result Date: " "2024  Narrative: ALBERTO VERDUGO/ACTIVE SYSTEM IS MRI CONDITIONAL NON-BILLABLE. CARELINK TRANSMISSION: ALERT FOR 1 PAUSE EPISODE W/ EGRAM SHOWING 4 SEC PAUSE @ 1302 HOURS. CALL IN TO PT TO ASSESS FOR SYMPTOMS. NO RESPONSE FROM PT. DL     Cardiac EP device report  Result Date: 2024  Narrative: ALBERTO LISA2/ACTIVE SYSTEM IS MRI CONDITIONAL NON-BILLABLE. CARELINK TRANSMISSION: ALERT FOR 1 PAUSE EPISODE W/ EGRAM SHOWING 4 SEC PAUSE @ 1302 HOURS. CALL IN TO PT TO ASSESS FOR SYMPTOMS. NO RESPONSE FROM PT. DLPt was walking when he felt his heart race and then slow down. He c/o lightheadedness which made him sit down until it passed. he then continued his walk. dl     Cardiac EP device report  Result Date: 2024  Narrative: ALBERTO LUCINA/ACTIVE SYSTEM IS MRI CONDITIONAL CARELINK TRANSMISSION: LOOP RECORDER. PRESENTING RHYTHM SB @ 50 BPM. BATTERY STATUS \"OK.\" NO PATIENT OR DEVICE ACTIVATED EPISODES. NORMAL DEVICE FUNCTION. DL        Cardiac testing:   Results for orders placed during the hospital encounter of 10/26/20    Echo complete with contrast if indicated    Narrative  [address] [address] [phone]    Transthoracic Echocardiogram  2D, M-mode, Doppler, and Color Doppler    Study date:  27-Oct-2020    Patient: BETTY LEWIS  MR number: [id number]  Account number: [id number]  : 1965  Age: 54 years  Gender: Male  Status: Outpatient  Location: Bedside  Height: 70.5 in  Weight: 225 lb  BP: 130/ 78 mmHg    Indications: Syncope and collapse, Assess left ventricular function.    Diagnoses: R55. - Syncope and collapse    Sonographer:  Julissa Cerna RDCS  Referring Physician:  Beltran Main MD  Group:  [facility]  Interpreting Physician:  Daniel Carmona MD    SUMMARY    LEFT VENTRICLE:  Systolic function was normal. Ejection fraction was estimated to be 60 %.  There were no regional wall motion abnormalities.  Wall thickness " was at the upper limits of normal.    VENTRICULAR SEPTUM:  There was paradoxical motion. These changes are consistent with a conduction abnormality or paced rhythm.    RIGHT ATRIUM:  The atrium was mildly dilated.    MITRAL VALVE:  There was mild regurgitation.    TRICUSPID VALVE:  There was trace regurgitation.    IVC, HEPATIC VEINS:  The inferior vena cava was mildly dilated.    HISTORY: Syncope. PRIOR HISTORY: Cough, Risk factors: a history of current cigarette use (within the last month). Chronic lung disease.    PROCEDURE: The procedure was performed at the bedside. This was a routine study. The transthoracic approach was used. The study included complete 2D imaging, M-mode, complete spectral Doppler, and color Doppler. The heart rate was 75 bpm,  at the start of the study. Images were obtained from the parasternal, apical, subcostal, and suprasternal notch acoustic windows. Image quality was adequate.    LEFT VENTRICLE: Size was normal. Systolic function was normal. Ejection fraction was estimated to be 60 %. There were no regional wall motion abnormalities. Wall thickness was at the upper limits of normal. DOPPLER: Left ventricular  diastolic function parameters were normal.    VENTRICULAR SEPTUM: There was paradoxical motion. These changes are consistent with a conduction abnormality or paced rhythm.    RIGHT VENTRICLE: The size was normal. Systolic function was normal. Wall thickness was normal.    LEFT ATRIUM: Size was normal.    RIGHT ATRIUM: The atrium was mildly dilated.    MITRAL VALVE: Valve structure was normal. There was normal leaflet separation. DOPPLER: The transmitral velocity was within the normal range. There was no evidence for stenosis. There was mild regurgitation.    AORTIC VALVE: The valve was trileaflet. Leaflets exhibited normal thickness and normal cuspal separation. DOPPLER: Transaortic velocity was within the normal range. There was no evidence for stenosis. There was no  regurgitation.    TRICUSPID VALVE: The valve structure was normal. There was normal leaflet separation. DOPPLER: The transtricuspid velocity was within the normal range. There was no evidence for stenosis. There was trace regurgitation.    PULMONIC VALVE: Leaflets exhibited normal thickness, no calcification, and normal cuspal separation. DOPPLER: The transpulmonic velocity was within the normal range. There was no regurgitation.    PERICARDIUM: There was no pericardial effusion. The pericardium was normal in appearance.    AORTA: The root exhibited normal size.    SYSTEMIC VEINS: IVC: The inferior vena cava was normal in size and course. The inferior vena cava was mildly dilated. Respirophasic changes were normal.    SYSTEM MEASUREMENT TABLES    2D mode  AoR Diam (2D): 31 mm  AoR Diam; Mean (2D): 31 mm  LA Dimension (2D): 42 mm  LA Dimension; Mean (2D): 42 mm  LA/Ao (2D): 1.4  EDV (2D-Cubed): 623549 mm3  EF (2D-Cubed): 80 %  ESV (2D-Cubed): 22337 mm3  FS (2D-Cubed): 41.5 %  FS (2D-Teich): 41.5 %  IVS/LVPW (2D): 1  IVSd (2D): 11.2 mm  IVSd; Mean chosen (2D): 11.2 mm  LVIDd (2D): 49.6 mm  LVIDd; Mean (2D): 49.6 mm  LVIDs (2D): 29 mm  LVIDs; Mean (2D): 29 mm  LVPWd (2D): 10.8 mm  LVPWd; Mean (2D): 10.8 mm  Left Ventricular Ejection Fraction; Teichholz; 2D mode;: 72.2 %  Left Ventricular End Diastolic Volume; Teichholz; 2D mode;: 540406 mm3  Left Ventricular End Systolic Volume; Teichholz; 2D mode;: 13206 mm3  SV (2D-Cubed): 31063 mm3  Stroke Volume; Teichholz; 2D mode;: 45169 mm3    Apical four chamber  Left Atrium MOD Diam; Most recent value chosen; End Systole; Apical four chamber;: 34.2 mm  Left Atrium MOD Diam; Most recent value chosen; End Systole; Apical four chamber;: 32.7 mm  Left Atrium MOD Diam; Most recent value chosen; End Systole; Apical four chamber;: 24.8 mm  Left Atrium MOD Diam; Most recent value chosen; End Systole; Apical four chamber;: 16.3 mm  Left Atrium MOD Diam; Most recent value chosen; End  Systole; Apical four chamber;: 15.1 mm  Left Atrium MOD Diam; Most recent value chosen; End Systole; Apical four chamber;: 21.5 mm  Left Atrium MOD Diam; Most recent value chosen; End Systole; Apical four chamber;: 26.9 mm  Left Atrium MOD Diam; Most recent value chosen; End Systole; Apical four chamber;: 31.4 mm  Left Atrium MOD Diam; Most recent value chosen; End Systole; Apical four chamber;: 34.5 mm  Left Atrium MOD Diam; Most recent value chosen; End Systole; Apical four chamber;: 35.7 mm  Left Atrium MOD Diam; Most recent value chosen; End Systole; Apical four chamber;: 37.8 mm  Left Atrium MOD Diam; Most recent value chosen; End Systole; Apical four chamber;: 38.4 mm  Left Atrium MOD Diam; Most recent value chosen; End Systole; Apical four chamber;: 38.7 mm  Left Atrium MOD Diam; Most recent value chosen; End Systole; Apical four chamber;: 39 mm  Left Atrium MOD Diam; Most recent value chosen; End Systole; Apical four chamber;: 38.7 mm  Left Atrium MOD Diam; Most recent value chosen; End Systole; Apical four chamber;: 38.7 mm  Left Atrium MOD Diam; Most recent value chosen; End Systole; Apical four chamber;: 37.8 mm  Left Atrium MOD Diam; Most recent value chosen; End Systole; Apical four chamber;: 37.5 mm  Left Atrium MOD Diam; Most recent value chosen; End Systole; Apical four chamber;: 36.6 mm  Left Atrium MOD Diam; Most recent value chosen; End Systole; Apical four chamber;: 35.4 mm  Left Atrium Systolic Area; Most recent value chosen; Method of Disks, Single Plane; 2D mode; Apical four chamber;: 1680 mm2  Left Atrium Systolic Volume; Most recent value chosen; Method of Disks, Single Plane; 2D mode; Apical four chamber;: 70511 mm3  Left Atrium systolic major axis; Most recent value chosen; Method of Disks, Single Plane; 2D mode; Apical four chamber;: 50.8 mm  EF (A4C): 59.7 %  LV MOD Diam; Recent value; End Diastole (A4C): 45.6 mm  LV MOD Diam; Recent value; End Diastole (A4C): 47.7 mm  LV MOD Diam;  Recent value; End Diastole (A4C): 21.2 mm  LV MOD Diam; Recent value; End Diastole (A4C): 31.3 mm  LV MOD Diam; Recent value; End Diastole (A4C): 36.7 mm  LV MOD Diam; Recent value; End Diastole (A4C): 39.5 mm  LV MOD Diam; Recent value; End Diastole (A4C): 41.6 mm  LV MOD Diam; Recent value; End Diastole (A4C): 43.8 mm  LV MOD Diam; Recent value; End Diastole (A4C): 46.5 mm  LV MOD Diam; Recent value; End Diastole (A4C): 48 mm  LV MOD Diam; Recent value; End Diastole (A4C): 48 mm  LV MOD Diam; Recent value; End Diastole (A4C): 47.7 mm  LV MOD Diam; Recent value; End Diastole (A4C): 46.8 mm  LV MOD Diam; Recent value; End Diastole (A4C): 46.5 mm  LV MOD Diam; Recent value; End Diastole (A4C): 45.9 mm  LV MOD Diam; Recent value; End Diastole (A4C): 45.9 mm  LV MOD Diam; Recent value; End Diastole (A4C): 46.2 mm  LV MOD Diam; Recent value; End Diastole (A4C): 46.5 mm  LV MOD Diam; Recent value; End Diastole (A4C): 47.7 mm  LV MOD Diam; Recent value; End Diastole (A4C): 45.6 mm  LV MOD Diam; Recent value; End Systole (A4C): 18.2 mm  LV MOD Diam; Recent value; End Systole (A4C): 35.4 mm  LV MOD Diam; Recent value; End Systole (A4C): 35.1 mm  LV MOD Diam; Recent value; End Systole (A4C): 12.1 mm  LV MOD Diam; Recent value; End Systole (A4C): 18.2 mm  LV MOD Diam; Recent value; End Systole (A4C): 23 mm  LV MOD Diam; Recent value; End Systole (A4C): 27.2 mm  LV MOD Diam; Recent value; End Systole (A4C): 29.4 mm  LV MOD Diam; Recent value; End Systole (A4C): 30.9 mm  LV MOD Diam; Recent value; End Systole (A4C): 31.5 mm  LV MOD Diam; Recent value; End Systole (A4C): 31.8 mm  LV MOD Diam; Recent value; End Systole (A4C): 32.1 mm  LV MOD Diam; Recent value; End Systole (A4C): 31.8 mm  LV MOD Diam; Recent value; End Systole (A4C): 31.8 mm  LV MOD Diam; Recent value; End Systole (A4C): 31.2 mm  LV MOD Diam; Recent value; End Systole (A4C): 31.2 mm  LV MOD Diam; Recent value; End Systole (A4C): 30.9 mm  LV MOD Diam; Recent  value; End Systole (A4C): 31.8 mm  LV MOD Diam; Recent value; End Systole (A4C): 33.6 mm  LV MOD Diam; Recent value; End Systole (A4C): 34.2 mm  Left Ventricle diastolic major axis; Most recent value chosen; Method of Disks, Single Plane; 2D mode; Apical four chamber;: 90.5 mm  Left Ventricle systolic major axis; Most recent value chosen; Method of Disks, Single Plane; 2D mode; Apical four chamber;: 79.6 mm  Left Ventricular Diastolic Area; Most recent value chosen; Method of Disks, Single Plane; 2D mode; Apical four chamber;: 3840 mm2  Left Ventricular End Diastolic Volume; Most recent value chosen; Method of Disks, Single Plane; 2D mode; Apical four chamber;: 379467 mm3  Left Ventricular End Systolic Volume; Most recent value chosen; Method of Disks, Single Plane; 2D mode; Apical four chamber;: 03441 mm3  Left Ventricular Systolic Area; Most recent value chosen; Method of Disks, Single Plane; 2D mode; Apical four chamber;: 2320 mm2  SV (A4C): 33593 mm3  Right Atrium MOD Diam; Most recent value chosen; Method of Disks, Single Plane; End Systole; 2D mode; Apical four chamber;: 10 mm  Right Atrium MOD Diam; Most recent value chosen; Method of Disks, Single Plane; End Systole; 2D mode; Apical four chamber;: 15.7 mm  Right Atrium MOD Diam; Most recent value chosen; Method of Disks, Single Plane; End Systole; 2D mode; Apical four chamber;: 19.4 mm  Right Atrium MOD Diam; Most recent value chosen; Method of Disks, Single Plane; End Systole; 2D mode; Apical four chamber;: 23.2 mm  Right Atrium MOD Diam; Most recent value chosen; Method of Disks, Single Plane; End Systole; 2D mode; Apical four chamber;: 25.5 mm  Right Atrium MOD Diam; Most recent value chosen; Method of Disks, Single Plane; End Systole; 2D mode; Apical four chamber;: 28.3 mm  Right Atrium MOD Diam; Most recent value chosen; Method of Disks, Single Plane; End Systole; 2D mode; Apical four chamber;: 31.3 mm  Right Atrium MOD Diam; Most recent value chosen;  Method of Disks, Single Plane; End Systole; 2D mode; Apical four chamber;: 34.3 mm  Right Atrium MOD Diam; Most recent value chosen; Method of Disks, Single Plane; End Systole; 2D mode; Apical four chamber;: 37.7 mm  Right Atrium MOD Diam; Most recent value chosen; Method of Disks, Single Plane; End Systole; 2D mode; Apical four chamber;: 40 mm  Right Atrium MOD Diam; Most recent value chosen; Method of Disks, Single Plane; End Systole; 2D mode; Apical four chamber;: 42 mm  Right Atrium MOD Diam; Most recent value chosen; Method of Disks, Single Plane; End Systole; 2D mode; Apical four chamber;: 43.5 mm  Right Atrium MOD Diam; Most recent value chosen; Method of Disks, Single Plane; End Systole; 2D mode; Apical four chamber;: 43.9 mm  Right Atrium MOD Diam; Most recent value chosen; Method of Disks, Single Plane; End Systole; 2D mode; Apical four chamber;: 43.8 mm  Right Atrium MOD Diam; Most recent value chosen; Method of Disks, Single Plane; End Systole; 2D mode; Apical four chamber;: 43.2 mm  Right Atrium MOD Diam; Most recent value chosen; Method of Disks, Single Plane; End Systole; 2D mode; Apical four chamber;: 42.8 mm  Right Atrium MOD Diam; Most recent value chosen; Method of Disks, Single Plane; End Systole; 2D mode; Apical four chamber;: 41.2 mm  Right Atrium MOD Diam; Most recent value chosen; Method of Disks, Single Plane; End Systole; 2D mode; Apical four chamber;: 38.8 mm  Right Atrium MOD Diam; Most recent value chosen; Method of Disks, Single Plane; End Systole; 2D mode; Apical four chamber;: 29.7 mm  Right Atrium MOD Diam; Most recent value chosen; Method of Disks, Single Plane; End Systole; 2D mode; Apical four chamber;: 19.7 mm  Right Atrium Systolic Area; Most recent value chosen; Method of Disks, Single Plane; End Systole; 2D mode; Apical four chamber;: 1780 mm2  Right Atrium Systolic Major Axis; Most recent value chosen; Method of Disks, Single Plane; End Systole; 2D mode; Apical four chamber;:  53.5 mm  Right Atrium Systolic Volume; Most recent value chosen; Method of Disks, Single Plane; End Systole; 2D mode; Apical four chamber;: 78018 mm3  Right Ventricle Basal Diameter; 2D mode; Apical four chamber;: 32.3 mm  Right Ventricle Basal Diameter; Mean; Mean value chosen; 2D mode; Apical four chamber;: 32.3 mm    M mode  Inferior Vena Cava Diameter; During Expiration; M mode;: 22 mm  Inferior Vena Cava Diameter; During Inspiration; M mode;: 6.7 mm  Inferior Vena Cava Diameter; Mean; Mean value chosen; During Expiration; M mode;: 22 mm  Inferior Vena Cava Diameter; Mean; Mean value chosen; During Inspiration; M mode;: 6.7 mm  Tricuspid Annular Plane Systolic Excursion; Mean; Mean value chosen; Tricuspid Annulus; M mode;: 23.3 mm  Tricuspid Annular Plane Systolic Excursion; Tricuspid Annulus; M mode;: 23.3 mm    Tissue Doppler Imaging  LV Peak Early Morton Tissue Chavez; Mean; Medial MA (TDI): 95.7 mm/s  LV Peak Early Morton Tissue Chavez; Medial MA (TDI): 95.7 mm/s    Unspecified Scan Mode  MV Peak Chavez/LV Peak Tissue Chavez E-Wave; Medial MA: 6.7  DT; Antegrade Flow: 141 ms  DT; Mean; Antegrade Flow: 141 ms  Dec [place]; Antegrade Flow: 4540 mm/s2  Dec [place]; Mean; Antegrade Flow: 4540 mm/s2  MV E/A: 1.1  MV Peak A Chavez: 606 mm/s  MV Peak A Chavez; Mean: 606 mm/s  MV Peak E Chavez; Antegrade Flow: 639 mm/s  MV Peak E Chavez; Mean; Antegrade Flow: 639 mm/s  MVA (PHT): 537 mm2  PHT: 41 ms  PHT; Mean: 41 ms  Peak Grad; Mean; Regurgitant Flow: 18 mm[Hg]  Vmax; Mean; Regurgitant Flow: 2150 mm/s  Vmax; Regurgitant Flow: 2100 mm/s  Vmax; Regurgitant Flow: 2190 mm/s    [employer] Accredited Echocardiography Laboratory    Prepared and electronically signed by    Daniel Carmona MD  Signed 27-Oct-2020 17:02:51    No results found for this or any previous visit.    No results found for this or any previous visit.    No results found for this or any previous visit.          I reviewed and interpreted the following  LABS/EKG/TELE/IMAGING and below is summary of my interpretation (if data available):       Current EKG reveals sinus bradycardia with 1st degree AV block         Interrogation of Loop 11/12/24  MDT LNQ22/ACTIVE SYSTEM IS MRI CONDITIONAL   NON-BILLABLE. CARELINK TRANSMISSION: ALERT FOR 1 PAUSE EPISODE W/ EGRAM SHOWING 4 SEC PAUSE @ 1302 HOURS. CALL IN TO PT TO ASSESS FOR SYMPTOMS. NO RESPONSE FROM PT. DLPt was walking when he felt his heart race and then slow down. He c/o lightheadedness which made him sit down until it passed. he then continued his walk     ECHO 4/25/24    Left Ventricle Left ventricular cavity size is normal. Wall thickness is normal. The left ventricular ejection fraction is 55%. Systolic function is normal. Wall motion is normal. Diastolic function is mildly abnormal, consistent with grade I (abnormal) relaxation.   Right Ventricle Right ventricular cavity size is mildly dilated. Systolic function is normal. Wall thickness is normal.   Left Atrium The atrium is normal in size.   Right Atrium The atrium is normal in size.   Aortic Valve The aortic valve is trileaflet. The leaflets are not thickened. The leaflets are not calcified. The leaflets exhibit normal mobility. There is no evidence of regurgitation. The aortic valve has no significant stenosis.   Mitral Valve There is no evidence of regurgitation. There is no evidence of stenosis. The mitral valve has normal structure and normal function.   Tricuspid Valve Tricuspid valve structure is normal. There is trace regurgitation. There is no evidence of stenosis.   Pulmonic Valve Pulmonic valve structure is normal. There is no evidence of regurgitation. There is no evidence of stenosis.   Ascending Aorta The aortic root is normal in size.   IVC/SVC The inferior vena cava is normal in size.   Pericardium There is no pericardial effusion. The pericardium is normal in appearance.

## 2024-11-15 NOTE — PROGRESS NOTES
HEART AND VASCULAR  CARDIAC ELECTROPHYSIOLOGY   HEART RHYTHM CENTER  [facility]    Outpatient New Consult  for evaluation for pacemaker implant  Today's Date: 11/15/24        Patient name: Randal Romeo  YOB: 1965  Sex: male         Chief Complaint: as above      ASSESSMENT:  Problem List Items Addressed This Visit       MARIANA (obstructive sleep apnea)    Obesity     Other Visit Diagnoses         History of syncope    -  Primary    Relevant Orders    POCT ECG      Lightheadedness                      PLAN:  History of Syncope, lightheadedness  -Loop recorder implanted  -Recent loop recorder interrogation 11/12/24 revealed 1 pause episode lasting 4 seconds @ 1302 hours  - Patient was symptomatic during this event with lightheadedness  -Sinus bradycardia with heart rates in the 40s at today's visit  - 04/2024 ECHO w/ preserved EF 55%  -Plan for dual chamber pacemaker implant + loop explantation  - Risk vs benefit of this procedure, procedure details, complication rates, and post-op restrictions/wound care were discussed in detail today    Essential hypertension  -BP at today's visit 132/82  - Continue general cardiology f/u     Obesity  -BMI 37.41  -Encouraged weight loss via diet and exercise    MARIANA  - Recommend compliance with CPAP        Follow up post-procedure    Orders Placed This Encounter   Procedures    POCT ECG     There are no discontinued medications.      .............................................................................................    HPI/Subjective:     Mr. Randal Romeo is a 58-year-old gentleman with a past medical history of hypertension, obesity, and syncope.  With regards to syncope, loop recorder was implanted for further evaluation.  More recently, patient noted he was walking when he felt his heart racing and slow down.  It was associated with lightheadedness forcing him to sit down until it passed, and on interrogation of his loop recorder, a 4  second pause was noted.  There do appear to be nonconducted P waves as well. He stated that he has received notifications in the past for pauses that were also symptomatic.     Today, he presents in sinus bradycardia.  Endorses episodes of lightheadedness as described above    Given the above, we discussed indication for dual-chamber pacemaker implant.  Given he presents with sinus bradycardia, has had symptomatic pauses, and syncope in the past, he meets criteria for dual chamber pacemaker implant.  We discussed the procedure in great detail including procedural steps and postprocedural care.  After discussion, he was in agreement with proceeding.  He will be scheduled for pacemaker implant.    Complete 12 point ROS reviewed and otherwise non pertinent or negative except as per HPI pertinent positives in Cardiovascular and Respiratory emphasized. Please see paper chart for outpatient clinic patients where the patient completed the 12 point ROS survey.           Past Medical History:   Diagnosis Date    COPD (chronic obstructive pulmonary disease) (HCC)     Cough     Emphysema of lung (HCC)     Liver lesion     Lung cancer (HCC)     Right upper lobe pulmonary nodule     Shortness of breath     Sleep apnea, obstructive     Syncope and collapse        No Known Allergies  I reviewed the Home Medication list and Allergies in the chart.   Scheduled Meds:  Current Outpatient Medications   Medication Sig Dispense Refill    albuterol (PROVENTIL HFA,VENTOLIN HFA) 90 mcg/act inhaler INHALE 2 PUFFS EVERY 6 (SIX) HOURS AS NEEDED FOR WHEEZING OR SHORTNESS OF BREATH 18 g 3    Budeson-Glycopyrrol-Formoterol (Breztri Aerosphere) 160-9-4.8 MCG/ACT AERO Inhale 2 puffs 2 (two) times a day Rinse mouth after use. 31.1 g 3    famotidine (PEPCID) 40 MG tablet TAKE 1 TABLET (40 MG TOTAL) BY MOUTH DAILY 100 tablet 1    furosemide (LASIX) 40 mg tablet Take 0.5 tablets (20 mg total) by mouth daily 90 tablet 3    metoprolol succinate  (TOPROL-XL) 50 mg 24 hr tablet Take 1 tablet (50 mg total) by mouth daily 90 tablet 3     No current facility-administered medications for this visit.     PRN Meds:.        Family History   Problem Relation Age of Onset    Stroke Father     Heart attack Father     Dementia Father     Alzheimer's disease Father     Clotting disorder Father     Diabetes Mother        Social History     Socioeconomic History    Marital status: /Civil Union     Spouse name: Not on file    Number of children: Not on file    Years of education: Not on file    Highest education level: Not on file   Occupational History    Not on file   Tobacco Use    Smoking status: Former     Current packs/day: 0.00     Average packs/day: 3.0 packs/day for 30.0 years (90.0 ttl pk-yrs)     Types: Cigarettes     Start date: 10/26/1990     Quit date: 10/26/2020     Years since quittin.0     Passive exposure: Past    Smokeless tobacco: Never    Tobacco comments:     30 years-5 packs a day    Vaping Use    Vaping status: Never Used   Substance and Sexual Activity    Alcohol use: Not Currently    Drug use: Not Currently    Sexual activity: Not Currently     Partners: Female   Other Topics Concern    Not on file   Social History Narrative    Not on file     Social Drivers of Health     Financial Resource Strain: Not on file   Food Insecurity: No Food Insecurity (2024)    Nursing - Inadequate Food Risk Classification     Worried About Running Out of Food in the Last Year: Never true     Ran Out of Food in the Last Year: Never true     Ran Out of Food in the Last Year: Not on file   Transportation Needs: No Transportation Needs (2024)    PRAPARE - Transportation     Lack of Transportation (Medical): No     Lack of Transportation (Non-Medical): No   Physical Activity: Not on file   Stress: Not on file   Social Connections: Not on file   Intimate Partner Violence: Not on file   Housing Stability: Low Risk  (2024)    Housing Stability Vital  "Sign     Unable to Pay for Housing in the Last Year: No     Number of Times Moved in the Last Year: 1     Homeless in the Last Year: No         OBJECTIVE:    /82 (BP Location: Right arm, Patient Position: Sitting, Cuff Size: Standard)   Pulse (!) 50   Ht 5' 11\" (1.803 m)   Wt 123 kg (270 lb 8 oz)   BMI 37.73 kg/m²   Vitals:    11/15/24 1238   Weight: 123 kg (270 lb 8 oz)     GEN: No acute distress, Alert and oriented, well appearing  HEENT:Normocephalic, atraumatic  CARDIOVASCULAR:  RRR, No murmur, rub, gallops S1,S2, sinus bradycardia  LUNGS: Clear To auscultation bilaterally, normal effort, no rales, rhonchi, crackles   ABDOMEN:  nondistended, non-tender, soft  EXTREMITIES/VASCULAR:  No edema. warm an well perfused.  PSYCH: Normal Affect,  linear speech pattern without evidence of psychosis.   NEURO: Grossly intact, moving all extremiteis equal, face symmetric, alert and responsive, no obvious focal defecits   GAIT:  Ambulates normally without difficulty  HEME: No bleeding, bruising, petechia, purpura   SKIN: No significant rashes on visibile skin, warm, no diaphoresis or pallor.     Lab Results:       LABS:      Chemistry        Component Value Date/Time    K 3.8 11/05/2024 0852     11/05/2024 0852    CO2 25 11/05/2024 0852    BUN 9 11/05/2024 0852    CREATININE 1.00 11/05/2024 0852        Component Value Date/Time    CALCIUM 8.9 11/05/2024 0852    ALKPHOS 61 11/05/2024 0852    AST 33 11/05/2024 0852    ALT 56 (H) 11/05/2024 0852            No results found for: \"CHOL\"  Lab Results   Component Value Date    HDL 43 11/05/2024    HDL 39 (L) 06/06/2024    HDL 41 12/11/2023     Lab Results   Component Value Date    LDLCALC 100 11/05/2024    LDLCALC 116 (H) 06/06/2024    LDLCALC 117 (H) 12/11/2023     Lab Results   Component Value Date    TRIG 128 11/05/2024    TRIG 153 (H) 06/06/2024    TRIG 152 (H) 12/11/2023     No results found for: \"CHOLHDL\"    IMAGING: Cardiac EP device report  Result Date: " "2024  Narrative: ALBERTO VERDUGO/ACTIVE SYSTEM IS MRI CONDITIONAL NON-BILLABLE. CARELINK TRANSMISSION: ALERT FOR 1 PAUSE EPISODE W/ EGRAM SHOWING 4 SEC PAUSE @ 1302 HOURS. CALL IN TO PT TO ASSESS FOR SYMPTOMS. NO RESPONSE FROM PT. DL     Cardiac EP device report  Result Date: 2024  Narrative: ALBERTO LISA2/ACTIVE SYSTEM IS MRI CONDITIONAL NON-BILLABLE. CARELINK TRANSMISSION: ALERT FOR 1 PAUSE EPISODE W/ EGRAM SHOWING 4 SEC PAUSE @ 1302 HOURS. CALL IN TO PT TO ASSESS FOR SYMPTOMS. NO RESPONSE FROM PT. DLPt was walking when he felt his heart race and then slow down. He c/o lightheadedness which made him sit down until it passed. he then continued his walk. dl     Cardiac EP device report  Result Date: 2024  Narrative: ALBERTO LUCINA/ACTIVE SYSTEM IS MRI CONDITIONAL CARELINK TRANSMISSION: LOOP RECORDER. PRESENTING RHYTHM SB @ 50 BPM. BATTERY STATUS \"OK.\" NO PATIENT OR DEVICE ACTIVATED EPISODES. NORMAL DEVICE FUNCTION. DL        Cardiac testing:   Results for orders placed during the hospital encounter of 10/26/20    Echo complete with contrast if indicated    Narrative  [address] [address] [phone]    Transthoracic Echocardiogram  2D, M-mode, Doppler, and Color Doppler    Study date:  27-Oct-2020    Patient: BETTY LEWIS  MR number: [id number]  Account number: [id number]  : 1965  Age: 54 years  Gender: Male  Status: Outpatient  Location: Bedside  Height: 70.5 in  Weight: 225 lb  BP: 130/ 78 mmHg    Indications: Syncope and collapse, Assess left ventricular function.    Diagnoses: R55. - Syncope and collapse    Sonographer:  Julissa Cerna RDCS  Referring Physician:  Beltran Main MD  Group:  [facility]  Interpreting Physician:  Daniel Carmona MD    SUMMARY    LEFT VENTRICLE:  Systolic function was normal. Ejection fraction was estimated to be 60 %.  There were no regional wall motion abnormalities.  Wall thickness " was at the upper limits of normal.    VENTRICULAR SEPTUM:  There was paradoxical motion. These changes are consistent with a conduction abnormality or paced rhythm.    RIGHT ATRIUM:  The atrium was mildly dilated.    MITRAL VALVE:  There was mild regurgitation.    TRICUSPID VALVE:  There was trace regurgitation.    IVC, HEPATIC VEINS:  The inferior vena cava was mildly dilated.    HISTORY: Syncope. PRIOR HISTORY: Cough, Risk factors: a history of current cigarette use (within the last month). Chronic lung disease.    PROCEDURE: The procedure was performed at the bedside. This was a routine study. The transthoracic approach was used. The study included complete 2D imaging, M-mode, complete spectral Doppler, and color Doppler. The heart rate was 75 bpm,  at the start of the study. Images were obtained from the parasternal, apical, subcostal, and suprasternal notch acoustic windows. Image quality was adequate.    LEFT VENTRICLE: Size was normal. Systolic function was normal. Ejection fraction was estimated to be 60 %. There were no regional wall motion abnormalities. Wall thickness was at the upper limits of normal. DOPPLER: Left ventricular  diastolic function parameters were normal.    VENTRICULAR SEPTUM: There was paradoxical motion. These changes are consistent with a conduction abnormality or paced rhythm.    RIGHT VENTRICLE: The size was normal. Systolic function was normal. Wall thickness was normal.    LEFT ATRIUM: Size was normal.    RIGHT ATRIUM: The atrium was mildly dilated.    MITRAL VALVE: Valve structure was normal. There was normal leaflet separation. DOPPLER: The transmitral velocity was within the normal range. There was no evidence for stenosis. There was mild regurgitation.    AORTIC VALVE: The valve was trileaflet. Leaflets exhibited normal thickness and normal cuspal separation. DOPPLER: Transaortic velocity was within the normal range. There was no evidence for stenosis. There was no  regurgitation.    TRICUSPID VALVE: The valve structure was normal. There was normal leaflet separation. DOPPLER: The transtricuspid velocity was within the normal range. There was no evidence for stenosis. There was trace regurgitation.    PULMONIC VALVE: Leaflets exhibited normal thickness, no calcification, and normal cuspal separation. DOPPLER: The transpulmonic velocity was within the normal range. There was no regurgitation.    PERICARDIUM: There was no pericardial effusion. The pericardium was normal in appearance.    AORTA: The root exhibited normal size.    SYSTEMIC VEINS: IVC: The inferior vena cava was normal in size and course. The inferior vena cava was mildly dilated. Respirophasic changes were normal.    SYSTEM MEASUREMENT TABLES    2D mode  AoR Diam (2D): 31 mm  AoR Diam; Mean (2D): 31 mm  LA Dimension (2D): 42 mm  LA Dimension; Mean (2D): 42 mm  LA/Ao (2D): 1.4  EDV (2D-Cubed): 145608 mm3  EF (2D-Cubed): 80 %  ESV (2D-Cubed): 04171 mm3  FS (2D-Cubed): 41.5 %  FS (2D-Teich): 41.5 %  IVS/LVPW (2D): 1  IVSd (2D): 11.2 mm  IVSd; Mean chosen (2D): 11.2 mm  LVIDd (2D): 49.6 mm  LVIDd; Mean (2D): 49.6 mm  LVIDs (2D): 29 mm  LVIDs; Mean (2D): 29 mm  LVPWd (2D): 10.8 mm  LVPWd; Mean (2D): 10.8 mm  Left Ventricular Ejection Fraction; Teichholz; 2D mode;: 72.2 %  Left Ventricular End Diastolic Volume; Teichholz; 2D mode;: 520654 mm3  Left Ventricular End Systolic Volume; Teichholz; 2D mode;: 52791 mm3  SV (2D-Cubed): 28467 mm3  Stroke Volume; Teichholz; 2D mode;: 29546 mm3    Apical four chamber  Left Atrium MOD Diam; Most recent value chosen; End Systole; Apical four chamber;: 34.2 mm  Left Atrium MOD Diam; Most recent value chosen; End Systole; Apical four chamber;: 32.7 mm  Left Atrium MOD Diam; Most recent value chosen; End Systole; Apical four chamber;: 24.8 mm  Left Atrium MOD Diam; Most recent value chosen; End Systole; Apical four chamber;: 16.3 mm  Left Atrium MOD Diam; Most recent value chosen; End  Systole; Apical four chamber;: 15.1 mm  Left Atrium MOD Diam; Most recent value chosen; End Systole; Apical four chamber;: 21.5 mm  Left Atrium MOD Diam; Most recent value chosen; End Systole; Apical four chamber;: 26.9 mm  Left Atrium MOD Diam; Most recent value chosen; End Systole; Apical four chamber;: 31.4 mm  Left Atrium MOD Diam; Most recent value chosen; End Systole; Apical four chamber;: 34.5 mm  Left Atrium MOD Diam; Most recent value chosen; End Systole; Apical four chamber;: 35.7 mm  Left Atrium MOD Diam; Most recent value chosen; End Systole; Apical four chamber;: 37.8 mm  Left Atrium MOD Diam; Most recent value chosen; End Systole; Apical four chamber;: 38.4 mm  Left Atrium MOD Diam; Most recent value chosen; End Systole; Apical four chamber;: 38.7 mm  Left Atrium MOD Diam; Most recent value chosen; End Systole; Apical four chamber;: 39 mm  Left Atrium MOD Diam; Most recent value chosen; End Systole; Apical four chamber;: 38.7 mm  Left Atrium MOD Diam; Most recent value chosen; End Systole; Apical four chamber;: 38.7 mm  Left Atrium MOD Diam; Most recent value chosen; End Systole; Apical four chamber;: 37.8 mm  Left Atrium MOD Diam; Most recent value chosen; End Systole; Apical four chamber;: 37.5 mm  Left Atrium MOD Diam; Most recent value chosen; End Systole; Apical four chamber;: 36.6 mm  Left Atrium MOD Diam; Most recent value chosen; End Systole; Apical four chamber;: 35.4 mm  Left Atrium Systolic Area; Most recent value chosen; Method of Disks, Single Plane; 2D mode; Apical four chamber;: 1680 mm2  Left Atrium Systolic Volume; Most recent value chosen; Method of Disks, Single Plane; 2D mode; Apical four chamber;: 31041 mm3  Left Atrium systolic major axis; Most recent value chosen; Method of Disks, Single Plane; 2D mode; Apical four chamber;: 50.8 mm  EF (A4C): 59.7 %  LV MOD Diam; Recent value; End Diastole (A4C): 45.6 mm  LV MOD Diam; Recent value; End Diastole (A4C): 47.7 mm  LV MOD Diam;  Recent value; End Diastole (A4C): 21.2 mm  LV MOD Diam; Recent value; End Diastole (A4C): 31.3 mm  LV MOD Diam; Recent value; End Diastole (A4C): 36.7 mm  LV MOD Diam; Recent value; End Diastole (A4C): 39.5 mm  LV MOD Diam; Recent value; End Diastole (A4C): 41.6 mm  LV MOD Diam; Recent value; End Diastole (A4C): 43.8 mm  LV MOD Diam; Recent value; End Diastole (A4C): 46.5 mm  LV MOD Diam; Recent value; End Diastole (A4C): 48 mm  LV MOD Diam; Recent value; End Diastole (A4C): 48 mm  LV MOD Diam; Recent value; End Diastole (A4C): 47.7 mm  LV MOD Diam; Recent value; End Diastole (A4C): 46.8 mm  LV MOD Diam; Recent value; End Diastole (A4C): 46.5 mm  LV MOD Diam; Recent value; End Diastole (A4C): 45.9 mm  LV MOD Diam; Recent value; End Diastole (A4C): 45.9 mm  LV MOD Diam; Recent value; End Diastole (A4C): 46.2 mm  LV MOD Diam; Recent value; End Diastole (A4C): 46.5 mm  LV MOD Diam; Recent value; End Diastole (A4C): 47.7 mm  LV MOD Diam; Recent value; End Diastole (A4C): 45.6 mm  LV MOD Diam; Recent value; End Systole (A4C): 18.2 mm  LV MOD Diam; Recent value; End Systole (A4C): 35.4 mm  LV MOD Diam; Recent value; End Systole (A4C): 35.1 mm  LV MOD Diam; Recent value; End Systole (A4C): 12.1 mm  LV MOD Diam; Recent value; End Systole (A4C): 18.2 mm  LV MOD Diam; Recent value; End Systole (A4C): 23 mm  LV MOD Diam; Recent value; End Systole (A4C): 27.2 mm  LV MOD Diam; Recent value; End Systole (A4C): 29.4 mm  LV MOD Diam; Recent value; End Systole (A4C): 30.9 mm  LV MOD Diam; Recent value; End Systole (A4C): 31.5 mm  LV MOD Diam; Recent value; End Systole (A4C): 31.8 mm  LV MOD Diam; Recent value; End Systole (A4C): 32.1 mm  LV MOD Diam; Recent value; End Systole (A4C): 31.8 mm  LV MOD Diam; Recent value; End Systole (A4C): 31.8 mm  LV MOD Diam; Recent value; End Systole (A4C): 31.2 mm  LV MOD Diam; Recent value; End Systole (A4C): 31.2 mm  LV MOD Diam; Recent value; End Systole (A4C): 30.9 mm  LV MOD Diam; Recent  value; End Systole (A4C): 31.8 mm  LV MOD Diam; Recent value; End Systole (A4C): 33.6 mm  LV MOD Diam; Recent value; End Systole (A4C): 34.2 mm  Left Ventricle diastolic major axis; Most recent value chosen; Method of Disks, Single Plane; 2D mode; Apical four chamber;: 90.5 mm  Left Ventricle systolic major axis; Most recent value chosen; Method of Disks, Single Plane; 2D mode; Apical four chamber;: 79.6 mm  Left Ventricular Diastolic Area; Most recent value chosen; Method of Disks, Single Plane; 2D mode; Apical four chamber;: 3840 mm2  Left Ventricular End Diastolic Volume; Most recent value chosen; Method of Disks, Single Plane; 2D mode; Apical four chamber;: 401188 mm3  Left Ventricular End Systolic Volume; Most recent value chosen; Method of Disks, Single Plane; 2D mode; Apical four chamber;: 91501 mm3  Left Ventricular Systolic Area; Most recent value chosen; Method of Disks, Single Plane; 2D mode; Apical four chamber;: 2320 mm2  SV (A4C): 13620 mm3  Right Atrium MOD Diam; Most recent value chosen; Method of Disks, Single Plane; End Systole; 2D mode; Apical four chamber;: 10 mm  Right Atrium MOD Diam; Most recent value chosen; Method of Disks, Single Plane; End Systole; 2D mode; Apical four chamber;: 15.7 mm  Right Atrium MOD Diam; Most recent value chosen; Method of Disks, Single Plane; End Systole; 2D mode; Apical four chamber;: 19.4 mm  Right Atrium MOD Diam; Most recent value chosen; Method of Disks, Single Plane; End Systole; 2D mode; Apical four chamber;: 23.2 mm  Right Atrium MOD Diam; Most recent value chosen; Method of Disks, Single Plane; End Systole; 2D mode; Apical four chamber;: 25.5 mm  Right Atrium MOD Diam; Most recent value chosen; Method of Disks, Single Plane; End Systole; 2D mode; Apical four chamber;: 28.3 mm  Right Atrium MOD Diam; Most recent value chosen; Method of Disks, Single Plane; End Systole; 2D mode; Apical four chamber;: 31.3 mm  Right Atrium MOD Diam; Most recent value chosen;  Method of Disks, Single Plane; End Systole; 2D mode; Apical four chamber;: 34.3 mm  Right Atrium MOD Diam; Most recent value chosen; Method of Disks, Single Plane; End Systole; 2D mode; Apical four chamber;: 37.7 mm  Right Atrium MOD Diam; Most recent value chosen; Method of Disks, Single Plane; End Systole; 2D mode; Apical four chamber;: 40 mm  Right Atrium MOD Diam; Most recent value chosen; Method of Disks, Single Plane; End Systole; 2D mode; Apical four chamber;: 42 mm  Right Atrium MOD Diam; Most recent value chosen; Method of Disks, Single Plane; End Systole; 2D mode; Apical four chamber;: 43.5 mm  Right Atrium MOD Diam; Most recent value chosen; Method of Disks, Single Plane; End Systole; 2D mode; Apical four chamber;: 43.9 mm  Right Atrium MOD Diam; Most recent value chosen; Method of Disks, Single Plane; End Systole; 2D mode; Apical four chamber;: 43.8 mm  Right Atrium MOD Diam; Most recent value chosen; Method of Disks, Single Plane; End Systole; 2D mode; Apical four chamber;: 43.2 mm  Right Atrium MOD Diam; Most recent value chosen; Method of Disks, Single Plane; End Systole; 2D mode; Apical four chamber;: 42.8 mm  Right Atrium MOD Diam; Most recent value chosen; Method of Disks, Single Plane; End Systole; 2D mode; Apical four chamber;: 41.2 mm  Right Atrium MOD Diam; Most recent value chosen; Method of Disks, Single Plane; End Systole; 2D mode; Apical four chamber;: 38.8 mm  Right Atrium MOD Diam; Most recent value chosen; Method of Disks, Single Plane; End Systole; 2D mode; Apical four chamber;: 29.7 mm  Right Atrium MOD Diam; Most recent value chosen; Method of Disks, Single Plane; End Systole; 2D mode; Apical four chamber;: 19.7 mm  Right Atrium Systolic Area; Most recent value chosen; Method of Disks, Single Plane; End Systole; 2D mode; Apical four chamber;: 1780 mm2  Right Atrium Systolic Major Axis; Most recent value chosen; Method of Disks, Single Plane; End Systole; 2D mode; Apical four chamber;:  53.5 mm  Right Atrium Systolic Volume; Most recent value chosen; Method of Disks, Single Plane; End Systole; 2D mode; Apical four chamber;: 58476 mm3  Right Ventricle Basal Diameter; 2D mode; Apical four chamber;: 32.3 mm  Right Ventricle Basal Diameter; Mean; Mean value chosen; 2D mode; Apical four chamber;: 32.3 mm    M mode  Inferior Vena Cava Diameter; During Expiration; M mode;: 22 mm  Inferior Vena Cava Diameter; During Inspiration; M mode;: 6.7 mm  Inferior Vena Cava Diameter; Mean; Mean value chosen; During Expiration; M mode;: 22 mm  Inferior Vena Cava Diameter; Mean; Mean value chosen; During Inspiration; M mode;: 6.7 mm  Tricuspid Annular Plane Systolic Excursion; Mean; Mean value chosen; Tricuspid Annulus; M mode;: 23.3 mm  Tricuspid Annular Plane Systolic Excursion; Tricuspid Annulus; M mode;: 23.3 mm    Tissue Doppler Imaging  LV Peak Early Morton Tissue Chavez; Mean; Medial MA (TDI): 95.7 mm/s  LV Peak Early Morton Tissue Chavez; Medial MA (TDI): 95.7 mm/s    Unspecified Scan Mode  MV Peak Chavez/LV Peak Tissue Chavez E-Wave; Medial MA: 6.7  DT; Antegrade Flow: 141 ms  DT; Mean; Antegrade Flow: 141 ms  Dec [place]; Antegrade Flow: 4540 mm/s2  Dec [place]; Mean; Antegrade Flow: 4540 mm/s2  MV E/A: 1.1  MV Peak A Chavez: 606 mm/s  MV Peak A Chavez; Mean: 606 mm/s  MV Peak E Chavez; Antegrade Flow: 639 mm/s  MV Peak E Chavez; Mean; Antegrade Flow: 639 mm/s  MVA (PHT): 537 mm2  PHT: 41 ms  PHT; Mean: 41 ms  Peak Grad; Mean; Regurgitant Flow: 18 mm[Hg]  Vmax; Mean; Regurgitant Flow: 2150 mm/s  Vmax; Regurgitant Flow: 2100 mm/s  Vmax; Regurgitant Flow: 2190 mm/s    [employer] Accredited Echocardiography Laboratory    Prepared and electronically signed by    Daniel Carmona MD  Signed 27-Oct-2020 17:02:51    No results found for this or any previous visit.    No results found for this or any previous visit.    No results found for this or any previous visit.          I reviewed and interpreted the following  LABS/EKG/TELE/IMAGING and below is summary of my interpretation (if data available):       Current EKG reveals sinus bradycardia with 1st degree AV block         Interrogation of Loop 11/12/24  MDT LNQ22/ACTIVE SYSTEM IS MRI CONDITIONAL   NON-BILLABLE. CARELINK TRANSMISSION: ALERT FOR 1 PAUSE EPISODE W/ EGRAM SHOWING 4 SEC PAUSE @ 1302 HOURS. CALL IN TO PT TO ASSESS FOR SYMPTOMS. NO RESPONSE FROM PT. DLPt was walking when he felt his heart race and then slow down. He c/o lightheadedness which made him sit down until it passed. he then continued his walk     ECHO 4/25/24    Left Ventricle Left ventricular cavity size is normal. Wall thickness is normal. The left ventricular ejection fraction is 55%. Systolic function is normal. Wall motion is normal. Diastolic function is mildly abnormal, consistent with grade I (abnormal) relaxation.   Right Ventricle Right ventricular cavity size is mildly dilated. Systolic function is normal. Wall thickness is normal.   Left Atrium The atrium is normal in size.   Right Atrium The atrium is normal in size.   Aortic Valve The aortic valve is trileaflet. The leaflets are not thickened. The leaflets are not calcified. The leaflets exhibit normal mobility. There is no evidence of regurgitation. The aortic valve has no significant stenosis.   Mitral Valve There is no evidence of regurgitation. There is no evidence of stenosis. The mitral valve has normal structure and normal function.   Tricuspid Valve Tricuspid valve structure is normal. There is trace regurgitation. There is no evidence of stenosis.   Pulmonic Valve Pulmonic valve structure is normal. There is no evidence of regurgitation. There is no evidence of stenosis.   Ascending Aorta The aortic root is normal in size.   IVC/SVC The inferior vena cava is normal in size.   Pericardium There is no pericardial effusion. The pericardium is normal in appearance.

## 2024-11-22 NOTE — PROGRESS NOTES
11/25/2024      Chief Complaint   Patient presents with    Follow-up     Elevated PSA          Assessment and Plan    Elevated PSA  - TRUS prostate biopsy on 3/25/24- Negative. TRUS prostate measurement of 107 g  - PSA of over 20 in December 2023. Most recent PSA from 10/3/24 increased to 11.851  - mpMRI from 11/14/24 - PIRADS 2, calculated prostate volume of 188 mL  - PSAD density reassuring at 0.06  - Follow up in April 2025 with repeat PSA      History of Present Illness  Randal Romeo is a 58 y.o. male here for follow up evaluation of elevated PSA and prostate MRI review. He denies any urinary symptoms or complaints.         Review of Systems   Constitutional:  Negative for chills and fever.   Respiratory:  Negative for shortness of breath.    Cardiovascular:  Negative for chest pain.   Gastrointestinal:  Negative for abdominal pain.   Genitourinary:  Negative for difficulty urinating, dysuria, flank pain, frequency, hematuria and urgency.   Neurological:  Negative for dizziness.           AUA SYMPTOM SCORE      Flowsheet Row Most Recent Value   AUA SYMPTOM SCORE    How often have you had a sensation of not emptying your bladder completely after you finished urinating? 0 (P)     How often have you had to urinate again less than two hours after you finished urinating? 4 (P)     How often have you found you stopped and started again several times when you urinate? 3 (P)     How often have you found it difficult to postpone urination? 5 (P)     How often have you had a weak urinary stream? 0 (P)     How often have you had to push or strain to begin urination? 0 (P)     How many times did you most typically get up to urinate from the time you went to bed at night until the time you got up in the morning? 1 (P)     Quality of Life: If you were to spend the rest of your life with your urinary condition just the way it is now, how would you feel about that? 3 (P)     AUA SYMPTOM SCORE 13 (P)                 Past Medical  History  Past Medical History:   Diagnosis Date    COPD (chronic obstructive pulmonary disease) (HCC)     Cough     Emphysema of lung (HCC)     Liver lesion     Lung cancer (HCC)     Right upper lobe pulmonary nodule     Shortness of breath     Sleep apnea, obstructive     Syncope and collapse        Past Social History  Past Surgical History:   Procedure Laterality Date    CARDIAC ELECTROPHYSIOLOGY PROCEDURE N/A 2022    Procedure: Cardiac loop recorder implant;  Surgeon: Daniel Carmona MD;  Location: [facility];  Service: Cardiology    CHOLECYSTECTOMY      LUNG SEGMENTECTOMY Right 2021    Procedure: RESECTION WEDGE LUNG RIGHT UPPER LOBE;  Surgeon: Mila Joya MD;  Location: [facility];  Service: Thoracic    [facility] INCL FLUOR GDNCE DX W/CELL WASHG SPX N/A 2021    Procedure: BRONCHOSCOPY FLEXIBLE;  Surgeon: Mila Joya MD;  Location: [facility];  Service: Thoracic    [facility] THORACOSCOPY W/LOBECTOMY SINGLE LOBE Right 2021    Procedure: RIGHT UPPER LOBECTOMY LUNG;  Surgeon: Mila Joya MD;  Location: [facility];  Service: Thoracic    [facility] THORACOSCOPY W/THERA WEDGE RESEXN INITIAL UNILAT Right 2021    Procedure: THORACOSCOPY VIDEO ASSISTED SURGERY (VATS),;  Surgeon: Mila Joya MD;  Location: [facility];  Service: Thoracic    TESTICLE SURGERY       Social History     Tobacco Use   Smoking Status Former    Current packs/day: 0.00    Average packs/day: 3.0 packs/day for 30.0 years (90.0 ttl pk-yrs)    Types: Cigarettes    Start date: 10/26/1990    Quit date: 10/26/2020    Years since quittin.0    Passive exposure: Past   Smokeless Tobacco Never   Tobacco Comments    30 years-5 packs a day        Past Family History  Family History   Problem Relation Age of Onset    Stroke Father     Heart attack Father     Dementia Father     Alzheimer's disease Father     Clotting disorder Father     Diabetes Mother        Past Social history  Social History      Socioeconomic History    Marital status: /Civil Union     Spouse name: Not on file    Number of children: Not on file    Years of education: Not on file    Highest education level: Not on file   Occupational History    Not on file   Tobacco Use    Smoking status: Former     Current packs/day: 0.00     Average packs/day: 3.0 packs/day for 30.0 years (90.0 ttl pk-yrs)     Types: Cigarettes     Start date: 10/26/1990     Quit date: 10/26/2020     Years since quittin.0     Passive exposure: Past    Smokeless tobacco: Never    Tobacco comments:     30 years-5 packs a day    Vaping Use    Vaping status: Never Used   Substance and Sexual Activity    Alcohol use: Not Currently    Drug use: Not Currently    Sexual activity: Not Currently     Partners: Female   Other Topics Concern    Not on file   Social History Narrative    Not on file     Social Drivers of Health     Financial Resource Strain: Not on file   Food Insecurity: No Food Insecurity (2024)    Nursing - Inadequate Food Risk Classification     Worried About Running Out of Food in the Last Year: Never true     Ran Out of Food in the Last Year: Never true     Ran Out of Food in the Last Year: Not on file   Transportation Needs: No Transportation Needs (2024)    PRAPARE - Transportation     Lack of Transportation (Medical): No     Lack of Transportation (Non-Medical): No   Physical Activity: Not on file   Stress: Not on file   Social Connections: Not on file   Intimate Partner Violence: Not on file   Housing Stability: Low Risk  (2024)    Housing Stability Vital Sign     Unable to Pay for Housing in the Last Year: No     Number of Times Moved in the Last Year: 1     Homeless in the Last Year: No       Current Medications  Current Outpatient Medications   Medication Sig Dispense Refill    albuterol (PROVENTIL HFA,VENTOLIN HFA) 90 mcg/act inhaler INHALE 2 PUFFS EVERY 6 (SIX) HOURS AS NEEDED FOR WHEEZING OR SHORTNESS OF BREATH 18 g 3     "Budeson-Glycopyrrol-Formoterol (Breztri Aerosphere) 160-9-4.8 MCG/ACT AERO Inhale 2 puffs 2 (two) times a day Rinse mouth after use. 31.1 g 3    famotidine (PEPCID) 40 MG tablet TAKE 1 TABLET (40 MG TOTAL) BY MOUTH DAILY 100 tablet 1    furosemide (LASIX) 40 mg tablet Take 0.5 tablets (20 mg total) by mouth daily 90 tablet 3    metoprolol succinate (TOPROL-XL) 50 mg 24 hr tablet Take 1 tablet (50 mg total) by mouth daily (Patient not taking: Reported on 11/25/2024) 90 tablet 3     No current facility-administered medications for this visit.       Allergies  No Known Allergies      The following portions of the patient's history were reviewed and updated as appropriate: allergies, current medications, past medical history, past social history, past surgical history and problem list.      Vitals  Vitals:    11/25/24 1017   BP: 130/72   BP Location: Left arm   Patient Position: Sitting   Cuff Size: Large   Pulse: 66   Resp: 18   Temp: 97.5 °F (36.4 °C)   SpO2: 95%   Weight: 123 kg (271 lb 3.2 oz)   Height: 5' 11\" (1.803 m)           Physical Exam  Physical Exam  Constitutional:       Appearance: Normal appearance.   HENT:      Head: Normocephalic and atraumatic.      Right Ear: External ear normal.      Left Ear: External ear normal.      Nose: Nose normal.   Eyes:      General: No scleral icterus.     Conjunctiva/sclera: Conjunctivae normal.   Cardiovascular:      Pulses: Normal pulses.   Pulmonary:      Effort: Pulmonary effort is normal.   Musculoskeletal:         General: Normal range of motion.      Cervical back: Normal range of motion.   Neurological:      General: No focal deficit present.      Mental Status: He is alert and oriented to person, place, and time.   Psychiatric:         Mood and Affect: Mood normal.         Behavior: Behavior normal.         Thought Content: Thought content normal.         Judgment: Judgment normal.           Results  No results found for this or any previous visit (from the past " hour).]  Lab Results   Component Value Date    PSA 11.851 (H) 10/03/2024    PSA 9.3 (H) 01/05/2024    PSA 23.25 (H) 12/11/2023     Lab Results   Component Value Date    CALCIUM 8.9 11/05/2024    K 3.8 11/05/2024    CO2 25 11/05/2024     11/05/2024    BUN 9 11/05/2024    CREATININE 1.00 11/05/2024     Lab Results   Component Value Date    WBC 5.13 11/05/2024    HGB 14.3 11/05/2024    HCT 44.6 11/05/2024    MCV 97 11/05/2024     11/05/2024           Orders  No orders of the defined types were placed in this encounter.      Becky Snyder

## 2024-11-25 ENCOUNTER — OFFICE VISIT (OUTPATIENT)
Dept: UROLOGY | Facility: CLINIC | Age: 59
End: 2024-11-25
Payer: COMMERCIAL

## 2024-11-25 VITALS
HEART RATE: 66 BPM | BODY MASS INDEX: 37.97 KG/M2 | DIASTOLIC BLOOD PRESSURE: 72 MMHG | OXYGEN SATURATION: 95 % | RESPIRATION RATE: 18 BRPM | HEIGHT: 71 IN | TEMPERATURE: 97.5 F | WEIGHT: 271.2 LBS | SYSTOLIC BLOOD PRESSURE: 130 MMHG

## 2024-11-25 DIAGNOSIS — R97.20 ELEVATED PSA: Primary | ICD-10-CM

## 2024-11-25 PROBLEM — E08.49 DIABETES DUE TO UNDRL CONDITION W OTH DIABETIC NEURO COMP (HCC): Status: ACTIVE | Noted: 2024-11-25

## 2024-11-25 PROCEDURE — 99213 OFFICE O/P EST LOW 20 MIN: CPT | Performed by: PHYSICIAN ASSISTANT

## 2024-11-26 ENCOUNTER — ANESTHESIA EVENT (OUTPATIENT)
Dept: NON INVASIVE DIAGNOSTICS | Facility: HOSPITAL | Age: 59
End: 2024-11-26
Payer: COMMERCIAL

## 2024-11-26 NOTE — DISCHARGE INSTR - AVS FIRST PAGE
LOOP RECORDER REMOVAL:  Do not use lotions/powders/creams on incision. Remove outer bandage 48 hours after procedure - if present, leave suture in place and they will be removed at 2 week follow up appointment. Please keep incision dry for the first first week - either keeping incision out of direct shower water or placing over the counter waterproof bandages over top when showering. Please call the office [phone] if you notice redness, swelling, bleeding, or drainage from incision or if you develop fevers.    PACEMAKER INSTRUCTIONS:  Please refer to post pacemaker implantation discharge instructions and restrictions and your pacemaker booklet/temporary card.     Keep incision dry for one week. Leave outer bandage in place for 1 week - it is water proof, and as long as it is fully adhered to your skin you may shower with it.  If it appears as though the bandage is coming off and/or there is any communication to the area of device incision, please then keep the whole area dry for the remaining week.  After 1 week, please remove by pulling all edges away from the center of the bandage. After the bandage is removed, you may then shower normally and get the area wet with soap and water, no scrubbing, and pat dry. Do not use lotions/powders/creams on incision.    No overhead reaching/pushing/pulling/lifting greater than 5-10lbs with left arm for six weeks. Please call the office if you notice redness, swelling, bleeding, or drainage from incision or if you develop fevers.       AFTER PACEMAKER CARE:    If you have any questions, please call [phone] to speak with a nurse (8:30am-4pm, or [phone] after hours). For appointments, please call [phone].      WHAT YOU SHOULD KNOW:   A pacemaker is a small, battery-powered device that is placed under your skin in your upper chest area with wires placed through a vein that lead directly into the heart. It helps regulate your heart rate and prevent your heart  from beating too slowly.                 AFTER YOU LEAVE:     Medicines:     Pain medicine:  You may need medicine to take away or decrease pain.     Learn how to take your medicine. Ask what medicine and how much you should take. Be sure you know how, when, and how often to take it. Usually Over the counter pain medicine is sufficient to control pain (Acetominophen or Ibuprofen) Ask your doctor if you may take these. If this does not control your pain, narcotic pain killers may be prescribed, please call if you need prescription.     Do not wait until the pain is severe before you take your medicine. Tell caregivers if your pain does not decrease.    Pain medicine can make you dizzy or sleepy. Prevent falls by calling someone when you get out of bed or if you need help.        Take your medicine as directed.  Call your healthcare provider if you think your medicine is not helping or if you have side effects. Tell him if you are allergic to any medicine.    Follow up with your cardiologist after your procedure:  You will need a follow-up visit approximately 2 weeks after you leave the hospital. Your cardiologist will check your wound and make sure that your pacemaker is working correctly.     Follow the instructions to check your pacemaker:  Your cardiologist or primary healthcare provider will check your pacemaker and the battery regularly.  He will use a computer to check your pacemaker over the telephone or wireless device which will be given to you.     Pacemaker batteries usually last 8 to 10 years. The pacemaker unit will be replaced when the battery gets low. This is a simpler procedure than the original one to implant your pacemaker.    Wound care:  Keep your incision dry for one week.  Do not use lotions/powders/creams on incision.     Leave outer bandage in place for 1 week - it is water proof, and as long as it is fully adhered to your skin you may shower with it.  If it appears as though the bandage is  coming off and/or there is any communication to the area of device incision, please then keep the whole area dry for the remaining week.  After 1 week, please remove by pulling all edges away from the center of the bandage.    Please call the office if you notice redness, swelling, bleeding, or drainage from incision or if you develop fevers.       Activity:   Arm movement and lifting:  Be careful using the arm on the side of your pacemaker. Do not move your arm for the first 24 hours after your procedure. Do not  lift your arm above your shoulder or lift more than 10 pounds for one month after your procedure. Avoid pushing, pulling, or repetitive arm movements for 6 weeks. This helps the leads stay in place and helps your wound heal. Ask your caregiver when you can drive after your procedure. You may move your arm side to side without lifting above your shoulder, and do not need to wear a sling at home.   Driving: you are ok to drive 48 hours after pacemaker is implanted   Sports:  Ask your caregiver when it is okay to play tennis, golf, basketball, or any sport that requires you to lift your arms. Do not play full contact sports, such as football, that could damage your pacemaker. Ask your cardiologist or primary healthcare provider how much and what kinds of physical activity are safe for you.    Living with a pacemaker:   Tell all caregivers you have a pacemaker:  This includes surgeons, radiologists, and medical technicians. You may want to wear a medical alert ID bracelet or necklace that states that you have a pacemaker.    Carry your pacemaker ID card:  Make sure you receive a pacemaker ID card. Carry it with you at all times. It lists important information about your pacemaker. Show it to airport security if you travel.     Avoid electrical interference:  Avoid welding equipment and other equipment with large magnets or electric fields. These things could interfere with how your pacemaker works. Use your  cell phone on the ear opposite from your pacemaker. Do not carry your cell phone in your shirt pocket over your chest.     Some Pacemakers are MRI safe. Ask you doctor if it is safe to proceed with MRI and let the radiologist and staff know you have a pacemaker.     Do not touch the skin around your pacemaker:  This can cause damage to the lead wires or move the pacemaker unit from where it should be.    Contact your cardiologist or primary healthcare provider if:   The area around your pacemaker has increasing amount of pain after surgery. The pain should improve over first few days after implantation.     The skin around your stitches has increasing redness, swelling, or has drainage. This may mean that you have an infection.     You have a fever.     You have chills, a cough, and feel weak or achy. These are also signs of infection.    Your feet or ankles are more swollen than your baseline.     Your Heart rate is less than 50 beats per minute     Seek care immediately if:   Your bandage becomes soaked with blood.     Your pacemaker is swelling rapidly    Your stitches open up.     You feel your heart suddenly beating very slowly or quickly.    You become too weak or dizzy to stand, or you pass out.     Your arm or leg feels warm, tender, and painful. It may look swollen and red.    You have chest pain that does not go away with rest or medicine.     You feel lightheaded, short of breath, and have chest pain.     You cough up blood.        © 2014 [employer]. Information is for End User's use only and may not be sold, redistributed or otherwise used for commercial purposes. All illustrations and images included in CareNotes® are the copyrighted property of [employer].D[employer] or [employer].  The above information is an  only. It is not intended as medical advice for individual conditions or treatments. Talk to your doctor, nurse or pharmacist before following any medical  regimen to see if it is safe and effective for you.

## 2024-11-27 ENCOUNTER — APPOINTMENT (OUTPATIENT)
Dept: RADIOLOGY | Facility: HOSPITAL | Age: 59
End: 2024-11-27
Payer: COMMERCIAL

## 2024-11-27 ENCOUNTER — HOSPITAL ENCOUNTER (OUTPATIENT)
Facility: HOSPITAL | Age: 59
Setting detail: OUTPATIENT SURGERY
Discharge: HOME | End: 2024-11-27
Attending: INTERNAL MEDICINE | Admitting: INTERNAL MEDICINE
Payer: COMMERCIAL

## 2024-11-27 ENCOUNTER — ANESTHESIA (OUTPATIENT)
Dept: NON INVASIVE DIAGNOSTICS | Facility: HOSPITAL | Age: 59
End: 2024-11-27
Payer: COMMERCIAL

## 2024-11-27 VITALS
RESPIRATION RATE: 18 BRPM | HEIGHT: 71 IN | HEART RATE: 69 BPM | DIASTOLIC BLOOD PRESSURE: 73 MMHG | TEMPERATURE: 97.9 F | BODY MASS INDEX: 37.8 KG/M2 | WEIGHT: 270 LBS | OXYGEN SATURATION: 97 % | SYSTOLIC BLOOD PRESSURE: 121 MMHG

## 2024-11-27 DIAGNOSIS — R00.1 SINUS BRADYCARDIA: ICD-10-CM

## 2024-11-27 DIAGNOSIS — I45.5 SINUS PAUSE: ICD-10-CM

## 2024-11-27 DIAGNOSIS — Z87.898 HISTORY OF SYNCOPE: ICD-10-CM

## 2024-11-27 LAB
ANION GAP SERPL CALCULATED.3IONS-SCNC: 7 MMOL/L (ref 4–13)
ATRIAL RATE: 72 BPM
ATRIAL RATE: 73 BPM
BUN SERPL-MCNC: 9 MG/DL (ref 5–25)
CALCIUM SERPL-MCNC: 8.6 MG/DL (ref 8.4–10.2)
CHLORIDE SERPL-SCNC: 107 MMOL/L (ref 96–108)
CO2 SERPL-SCNC: 26 MMOL/L (ref 21–32)
CREAT SERPL-MCNC: 0.96 MG/DL (ref 0.6–1.3)
ERYTHROCYTE [DISTWIDTH] IN BLOOD BY AUTOMATED COUNT: 13.7 % (ref 11.6–15.1)
GFR SERPL CREATININE-BSD FRML MDRD: 86 ML/MIN/1.73SQ M
GLUCOSE P FAST SERPL-MCNC: 90 MG/DL (ref 65–99)
GLUCOSE SERPL-MCNC: 90 MG/DL (ref 65–140)
HCT VFR BLD AUTO: 43.9 % (ref 36.5–49.3)
HGB BLD-MCNC: 14.6 G/DL (ref 12–17)
INR PPP: 1 (ref 0.85–1.19)
MCH RBC QN AUTO: 31.1 PG (ref 26.8–34.3)
MCHC RBC AUTO-ENTMCNC: 33.3 G/DL (ref 31.4–37.4)
MCV RBC AUTO: 94 FL (ref 82–98)
P AXIS: 54 DEGREES
P AXIS: 64 DEGREES
PLATELET # BLD AUTO: 266 THOUSANDS/UL (ref 149–390)
PMV BLD AUTO: 9.8 FL (ref 8.9–12.7)
POTASSIUM SERPL-SCNC: 3.7 MMOL/L (ref 3.5–5.3)
PR INTERVAL: 204 MS
PR INTERVAL: 222 MS
PROTHROMBIN TIME: 13.5 SECONDS (ref 12.3–15)
QRS AXIS: -12 DEGREES
QRS AXIS: -17 DEGREES
QRSD INTERVAL: 80 MS
QRSD INTERVAL: 92 MS
QT INTERVAL: 370 MS
QT INTERVAL: 388 MS
QTC INTERVAL: 407 MS
QTC INTERVAL: 425 MS
RBC # BLD AUTO: 4.69 MILLION/UL (ref 3.88–5.62)
SODIUM SERPL-SCNC: 140 MMOL/L (ref 135–147)
T WAVE AXIS: 102 DEGREES
T WAVE AXIS: 54 DEGREES
VENTRICULAR RATE: 72 BPM
VENTRICULAR RATE: 73 BPM
WBC # BLD AUTO: 4.48 THOUSAND/UL (ref 4.31–10.16)

## 2024-11-27 PROCEDURE — 33286 RMVL SUBQ CAR RHYTHM MNTR: CPT | Performed by: INTERNAL MEDICINE

## 2024-11-27 PROCEDURE — 85610 PROTHROMBIN TIME: CPT | Performed by: PHYSICIAN ASSISTANT

## 2024-11-27 PROCEDURE — C1898 LEAD, PMKR, OTHER THAN TRANS: HCPCS | Performed by: INTERNAL MEDICINE

## 2024-11-27 PROCEDURE — 93010 ELECTROCARDIOGRAM REPORT: CPT | Performed by: INTERNAL MEDICINE

## 2024-11-27 PROCEDURE — 85027 COMPLETE CBC AUTOMATED: CPT | Performed by: PHYSICIAN ASSISTANT

## 2024-11-27 PROCEDURE — C1785 PMKR, DUAL, RATE-RESP: HCPCS | Performed by: INTERNAL MEDICINE

## 2024-11-27 PROCEDURE — 93005 ELECTROCARDIOGRAM TRACING: CPT

## 2024-11-27 PROCEDURE — C1892 INTRO/SHEATH,FIXED,PEEL-AWAY: HCPCS | Performed by: INTERNAL MEDICINE

## 2024-11-27 PROCEDURE — 80048 BASIC METABOLIC PNL TOTAL CA: CPT | Performed by: PHYSICIAN ASSISTANT

## 2024-11-27 PROCEDURE — C1769 GUIDE WIRE: HCPCS | Performed by: INTERNAL MEDICINE

## 2024-11-27 PROCEDURE — 71045 X-RAY EXAM CHEST 1 VIEW: CPT

## 2024-11-27 PROCEDURE — 33208 INSRT HEART PM ATRIAL & VENT: CPT | Performed by: INTERNAL MEDICINE

## 2024-11-27 PROCEDURE — C1887 CATHETER, GUIDING: HCPCS | Performed by: INTERNAL MEDICINE

## 2024-11-27 DEVICE — LEAD 3830 US MKT/ 69CM MRI LBBAP
Type: IMPLANTABLE DEVICE | Site: HEART | Status: FUNCTIONAL
Brand: SELECTSECURE™ MRI SURESCAN™

## 2024-11-27 DEVICE — ENVELOPE CMRM6122 ABSORB MED MR
Type: IMPLANTABLE DEVICE | Site: CHEST  WALL | Status: FUNCTIONAL
Brand: TYRX™

## 2024-11-27 DEVICE — IPG W1DR01 AZURE XT DR MRI USA
Type: IMPLANTABLE DEVICE | Site: CHEST  WALL | Status: FUNCTIONAL
Brand: AZURE™ XT DR MRI SURESCAN™

## 2024-11-27 DEVICE — LEAD 457453 MRI US BI RCMCRD MVC
Type: IMPLANTABLE DEVICE | Site: HEART | Status: FUNCTIONAL
Brand: CAPSURE SENSE MRI™ SURESCAN™

## 2024-11-27 RX ORDER — LIDOCAINE HYDROCHLORIDE 10 MG/ML
INJECTION, SOLUTION EPIDURAL; INFILTRATION; INTRACAUDAL; PERINEURAL CODE/TRAUMA/SEDATION MEDICATION
Status: DISCONTINUED | OUTPATIENT
Start: 2024-11-27 | End: 2024-11-27 | Stop reason: HOSPADM

## 2024-11-27 RX ORDER — FENTANYL CITRATE/PF 50 MCG/ML
25 SYRINGE (ML) INJECTION
Status: DISCONTINUED | OUTPATIENT
Start: 2024-11-27 | End: 2024-11-27 | Stop reason: HOSPADM

## 2024-11-27 RX ORDER — PROPOFOL 10 MG/ML
INJECTION, EMULSION INTRAVENOUS AS NEEDED
Status: DISCONTINUED | OUTPATIENT
Start: 2024-11-27 | End: 2024-11-27

## 2024-11-27 RX ORDER — LIDOCAINE HYDROCHLORIDE 10 MG/ML
INJECTION, SOLUTION EPIDURAL; INFILTRATION; INTRACAUDAL; PERINEURAL AS NEEDED
Status: DISCONTINUED | OUTPATIENT
Start: 2024-11-27 | End: 2024-11-27

## 2024-11-27 RX ORDER — ONDANSETRON 2 MG/ML
4 INJECTION INTRAMUSCULAR; INTRAVENOUS ONCE AS NEEDED
Status: DISCONTINUED | OUTPATIENT
Start: 2024-11-27 | End: 2024-11-27 | Stop reason: HOSPADM

## 2024-11-27 RX ORDER — GENTAMICIN 40 MG/ML
INJECTION, SOLUTION INTRAMUSCULAR; INTRAVENOUS CODE/TRAUMA/SEDATION MEDICATION
Status: DISCONTINUED | OUTPATIENT
Start: 2024-11-27 | End: 2024-11-27 | Stop reason: HOSPADM

## 2024-11-27 RX ORDER — SODIUM CHLORIDE 9 MG/ML
INJECTION, SOLUTION INTRAVENOUS CONTINUOUS PRN
Status: DISCONTINUED | OUTPATIENT
Start: 2024-11-27 | End: 2024-11-27

## 2024-11-27 RX ORDER — ACETAMINOPHEN 325 MG/1
650 TABLET ORAL EVERY 4 HOURS PRN
Status: CANCELLED | OUTPATIENT
Start: 2024-11-27

## 2024-11-27 RX ORDER — CEFAZOLIN SODIUM 2 G/50ML
2000 SOLUTION INTRAVENOUS ONCE
Status: COMPLETED | OUTPATIENT
Start: 2024-11-27 | End: 2024-11-27

## 2024-11-27 RX ORDER — ONDANSETRON 2 MG/ML
INJECTION INTRAMUSCULAR; INTRAVENOUS AS NEEDED
Status: DISCONTINUED | OUTPATIENT
Start: 2024-11-27 | End: 2024-11-27

## 2024-11-27 RX ORDER — SODIUM CHLORIDE 9 MG/ML
20 INJECTION, SOLUTION INTRAVENOUS CONTINUOUS
Status: DISCONTINUED | OUTPATIENT
Start: 2024-11-27 | End: 2024-11-27 | Stop reason: HOSPADM

## 2024-11-27 RX ORDER — FENTANYL CITRATE 50 UG/ML
INJECTION, SOLUTION INTRAMUSCULAR; INTRAVENOUS AS NEEDED
Status: DISCONTINUED | OUTPATIENT
Start: 2024-11-27 | End: 2024-11-27

## 2024-11-27 RX ADMIN — LIDOCAINE HYDROCHLORIDE 100 MG: 10 INJECTION, SOLUTION EPIDURAL; INFILTRATION; INTRACAUDAL; PERINEURAL at 10:53

## 2024-11-27 RX ADMIN — SODIUM CHLORIDE 20 ML/HR: 0.9 INJECTION, SOLUTION INTRAVENOUS at 08:43

## 2024-11-27 RX ADMIN — ONDANSETRON 4 MG: 2 INJECTION INTRAMUSCULAR; INTRAVENOUS at 11:11

## 2024-11-27 RX ADMIN — FENTANYL CITRATE 25 MCG: 50 INJECTION INTRAMUSCULAR; INTRAVENOUS at 11:17

## 2024-11-27 RX ADMIN — SODIUM CHLORIDE: 0.9 INJECTION, SOLUTION INTRAVENOUS at 11:49

## 2024-11-27 RX ADMIN — SODIUM CHLORIDE: 0.9 INJECTION, SOLUTION INTRAVENOUS at 10:48

## 2024-11-27 RX ADMIN — CEFAZOLIN SODIUM 2000 MG: 2 SOLUTION INTRAVENOUS at 10:58

## 2024-11-27 RX ADMIN — CEFAZOLIN SODIUM 1000 MG: 2 SOLUTION INTRAVENOUS at 11:05

## 2024-11-27 RX ADMIN — PROPOFOL 150 MG: 10 INJECTION, EMULSION INTRAVENOUS at 10:53

## 2024-11-27 NOTE — ANESTHESIA POSTPROCEDURE EVALUATION
Post-Op Assessment Note    CV Status:  Stable  Pain Score: 0    Pain management: adequate       Mental Status:  Alert and awake   Hydration Status:  Euvolemic   PONV Controlled:  Controlled   Airway Patency:  Patent     Post Op Vitals Reviewed: Yes    No anethesia notable event occurred.    Staff: CRNA           Last Filed PACU Vitals:  Vitals Value Taken Time   Temp 97.5    Pulse 82    /75    Resp 20    SpO2 93 ra        Modified Nora:  No data recorded

## 2024-11-27 NOTE — INTERVAL H&P NOTE
H&P reviewed. After examining the patient I find no changes in the patients condition since the H&P had been written.    Vitals:    11/27/24 0835   BP: 141/78   Pulse: 73   Resp: 16   Temp: 97.8 °F (36.6 °C)   SpO2: 98%     Randal is a 58-year-old male with past medical history of hypertension obesity, and syncope for which she had loop recorder implanted for further evaluation of rhythm disturbance.  Patient had episode of slow heart rate with associated lightheadedness forcing him to sit down.  Loop recorder was interrogated and showed 4-second pause with no conducted P waves.  He does have longstanding sinus bradycardia as well but is currently not on any AV kathy blockers and does not have any indication for these.  He had discussion with Dr. Newton in the office on 11/15 regarding bradycardia and need for dual pacemaker implantation for which she was agreeable.  He presents today to undergo this procedure with Dr. Parra.

## 2024-11-27 NOTE — Clinical Note
HIS GUIDE REMOVED VIA SLITTER VTAMA Counseling: I discussed with the patient that VTAMA is not for use in the eyes, mouth or mouth. They should call the office if they develop any signs of allergic reactions to VTAMA. The patient verbalized understanding of the proper use and possible adverse effects of VTAMA.  All of the patient's questions and concerns were addressed.

## 2024-11-27 NOTE — ANESTHESIA PREPROCEDURE EVALUATION
Procedure:  Cardiac pacer implant dual chamber (Chest)  Cardiac loop recorder explant (Chest)    Relevant Problems   ANESTHESIA (within normal limits)      CARDIO (within normal limits)      ENDO (within normal limits)      GI/HEPATIC   (+) Gastroesophageal reflux disease without esophagitis   (+) Liver lesion      /RENAL (within normal limits)      GYN (within normal limits)      HEMATOLOGY (within normal limits)      MUSCULOSKELETAL (within normal limits)      NEURO/PSYCH (within normal limits)      PULMONARY   (+) Chronic Obstructive Pulmonary Disease   (+) MARIANA (obstructive sleep apnea)   (+) Shortness of breath        Physical Exam    Airway  Comment: Large neck  Mallampati score: III  TM Distance: >3 FB  Neck ROM: full     Dental        Cardiovascular  Rhythm: regular, Rate: normal, Cardiovascular exam normal    Pulmonary  Pulmonary exam normal Breath sounds clear to auscultation    Other Findings        Anesthesia Plan  ASA Score- 3     Anesthesia Type- general with ASA Monitors.         Additional Monitors:     Airway Plan: LMA.           Plan Factors-Exercise tolerance (METS): >4 METS.    Chart reviewed. EKG reviewed. Imaging results reviewed. Existing labs reviewed. Patient summary reviewed.          Obstructive sleep apnea risk education given perioperatively.        Induction- intravenous.    Postoperative Plan-     Perioperative Resuscitation Plan - Level 1 - Full Code.       Informed Consent- Anesthetic plan and risks discussed with patient.  I personally reviewed this patient with the CRNA. Discussed and agreed on the Anesthesia Plan with the CRNA..

## 2024-11-27 NOTE — Clinical Note
The PACER GENERATOR MORENA XT DR MILANA DOWELL - [id number] device was inserted. The leads were placed into the connector and visually verified to be in correct position. Injury current obtained.

## 2024-11-29 NOTE — ANESTHESIA POSTPROCEDURE EVALUATION
Post-Op Assessment Note    CV Status:  Stable    Pain management: adequate       Mental Status:  Alert and awake   Hydration Status:  Euvolemic   PONV Controlled:  Controlled   Airway Patency:  Patent     Post Op Vitals Reviewed: Yes    No anethesia notable event occurred.    Staff: Anesthesiologist, CRNA           Last Filed PACU Vitals:  Vitals Value Taken Time   Temp 97.5 °F (36.4 °C) 11/27/24 1215   Pulse 70 11/27/24 1319   /81 11/27/24 1315   Resp 22 11/27/24 1319   SpO2 90 % 11/27/24 1319   Vitals shown include unfiled device data.    Modified Nora:  Activity: 2 (11/27/2024  1:15 PM)  Respiration: 2 (11/27/2024  1:15 PM)  Circulation: 2 (11/27/2024  1:15 PM)  Consciousness: 2 (11/27/2024  1:15 PM)  Oxygen Saturation: 1 (11/27/2024  1:15 PM)  Modified Nora Score: 9 (11/27/2024  1:15 PM)

## 2024-12-03 ENCOUNTER — RA CDI HCC (OUTPATIENT)
Dept: OTHER | Facility: HOSPITAL | Age: 59
End: 2024-12-03

## 2024-12-03 ENCOUNTER — TELEPHONE (OUTPATIENT)
Dept: FAMILY MEDICINE CLINIC | Facility: CLINIC | Age: 59
End: 2024-12-03

## 2024-12-03 DIAGNOSIS — E78.2 MIXED HYPERLIPIDEMIA: Primary | ICD-10-CM

## 2024-12-03 DIAGNOSIS — E11.9 TYPE II DIABETES MELLITUS, WELL CONTROLLED (HCC): ICD-10-CM

## 2024-12-03 DIAGNOSIS — Z87.898 HISTORY OF NOCTURIA: ICD-10-CM

## 2024-12-03 NOTE — TELEPHONE ENCOUNTER
Pt presented to office, regarding his labs to be drawn before his appointment. Per Kalyani pt to completed PSA, Lipid and will add an U/A. Pt will obtain labs tomorrow.

## 2024-12-04 ENCOUNTER — APPOINTMENT (OUTPATIENT)
Dept: LAB | Facility: MEDICAL CENTER | Age: 59
End: 2024-12-04
Payer: COMMERCIAL

## 2024-12-04 DIAGNOSIS — E78.2 MIXED HYPERLIPIDEMIA: ICD-10-CM

## 2024-12-04 DIAGNOSIS — Z87.898 HISTORY OF NOCTURIA: ICD-10-CM

## 2024-12-04 DIAGNOSIS — E11.9 TYPE II DIABETES MELLITUS, WELL CONTROLLED (HCC): ICD-10-CM

## 2024-12-04 LAB
BACTERIA UR QL AUTO: ABNORMAL /HPF
BILIRUB UR QL STRIP: NEGATIVE
CHOLEST SERPL-MCNC: 185 MG/DL (ref ?–200)
CLARITY UR: CLEAR
COLOR UR: YELLOW
CREAT UR-MCNC: 240 MG/DL
GLUCOSE UR STRIP-MCNC: NEGATIVE MG/DL
HDLC SERPL-MCNC: 41 MG/DL
HGB UR QL STRIP.AUTO: NEGATIVE
KETONES UR STRIP-MCNC: NEGATIVE MG/DL
LDLC SERPL CALC-MCNC: 118 MG/DL (ref 0–100)
LEUKOCYTE ESTERASE UR QL STRIP: NEGATIVE
MICROALBUMIN UR-MCNC: 9.5 MG/L
MICROALBUMIN/CREAT 24H UR: 4 MG/G CREATININE (ref 0–30)
MUCOUS THREADS UR QL AUTO: ABNORMAL
NITRITE UR QL STRIP: NEGATIVE
NON-SQ EPI CELLS URNS QL MICRO: ABNORMAL /HPF
NONHDLC SERPL-MCNC: 144 MG/DL
PH UR STRIP.AUTO: 6 [PH]
PROT UR STRIP-MCNC: ABNORMAL MG/DL
RBC #/AREA URNS AUTO: ABNORMAL /HPF
SP GR UR STRIP.AUTO: 1.03 (ref 1–1.03)
TRIGL SERPL-MCNC: 128 MG/DL (ref ?–150)
UROBILINOGEN UR STRIP-ACNC: <2 MG/DL
WBC #/AREA URNS AUTO: ABNORMAL /HPF

## 2024-12-04 PROCEDURE — 82570 ASSAY OF URINE CREATININE: CPT

## 2024-12-04 PROCEDURE — 80061 LIPID PANEL: CPT

## 2024-12-04 PROCEDURE — 81001 URINALYSIS AUTO W/SCOPE: CPT

## 2024-12-04 PROCEDURE — 36415 COLL VENOUS BLD VENIPUNCTURE: CPT

## 2024-12-04 PROCEDURE — 84153 ASSAY OF PSA TOTAL: CPT

## 2024-12-04 PROCEDURE — 82043 UR ALBUMIN QUANTITATIVE: CPT

## 2024-12-09 LAB — MISCELLANEOUS LAB TEST RESULT: NORMAL

## 2024-12-10 ENCOUNTER — OFFICE VISIT (OUTPATIENT)
Dept: FAMILY MEDICINE CLINIC | Facility: CLINIC | Age: 59
End: 2024-12-10
Payer: COMMERCIAL

## 2024-12-10 VITALS
DIASTOLIC BLOOD PRESSURE: 70 MMHG | SYSTOLIC BLOOD PRESSURE: 130 MMHG | WEIGHT: 268 LBS | RESPIRATION RATE: 18 BRPM | HEART RATE: 66 BPM | TEMPERATURE: 97.7 F | OXYGEN SATURATION: 98 % | BODY MASS INDEX: 37.52 KG/M2 | HEIGHT: 71 IN

## 2024-12-10 DIAGNOSIS — Z23 ENCOUNTER FOR IMMUNIZATION: ICD-10-CM

## 2024-12-10 DIAGNOSIS — E11.9 TYPE 2 DIABETES MELLITUS IN REMISSION (HCC): ICD-10-CM

## 2024-12-10 DIAGNOSIS — K21.9 GASTROESOPHAGEAL REFLUX DISEASE WITHOUT ESOPHAGITIS: Primary | ICD-10-CM

## 2024-12-10 DIAGNOSIS — R06.02 SHORTNESS OF BREATH: ICD-10-CM

## 2024-12-10 DIAGNOSIS — J43.9 PULMONARY EMPHYSEMA, UNSPECIFIED EMPHYSEMA TYPE (HCC): ICD-10-CM

## 2024-12-10 DIAGNOSIS — R06.02 SHORTNESS OF BREATH ON EXERTION: ICD-10-CM

## 2024-12-10 DIAGNOSIS — G47.33 OSA (OBSTRUCTIVE SLEEP APNEA): ICD-10-CM

## 2024-12-10 DIAGNOSIS — E66.01 OBESITY, MORBID (HCC): ICD-10-CM

## 2024-12-10 PROBLEM — E08.49 DIABETES DUE TO UNDRL CONDITION W OTH DIABETIC NEURO COMP (HCC): Status: RESOLVED | Noted: 2024-11-25 | Resolved: 2024-12-10

## 2024-12-10 LAB — SL AMB POCT HEMOGLOBIN AIC: 5.6 (ref ?–6.5)

## 2024-12-10 PROCEDURE — 99214 OFFICE O/P EST MOD 30 MIN: CPT | Performed by: NURSE PRACTITIONER

## 2024-12-10 PROCEDURE — 90471 IMMUNIZATION ADMIN: CPT | Performed by: NURSE PRACTITIONER

## 2024-12-10 PROCEDURE — 83036 HEMOGLOBIN GLYCOSYLATED A1C: CPT | Performed by: NURSE PRACTITIONER

## 2024-12-10 PROCEDURE — 90673 RIV3 VACCINE NO PRESERV IM: CPT | Performed by: NURSE PRACTITIONER

## 2024-12-10 PROCEDURE — G0008 ADMIN INFLUENZA VIRUS VAC: HCPCS | Performed by: NURSE PRACTITIONER

## 2024-12-10 RX ORDER — FAMOTIDINE 40 MG/1
40 TABLET, FILM COATED ORAL DAILY
Qty: 100 TABLET | Refills: 1 | Status: SHIPPED | OUTPATIENT
Start: 2024-12-10

## 2024-12-10 RX ORDER — FUROSEMIDE 40 MG/1
20 TABLET ORAL DAILY
Qty: 90 TABLET | Refills: 3 | Status: SHIPPED | OUTPATIENT
Start: 2024-12-10

## 2024-12-10 NOTE — ASSESSMENT & PLAN NOTE
Stable.  Currently managed by pulmonology.  Patient is on Breztri tolerating it very well physical assessment today demonstrates that he does have good lung sounds throughout.  Will continue and keep all follow-up appointments with pulmonology.    Orders:    CBC and differential; Future    Comprehensive metabolic panel; Future    Lipid panel; Future    UA w Reflex to Microscopic w Reflex to Culture; Future

## 2024-12-10 NOTE — PROGRESS NOTES
"Name: Randal Romeo      : 1965      MRN: [id number]  Encounter Provider: RAY Redmond  Encounter Date: 12/10/2024   Encounter department: [facility]  :  Assessment & Plan  Gastroesophageal reflux disease without esophagitis  Well-controlled by the Pepcid.  As he states this is all it controls his stomach acid.  I refilled at current dose 40 mg to be taken daily.    Orders:    famotidine (PEPCID) 40 MG tablet; Take 1 tablet (40 mg total) by mouth daily    CBC and differential; Future    Comprehensive metabolic panel; Future    Type 2 diabetes mellitus in remission (HCC)    No results found for: \"HGBA1C\"    Orders:    POCT hemoglobin A1c    Obesity, morbid (HCC)  Patient's BMI is in the morbidly obese range.  That was discussed today.  Patient is advised to watch calorie intake and increase his daily activity is recommended that he gets at least 20 minutes of aerobic physical activity per day.  Patient currently is recovering from pacemaker placement.  So advised to start this after his follow-up appointment with cardiology..  It should be noted patient is currently a diabetes in remission we will continue to monitor A1c on an as-needed basis.    Orders:    CBC and differential; Future    Comprehensive metabolic panel; Future    Lipid panel; Future    Pulmonary emphysema, unspecified emphysema type (HCC)  Stable.  Currently managed by pulmonology.  Patient is on Breztri tolerating it very well physical assessment today demonstrates that he does have good lung sounds throughout.  Will continue and keep all follow-up appointments with pulmonology.    Orders:    CBC and differential; Future    Comprehensive metabolic panel; Future    Lipid panel; Future    UA w Reflex to Microscopic w Reflex to Culture; Future    Shortness of breath on exertion    Orders:    furosemide (LASIX) 40 mg tablet; Take 0.5 tablets (20 mg total) by mouth daily    MARIANA (obstructive sleep apnea)  Continues " "to use CPAP as prescribed.         Shortness of breath  Currently was to chamber pacemaker November 2024 does have a follow-up appointment with cardiology for pacemaker check they will continue to manage and monitor.  Patient does take the Lasix as prescribed is well-tolerated.  Electrolytes checked found to be within normal limits.         Physical assessment unremarkable. Labs reviewed and all questions answered also VS were well controlled. Labs given is to complete in 6 mos for possible fu discussion. Dosing all possible side effects of the prescribed medications or medications that had been prescribed in the past were reviewed and all questions were answered.  Patient verbalized agreement and understanding of the plan of care as outlined during the office visit today return to office as indicated or sooner if a problem arises. RTO as needed or for next scheduled appt. All questions answered.         History of Present Illness       Patient is here for routine follow-up.  To review most recent labs.  To also discuss current state of health and any new problems that they may be experiencing.  Patient states that medications taken as prescribed and very well tolerated no new complaints at this time.          Review of Systems   Constitutional:  Negative for appetite change and fever.   HENT:  Negative for congestion and trouble swallowing.    Respiratory:  Negative for shortness of breath.    Cardiovascular:  Negative for chest pain.   Gastrointestinal:  Negative for abdominal pain.   Genitourinary:  Negative for difficulty urinating.   Musculoskeletal:  Negative for myalgias.   Neurological:  Negative for dizziness.   Psychiatric/Behavioral:  The patient is not nervous/anxious.        Objective   /70 (BP Location: Left arm, Patient Position: Sitting, Cuff Size: Large)   Pulse 66   Temp 97.7 °F (36.5 °C) (Temporal)   Resp 18   Ht 5' 10.5\" (1.791 m)   Wt 122 kg (268 lb)   SpO2 98%   BMI 37.91 kg/m²    "   Physical Exam  Constitutional:       Appearance: Normal appearance. He is obese.   HENT:      Head: Normocephalic.      Right Ear: Tympanic membrane and external ear normal.      Left Ear: Tympanic membrane and external ear normal.      Nose: Nose normal.      Mouth/Throat:      Mouth: Mucous membranes are moist.      Pharynx: Oropharynx is clear.   Eyes:      Pupils: Pupils are equal, round, and reactive to light.   Cardiovascular:      Rate and Rhythm: Normal rate and regular rhythm.      Pulses: Normal pulses.      Heart sounds: Normal heart sounds.   Pulmonary:      Effort: Pulmonary effort is normal.      Breath sounds: Normal breath sounds.   Abdominal:      General: Bowel sounds are normal.      Palpations: Abdomen is soft.   Musculoskeletal:         General: Normal range of motion.      Cervical back: Neck supple.   Neurological:      General: No focal deficit present.      Mental Status: He is alert and oriented to person, place, and time.   Psychiatric:         Mood and Affect: Mood normal.         Behavior: Behavior normal.         Thought Content: Thought content normal.         Judgment: Judgment normal.          no

## 2024-12-10 NOTE — ASSESSMENT & PLAN NOTE
Patient's BMI is in the morbidly obese range.  That was discussed today.  Patient is advised to watch calorie intake and increase his daily activity is recommended that he gets at least 20 minutes of aerobic physical activity per day.  Patient currently is recovering from pacemaker placement.  So advised to start this after his follow-up appointment with cardiology..  It should be noted patient is currently a diabetes in remission we will continue to monitor A1c on an as-needed basis.    Orders:    CBC and differential; Future    Comprehensive metabolic panel; Future    Lipid panel; Future

## 2024-12-10 NOTE — ASSESSMENT & PLAN NOTE
Well-controlled by the Pepcid.  As he states this is all it controls his stomach acid.  I refilled at current dose 40 mg to be taken daily.    Orders:    famotidine (PEPCID) 40 MG tablet; Take 1 tablet (40 mg total) by mouth daily    CBC and differential; Future    Comprehensive metabolic panel; Future

## 2024-12-10 NOTE — ASSESSMENT & PLAN NOTE
Currently was to chamber pacemaker November 2024 does have a follow-up appointment with cardiology for pacemaker check they will continue to manage and monitor.  Patient does take the Lasix as prescribed is well-tolerated.  Electrolytes checked found to be within normal limits.

## 2024-12-15 ENCOUNTER — APPOINTMENT (EMERGENCY)
Dept: CT IMAGING | Facility: HOSPITAL | Age: 59
End: 2024-12-15
Payer: COMMERCIAL

## 2024-12-15 ENCOUNTER — NURSE TRIAGE (OUTPATIENT)
Dept: OTHER | Facility: OTHER | Age: 59
End: 2024-12-15

## 2024-12-15 ENCOUNTER — APPOINTMENT (EMERGENCY)
Dept: RADIOLOGY | Facility: HOSPITAL | Age: 59
End: 2024-12-15
Payer: COMMERCIAL

## 2024-12-15 ENCOUNTER — HOSPITAL ENCOUNTER (INPATIENT)
Facility: HOSPITAL | Age: 59
LOS: 2 days | Discharge: HOME | End: 2024-12-18
Attending: EMERGENCY MEDICINE | Admitting: INTERNAL MEDICINE
Payer: COMMERCIAL

## 2024-12-15 DIAGNOSIS — R78.81 BACTEREMIA DUE TO ENTEROBACTER SPECIES: ICD-10-CM

## 2024-12-15 DIAGNOSIS — R05.8 COUGH PRODUCTIVE OF YELLOW SPUTUM: ICD-10-CM

## 2024-12-15 DIAGNOSIS — B96.89 BACTEREMIA DUE TO ENTEROBACTER SPECIES: ICD-10-CM

## 2024-12-15 DIAGNOSIS — R82.998 URINE LEUKOCYTES: ICD-10-CM

## 2024-12-15 DIAGNOSIS — Z95.0 HX OF CARDIAC PACEMAKER: ICD-10-CM

## 2024-12-15 DIAGNOSIS — R78.81 BACTEREMIA: ICD-10-CM

## 2024-12-15 DIAGNOSIS — R65.10 SIRS (SYSTEMIC INFLAMMATORY RESPONSE SYNDROME) (HCC): ICD-10-CM

## 2024-12-15 DIAGNOSIS — R68.89 RIGORS: Primary | ICD-10-CM

## 2024-12-15 PROBLEM — R68.83 CHILLS: Status: ACTIVE | Noted: 2024-12-15

## 2024-12-15 PROBLEM — R82.71 ASYMPTOMATIC BACTERIURIA: Status: ACTIVE | Noted: 2024-12-15

## 2024-12-15 LAB
2HR DELTA HS TROPONIN: 1 NG/L
4HR DELTA HS TROPONIN: -1 NG/L
ALBUMIN SERPL BCG-MCNC: 4.1 G/DL (ref 3.5–5)
ALP SERPL-CCNC: 69 U/L (ref 34–104)
ALT SERPL W P-5'-P-CCNC: 35 U/L (ref 7–52)
ANION GAP SERPL CALCULATED.3IONS-SCNC: 9 MMOL/L (ref 4–13)
ANISOCYTOSIS BLD QL SMEAR: PRESENT
APTT PPP: 34 SECONDS (ref 23–34)
AST SERPL W P-5'-P-CCNC: 19 U/L (ref 13–39)
BACTERIA UR QL AUTO: ABNORMAL /HPF
BASOPHILS # BLD MANUAL: 0.18 THOUSAND/UL (ref 0–0.1)
BASOPHILS NFR MAR MANUAL: 2 % (ref 0–1)
BILIRUB SERPL-MCNC: 1.05 MG/DL (ref 0.2–1)
BILIRUB UR QL STRIP: NEGATIVE
BUN SERPL-MCNC: 12 MG/DL (ref 5–25)
CALCIUM SERPL-MCNC: 8.8 MG/DL (ref 8.4–10.2)
CARDIAC TROPONIN I PNL SERPL HS: 5 NG/L (ref ?–50)
CARDIAC TROPONIN I PNL SERPL HS: 6 NG/L (ref ?–50)
CARDIAC TROPONIN I PNL SERPL HS: 7 NG/L (ref ?–50)
CHLORIDE SERPL-SCNC: 102 MMOL/L (ref 96–108)
CLARITY UR: CLEAR
CO2 SERPL-SCNC: 24 MMOL/L (ref 21–32)
COLOR UR: YELLOW
CREAT SERPL-MCNC: 1.01 MG/DL (ref 0.6–1.3)
D DIMER PPP FEU-MCNC: 1.61 UG/ML FEU
EOSINOPHIL # BLD MANUAL: 0.18 THOUSAND/UL (ref 0–0.4)
EOSINOPHIL NFR BLD MANUAL: 2 % (ref 0–6)
ERYTHROCYTE [DISTWIDTH] IN BLOOD BY AUTOMATED COUNT: 13.8 % (ref 11.6–15.1)
FLUAV AG UPPER RESP QL IA.RAPID: NEGATIVE
FLUBV AG UPPER RESP QL IA.RAPID: NEGATIVE
GFR SERPL CREATININE-BSD FRML MDRD: 81 ML/MIN/1.73SQ M
GLUCOSE SERPL-MCNC: 110 MG/DL (ref 65–140)
GLUCOSE UR STRIP-MCNC: NEGATIVE MG/DL
HCT VFR BLD AUTO: 43.5 % (ref 36.5–49.3)
HGB BLD-MCNC: 14.4 G/DL (ref 12–17)
HGB UR QL STRIP.AUTO: ABNORMAL
INR PPP: 1.03 (ref 0.85–1.19)
KETONES UR STRIP-MCNC: NEGATIVE MG/DL
LACTATE SERPL-SCNC: 1.2 MMOL/L (ref 0.5–2)
LACTATE SERPL-SCNC: 2.3 MMOL/L (ref 0.5–2)
LEUKOCYTE ESTERASE UR QL STRIP: ABNORMAL
LG PLATELETS BLD QL SMEAR: PRESENT
LYMPHOCYTES # BLD AUTO: 0.72 THOUSAND/UL (ref 0.6–4.47)
LYMPHOCYTES # BLD AUTO: 7 % (ref 14–44)
MCH RBC QN AUTO: 31.4 PG (ref 26.8–34.3)
MCHC RBC AUTO-ENTMCNC: 33.1 G/DL (ref 31.4–37.4)
MCV RBC AUTO: 95 FL (ref 82–98)
MONOCYTES # BLD AUTO: 0.18 THOUSAND/UL (ref 0–1.22)
MONOCYTES NFR BLD: 2 % (ref 4–12)
MUCOUS THREADS UR QL AUTO: ABNORMAL
NEUTROPHILS # BLD MANUAL: 7.71 THOUSAND/UL (ref 1.85–7.62)
NEUTS BAND NFR BLD MANUAL: 9 % (ref 0–8)
NEUTS SEG NFR BLD AUTO: 77 % (ref 43–75)
NITRITE UR QL STRIP: POSITIVE
NON-SQ EPI CELLS URNS QL MICRO: ABNORMAL /HPF
PH UR STRIP.AUTO: 5.5 [PH]
PLATELET # BLD AUTO: 172 THOUSANDS/UL (ref 149–390)
PLATELET # BLD AUTO: 198 THOUSANDS/UL (ref 149–390)
PLATELET BLD QL SMEAR: ADEQUATE
PMV BLD AUTO: 9.4 FL (ref 8.9–12.7)
PMV BLD AUTO: 9.6 FL (ref 8.9–12.7)
POTASSIUM SERPL-SCNC: 3.9 MMOL/L (ref 3.5–5.3)
PROCALCITONIN SERPL-MCNC: 0.76 NG/ML
PROT SERPL-MCNC: 7.5 G/DL (ref 6.4–8.4)
PROT UR STRIP-MCNC: ABNORMAL MG/DL
PROTHROMBIN TIME: 14.2 SECONDS (ref 12.3–15)
RBC # BLD AUTO: 4.59 MILLION/UL (ref 3.88–5.62)
RBC #/AREA URNS AUTO: ABNORMAL /HPF
RBC MORPH BLD: PRESENT
SARS-COV+SARS-COV-2 AG RESP QL IA.RAPID: NEGATIVE
SODIUM SERPL-SCNC: 135 MMOL/L (ref 135–147)
SP GR UR STRIP.AUTO: >=1.05 (ref 1–1.03)
UROBILINOGEN UR STRIP-ACNC: <2 MG/DL
VARIANT LYMPHS # BLD AUTO: 1 %
WBC # BLD AUTO: 8.96 THOUSAND/UL (ref 4.31–10.16)
WBC #/AREA URNS AUTO: ABNORMAL /HPF

## 2024-12-15 PROCEDURE — 87186 SC STD MICRODIL/AGAR DIL: CPT | Performed by: EMERGENCY MEDICINE

## 2024-12-15 PROCEDURE — 85027 COMPLETE CBC AUTOMATED: CPT | Performed by: EMERGENCY MEDICINE

## 2024-12-15 PROCEDURE — 84484 ASSAY OF TROPONIN QUANT: CPT | Performed by: EMERGENCY MEDICINE

## 2024-12-15 PROCEDURE — 93005 ELECTROCARDIOGRAM TRACING: CPT

## 2024-12-15 PROCEDURE — 99223 1ST HOSP IP/OBS HIGH 75: CPT

## 2024-12-15 PROCEDURE — 85730 THROMBOPLASTIN TIME PARTIAL: CPT | Performed by: EMERGENCY MEDICINE

## 2024-12-15 PROCEDURE — 84484 ASSAY OF TROPONIN QUANT: CPT

## 2024-12-15 PROCEDURE — 99285 EMERGENCY DEPT VISIT HI MDM: CPT | Performed by: EMERGENCY MEDICINE

## 2024-12-15 PROCEDURE — 85049 AUTOMATED PLATELET COUNT: CPT

## 2024-12-15 PROCEDURE — 83605 ASSAY OF LACTIC ACID: CPT | Performed by: EMERGENCY MEDICINE

## 2024-12-15 PROCEDURE — 71275 CT ANGIOGRAPHY CHEST: CPT

## 2024-12-15 PROCEDURE — 87154 CUL TYP ID BLD PTHGN 6+ TRGT: CPT | Performed by: EMERGENCY MEDICINE

## 2024-12-15 PROCEDURE — 80053 COMPREHEN METABOLIC PANEL: CPT | Performed by: EMERGENCY MEDICINE

## 2024-12-15 PROCEDURE — 87804 INFLUENZA ASSAY W/OPTIC: CPT | Performed by: EMERGENCY MEDICINE

## 2024-12-15 PROCEDURE — 87040 BLOOD CULTURE FOR BACTERIA: CPT | Performed by: EMERGENCY MEDICINE

## 2024-12-15 PROCEDURE — 85379 FIBRIN DEGRADATION QUANT: CPT | Performed by: EMERGENCY MEDICINE

## 2024-12-15 PROCEDURE — 36415 COLL VENOUS BLD VENIPUNCTURE: CPT

## 2024-12-15 PROCEDURE — 87811 SARS-COV-2 COVID19 W/OPTIC: CPT | Performed by: EMERGENCY MEDICINE

## 2024-12-15 PROCEDURE — 71045 X-RAY EXAM CHEST 1 VIEW: CPT

## 2024-12-15 PROCEDURE — 84145 PROCALCITONIN (PCT): CPT | Performed by: EMERGENCY MEDICINE

## 2024-12-15 PROCEDURE — 85610 PROTHROMBIN TIME: CPT | Performed by: EMERGENCY MEDICINE

## 2024-12-15 PROCEDURE — 99285 EMERGENCY DEPT VISIT HI MDM: CPT

## 2024-12-15 PROCEDURE — 96365 THER/PROPH/DIAG IV INF INIT: CPT

## 2024-12-15 PROCEDURE — 96361 HYDRATE IV INFUSION ADD-ON: CPT

## 2024-12-15 PROCEDURE — 87086 URINE CULTURE/COLONY COUNT: CPT | Performed by: EMERGENCY MEDICINE

## 2024-12-15 PROCEDURE — 85007 BL SMEAR W/DIFF WBC COUNT: CPT | Performed by: EMERGENCY MEDICINE

## 2024-12-15 PROCEDURE — 87077 CULTURE AEROBIC IDENTIFY: CPT | Performed by: EMERGENCY MEDICINE

## 2024-12-15 PROCEDURE — 81001 URINALYSIS AUTO W/SCOPE: CPT | Performed by: EMERGENCY MEDICINE

## 2024-12-15 RX ORDER — ENOXAPARIN SODIUM 100 MG/ML
40 INJECTION SUBCUTANEOUS DAILY
Status: DISCONTINUED | OUTPATIENT
Start: 2024-12-16 | End: 2024-12-18 | Stop reason: HOSPADM

## 2024-12-15 RX ORDER — METOPROLOL SUCCINATE 50 MG/1
50 TABLET, EXTENDED RELEASE ORAL DAILY
Status: DISCONTINUED | OUTPATIENT
Start: 2024-12-16 | End: 2024-12-15

## 2024-12-15 RX ORDER — ACETAMINOPHEN 325 MG/1
650 TABLET ORAL ONCE
Status: COMPLETED | OUTPATIENT
Start: 2024-12-15 | End: 2024-12-15

## 2024-12-15 RX ORDER — ACETAMINOPHEN 325 MG/1
975 TABLET ORAL EVERY 8 HOURS
Status: DISCONTINUED | OUTPATIENT
Start: 2024-12-15 | End: 2024-12-18 | Stop reason: HOSPADM

## 2024-12-15 RX ORDER — ONDANSETRON 2 MG/ML
4 INJECTION INTRAMUSCULAR; INTRAVENOUS EVERY 6 HOURS PRN
Status: DISCONTINUED | OUTPATIENT
Start: 2024-12-15 | End: 2024-12-18 | Stop reason: HOSPADM

## 2024-12-15 RX ORDER — AZITHROMYCIN 250 MG/1
500 TABLET, FILM COATED ORAL ONCE
Status: COMPLETED | OUTPATIENT
Start: 2024-12-15 | End: 2024-12-15

## 2024-12-15 RX ORDER — ALBUTEROL SULFATE 90 UG/1
2 INHALANT RESPIRATORY (INHALATION) EVERY 6 HOURS PRN
Status: DISCONTINUED | OUTPATIENT
Start: 2024-12-15 | End: 2024-12-18 | Stop reason: HOSPADM

## 2024-12-15 RX ORDER — BENZONATATE 100 MG/1
200 CAPSULE ORAL 3 TIMES DAILY
Status: DISCONTINUED | OUTPATIENT
Start: 2024-12-15 | End: 2024-12-18 | Stop reason: HOSPADM

## 2024-12-15 RX ORDER — FAMOTIDINE 20 MG/1
40 TABLET, FILM COATED ORAL DAILY
Status: DISCONTINUED | OUTPATIENT
Start: 2024-12-16 | End: 2024-12-18 | Stop reason: HOSPADM

## 2024-12-15 RX ORDER — GUAIFENESIN 600 MG/1
600 TABLET, EXTENDED RELEASE ORAL EVERY 12 HOURS SCHEDULED
Status: DISCONTINUED | OUTPATIENT
Start: 2024-12-15 | End: 2024-12-18 | Stop reason: HOSPADM

## 2024-12-15 RX ORDER — BUDESONIDE AND FORMOTEROL FUMARATE DIHYDRATE 160; 4.5 UG/1; UG/1
2 AEROSOL RESPIRATORY (INHALATION) 2 TIMES DAILY
Status: DISCONTINUED | OUTPATIENT
Start: 2024-12-15 | End: 2024-12-18 | Stop reason: HOSPADM

## 2024-12-15 RX ORDER — FUROSEMIDE 20 MG/1
20 TABLET ORAL DAILY
Status: DISCONTINUED | OUTPATIENT
Start: 2024-12-16 | End: 2024-12-18 | Stop reason: HOSPADM

## 2024-12-15 RX ORDER — ACETAMINOPHEN 325 MG/1
650 TABLET ORAL EVERY 6 HOURS PRN
Status: DISCONTINUED | OUTPATIENT
Start: 2024-12-15 | End: 2024-12-18 | Stop reason: HOSPADM

## 2024-12-15 RX ORDER — METOPROLOL SUCCINATE 25 MG/1
25 TABLET, EXTENDED RELEASE ORAL 2 TIMES DAILY
Status: DISCONTINUED | OUTPATIENT
Start: 2024-12-15 | End: 2024-12-18 | Stop reason: HOSPADM

## 2024-12-15 RX ADMIN — CEFTRIAXONE SODIUM 1000 MG: 10 INJECTION, POWDER, FOR SOLUTION INTRAVENOUS at 19:15

## 2024-12-15 RX ADMIN — IOHEXOL 60 ML: 350 INJECTION, SOLUTION INTRAVENOUS at 15:27

## 2024-12-15 RX ADMIN — SODIUM CHLORIDE 1000 ML: 0.9 INJECTION, SOLUTION INTRAVENOUS at 14:33

## 2024-12-15 RX ADMIN — ACETAMINOPHEN 650 MG: 325 TABLET, FILM COATED ORAL at 14:32

## 2024-12-15 RX ADMIN — BUDESONIDE AND FORMOTEROL FUMARATE DIHYDRATE 2 PUFF: 160; 4.5 AEROSOL RESPIRATORY (INHALATION) at 19:04

## 2024-12-15 RX ADMIN — METOPROLOL SUCCINATE 25 MG: 25 TABLET, EXTENDED RELEASE ORAL at 19:02

## 2024-12-15 RX ADMIN — CEFTRIAXONE SODIUM 1000 MG: 10 INJECTION, POWDER, FOR SOLUTION INTRAVENOUS at 16:00

## 2024-12-15 RX ADMIN — AZITHROMYCIN DIHYDRATE 500 MG: 250 TABLET ORAL at 16:05

## 2024-12-15 NOTE — ED PROVIDER NOTES
Time reflects when diagnosis was documented in both MDM as applicable and the Disposition within this note       Time User Action Codes Description Comment    12/15/2024  4:51 PM Christi Stewart Add [R68.89] Rigors     12/15/2024  4:51 PM MiyashemarmeloMarlene Christi CHAI Add [R65.10] SIRS (systemic inflammatory response syndrome) (HCC)     12/15/2024  4:52 PM Christi Stewart Add [R82.998] Urine leukocytes     12/15/2024  4:52 PM Christi Stewart Add [R05.8] Cough productive of yellow sputum     12/15/2024  4:52 PM Christi Stewart Add [Z95.0] Hx of cardiac pacemaker           ED Disposition       ED Disposition   Admit    Condition   Stable    Date/Time   Sun Dec 15, 2024  4:51 PM    Comment   Case was discussed with Dr. Finney and the patient's admission status was agreed to be Admission Status: observation status to the service of Dr. Finney .               Assessment & Plan       Medical Decision Making  Amount and/or Complexity of Data Reviewed  Labs: ordered.  Radiology: ordered and independent interpretation performed.    Risk  OTC drugs.  Prescription drug management.  Decision regarding hospitalization.        ED Course as of 12/15/24 2316   Sun Dec 15, 2024   1428 Patient describes rigors earlier this morning and feeling of lightheadedness and near syncope.  Concern for acute viral versus bacterial infection.  He does have cough productive of small amount of sputum.  Lungs clear.  Concern for possible pneumonia.  Suspicion not high for PE though he does have some risk factor.  Obtaining D-dimer.  He did recently have invasive procedure with removal of loop recorder and placement of pacemaker.  Surgical site has not bothered him.  Incisions dry.  No surrounding erythema or tenderness.   1436 Bandemia present.  Covering empirically with antibiotics.   1452 I spoke with patient further.  He again confirms no nausea/vomiting.  He did have cholecystectomy 27 years  ago.  He has not had any epigastric discomfort, nausea or vomiting and ate pancakes for breakfast without difficulty.  Bilirubin is only minimally elevated.     1609 Repeat lactic acid is in process.  Urinalysis has returned positive for leukocyte esterase and nitrite presents-highly suspicious for UTI.  Microscopy pending.  He has had CTA.   1626 Despite large quantity of white blood cells in urinalysis no bacteria are visualized, which is a bit unusual for UTI.  White blood cells not present on UA 11 days ago.    CT report has been rendered.  No PE.  No pneumonia appreciated either.    Febrile source uncertain.Lactic acid level improved.    Given uncertain source of fever, presence of SIRS criteria and recent history of pacemaker placement patient brought in under observation status on [facility] service.  Ceftriaxone dose increased to 2 g for full weight-based coverage.    Medications   albuterol (PROVENTIL HFA,VENTOLIN HFA) inhaler 2 puff (0 puffs Inhalation Hold 12/15/24 1903)   budesonide-formoterol (SYMBICORT) 160-4.5 mcg/act inhaler 2 puff (2 puffs Inhalation Given 12/15/24 1904)   famotidine (PEPCID) tablet 40 mg (has no administration in time range)   furosemide (LASIX) tablet 20 mg (has no administration in time range)   acetaminophen (TYLENOL) tablet 650 mg (has no administration in time range)   ondansetron (ZOFRAN) injection 4 mg (has no administration in time range)   guaiFENesin (MUCINEX) 12 hr tablet 600 mg (0 mg Oral Hold 12/15/24 1745)   benzonatate (TESSALON PERLES) capsule 200 mg (0 mg Oral Hold 12/15/24 1745)   enoxaparin (LOVENOX) subcutaneous injection 40 mg (has no administration in time range)   acetaminophen (TYLENOL) tablet 975 mg (has no administration in time range)   umeclidinium 62.5 mcg/actuation inhaler AEPB 1 puff (has no administration in time range)   metoprolol succinate (TOPROL-XL) 24 hr tablet 25 mg (25 mg Oral Given 12/15/24 1902)   sodium chloride 0.9 % bolus 1,000 mL (0 mL  Intravenous Stopped 12/15/24 1630)   acetaminophen (TYLENOL) tablet 650 mg (650 mg Oral Given 12/15/24 1432)   ceftriaxone (ROCEPHIN) 1 g/50 mL in dextrose IVPB (0 mg Intravenous Stopped 12/15/24 1738)   azithromycin (ZITHROMAX) tablet 500 mg (500 mg Oral Given 12/15/24 1605)   iohexol (OMNIPAQUE) 350 MG/ML injection (MULTI-DOSE) 60 mL (60 mL Intravenous Given 12/15/24 1527)   ceftriaxone (ROCEPHIN) 1 g/50 mL in dextrose IVPB (0 mg Intravenous Stopped 12/15/24 2032)       ED Risk Strat Scores                          SBIRT 22yo+      Flowsheet Row Most Recent Value   Initial Alcohol Screen: US AUDIT-C     1. How often do you have a drink containing alcohol? 0 Filed at: 12/15/2024 1900   2. How many drinks containing alcohol do you have on a typical day you are drinking?  0 Filed at: 12/15/2024 1900   3a. Male UNDER 65: How often do you have five or more drinks on one occasion? 0 Filed at: 12/15/2024 1900   3b. FEMALE Any Age, or MALE 65+: How often do you have 4 or more drinks on one occassion? 0 Filed at: 12/15/2024 1900   Audit-C Score 0 Filed at: 12/15/2024 1900   ROXY: How many times in the past year have you...    Used an illegal drug or used a prescription medication for non-medical reasons? Never Filed at: 12/15/2024 1900            Wells' Criteria for PE      Flowsheet Row Most Recent Value   Wells' Criteria for PE    Clinical signs and symptoms of DVT 0 Filed at: 12/15/2024 1425   PE is primary diagnosis or equally likely 0 Filed at: 12/15/2024 1425   HR >100 1.5 Filed at: 12/15/2024 1425   Immobilization at least 3 days or Surgery in the previous 4 weeks 0 Filed at: 12/15/2024 1425   Previous, objectively diagnosed PE or DVT 0 Filed at: 12/15/2024 1425   Hemoptysis 0 Filed at: 12/15/2024 1425   Malignancy with treatment within 6 months or palliative 0 Filed at: 12/15/2024 1422   Shailesh' Criteria Total 1.5 Filed at: 12/15/2024 1420                        History of Present Illness       Chief Complaint    Patient presents with    Chills     Patient presents for chills that began last night. Has subjective fever at home with n/v. Pacemaker placed 2.5 weeks ago.        Past Medical History:   Diagnosis Date    COPD (chronic obstructive pulmonary disease) (HCC)     Cough     Emphysema of lung (HCC)     Liver lesion     Lung cancer (HCC)     Right upper lobe pulmonary nodule     Shortness of breath     Sleep apnea, obstructive     Syncope and collapse       Past Surgical History:   Procedure Laterality Date    CARDIAC ELECTROPHYSIOLOGY PROCEDURE N/A 7/26/2022    Procedure: Cardiac loop recorder implant;  Surgeon: Daniel Carmona MD;  Location: [facility];  Service: Cardiology    CARDIAC ELECTROPHYSIOLOGY PROCEDURE N/A 11/27/2024    Procedure: Cardiac pacer implant dual chamber;  Surgeon: Efrem Parra MD;  Location: [facility];  Service: Cardiology    CARDIAC ELECTROPHYSIOLOGY PROCEDURE N/A 11/27/2024    Procedure: Cardiac loop recorder explant;  Surgeon: Efrem Parra MD;  [facility]: [facility];  Service: Cardiology    CHOLECYSTECTOMY      LUNG SEGMENTECTOMY Right 1/6/2021    Procedure: RESECTION WEDGE LUNG RIGHT UPPER LOBE;  Surgeon: Mila Joya MD;  Location: [facility];  Service: Thoracic    ID [facility] INCL FLUOR GDNCE DX W/CELL WASHG SPX N/A 1/6/2021    Procedure: BRONCHOSCOPY FLEXIBLE;  Surgeon: Mila Joya MD;  [facility]: [facility];  Service: Thoracic    ID THORACOSCOPY W/LOBECTOMY SINGLE LOBE Right 1/6/2021    Procedure: RIGHT UPPER LOBECTOMY LUNG;  Surgeon: Mila Joya MD;  Location: [facility];  Service: Thoracic    ID THORACOSCOPY W/THERA WEDGE RESEXN INITIAL UNILAT Right 1/6/2021    Procedure: THORACOSCOPY VIDEO ASSISTED SURGERY (VATS),;  Surgeon: Mila Joya MD;  Location: [facility];  Service: Thoracic    TESTICLE SURGERY        Family History   Problem Relation Age of Onset    Stroke Father     Heart attack Father     Dementia Father      Alzheimer's disease Father     Clotting disorder Father     Diabetes Mother       Social History     Tobacco Use    Smoking status: Former     Current packs/day: 0.00     Average packs/day: 3.0 packs/day for 30.0 years (90.0 ttl pk-yrs)     Types: Cigarettes     Start date: 10/26/1990     Quit date: 10/26/2020     Years since quittin.1     Passive exposure: Past    Smokeless tobacco: Never    Tobacco comments:     30 years-5 packs a day    Vaping Use    Vaping status: Never Used   Substance Use Topics    Alcohol use: Not Currently    Drug use: Not Currently      E-Cigarette/Vaping    E-Cigarette Use Never User       E-Cigarette/Vaping Substances    Nicotine No     THC No     CBD No     Flavoring No     Other No     Unknown No       I have reviewed and agree with the history as documented.     Patient is a 59-year-old male who presents to the emergency department feeling poorly today with fever and lightheadedness.  He felt well during the day yesterday, took the dogs out in the evening and felt very chilled afterwards.  He initially thought that this was secondary to the cold temperature outside but noted the chills persist despite warm ambient temperature inside.  He noticed that fingertips and feet were cool which is very unusual for him.  He felt well upon going to sleep, awoke feeling well today and then appreciated sensation of chills and increased warmth.  Reports having had uncontrollable shaking through arrival here.  He did feel lightheaded here as well as though he might pass out upon entering patient room.  No nasal congestion or sore throat.  He has been coughing up small amounts of yellow and brown as well as clear sputum today.  No chest pain.  No dyspnea.  No abdominal discomfort, nausea, vomiting, urinary abnormality or changes in bowel movements.  He did undergo removal of loop recorder and placement of pacemaker on .  Incision site has been doing well.  He notes that the Steri-Strips are  still in place.  He has not had pain or drainage from this region.  No known sick contacts.            Review of Systems   All other systems reviewed and are negative.          Objective       ED Triage Vitals   Temperature Pulse Blood Pressure Respirations SpO2 Patient Position - Orthostatic VS   12/15/24 1304 12/15/24 1304 12/15/24 1304 12/15/24 1304 12/15/24 1304 12/15/24 1304   99.9 °F (37.7 °C) 104 129/68 22 100 % Sitting      Temp Source Heart Rate Source BP Location FiO2 (%) Pain Score    12/15/24 1304 12/15/24 1304 12/15/24 1304 -- 12/15/24 2030    Oral Monitor Right arm  No Pain      Vitals      Date and Time Temp Pulse SpO2 Resp BP Pain Score FACES Pain Rating User   12/15/24 2209 98.9 °F (37.2 °C) 71 93 % 18 116/64 -- -- CD   12/15/24 2136 -- -- -- -- -- No Pain -- TP   12/15/24 2124 -- -- -- -- -- No Pain -- TP   12/15/24 2119 98.1 °F (36.7 °C) 75 96 % 19 123/63 -- --    12/15/24 2030 -- 76 98 % 20 107/66 No Pain --    12/15/24 1930 99.6 °F (37.6 °C) 72 95 % -- 108/65 -- --    12/15/24 1900 -- 81 94 % 20 110/67 -- --    12/15/24 1700 -- 83 95 % 20 117/70 -- --    12/15/24 1630 -- 88 95 % 20 118/69 -- --    12/15/24 1600 -- 85 96 % 18 109/66 -- --    12/15/24 1545 -- 88 95 % -- -- -- --    12/15/24 1530 -- 87 94 % -- 113/64 -- --    12/15/24 1515 -- 86 92 % -- 117/62 -- --    12/15/24 1500 -- 86 93 % -- 117/60 -- --    12/15/24 1445 -- 90 93 % -- 118/62 -- -- EB   12/15/24 1430 -- 97 94 % -- 123/65 -- -- EB   12/15/24 1415 -- 100 94 % 24 119/63 -- --    12/15/24 1304 99.9 °F (37.7 °C) 104 100 % 22 129/68 -- -- AW            Physical Exam  Vitals and nursing note reviewed.   Constitutional:       Appearance: He is ill-appearing.   HENT:      Head: Normocephalic.      Mouth/Throat:      Mouth: Mucous membranes are moist.   Eyes:      General: No scleral icterus.     Extraocular Movements: Extraocular movements intact.   Cardiovascular:      Rate and Rhythm: Regular rhythm.  Tachycardia present.      Heart sounds: Normal heart sounds.      Comments: Horizontal incision appreciated in left upper chest with overlying loosened Steri-Strips.  No appreciated drainage, erythema, tenderness or increased warmth.  Second small incision with suture material in place.  Edges well-approximated.  No drainage, erythema, increased warmth or tenderness.  Pulmonary:      Effort: Pulmonary effort is normal.      Breath sounds: Normal breath sounds.   Abdominal:      General: Bowel sounds are normal.      Palpations: Abdomen is soft.      Tenderness: There is no right CVA tenderness or left CVA tenderness.   Musculoskeletal:         General: No tenderness.      Cervical back: Normal range of motion and neck supple.      Right lower leg: No edema.      Left lower leg: No edema.   Skin:     General: Skin is warm and dry.   Neurological:      Mental Status: He is alert and oriented to person, place, and time.   Psychiatric:         Mood and Affect: Mood normal.         Results Reviewed       Procedure Component Value Units Date/Time    HS Troponin I 4hr [[id number]]  (Normal) Collected: 12/15/24 1900    Lab Status: Final result Specimen: Blood from Arm, Right Updated: 12/15/24 1946     hs TnI 4hr 5 ng/L      Delta 4hr hsTnI -1 ng/L     Platelet count [[id number]]  (Normal) Collected: 12/15/24 1900    Lab Status: Final result Specimen: Blood from Arm, Right Updated: 12/15/24 1918     Platelets 172 Thousands/uL      MPV 9.6 fL     Blood culture #2 [[id number]] Collected: 12/15/24 1306    Lab Status: Preliminary result Specimen: Blood from Arm, Right Updated: 12/15/24 1824     Blood Culture Received in Microbiology Lab. Culture in Progress.    HS Troponin I 2hr [[id number]]  (Normal) Collected: 12/15/24 1547    Lab Status: Final result Specimen: Blood from Arm, Left Updated: 12/15/24 1624     hs TnI 2hr 7 ng/L      Delta 2hr hsTnI 1 ng/L     Lactic acid 2 Hours [[id number]]  (Normal) Collected: 12/15/24 1547    Lab  Status: Final result Specimen: Blood from Arm, Left Updated: 12/15/24 1617     LACTIC ACID 1.2 mmol/L     Narrative:      Result may be elevated if tourniquet was used during collection.    Urine Microscopic [[id number]]  (Abnormal) Collected: 12/15/24 1549    Lab Status: Final result Specimen: Urine, Clean Catch Updated: 12/15/24 1617     RBC, UA 4-10 /hpf      WBC, UA Innumerable /hpf      Epithelial Cells Occasional /hpf      Bacteria, UA None Seen /hpf      MUCUS THREADS Innumerable    Urine culture [[id number]] Collected: 12/15/24 1549    Lab Status: In process Specimen: Urine, Clean Catch Updated: 12/15/24 1617    UA w Reflex to Microscopic w Reflex to Culture [[id number]]  (Abnormal) Collected: 12/15/24 1549    Lab Status: Final result Specimen: Urine, Clean Catch Updated: 12/15/24 1608     Color, UA Yellow     Clarity, UA Clear     Specific Gravity, UA >=1.050     pH, UA 5.5     Leukocytes, UA Moderate     Nitrite, UA Positive     Protein, UA 30 (1+) mg/dl      Glucose, UA Negative mg/dl      Ketones, UA Negative mg/dl      Urobilinogen, UA <2.0 mg/dl      Bilirubin, UA Negative     Occult Blood, UA Trace    Blood culture #1 [[id number]] Collected: 12/15/24 1547    Lab Status: In process Specimen: Blood from Hand, Left Updated: 12/15/24 1557    FLU/COVID Rapid Antigen (30 min. TAT) - Preferred screening test in ED [[id number]]  (Normal) Collected: 12/15/24 1422    Lab Status: Final result Specimen: Nares from Nose Updated: 12/15/24 1506     SARS COV Rapid Antigen Negative     Influenza A Rapid Antigen Negative     Influenza B Rapid Antigen Negative    Narrative:      This test has been performed using the [employer] Deedee 2 FLU+SARS Antigen test under the Emergency Use Authorization (EUA). This test has been validated by the  and verified by the performing laboratory. The Deedee uses lateral flow immunofluorescent sandwich assay to detect SARS-COV, Influenza A and Influenza B Antigen.     The Qu[employer] Deedee  2 SARS Antigen test does not differentiate between SARS-CoV and SARS-CoV-2.     Negative results are presumptive and may be confirmed with a molecular assay, if necessary, for patient management. Negative results do not rule out SARS-CoV-2 or influenza infection and should not be used as the sole basis for treatment or patient management decisions. A negative test result may occur if the level of antigen in a sample is below the limit of detection of this test.     Positive results are indicative of the presence of viral antigens, but do not rule out bacterial infection or co-infection with other viruses.     All test results should be used as an adjunct to clinical observations and other information available to the provider.    FOR PEDIATRIC PATIENTS - copy/paste COVID Guidelines URL to browser: [email or web address]    RBC Morphology Reflex Test [[id number]] Collected: 12/15/24 1306    Lab Status: Final result Specimen: Blood from Arm, Right Updated: 12/15/24 1501    Procalcitonin [[id number]]  (Abnormal) Collected: 12/15/24 1306    Lab Status: Final result Specimen: Blood from Arm, Right Updated: 12/15/24 1457     Procalcitonin 0.76 ng/ml     D-dimer, quantitative [[id number]]  (Abnormal) Collected: 12/15/24 1306    Lab Status: Final result Specimen: Blood from Arm, Right Updated: 12/15/24 1448     D-Dimer, Quant 1.61 ug/ml FEU     Narrative:      In the evaluation for possible pulmonary embolism, in the appropriate (Well's Score of 4 or less) patient, the age adjusted d-dimer cutoff for this patient can be calculated as:    Age x 0.01 (in ug/mL) for Age-adjusted D-dimer exclusion threshold for a patient over 50 years.    Protime-INR [[id number]]  (Normal) Collected: 12/15/24 1306    Lab Status: Final result Specimen: Blood from Arm, Right Updated: 12/15/24 1442     Protime 14.2 seconds      INR 1.03    Narrative:      INR Therapeutic Range    Indication                                              INR Range      Atrial Fibrillation                                               2.0-3.0  Hypercoagulable State                                    2.0.2.3  Left Ventricular Asist Device                            2.0-3.0  Mechanical Heart Valve                                  -    Aortic(with afib, MI, embolism, HF, LA enlargement,    and/or coagulopathy)                                     2.0-3.0 (2.5-3.5)     Mitral                                                             2.5-3.5  Prosthetic/Bioprosthetic Heart Valve               2.0-3.0  Venous thromboembolism (VTE: VT, PE        2.0-3.0    APTT [[id number]]  (Normal) Collected: 12/15/24 1306    Lab Status: Final result Specimen: Blood from Arm, Right Updated: 12/15/24 1442     PTT 34 seconds     CBC and differential [[id number]]  (Normal) Collected: 12/15/24 1306    Lab Status: Final result Specimen: Blood from Arm, Right Updated: 12/15/24 1432     WBC 8.96 Thousand/uL      RBC 4.59 Million/uL      Hemoglobin 14.4 g/dL      Hematocrit 43.5 %      MCV 95 fL      MCH 31.4 pg      MCHC 33.1 g/dL      RDW 13.8 %      MPV 9.4 fL      Platelets 198 Thousands/uL     Narrative:      This is an appended report.  These results have been appended to a previously verified report.    Manual Differential(PHLEBS Do Not Order) [[id number]]  (Abnormal) Collected: 12/15/24 1306    Lab Status: Final result Specimen: Blood from Arm, Right Updated: 12/15/24 1432     Segmented % 77 %      Bands % 9 %      Lymphocytes % 7 %      Monocytes % 2 %      Eosinophils % 2 %      Basophils % 2 %      Atypical Lymphocytes % 1 %      Absolute Neutrophils 7.71 Thousand/uL      Absolute Lymphocytes 0.72 Thousand/uL      Absolute Monocytes 0.18 Thousand/uL      Absolute Eosinophils 0.18 Thousand/uL      Absolute Basophils 0.18 Thousand/uL      Total Counted --     RBC Morphology Present     Platelet Estimate Adequate     Large Platelet Present     Anisocytosis  Present    HS Troponin 0hr (reflex protocol) [[id number]]  (Normal) Collected: 12/15/24 1306    Lab Status: Final result Specimen: Blood from Arm, Right Updated: 12/15/24 1343     hs TnI 0hr 6 ng/L     Comprehensive metabolic panel [[id number]]  (Abnormal) Collected: 12/15/24 1306    Lab Status: Final result Specimen: Blood from Arm, Right Updated: 12/15/24 1337     Sodium 135 mmol/L      Potassium 3.9 mmol/L      Chloride 102 mmol/L      CO2 24 mmol/L      ANION GAP 9 mmol/L      BUN 12 mg/dL      Creatinine 1.01 mg/dL      Glucose 110 mg/dL      Calcium 8.8 mg/dL      AST 19 U/L      ALT 35 U/L      Alkaline Phosphatase 69 U/L      Total Protein 7.5 g/dL      Albumin 4.1 g/dL      Total Bilirubin 1.05 mg/dL      eGFR 81 ml/min/1.73sq m     Narrative:      National Kidney Disease Foundation guidelines for Chronic Kidney Disease (CKD):     Stage 1 with normal or high GFR (GFR > 90 mL/min/1.73 square meters)    Stage 2 Mild CKD (GFR = 60-89 mL/min/1.73 square meters)    Stage 3A Moderate CKD (GFR = 45-59 mL/min/1.73 square meters)    Stage 3B Moderate CKD (GFR = 30-44 mL/min/1.73 square meters)    Stage 4 Severe CKD (GFR = 15-29 mL/min/1.73 square meters)    Stage 5 End Stage CKD (GFR <15 mL/min/1.73 square meters)  Note: GFR calculation is accurate only with a steady state creatinine    Lactic acid, plasma (w/reflex if result > 2.0) [[id number]]  (Abnormal) Collected: 12/15/24 1306    Lab Status: Final result Specimen: Blood from Arm, Right Updated: 12/15/24 1337     LACTIC ACID 2.3 mmol/L     Narrative:      Result may be elevated if tourniquet was used during collection.            CTA chest pe study   Final Interpretation by Manny Kunz MD (12/15 4652)      No evidence for pulmonary embolism.      Mild emphysematous changes.            Workstation performed: [id number]         XR chest 1 view portable   ED Interpretation by Christi Stewart MD (12/15 1025)   Lungs clear.          Procedures    ED  Medication and Procedure Management   Prior to Admission Medications   Prescriptions Last Dose Informant Patient Reported? Taking?   Budeson-Glycopyrrol-Formoterol (Breztri Aerosphere) 160-9-4.8 MCG/ACT AERO  Self No No   Sig: Inhale 2 puffs 2 (two) times a day Rinse mouth after use.   albuterol (PROVENTIL HFA,VENTOLIN HFA) 90 mcg/act inhaler  Self No No   Sig: INHALE 2 PUFFS EVERY 6 (SIX) HOURS AS NEEDED FOR WHEEZING OR SHORTNESS OF BREATH   famotidine (PEPCID) 40 MG tablet   No No   Sig: Take 1 tablet (40 mg total) by mouth daily   furosemide (LASIX) 40 mg tablet   No No   Sig: Take 0.5 tablets (20 mg total) by mouth daily   metoprolol succinate (TOPROL-XL) 50 mg 24 hr tablet 12/14/2024 Self No Yes   Sig: Take 1 tablet (50 mg total) by mouth daily   Patient taking differently: Take 25 mg by mouth 2 (two) times a day      Facility-Administered Medications: None     Current Discharge Medication List        CONTINUE these medications which have NOT CHANGED    Details   metoprolol succinate (TOPROL-XL) 50 mg 24 hr tablet Take 1 tablet (50 mg total) by mouth daily  Qty: 90 tablet, Refills: 3    Associated Diagnoses: Primary hypertension      albuterol (PROVENTIL HFA,VENTOLIN HFA) 90 mcg/act inhaler INHALE 2 PUFFS EVERY 6 (SIX) HOURS AS NEEDED FOR WHEEZING OR SHORTNESS OF BREATH  Qty: 18 g, Refills: 3    Associated Diagnoses: Dyspnea on exertion      Budeson-Glycopyrrol-Formoterol (Breztri Aerosphere) 160-9-4.8 MCG/ACT AERO Inhale 2 puffs 2 (two) times a day Rinse mouth after use.  Qty: 31.1 g, Refills: 3    Associated Diagnoses: Centrilobular emphysema (HCC)      famotidine (PEPCID) 40 MG tablet Take 1 tablet (40 mg total) by mouth daily  Qty: 100 tablet, Refills: 1    Associated Diagnoses: Gastroesophageal reflux disease without esophagitis      furosemide (LASIX) 40 mg tablet Take 0.5 tablets (20 mg total) by mouth daily  Qty: 90 tablet, Refills: 3    Associated Diagnoses: Shortness of breath on exertion           No  "discharge procedures on file.  ED SEPSIS DOCUMENTATION   Time reflects when diagnosis was documented in both MDM as applicable and the Disposition within this note       Time User Action Codes Description Comment    12/15/2024  4:51 PM Christi Stewart Add [R68.89] Rigors     12/15/2024  4:51 PM Christi Stewart Add [R65.10] SIRS (systemic inflammatory response syndrome) (HCC)     12/15/2024  4:52 PM Christi Stewart Add [R82.998] Urine leukocytes     12/15/2024  4:52 PM Christi Stewart Add [R05.8] Cough productive of yellow sputum     12/15/2024  4:52 PM Christi Stewart Add [Z95.0] Hx of cardiac pacemaker            Initial Sepsis Screening       Row Name 12/15/24 1438                Is the patient's history suggestive of a new or worsening infection? Yes (Proceed)  -SZ        Suspected source of infection pneumonia;suspect infection, source unknown  -SZ        Indicate SIRS criteria Tachycardia > 90 bpm;Tachypnea > 20 resp per min  -SZ        Are two or more of the above signs & symptoms of infection both present and new to the patient? Yes (Proceed)  -SZ        Assess for evidence of organ dysfunction: Are any of the below criteria present within 6 hours of suspected infection and SIRS criteria that are NOT considered to be chronic conditions? Lactate > 2.0  -SZ        Date of presentation of severe sepsis 12/15/24  -        Time of presentation of severe sepsis 1438  -SZ        Sepsis Note: Click \"NEXT\" below (NOT \"close\") to generate sepsis note based on above information. YES (proceed by clicking \"NEXT\")  -SZ                  User Key  (r) = Recorded By, (t) = Taken By, (c) = Cosigned By      Initials Name Provider Type    SZ Christi Stewart MD Physician                       Christi Stewart MD  12/15/24 2316    "

## 2024-12-15 NOTE — H&P
H&P - Hospitalist   Name: Randal Romeo 59 y.o. male I MRN: [id number]  Unit/Bed#: ED-02 I Date of Admission: 12/15/2024   Date of Service: 12/15/2024 I Hospital Day: 0     Assessment & Plan  Chills  Patient has history of 1 day, poor oral intake rigors, chills, diaphoresis, poor oral intake  Flu COVID negative  Patient has increased coughing and shortness of breath  Prompted him to go to the emergency department  CT scan did not show embolism  WBC not elevated  Patient does have a cough but no known wheezing  Does not appear to have COPD in exacerbation  Considering a viral possible common cold versus bronchitis  Patient has elevated WBC with urine however no symptoms of dysuria, urinary frequency.    Plan:  Will continue azithromycin for 5 days for upper respiratory tract infection  Supportive care  Observe overnight  Asymptomatic bacteriuria  Patient has asymptomatic bacteriuria  Will hold off antibiotics    Chronic Obstructive Pulmonary Disease  COPD on Breztri  Not in acute exacerbation  Malignant neoplasm of upper lobe of right lung (HCC)  Patient had previous VATS in January 2021 for right lung mass  Follows with pulmonology as outpatient.    Gastroesophageal reflux disease without esophagitis  Continue famotidine  MARIANA (obstructive sleep apnea)  Obstructive sleep apnea on CPAP  Will order CPAP daily at night.  Status post placement of cardiac pacemaker  Status post cardiac pacemaker due to sinus pauses which was done on 11/27/2024.        VTE Pharmacologic Prophylaxis: VTE Score: 5 High Risk (Score >/= 5) - Pharmacological DVT Prophylaxis Ordered: enoxaparin (Lovenox). Sequential Compression Devices Ordered.  Code Status: level 1   Discussion with family: Updated  (wife) at bedside.    Anticipated Length of Stay: Patient will be admitted on an observation basis with an anticipated length of stay of less than 2 midnights secondary to URTI.    History of Present Illness   Chief Complaint:  SUE Romeo is a 59 y.o. male with a History of syncope with sinus pauses status post dual chamber pacemaker placement, hypertension, obstructive sleep apnea on CPAP, mildly neoplasm of the right lung, status post VATS in January 2021, GERD, COPD, Breztri, coming in for chills, rigors, cough,   Tachycardia respiration, WBC normal, respiration elevated 20.  D-dimer 1.61, SARS COVID-negative, urine microscopy innumerable bacteria, lactic acid 1.2.  No evidence of pulmonary embolism on CT, mild emphysematous changes.    Review of Systems   Constitutional:  Positive for fever. Negative for chills.   HENT:  Negative for ear pain and sore throat.    Eyes:  Negative for pain and visual disturbance.   Respiratory:  Positive for cough and shortness of breath.    Cardiovascular:  Negative for chest pain and palpitations.   Gastrointestinal:  Negative for abdominal pain and vomiting.   Genitourinary:  Negative for dysuria and hematuria.   Musculoskeletal:  Negative for arthralgias and back pain.   Skin:  Negative for color change and rash.   Neurological:  Negative for seizures and syncope.   All other systems reviewed and are negative.      Historical Information   Past Medical History:   Diagnosis Date    COPD (chronic obstructive pulmonary disease) (HCC)     Cough     Emphysema of lung (HCC)     Liver lesion     Lung cancer (HCC)     Right upper lobe pulmonary nodule     Shortness of breath     Sleep apnea, obstructive     Syncope and collapse      Past Surgical History:   Procedure Laterality Date    CARDIAC ELECTROPHYSIOLOGY PROCEDURE N/A 7/26/2022    Procedure: Cardiac loop recorder implant;  Surgeon: Daniel Carmona MD;  Location: [facility];  Service: Cardiology    CARDIAC ELECTROPHYSIOLOGY PROCEDURE N/A 11/27/2024    Procedure: Cardiac pacer implant dual chamber;  Surgeon: Efrem Parra MD;  Location: [facility];  Service: Cardiology    CARDIAC ELECTROPHYSIOLOGY PROCEDURE N/A 11/27/2024     Procedure: Cardiac loop recorder explant;  Surgeon: Efrem Parra MD;  [facility]: [facility];  Service: Cardiology    CHOLECYSTECTOMY      LUNG SEGMENTECTOMY Right 2021    Procedure: RESECTION WEDGE LUNG RIGHT UPPER LOBE;  Surgeon: Mila Joya MD;  Location: [facility];  Service: Thoracic    [facility] INCL FLUOR GDNCE DX W/CELL WASHG SPX N/A 2021    Procedure: BRONCHOSCOPY FLEXIBLE;  Surgeon: Mila Joya MD;  [facility]: [facility];  Service: Thoracic    [place] THORACOSCOPY W/LOBECTOMY SINGLE LOBE Right 2021    Procedure: RIGHT UPPER LOBECTOMY LUNG;  Surgeon: Mila Joya MD;  [facility];  Service: Thoracic    [place] THORACOSCOPY W/THERA WEDGE RESEXN INITIAL UNILAT Right 2021    Procedure: THORACOSCOPY VIDEO ASSISTED SURGERY (VATS),;  Surgeon: Mila Joya MD;  [facility]: [facility];  Service: Thoracic    TESTICLE SURGERY       Social History     Tobacco Use    Smoking status: Former     Current packs/day: 0.00     Average packs/day: 3.0 packs/day for 30.0 years (90.0 ttl pk-yrs)     Types: Cigarettes     Start date: 10/26/1990     Quit date: 10/26/2020     Years since quittin.1     Passive exposure: Past    Smokeless tobacco: Never    Tobacco comments:     30 years-5 packs a day    Vaping Use    Vaping status: Never Used   Substance and Sexual Activity    Alcohol use: Not Currently    Drug use: Not Currently    Sexual activity: Not Currently     Partners: Female     E-Cigarette/Vaping    E-Cigarette Use Never User      E-Cigarette/Vaping Substances    Nicotine No     THC No     CBD No     Flavoring No     Other No     Unknown No      Family history non-contributory  Social History:  Marital Status: /Civil Union   Occupation: None  Patient Pre-hospital Living Situation: Home  Patient Pre-hospital Level of Mobility: walks  Patient Pre-hospital Diet Restrictions: None    Meds/Allergies   I have reviewed home medications with patient personally.  Prior  to Admission medications    Medication Sig Start Date End Date Taking? Authorizing Provider   albuterol (PROVENTIL HFA,VENTOLIN HFA) 90 mcg/act inhaler INHALE 2 PUFFS EVERY 6 (SIX) HOURS AS NEEDED FOR WHEEZING OR SHORTNESS OF BREATH 2/24/22   Susannah Molina MD   Budeson-Glycopyrrol-Formoterol (Breztri Aerosphere) 160-9-4.8 MCG/ACT AERO Inhale 2 puffs 2 (two) times a day Rinse mouth after use. 3/27/24 3/27/25  ARY Jesus   famotidine (PEPCID) 40 MG tablet Take 1 tablet (40 mg total) by mouth daily 12/10/24   RAY Herbert   furosemide (LASIX) 40 mg tablet Take 0.5 tablets (20 mg total) by mouth daily 12/10/24   RAY Herbert   metoprolol succinate (TOPROL-XL) 50 mg 24 hr tablet Take 1 tablet (50 mg total) by mouth daily 6/12/24   Adam Hendricks MD     No Known Allergies    Objective :  Temp:  [99.9 °F (37.7 °C)] 99.9 °F (37.7 °C)  HR:  [] 88  BP: (109-129)/(60-69) 118/69  Resp:  [18-24] 20  SpO2:  [92 %-100 %] 95 %  O2 Device: None (Room air)    Physical Exam  Vitals and nursing note reviewed.   Constitutional:       Appearance: He is well-developed and normal weight.   HENT:      Head: Normocephalic and atraumatic.      Nose: Nose normal.      Mouth/Throat:      Mouth: Mucous membranes are moist.   Eyes:      Conjunctiva/sclera: Conjunctivae normal.   Cardiovascular:      Rate and Rhythm: Normal rate and regular rhythm.      Heart sounds: Normal heart sounds. No murmur heard.  Pulmonary:      Effort: Pulmonary effort is normal. No respiratory distress.      Breath sounds: Normal breath sounds.   Abdominal:      General: Abdomen is flat.      Palpations: Abdomen is soft.      Tenderness: There is no abdominal tenderness.   Musculoskeletal:         General: No swelling.      Cervical back: Neck supple.      Right lower leg: No edema.      Left lower leg: No edema.   Skin:     General: Skin is warm and dry.      Capillary Refill: Capillary refill takes less than 2 seconds.   Neurological:       General: No focal deficit present.      Mental Status: He is alert and oriented to person, place, and time. Mental status is at baseline.   Psychiatric:         Mood and Affect: Mood normal.          Lines/Drains:            Lab Results: I have reviewed the following results:  Results from last 7 days   Lab Units 12/15/24  1306   WBC Thousand/uL 8.96   HEMOGLOBIN g/dL 14.4   HEMATOCRIT % 43.5   PLATELETS Thousands/uL 198   BANDS PCT % 9*   LYMPHO PCT % 7*   MONO PCT % 2*   EOS PCT % 2     Results from last 7 days   Lab Units 12/15/24  1306   SODIUM mmol/L 135   POTASSIUM mmol/L 3.9   CHLORIDE mmol/L 102   CO2 mmol/L 24   BUN mg/dL 12   CREATININE mg/dL 1.01   ANION GAP mmol/L 9   CALCIUM mg/dL 8.8   ALBUMIN g/dL 4.1   TOTAL BILIRUBIN mg/dL 1.05*   ALK PHOS U/L 69   ALT U/L 35   AST U/L 19   GLUCOSE RANDOM mg/dL 110     Results from last 7 days   Lab Units 12/15/24  1306   INR  1.03         Lab Results   Component Value Date    HGBA1C 5.6 12/10/2024     Results from last 7 days   Lab Units 12/15/24  1547 12/15/24  1306   LACTIC ACID mmol/L 1.2 2.3*   PROCALCITONIN ng/ml  --  0.76*       Imaging Results Review: I personally reviewed the following image studies/reports in PACS and discussed pertinent findings with Radiology: chest xray. My interpretation of the radiology images/reports is: Above. CT scan above also  Other Study Results Review: EKG was reviewed.     Administrative Statements   I have spent a total time of 80 minutes in caring for this patient on the day of the visit/encounter including Diagnostic results, Prognosis, Risks and benefits of tx options, Instructions for management, Patient and family education, Importance of tx compliance, Risk factor reductions, Impressions, Counseling / Coordination of care, Documenting in the medical record, Reviewing / ordering tests, medicine, procedures  , Obtaining or reviewing history  , and Communicating with other healthcare professionals .    ** Please Note:  This note has been constructed using a voice recognition system. **

## 2024-12-15 NOTE — TELEPHONE ENCOUNTER
"Patients daughter calling to report that patient developed chills last night, hands and feet slightly cool to touch but no loss of sensation or tingling. No other symptoms, incision at PPM site is WNL, no signs of infection reported. Advised to call back if patient develops fevers or any other symptoms and to follow up with office at scheduled appointment on Tuesday 12/17. Patients daughter expressed understanding and is agreeable.   Reason for Disposition   Other post-op symptom or question    Answer Assessment - Initial Assessment Questions  1. SYMPTOM: \"What's the main symptom you're concerned about?\" (e.g., pain, fever, vomiting)      Chills   2. ONSET: \"When did chills  start?\"      Last night  3. SURGERY: \"What surgery did you have?\"      PPM 11/27  4. DATE of SURGERY: \"When was the surgery?\"       11/27/24  5. ANESTHESIA: \"What type of anesthesia did you have?\" (e.g., general, spinal, epidural, local)      General    6. DRAINS: \"Were any drains place in or around the wound?\" (e.g., Hemovac, Jatinder-Aparicio, Penrose)      No  7. PAIN: \"Is there any pain?\" If Yes, ask: \"How bad is it?\"  (Scale 1-10; or mild, moderate, severe)      Denies  8. FEVER: \"Do you have a fever?\" If Yes, ask: \"What is your temperature, how was it measured, and when did it start?\"      No 99.8 temp  9. VOMITING: \"Is there any vomiting?\" If Yes, ask: \"How many times?\"      Denies  10. BLEEDING: \"Is there any bleeding?\" If Yes, ask: \"How much?\" and \"Where?\"        Denies  11. OTHER SYMPTOMS: \"Do you have any other symptoms?\" (e.g., drainage from wound, painful urination, constipation)        Denies    Protocols used: Post-Op Symptoms and Questions-Adult-    "

## 2024-12-15 NOTE — SEPSIS NOTE
"  Sepsis Note   Randal Romeo 59 y.o. male MRN: [id number]  Unit/Bed#: ED-02 Encounter: [id number]       Initial Sepsis Screening       Row Name 12/15/24 1438                Is the patient's history suggestive of a new or worsening infection? Yes (Proceed)  -SZ        Suspected source of infection pneumonia;suspect infection, source unknown  -SZ        Indicate SIRS criteria Tachycardia > 90 bpm;Tachypnea > 20 resp per min  -SZ        Are two or more of the above signs & symptoms of infection both present and new to the patient? Yes (Proceed)  -SZ        Assess for evidence of organ dysfunction: Are any of the below criteria present within 6 hours of suspected infection and SIRS criteria that are NOT considered to be chronic conditions? Lactate > 2.0  -        Date of presentation of severe sepsis 12/15/24  -        Time of presentation of severe sepsis 1438  -SZ        Sepsis Note: Click \"NEXT\" below (NOT \"close\") to generate sepsis note based on above information. YES (proceed by clicking \"NEXT\")  -                  User Key  (r) = Recorded By, (t) = Taken By, (c) = Cosigned By      Initials Name Provider Type    SZ Christi Stewart MD Physician                        There is no height or weight on file to calculate BMI.  Wt Readings from Last 1 Encounters:   12/10/24 122 kg (268 lb)        Ideal body weight: 74.1 kg (163 lb 7.5 oz)  Adjusted ideal body weight: 93.1 kg (205 lb 4.5 oz)    "

## 2024-12-15 NOTE — ASSESSMENT & PLAN NOTE
Patient has history of 1 day, poor oral intake rigors, chills, diaphoresis, poor oral intake  Flu COVID negative  Patient has increased coughing and shortness of breath  Prompted him to go to the emergency department  CT scan did not show embolism  WBC not elevated  Patient does have a cough but no known wheezing  Does not appear to have COPD in exacerbation  Considering a viral possible common cold versus bronchitis  Patient has elevated WBC with urine however no symptoms of dysuria, urinary frequency.    Plan:  Will continue azithromycin for 5 days for upper respiratory tract infection  Supportive care  Observe overnight

## 2024-12-15 NOTE — ASSESSMENT & PLAN NOTE
Patient had previous VATS in January 2021 for right lung mass  Follows with pulmonology as outpatient.

## 2024-12-16 PROBLEM — R78.81 BACTEREMIA: Status: ACTIVE | Noted: 2024-12-15

## 2024-12-16 LAB
ALBUMIN SERPL BCG-MCNC: 3.7 G/DL (ref 3.5–5)
ALP SERPL-CCNC: 54 U/L (ref 34–104)
ALT SERPL W P-5'-P-CCNC: 26 U/L (ref 7–52)
ANION GAP SERPL CALCULATED.3IONS-SCNC: 7 MMOL/L (ref 4–13)
AST SERPL W P-5'-P-CCNC: 15 U/L (ref 13–39)
ATRIAL RATE: 100 BPM
BILIRUB SERPL-MCNC: 0.68 MG/DL (ref 0.2–1)
BUN SERPL-MCNC: 13 MG/DL (ref 5–25)
CALCIUM SERPL-MCNC: 8.6 MG/DL (ref 8.4–10.2)
CHLORIDE SERPL-SCNC: 105 MMOL/L (ref 96–108)
CO2 SERPL-SCNC: 26 MMOL/L (ref 21–32)
CREAT SERPL-MCNC: 1.08 MG/DL (ref 0.6–1.3)
ERYTHROCYTE [DISTWIDTH] IN BLOOD BY AUTOMATED COUNT: 14 % (ref 11.6–15.1)
GFR SERPL CREATININE-BSD FRML MDRD: 74 ML/MIN/1.73SQ M
GLUCOSE P FAST SERPL-MCNC: 108 MG/DL (ref 65–99)
GLUCOSE SERPL-MCNC: 108 MG/DL (ref 65–140)
HCT VFR BLD AUTO: 41.3 % (ref 36.5–49.3)
HGB BLD-MCNC: 13.4 G/DL (ref 12–17)
MCH RBC QN AUTO: 30.7 PG (ref 26.8–34.3)
MCHC RBC AUTO-ENTMCNC: 32.4 G/DL (ref 31.4–37.4)
MCV RBC AUTO: 95 FL (ref 82–98)
PLATELET # BLD AUTO: 190 THOUSANDS/UL (ref 149–390)
PMV BLD AUTO: 10 FL (ref 8.9–12.7)
POTASSIUM SERPL-SCNC: 3.9 MMOL/L (ref 3.5–5.3)
PROCALCITONIN SERPL-MCNC: 35.4 NG/ML
PROT SERPL-MCNC: 6.8 G/DL (ref 6.4–8.4)
QRS AXIS: 6 DEGREES
QRSD INTERVAL: 62 MS
QT INTERVAL: 302 MS
QTC INTERVAL: 395 MS
RBC # BLD AUTO: 4.36 MILLION/UL (ref 3.88–5.62)
SODIUM SERPL-SCNC: 138 MMOL/L (ref 135–147)
T WAVE AXIS: 51 DEGREES
VENTRICULAR RATE: 103 BPM
WBC # BLD AUTO: 11.93 THOUSAND/UL (ref 4.31–10.16)

## 2024-12-16 PROCEDURE — 84145 PROCALCITONIN (PCT): CPT

## 2024-12-16 PROCEDURE — 99232 SBSQ HOSP IP/OBS MODERATE 35: CPT

## 2024-12-16 PROCEDURE — 80053 COMPREHEN METABOLIC PANEL: CPT

## 2024-12-16 PROCEDURE — 85027 COMPLETE CBC AUTOMATED: CPT

## 2024-12-16 PROCEDURE — 93010 ELECTROCARDIOGRAM REPORT: CPT | Performed by: INTERNAL MEDICINE

## 2024-12-16 RX ADMIN — CEFEPIME 2000 MG: 2 INJECTION, POWDER, FOR SOLUTION INTRAVENOUS at 13:41

## 2024-12-16 RX ADMIN — ACETAMINOPHEN 975 MG: 325 TABLET, FILM COATED ORAL at 06:08

## 2024-12-16 RX ADMIN — GUAIFENESIN 600 MG: 600 TABLET ORAL at 08:19

## 2024-12-16 RX ADMIN — BENZONATATE 200 MG: 100 CAPSULE ORAL at 08:19

## 2024-12-16 RX ADMIN — BUDESONIDE AND FORMOTEROL FUMARATE DIHYDRATE 2 PUFF: 160; 4.5 AEROSOL RESPIRATORY (INHALATION) at 08:20

## 2024-12-16 RX ADMIN — BUDESONIDE AND FORMOTEROL FUMARATE DIHYDRATE 2 PUFF: 160; 4.5 AEROSOL RESPIRATORY (INHALATION) at 17:40

## 2024-12-16 RX ADMIN — ENOXAPARIN SODIUM 40 MG: 40 INJECTION SUBCUTANEOUS at 08:19

## 2024-12-16 RX ADMIN — ACETAMINOPHEN 975 MG: 325 TABLET, FILM COATED ORAL at 22:01

## 2024-12-16 RX ADMIN — METOPROLOL SUCCINATE 25 MG: 25 TABLET, EXTENDED RELEASE ORAL at 22:01

## 2024-12-16 RX ADMIN — UMECLIDINIUM 1 PUFF: 62.5 AEROSOL, POWDER ORAL at 08:49

## 2024-12-16 RX ADMIN — BENZONATATE 200 MG: 100 CAPSULE ORAL at 22:01

## 2024-12-16 RX ADMIN — METOPROLOL SUCCINATE 25 MG: 25 TABLET, EXTENDED RELEASE ORAL at 08:19

## 2024-12-16 RX ADMIN — FAMOTIDINE 40 MG: 20 TABLET, FILM COATED ORAL at 08:19

## 2024-12-16 RX ADMIN — CEFEPIME 2000 MG: 2 INJECTION, POWDER, FOR SOLUTION INTRAVENOUS at 22:02

## 2024-12-16 RX ADMIN — GUAIFENESIN 600 MG: 600 TABLET ORAL at 22:01

## 2024-12-16 RX ADMIN — FUROSEMIDE 20 MG: 20 TABLET ORAL at 08:19

## 2024-12-16 NOTE — PLAN OF CARE

## 2024-12-16 NOTE — UTILIZATION REVIEW
NOTIFICATION OF OBSERVATION ADMISSION   AUTHORIZATION REQUEST   SERVICING FACILITY:   [place] [address]  Tax ID: [id number]  NPI: [id number]   ATTENDING PROVIDER:  Attending Name and NPI#: Jaja Gallego Md [[id number]]  Address: [address] [place] [address]  Phone: [phone]   ADMISSION INFORMATION:  Place of Service: On [facility]  Place of Service Code: 22 CPT Code:   Admitting Diagnosis Code/Description:  Chills [R68.83]  Rigors [R68.89]  Urine leukocytes [R82.998]  SIRS (systemic inflammatory response syndrome) (HCC) [R65.10]  Hx of cardiac pacemaker [Z95.0]  Cough productive of yellow sputum [R05.8]  Observation Admission Date/Time: 12/15/2024 1653  Discharge Date/Time: No discharge date for patient encounter.     UTILIZATION REVIEW CONTACT:  Kelsey Caal Utilization   Network Utilization Review Department  Phone: [phone]  Fax: [phone]  Email: [email or web address]  Contact for approvals/pending authorizations, clinical reviews, and discharge.     PHYSICIAN ADVISORY SERVICES:  Medical Necessity Denial & Fdsy-vl-Sbiw Review  Phone: [phone]  Fax: [phone]  Email: [email or web address]     DISCHARGE SUPPORT TEAM:  For Patients Discharge Needs & Updates  Phone: [phone] opt. 2 Fax: [phone]  Email: [email or web address]

## 2024-12-16 NOTE — PLAN OF CARE

## 2024-12-16 NOTE — ASSESSMENT & PLAN NOTE
Fortanetly, patient remains AFVSS. WBC 11  Suspected secondary to acute cystitis   Start on Cefepime 2g q8, await final urine and blood cultures. Anticipate 10-14 day abx course   Trend WBCs/fever curve

## 2024-12-16 NOTE — PROGRESS NOTES
Progress Note - Hospitalist   Name: Randal Romeo 59 y.o. male I MRN: [id number]  Unit/Bed#: -01 I Date of Admission: 12/15/2024   Date of Service: 12/16/2024 I Hospital Day: 0    Assessment & Plan  Bacteremia  Fortanetly, patient remains AFVSS. WBC 11  Suspected secondary to acute cystitis   Start on Cefepime 2g q8, await final urine and blood cultures. Anticipate 10-14 day abx course   Trend WBCs/fever curve     Asymptomatic bacteriuria  Now with evidence of GNR bacteremia   Continue IV Cefepime    Chronic Obstructive Pulmonary Disease  COPD on Breztri  Not in acute exacerbation  Malignant neoplasm of upper lobe of right lung (HCC)  Patient had previous VATS in January 2021 for right lung mass  Follows with pulmonology as outpatient.    Gastroesophageal reflux disease without esophagitis  Continue famotidine  MARIANA (obstructive sleep apnea)  Obstructive sleep apnea on CPAP  Will order CPAP daily at night.  Status post placement of cardiac pacemaker  Status post cardiac pacemaker due to sinus pauses which was done on 11/27/2024.      VTE Pharmacologic Prophylaxis: VTE Score: 5 lovenox    Mobility:   Basic Mobility Inpatient Raw Score: 24  JH-HLM Goal: 8: Walk 250 feet or more  JH-HLM Achieved: 8: Walk 250 feet ot more  JH-HLM Goal NOT achieved. Continue with multidisciplinary rounding and encourage appropriate mobility to improve upon JH-HLM goals.    Patient Centered Rounds: I performed bedside rounds with nursing staff today.   Discussions with Specialists or Other Care Team Provider: case management     Education and Discussions with Family / Patient: Updated  (wife) via phone.    Current Length of Stay: 0 day(s)  Current Patient Status: Inpatient   Certification Statement: The patient, admitted on an observation basis, will now require > 2 midnight hospital stay due to bacteremia, IV abx  Discharge Plan: Anticipate discharge in 24-48 hrs to home.    Code Status: Level 1 - Full  Code    Subjective   Patient seen and examined. Reports feeling warm. Low grade fever last evening. No chills, abdominal pain, N/V/D. No urinary complaints     Objective :  Temp:  [98.1 °F (36.7 °C)-99.6 °F (37.6 °C)] 98.3 °F (36.8 °C)  HR:  [64-88] 64  BP: (107-152)/(63-75) 134/75  Resp:  [16-20] 18  SpO2:  [93 %-98 %] 95 %  O2 Device: None (Room air)    Body mass index is 38.05 kg/m².     Input and Output Summary (last 24 hours):     Intake/Output Summary (Last 24 hours) at 12/16/2024 1520  Last data filed at 12/16/2024 1414  Gross per 24 hour   Intake 1460 ml   Output --   Net 1460 ml       Physical Exam  Constitutional:       Appearance: He is obese.   HENT:      Head: Normocephalic and atraumatic.   Cardiovascular:      Rate and Rhythm: Normal rate and regular rhythm.      Comments: Pacemaker site without surrounding erythema   Pulmonary:      Effort: Pulmonary effort is normal. No respiratory distress.      Breath sounds: Normal breath sounds.   Abdominal:      General: Abdomen is flat. Bowel sounds are normal.      Palpations: Abdomen is soft.      Tenderness: There is no abdominal tenderness. There is no guarding.   Musculoskeletal:      Right lower leg: No edema.      Left lower leg: No edema.   Skin:     General: Skin is warm and dry.   Neurological:      General: No focal deficit present.      Mental Status: He is alert and oriented to person, place, and time.           Lines/Drains:              Lab Results: I have reviewed the following results:   Results from last 7 days   Lab Units 12/16/24  0442 12/15/24  1900 12/15/24  1306   WBC Thousand/uL 11.93*  --  8.96   HEMOGLOBIN g/dL 13.4  --  14.4   HEMATOCRIT % 41.3  --  43.5   PLATELETS Thousands/uL 190   < > 198   BANDS PCT %  --   --  9*   LYMPHO PCT %  --   --  7*   MONO PCT %  --   --  2*   EOS PCT %  --   --  2    < > = values in this interval not displayed.     Results from last 7 days   Lab Units 12/16/24  0442   SODIUM mmol/L 138   POTASSIUM  mmol/L 3.9   CHLORIDE mmol/L 105   CO2 mmol/L 26   BUN mg/dL 13   CREATININE mg/dL 1.08   ANION GAP mmol/L 7   CALCIUM mg/dL 8.6   ALBUMIN g/dL 3.7   TOTAL BILIRUBIN mg/dL 0.68   ALK PHOS U/L 54   ALT U/L 26   AST U/L 15   GLUCOSE RANDOM mg/dL 108     Results from last 7 days   Lab Units 12/15/24  1306   INR  1.03         Results from last 7 days   Lab Units 12/10/24  1017   HEMOGLOBIN A1C  5.6     Results from last 7 days   Lab Units 12/16/24  0442 12/15/24  1547 12/15/24  1306   LACTIC ACID mmol/L  --  1.2 2.3*   PROCALCITONIN ng/ml 35.40*  --  0.76*       Recent Cultures (last 7 days):   Results from last 7 days   Lab Units 12/15/24  1547 12/15/24  1306   GRAM STAIN RESULT  Gram negative rods* Gram negative rods*       Imaging Results Review: I reviewed radiology reports from this admission including: chest xray and CT chest.  Other Study Results Review: No additional pertinent studies reviewed.    Last 24 Hours Medication List:     Current Facility-Administered Medications:     acetaminophen (TYLENOL) tablet 650 mg, Q6H PRN    acetaminophen (TYLENOL) tablet 975 mg, Q8H    albuterol (PROVENTIL HFA,VENTOLIN HFA) inhaler 2 puff, Q6H PRN    benzonatate (TESSALON PERLES) capsule 200 mg, TID    budesonide-formoterol (SYMBICORT) 160-4.5 mcg/act inhaler 2 puff, BID    ceFEPime (MAXIPIME) 2,000 mg in dextrose 5 % 50 mL IVPB, Q8H, Last Rate: 2,000 mg (12/16/24 1341)    enoxaparin (LOVENOX) subcutaneous injection 40 mg, Daily    famotidine (PEPCID) tablet 40 mg, Daily    furosemide (LASIX) tablet 20 mg, Daily    guaiFENesin (MUCINEX) 12 hr tablet 600 mg, Q12H BOO    metoprolol succinate (TOPROL-XL) 24 hr tablet 25 mg, BID    ondansetron (ZOFRAN) injection 4 mg, Q6H PRN    umeclidinium 62.5 mcg/actuation inhaler AEPB 1 puff, Daily    Administrative Statements   Today, Patient Was Seen By: Christina Granger PA-C      **Please Note: This note may have been constructed using a voice recognition system.**

## 2024-12-17 ENCOUNTER — RESULTS FOLLOW-UP (OUTPATIENT)
Dept: CARDIOLOGY CLINIC | Facility: CLINIC | Age: 59
End: 2024-12-17

## 2024-12-17 ENCOUNTER — APPOINTMENT (INPATIENT)
Dept: CT IMAGING | Facility: HOSPITAL | Age: 59
End: 2024-12-17
Payer: COMMERCIAL

## 2024-12-17 PROBLEM — N40.0 BPH (BENIGN PROSTATIC HYPERPLASIA): Status: ACTIVE | Noted: 2024-12-17

## 2024-12-17 PROBLEM — B96.89 BACTEREMIA DUE TO ENTEROBACTER SPECIES: Status: ACTIVE | Noted: 2024-12-15

## 2024-12-17 PROBLEM — E66.812 CLASS 2 OBESITY IN ADULT: Status: ACTIVE | Noted: 2024-12-17

## 2024-12-17 PROBLEM — K52.9 CHRONIC DIARRHEA: Status: ACTIVE | Noted: 2024-12-17

## 2024-12-17 PROBLEM — A41.9 SEPSIS (HCC): Status: ACTIVE | Noted: 2024-12-17

## 2024-12-17 PROBLEM — R82.79 POSITIVE URINE CULTURE: Status: ACTIVE | Noted: 2024-12-17

## 2024-12-17 LAB
ALBUMIN SERPL BCG-MCNC: 3.9 G/DL (ref 3.5–5)
ALP SERPL-CCNC: 53 U/L (ref 34–104)
ALT SERPL W P-5'-P-CCNC: 21 U/L (ref 7–52)
ANION GAP SERPL CALCULATED.3IONS-SCNC: 6 MMOL/L (ref 4–13)
AST SERPL W P-5'-P-CCNC: 14 U/L (ref 13–39)
BASOPHILS # BLD AUTO: 0.11 THOUSANDS/ΜL (ref 0–0.1)
BASOPHILS NFR BLD AUTO: 2 % (ref 0–1)
BILIRUB SERPL-MCNC: 0.56 MG/DL (ref 0.2–1)
BUN SERPL-MCNC: 14 MG/DL (ref 5–25)
CALCIUM SERPL-MCNC: 8.6 MG/DL (ref 8.4–10.2)
CHLORIDE SERPL-SCNC: 109 MMOL/L (ref 96–108)
CO2 SERPL-SCNC: 23 MMOL/L (ref 21–32)
CREAT SERPL-MCNC: 1.01 MG/DL (ref 0.6–1.3)
EOSINOPHIL # BLD AUTO: 0.17 THOUSAND/ΜL (ref 0–0.61)
EOSINOPHIL NFR BLD AUTO: 2 % (ref 0–6)
ERYTHROCYTE [DISTWIDTH] IN BLOOD BY AUTOMATED COUNT: 14 % (ref 11.6–15.1)
GFR SERPL CREATININE-BSD FRML MDRD: 81 ML/MIN/1.73SQ M
GLUCOSE SERPL-MCNC: 94 MG/DL (ref 65–140)
HCT VFR BLD AUTO: 43.4 % (ref 36.5–49.3)
HGB BLD-MCNC: 13.7 G/DL (ref 12–17)
IMM GRANULOCYTES # BLD AUTO: 0.02 THOUSAND/UL (ref 0–0.2)
IMM GRANULOCYTES NFR BLD AUTO: 0 % (ref 0–2)
LYMPHOCYTES # BLD AUTO: 1.48 THOUSANDS/ΜL (ref 0.6–4.47)
LYMPHOCYTES NFR BLD AUTO: 21 % (ref 14–44)
MCH RBC QN AUTO: 30.7 PG (ref 26.8–34.3)
MCHC RBC AUTO-ENTMCNC: 31.6 G/DL (ref 31.4–37.4)
MCV RBC AUTO: 97 FL (ref 82–98)
MONOCYTES # BLD AUTO: 1.07 THOUSAND/ΜL (ref 0.17–1.22)
MONOCYTES NFR BLD AUTO: 15 % (ref 4–12)
NEUTROPHILS # BLD AUTO: 4.16 THOUSANDS/ΜL (ref 1.85–7.62)
NEUTS SEG NFR BLD AUTO: 60 % (ref 43–75)
NRBC BLD AUTO-RTO: 0 /100 WBCS
PLATELET # BLD AUTO: 175 THOUSANDS/UL (ref 149–390)
PMV BLD AUTO: 10.1 FL (ref 8.9–12.7)
POTASSIUM SERPL-SCNC: 3.7 MMOL/L (ref 3.5–5.3)
PROT SERPL-MCNC: 7.2 G/DL (ref 6.4–8.4)
RBC # BLD AUTO: 4.46 MILLION/UL (ref 3.88–5.62)
SODIUM SERPL-SCNC: 138 MMOL/L (ref 135–147)
WBC # BLD AUTO: 7.01 THOUSAND/UL (ref 4.31–10.16)

## 2024-12-17 PROCEDURE — 80053 COMPREHEN METABOLIC PANEL: CPT

## 2024-12-17 PROCEDURE — 99232 SBSQ HOSP IP/OBS MODERATE 35: CPT | Performed by: PHYSICIAN ASSISTANT

## 2024-12-17 PROCEDURE — 74177 CT ABD & PELVIS W/CONTRAST: CPT

## 2024-12-17 PROCEDURE — 85025 COMPLETE CBC W/AUTO DIFF WBC: CPT

## 2024-12-17 PROCEDURE — 99223 1ST HOSP IP/OBS HIGH 75: CPT | Performed by: INTERNAL MEDICINE

## 2024-12-17 RX ADMIN — METOPROLOL SUCCINATE 25 MG: 25 TABLET, EXTENDED RELEASE ORAL at 21:40

## 2024-12-17 RX ADMIN — CEFEPIME 2000 MG: 2 INJECTION, POWDER, FOR SOLUTION INTRAVENOUS at 05:49

## 2024-12-17 RX ADMIN — METOPROLOL SUCCINATE 25 MG: 25 TABLET, EXTENDED RELEASE ORAL at 10:00

## 2024-12-17 RX ADMIN — BUDESONIDE AND FORMOTEROL FUMARATE DIHYDRATE 2 PUFF: 160; 4.5 AEROSOL RESPIRATORY (INHALATION) at 10:00

## 2024-12-17 RX ADMIN — ACETAMINOPHEN 975 MG: 325 TABLET, FILM COATED ORAL at 13:56

## 2024-12-17 RX ADMIN — CEFEPIME 2000 MG: 2 INJECTION, POWDER, FOR SOLUTION INTRAVENOUS at 13:56

## 2024-12-17 RX ADMIN — FUROSEMIDE 20 MG: 20 TABLET ORAL at 10:00

## 2024-12-17 RX ADMIN — BUDESONIDE AND FORMOTEROL FUMARATE DIHYDRATE 2 PUFF: 160; 4.5 AEROSOL RESPIRATORY (INHALATION) at 18:55

## 2024-12-17 RX ADMIN — IOHEXOL 100 ML: 350 INJECTION, SOLUTION INTRAVENOUS at 17:23

## 2024-12-17 RX ADMIN — UMECLIDINIUM 1 PUFF: 62.5 AEROSOL, POWDER ORAL at 10:00

## 2024-12-17 RX ADMIN — FAMOTIDINE 40 MG: 20 TABLET, FILM COATED ORAL at 10:00

## 2024-12-17 RX ADMIN — ACETAMINOPHEN 975 MG: 325 TABLET, FILM COATED ORAL at 21:40

## 2024-12-17 RX ADMIN — CEFEPIME 2000 MG: 2 INJECTION, POWDER, FOR SOLUTION INTRAVENOUS at 21:44

## 2024-12-17 NOTE — PLAN OF CARE

## 2024-12-17 NOTE — ASSESSMENT & PLAN NOTE
Site appears grossly unremarkable.  Steri-Strips remain in place.  Low suspicion for endovascular complication/pocket infection.  Requested primary service discussed with cardiology for removal of Steri-Strips to further monitor the site otherwise.  Antibiotics as above.

## 2024-12-17 NOTE — UTILIZATION REVIEW
Initial Clinical Review    WAS OBSERVATION 12/15/24 CONVERTED TO INPATIENT ADMISSION 12/16/24 DUE TO CONTINUED STAY REQUIRED TO CARE FOR PATIENT WITH BACTEREMIA  .     admission: Date/Time/Statement:   Admission Orders (From admission, onward)       Ordered        12/16/24 1452  INPATIENT ADMISSION  Once            12/15/24 1653  Place in Observation  Once                          Orders Placed This Encounter   Procedures                          INPATIENT ADMISSION     Standing Status:   Standing     Number of Occurrences:   1     Level of Care:   Med Surg [16]     Estimated length of stay:   More than 2 Midnights     Certification:   I certify that inpatient services are medically necessary for this patient for a duration of greater than two midnights. See H&P and MD Progress Notes for additional information about the patient's course of treatment.     ED Arrival Information       Expected   -    Arrival   12/15/2024 12:54    Acuity   Urgent              Means of arrival   Walk-In    Escorted by   Family Member    Service   Hospitalist    Admission type   Emergency              Arrival complaint   Recent pacemaker put in, shivering, fever             Chief Complaint   Patient presents with    Chills     Patient presents for chills that began last night. Has subjective fever at home with n/v. Pacemaker placed 2.5 weeks ago.        Initial Presentation: 59 y.o. male presents to the ED with complaints of Fever, lightheadedness, rigors, poor oral intake, chills, diaphoresis x 1 day. Pt has increased soughing and shortness of breath, + tachycardia with + fever 99.9 F, , RR 20. Recently had a loop recorder removes and Pacemaker inserted approximately 2.5 weeks ago. Pacemaker incision clean, dry and intact without signs of infection.  Exam: + fever 99.9 F, , RR 20, + cough and + sob.  PMH:  syncope with sinus pauses status post dual chamber pacemaker placement, hypertension, obstructive sleep apnea on CPAP,  mildly neoplasm of the right lung, status post VATS in January 2021, GERD, COPD. Abnormal albs/imaging: .D-dimer 1.61, T bILI 1.05,Lactic Acid 2.3, Pro Zoran 0.76, SARS COVID-negative, urine microscopy innumerable bacteria, lactic acid 1.2. No evidence of pulmonary embolism on CT, mild emphysematous changes. In the ED pt given PO acetaminophen, IV NS 1 liter, IV Ceftriaxone x2 doses, IV azithromycin x1 dose. Plan: admit for observation for Chills, Asymptomatic bacteriuria , Zithromax x5 days, trend fever, WBC, CPAP at HS.     Anticipated Length of Stay/Certification Statement: Patient will be admitted on an observation basis with an anticipated length of stay of less than 2 midnights secondary to URTI.     CONVERTED TO IP 12/16/24.     Date: 12/16/24   Day 2: Hospitalist: Exam:Reports feeling warm. Low grade fever last evening , Temp 99.6 F.  WBC 11,pacemaker incision w/o surrounding erythema, suspect to acute cystitis, Plan: start Cefepime 2 GM Q 8, f/u final urine and blood cultures, trend WBC, fever curve, DVT ppx's. Continue MDI, Mucinex    Date: 12/17/24  Day 3: Has surpassed a 2nd midnight with active treatments and services.    12/17/ 24 Infectious Disease consult: Noted with tachycardia and tachypnea along with mild bandemia of 9%. + bacteremia. Temp 99 F. WBC 7.01 Clinical parameters overall improving. Definitive source of bacteremia remains uncertain. 2 out of 2 blood cultures have isolated Enterobacter by PCR and urine culture as well. Unfortunately this organism does typically have inducible resistance and so limited antibiotic options Plan: Anticipate total 7 days of antibiotic for bacteremia , IV cefepime Q 8hrs, continue Daily CBC/CMP,trend fever curve, Rec formal CT A/P with contrast.      Hospitalist:  Left chest incision well approximated, no drainage or tenderness ,steri strips removed.  Pt states feels better since admission.Bacteremia Suspected secondary to acute cystitis  Plan: f/u completed  BC's.     Certification Statement: The patient will continue to require additional inpatient hospital stay due to sepsis pending cultures and sensitivities     ED Treatment-Medication Administration from 12/15/2024 1254 to 12/15/2024 2116 Rec formal CT         Date/Time Order Dose Route Action     12/15/2024 1433 sodium chloride 0.9 % bolus 1,000 mL 1,000 mL Intravenous New Bag     12/15/2024 1432 acetaminophen (TYLENOL) tablet 650 mg 650 mg Oral Given     12/15/2024 1600 ceftriaxone (ROCEPHIN) 1 g/50 mL in dextrose IVPB 1,000 mg Intravenous New Bag     12/15/2024 1605 azithromycin (ZITHROMAX) tablet 500 mg 500 mg Oral Given     12/15/2024 1527 iohexol (OMNIPAQUE) 350 MG/ML injection (MULTI-DOSE) 60 mL 60 mL Intravenous Given     12/15/2024 1904 budesonide-formoterol (SYMBICORT) 160-4.5 mcg/act inhaler 2 puff 2 puff Inhalation Given     12/15/2024 1915 ceftriaxone (ROCEPHIN) 1 g/50 mL in dextrose IVPB 1,000 mg Intravenous New Bag     12/15/2024 1902 metoprolol succinate (TOPROL-XL) 24 hr tablet 25 mg 25 mg Oral Given            Scheduled Medications:  acetaminophen, 975 mg, Oral, Q8H  benzonatate, 200 mg, Oral, TID  budesonide-formoterol, 2 puff, Inhalation, BID  cefepime, 2,000 mg, Intravenous, Q8H  enoxaparin, 40 mg, Subcutaneous, Daily  famotidine, 40 mg, Oral, Daily  furosemide, 20 mg, Oral, Daily  guaiFENesin, 600 mg, Oral, Q12H OBO  metoprolol succinate, 25 mg, Oral, BID  umeclidinium, 1 puff, Inhalation, Daily      Continuous IV Infusions: none      PRN Meds:  acetaminophen, 650 mg, Oral, Q6H PRN  albuterol, 2 puff, Inhalation, Q6H PRN  ondansetron, 4 mg, Intravenous, Q6H PRN      ED Triage Vitals   Temperature Pulse Respirations Blood Pressure SpO2 Pain Score   12/15/24 1304 12/15/24 1304 12/15/24 1304 12/15/24 1304 12/15/24 1304 12/15/24 2030   99.9 °F (37.7 °C) 104 22 129/68 100 % No Pain     Weight (last 2 days)       Date/Time Weight    12/15/24 1630 122 (268.96)            Vital Signs (last 3 days)        Date/Time Temp Pulse Resp BP MAP (mmHg) SpO2 O2 Device Patient Position - Orthostatic VS Pain    12/17/24 06:46:59 98.4 °F (36.9 °C) 67 -- 144/83 103 97 % -- -- --    12/17/24 0533 -- -- -- -- -- -- -- -- No Pain    12/17/24 0100 -- -- -- -- -- -- -- -- No Pain    12/16/24 2201 -- -- -- -- -- -- -- -- No Pain    12/16/24 21:33:09 99 °F (37.2 °C) 70 -- 122/70 87 95 % -- -- --    12/16/24 16:24:20 98 °F (36.7 °C) 70 18 121/67 85 96 % -- -- --    12/16/24 1514 98.3 °F (36.8 °C) 64 18 134/75 98 95 % None (Room air) Lying --    12/16/24 0745 -- -- -- -- -- -- -- -- No Pain    12/16/24 0735 98.8 °F (37.1 °C) 67 16 152/75 107 96 % None (Room air) Lying --    12/16/24 0608 -- -- -- -- -- -- -- -- 2    12/15/24 2209 98.9 °F (37.2 °C) 71 18 116/64 83 93 % -- Lying --    12/15/24 2136 -- -- -- -- -- -- -- -- No Pain    12/15/24 2124 -- -- -- -- -- -- -- -- No Pain    12/15/24 2119 98.1 °F (36.7 °C) 75 19 123/63 84 96 % -- Lying --    12/15/24 2030 -- 76 20 107/66 81 98 % None (Room air) -- No Pain    12/15/24 1930 99.6 °F (37.6 °C) 72 -- 108/65 81 95 % -- -- --    12/15/24 1900 -- 81 20 110/67 -- 94 % -- -- --    12/15/24 1700 -- 83 20 117/70 88 95 % -- -- --    12/15/24 1630 -- 88 20 118/69 88 95 % None (Room air) -- --    12/15/24 1600 -- 85 18 109/66 83 96 % -- -- --    12/15/24 1545 -- 88 -- -- -- 95 % -- -- --    12/15/24 1530 -- 87 -- 113/64 83 94 % -- -- --    12/15/24 1515 -- 86 -- 117/62 83 92 % -- -- --    12/15/24 1500 -- 86 -- 117/60 82 93 % -- -- --    12/15/24 1445 -- 90 -- 118/62 83 93 % -- -- --    12/15/24 1430 -- 97 -- 123/65 88 94 % -- -- --    12/15/24 1415 -- 100 24 119/63 83 94 % None (Room air) Sitting --    12/15/24 1304 99.9 °F (37.7 °C) 104 22 129/68 91 100 % None (Room air) Sitting --              Pertinent Labs/Diagnostic Test Results:   Radiology:  CTA chest pe study   Final Interpretation by Manny Kunz MD (12/15 1612)      No evidence for pulmonary embolism.      Mild emphysematous  changes.            Workstation performed: [id number]         XR chest 1 view portable   ED Interpretation by Christi Stewart MD (12/15 8088)   Lungs clear.      Final Interpretation by David Pickard MD (12/16 3753)      No acute cardiopulmonary disease.            Workstation performed: [id number]           Cardiology:  ECG 12 lead   Final Result by Michael Viramontes MD (12/16 0890)   Normal sinus rhythm   Low voltage QRS   Cannot rule out Anterior infarct , age undetermined   Abnormal ECG   When compared with ECG of 27-Nov-2024 12:19,   Normal sinus rhythm has replaced Electronic ventricular pacemaker   Confirmed by Michael Viramontes ([id number]) on 12/16/2024 3:51:13 PM              Results from last 7 days   Lab Units 12/17/24  0541 12/16/24  0442 12/15/24  1900 12/15/24  1306   WBC Thousand/uL 7.01 11.93*  --  8.96   HEMOGLOBIN g/dL 13.7 13.4  --  14.4   HEMATOCRIT % 43.4 41.3  --  43.5   PLATELETS Thousands/uL 175 190 172 198   TOTAL NEUT ABS Thousands/µL 4.16  --   --   --    BANDS PCT %  --   --   --  9*         Results from last 7 days   Lab Units 12/17/24  0541 12/16/24  0442 12/15/24  1306   SODIUM mmol/L 138 138 135   POTASSIUM mmol/L 3.7 3.9 3.9   CHLORIDE mmol/L 109* 105 102   CO2 mmol/L 23 26 24   ANION GAP mmol/L 6 7 9   BUN mg/dL 14 13 12   CREATININE mg/dL 1.01 1.08 1.01   EGFR ml/min/1.73sq m 81 74 81   CALCIUM mg/dL 8.6 8.6 8.8     Results from last 7 days   Lab Units 12/17/24  0541 12/16/24  0442 12/15/24  1306   AST U/L 14 15 19   ALT U/L 21 26 35   ALK PHOS U/L 53 54 69   TOTAL PROTEIN g/dL 7.2 6.8 7.5   ALBUMIN g/dL 3.9 3.7 4.1   TOTAL BILIRUBIN mg/dL 0.56 0.68 1.05*         Results from last 7 days   Lab Units 12/17/24  0541 12/16/24 0442 12/15/24  1306   GLUCOSE RANDOM mg/dL 94 108 110         Results from last 7 days   Lab Units 12/10/24  1017   HEMOGLOBIN A1C  5.6       Results from last 7 days   Lab Units 12/15/24  1900 12/15/24  1547 12/15/24  1306   HS TNI 0HR ng/L  --    --  6   HS TNI 2HR ng/L  --  7  --    HSTNI D2 ng/L  --  1  --    HS TNI 4HR ng/L 5  --   --    HSTNI D4 ng/L -1  --   --      Results from last 7 days   Lab Units 12/15/24  1306   D-DIMER QUANTITATIVE ug/ml FEU 1.61*     Results from last 7 days   Lab Units 12/15/24  1306   PROTIME seconds 14.2   INR  1.03   PTT seconds 34         Results from last 7 days   Lab Units 12/16/24  0442 12/15/24  1306   PROCALCITONIN ng/ml 35.40* 0.76*     Results from last 7 days   Lab Units 12/15/24  1547 12/15/24  1306   LACTIC ACID mmol/L 1.2 2.3*             Results from last 7 days   Lab Units 12/15/24  1549   CLARITY UA  Clear   COLOR UA  Yellow   SPEC GRAV UA  >=1.050*   PH UA  5.5   GLUCOSE UA mg/dl Negative   KETONES UA mg/dl Negative   BLOOD UA  Trace*   PROTEIN UA mg/dl 30 (1+)*   NITRITE UA  Positive*   BILIRUBIN UA  Negative   UROBILINOGEN UA (BE) mg/dl <2.0   LEUKOCYTES UA  Moderate*   WBC UA /hpf Innumerable*   RBC UA /hpf 4-10*   BACTERIA UA /hpf None Seen   EPITHELIAL CELLS WET PREP /hpf Occasional   MUCUS THREADS  Innumerable*     Results from last 7 days   Lab Units 12/15/24  1549 12/15/24  1547 12/15/24  1306   GRAM STAIN RESULT   --  Gram negative rods* Gram negative rods*   URINE CULTURE  >100,000 cfu/ml Enterobacter cloacae*  --   --                    Past Medical History:   Diagnosis Date    COPD (chronic obstructive pulmonary disease) (HCC)     Cough     Emphysema of lung (HCC)     Liver lesion     Lung cancer (HCC)     Right upper lobe pulmonary nodule     Shortness of breath     Sleep apnea, obstructive     Syncope and collapse      Present on Admission:   Chronic Obstructive Pulmonary Disease   Gastroesophageal reflux disease without esophagitis   Malignant neoplasm of upper lobe of right lung (HCC)   MARIANA (obstructive sleep apnea)      Admitting Diagnosis: Chills [R68.83]  Rigors [R68.89]  Urine leukocytes [R82.998]  SIRS (systemic inflammatory response syndrome) (HCC) [R65.10]  Hx of cardiac pacemaker  [Z95.0]  Cough productive of yellow sputum [R05.8]  Age/Sex: 59 y.o. male    Network Utilization Review Department  ATTENTION: Please call with any questions or concerns to [phone] and carefully listen to the prompts so that you are directed to the right person. All voicemails are confidential.   For Discharge needs, contact Care Management DC Support Team at [phone] opt. [phone]  Send all requests for admission clinical reviews, approved or denied determinations and any other requests to dedicated fax number below belonging to the [facility] where the patient is receiving treatment. List of dedicated fax numbers for the Facilities:  FACILITY NAME UR FAX NUMBER   ADMISSION DENIALS (Administrative/Medical Necessity) [phone]   DISCHARGE SUPPORT TEAM (NETWORK) [phone]   PARENT CHILD HEALTH (Maternity/NICU/Pediatrics) [phone]   [facility] [phone]   [facility] [phone]   [facility] [phone]   [facility] [phone]   [facility] [phone]   [facility] [phone]   [facility] [phone]   [facility] [phone]   [facility] [phone]   [facility] [phone]   [facility] [phone]   [facility] [phone]

## 2024-12-17 NOTE — ASSESSMENT & PLAN NOTE
Has had issues with chronic diarrhea since gallbladder removal.  Again uncertain if patient was having worsening of baseline symptoms in the setting of the above.  Will obtain imaging as above.  Continue antibiotics otherwise as above.

## 2024-12-17 NOTE — ASSESSMENT & PLAN NOTE
BMI noted to be 38.  This can impact antibiotic dosing.  Will favor higher dosing of antibiotic as above for now.  Will dose adjust otherwise as needed.

## 2024-12-17 NOTE — ASSESSMENT & PLAN NOTE
Noted with tachycardia and tachypnea along with mild bandemia of 9%.  Source is patient's bacteremia.  Clinical parameters overall improving.  Definitive source of bacteremia remains uncertain.  Antibiotics as below  Continue to trend fever curve/vitals  Repeat CBC/CMP tomorrow to monitor treatment response last dosing  Follow-up pending blood culture susceptibilities  Follow-up pending urine cultures and susceptibilities  Recommend formal CT imaging of the abdomen and pelvis with contrast  Monitor for new/developing symptoms  Recommend discussion with cardiology to remove Steri-Strips to evaluate pacer site  Uncertain if patient will have oral antibiotic option  Anticipate total 7 days of antibiotic for bacteremia  Will adjust antibiotic further based on culture data/findings  Additional supportive cares per primary  Additional interventions pending clinical course

## 2024-12-17 NOTE — ASSESSMENT & PLAN NOTE
Patient had previous prostate biopsy and recent MRI of the prostate in November.  Possible risk factor for the above.  Follow-up pending imaging.  Continue antibiotics as above.  Follow-up with urology as outpatient.

## 2024-12-17 NOTE — ASSESSMENT & PLAN NOTE
Fortanetly, patient remains AFVSS. WBC 11  Suspected secondary to acute cystitis   Start on Cefepime 2g q8, await final urine and blood cultures.   Infectious disease following  Trend WBCs/fever curve

## 2024-12-17 NOTE — ASSESSMENT & PLAN NOTE
2 out of 2 blood cultures have isolated Enterobacter by PCR.  Unfortunately this organism does typically have inducible resistance and so limited antibiotic options.  Patient does not have a sense of smell and at baseline has urinary frequency, BPH and chronic diarrhea.  Suspicion overall is for a GI/ source.  Consider also viral illness which may have propagated given reports of upper GI symptoms on Sunday.  Exam otherwise nonfocal and benign.  Pacer site with Steri-Strips in place grossly unremarkable and without expressible drainage.  Continue cefepime 2 g every 8 hours for now given BMI  Continue to trend fever curve/vitals  Repeat CBC/chemistry tomorrow  Follow-up pending blood culture susceptibilities  Follow-up pending urine culture susceptibilities  Recommend formal CT imaging of the abdomen pelvis with contrast  Monitor for new/developing symptoms  Would recommend removal of Steri-Strips, if okay with cardiology to evaluate site  Tentative plan is for 7 days of antibiotic for bacteremia  Will adjust duration and antibiotic further based on findings  Supportive care per primary

## 2024-12-17 NOTE — ASSESSMENT & PLAN NOTE
Patient met sepsis criteria time of admission with tachycardia and tachypnea  Source likely bacteremia with bacteriuria  Blood cultures growing gram-negative rods-Enterobacter  Urine culture also growing gram-negative rods-Enterobacter  Currently receiving cefepime  ID following  Follow-up sensitivities, will likely need 1 week of antibiotics for bacteremia  Check CTA PE for completeness  Does report pacemaker was placed approximately 3 weeks ago, Steri-Strips removed, no evidence of topical infection, incision well-approximated

## 2024-12-17 NOTE — ASSESSMENT & PLAN NOTE
Urine culture now also isolating Enterobacter.  Again given chronic issues above uncertain if this may have been an undiagnosed developing urinary tract infection.  Patient fortunately otherwise improving.  Antibiotic as above  Labs tomorrow  Follow-up pending urine culture  Urology follow-up as outpatient  Follow-up pending imaging

## 2024-12-17 NOTE — CONSULTS
Consultation - Infectious Disease   Name: Randal Romeo 59 y.o. male I MRN: [id number]  Unit/Bed#: S -01 I Date of Admission: 12/15/2024   Date of Service: 12/17/2024 I Hospital Day: 1   Inpatient consult to Infectious Diseases  Consult performed by: Cristal Herbert MD  Consult ordered by: Simin Scanlon PA-C        Physician Requesting Evaluation: Iris Cutler MD   Reason for Evaluation / Principal Problem: Bacteremia      Assessment & Plan  Sepsis (HCC)  Noted with tachycardia and tachypnea along with mild bandemia of 9%.  Source is patient's bacteremia.  Clinical parameters overall improving.  Definitive source of bacteremia remains uncertain.  Antibiotics as below  Continue to trend fever curve/vitals  Repeat CBC/CMP tomorrow to monitor treatment response last dosing  Follow-up pending blood culture susceptibilities  Follow-up pending urine cultures and susceptibilities  Recommend formal CT imaging of the abdomen and pelvis with contrast  Monitor for new/developing symptoms  Recommend discussion with cardiology to remove Steri-Strips to evaluate pacer site  Uncertain if patient will have oral antibiotic option  Anticipate total 7 days of antibiotic for bacteremia  Will adjust antibiotic further based on culture data/findings  Additional supportive cares per primary  Additional interventions pending clinical course  Bacteremia due to Enterobacter species  2 out of 2 blood cultures have isolated Enterobacter by PCR.  Unfortunately this organism does typically have inducible resistance and so limited antibiotic options.  Patient does not have a sense of smell and at baseline has urinary frequency, BPH and chronic diarrhea.  Suspicion overall is for a GI/ source.  Consider also viral illness which may have propagated given reports of upper GI symptoms on Sunday.  Exam otherwise nonfocal and benign.  Pacer site with Steri-Strips in place grossly unremarkable and without expressible  drainage.  Continue cefepime 2 g every 8 hours for now given BMI  Continue to trend fever curve/vitals  Repeat CBC/chemistry tomorrow  Follow-up pending blood culture susceptibilities  Follow-up pending urine culture susceptibilities  Recommend formal CT imaging of the abdomen pelvis with contrast  Monitor for new/developing symptoms  Would recommend removal of Steri-Strips, if okay with cardiology to evaluate site  Tentative plan is for 7 days of antibiotic for bacteremia  Will adjust duration and antibiotic further based on findings  Supportive care per primary  Positive urine culture  Urine culture now also isolating Enterobacter.  Again given chronic issues above uncertain if this may have been an undiagnosed developing urinary tract infection.  Patient fortunately otherwise improving.  Antibiotic as above  Labs tomorrow  Follow-up pending urine culture  Urology follow-up as outpatient  Follow-up pending imaging    BPH (benign prostatic hyperplasia)  Patient had previous prostate biopsy and recent MRI of the prostate in November.  Possible risk factor for the above.  Follow-up pending imaging.  Continue antibiotics as above.  Follow-up with urology as outpatient.  Chronic diarrhea  Has had issues with chronic diarrhea since gallbladder removal.  Again uncertain if patient was having worsening of baseline symptoms in the setting of the above.  Will obtain imaging as above.  Continue antibiotics otherwise as above.  Status post placement of cardiac pacemaker  Site appears grossly unremarkable.  Steri-Strips remain in place.  Low suspicion for endovascular complication/pocket infection.  Requested primary service discussed with cardiology for removal of Steri-Strips to further monitor the site otherwise.  Antibiotics as above.  Class 2 obesity in adult  BMI noted to be 38.  This can impact antibiotic dosing.  Will favor higher dosing of antibiotic as above for now.  Will dose adjust otherwise as needed.    Above  plan discussed in detail with the patient at bedside  Above plan discussed in detail with primary service advance practitioner who is aware of plans for current antibiotics, following up cultures and additional questions/imaging request.    ID consult service will continue to follow.    HISTORY OF PRESENT ILLNESS:  HPI: Randal Romeo is a 59 y.o. year old male with COPD, obstructive sleep apnea, hypertension, GERD, prior right sided lung cancer status post VATS in January 2021 as well as history of syncope with now placement of dual-chamber pacemaker prior to Thanksgiving.  Patient presented to the hospital on 12/15.  He reports that on Saturday he was starting to feel episodes of chills and subjective fever and fatigue.  He rested that day and then felt better the next day but then his symptoms again started to develop on Sunday.  It was as his symptoms progressed including rigors that prompted his family to push him to come to the hospital.  On evaluation he was noted to be tachycardic and tachypneic.  He had a mild bandemia noted.  COVID and flu testing was negative.  UA with bacteriuria and CT PE was done which was negative for PE with mild emphysematous changes.  Patient was ultimately admitted and antibiotics were initially held as there was no definitive source.  Patient reports that on Sunday when he developed symptoms again he was also developing nausea with some reflux/GI symptoms.  At baseline the patient reports that he has frequent urination because he is on water pills and because of his underlying BPH that he has nocturia as well.  He reports that he was seeing urology as an outpatient and underwent biopsy of his prostate and then later MRI of the prostate which was unremarkable.  He is uncertain if he empties his bladder entirely.  He also has no sense of smell ever since COVID and so is uncertain if his urine started to take on a foul odor.  When asking about GI symptoms the patient reports ever  since he had his gallbladder removed that he has had chronic and persistent diarrhea and so again uncertain if this was worsening from baseline.  He reports since admission he has been slowly feeling better.  His pacemaker site has otherwise been healing well and he still has Steri-Strips in place.  He was actually supposed to see cardiology in follow-up.  He denied any notable drainage from that site.  He denies having any other devices in place.  Patient has been otherwise afebrile and white blood cell count has normalized.  Blood cultures from admission eventually returned positive with Enterobacter cloacae.  Antibiotics were switched to cefepime.  Urine culture now isolating the same as well.  Discussed with primary and requested additional imaging of the abdomen with contrast.  Also requested discussion with cardiology about removal of Steri-Strips to evaluate the pacemaker site.  Current vitals otherwise stable.  Reviewed with the patient plans for continued antibiotics, need to follow-up cultures and potential for need for completion of IV course given the organism isolated and issues with inducible resistance.  Additional questions answered.  We are consulted at this time for further assistance with management given patient's bacteremia.    REVIEW OF SYSTEMS:  A complete 12 point system-based review of systems is negative other than that noted in the HPI.    PAST MEDICAL HISTORY:  Past Medical History:   Diagnosis Date    COPD (chronic obstructive pulmonary disease) (HCC)     Cough     Emphysema of lung (HCC)     Liver lesion     Lung cancer (HCC)     Right upper lobe pulmonary nodule     Shortness of breath     Sleep apnea, obstructive     Syncope and collapse      Past Surgical History:   Procedure Laterality Date    CARDIAC ELECTROPHYSIOLOGY PROCEDURE N/A 7/26/2022    Procedure: Cardiac loop recorder implant;  Surgeon: Daniel Carmona MD;  Location: [facility];  Service: Cardiology    CARDIAC  ELECTROPHYSIOLOGY PROCEDURE N/A 2024    Procedure: Cardiac pacer implant dual chamber;  Surgeon: Efrem Parra MD;  Location: [facility];  Service: Cardiology    CARDIAC ELECTROPHYSIOLOGY PROCEDURE N/A 2024    Procedure: Cardiac loop recorder explant;  Surgeon: Efrem Parra MD;  [facility]: [facility];  Service: Cardiology    CHOLECYSTECTOMY      LUNG SEGMENTECTOMY Right 2021    Procedure: RESECTION WEDGE LUNG RIGHT UPPER LOBE;  Surgeon: Mila Joya MD;  Location: [facility];  Service: Thoracic    NC NCC INCL FLUOR GDNCE DX W/CELL WASHG SPX N/A 2021    Procedure: BRONCHOSCOPY FLEXIBLE;  Surgeon: Mila Joya MD;  [facility]: [facility];  Service: Thoracic    NC THORACOSCOPY W/LOBECTOMY SINGLE LOBE Right 2021    Procedure: RIGHT UPPER LOBECTOMY LUNG;  Surgeon: Mila Joya MD;  [facility]: [facility];  Service: Thoracic    NC THORACOSCOPY W/THERA WEDGE RESEXN INITIAL UNILAT Right 2021    Procedure: THORACOSCOPY VIDEO ASSISTED SURGERY (VATS),;  Surgeon: Mila Joya MD;  Location: [facility];  Service: Thoracic    TESTICLE SURGERY         FAMILY HISTORY:  Non-contributory    SOCIAL HISTORY:  Social History   Social History     Substance and Sexual Activity   Alcohol Use Not Currently     Social History     Substance and Sexual Activity   Drug Use Not Currently     Social History     Tobacco Use   Smoking Status Former    Current packs/day: 0.00    Average packs/day: 3.0 packs/day for 30.0 years (90.0 ttl pk-yrs)    Types: Cigarettes    Start date: 10/26/1990    Quit date: 10/26/2020    Years since quittin.1    Passive exposure: Past   Smokeless Tobacco Never   Tobacco Comments    30 years-5 packs a day        ALLERGIES:  No Known Allergies    MEDICATIONS:  All current active medications have been reviewed.    PHYSICAL EXAM:  Temp:  [98 °F (36.7 °C)-99 °F (37.2 °C)] 98.4 °F (36.9 °C)  HR:  [64-70] 67  Resp:  [18] 18  BP: (121-144)/(67-83)  144/83  SpO2:  [95 %-97 %] 97 %  Temp (24hrs), Av.4 °F (36.9 °C), Min:98 °F (36.7 °C), Max:99 °F (37.2 °C)  Current: Temperature: 98.4 °F (36.9 °C)    Intake/Output Summary (Last 24 hours) at 2024 1314  Last data filed at 2024 0900  Gross per 24 hour   Intake 1370 ml   Output 0 ml   Net 1370 ml       General Appearance:  Appearing well, nontoxic, and in no distress   Head:  Normocephalic, without obvious abnormality, atraumatic   Eyes:  Conjunctiva pink and sclera anicteric, both eyes   Nose: Nares normal, mucosa normal, no drainage   Throat: Oropharynx moist without lesions   Neck: Supple, symmetrical, no adenopathy, no tenderness/mass/nodules   Back:   Symmetric, no curvature, ROM normal, no CVA tenderness   Lungs:   Clear to auscultation bilaterally, respirations unlabored on room air   Chest Wall:  No tenderness or deformity   Heart:  RRR; no murmur, rub or gallop   Abdomen:   Soft, non-tender, non-distended, positive bowel sounds    Extremities: No cyanosis, clubbing or edema   Skin: No rashes or lesions. No draining wounds noted.   Lymph nodes: Cervical, supraclavicular nodes normal   Neurologic: Alert and oriented times 3, extremity strength 5/5 and symmetric       LABS, IMAGING, & OTHER STUDIES:  In completing this consult I have performed an extensive review of the medical records in epic including review of the notes, radiographs, and laboratory results as detailed below.     Lab Results:  I have personally reviewed pertinent labs.  Comments/Interpretations: No leukocytosis.    Results from last 7 days   Lab Units 24  0541 24  0442 12/15/24  1900 12/15/24  1306   WBC Thousand/uL 7.01 11.93*  --  8.96   HEMOGLOBIN g/dL 13.7 13.4  --  14.4   PLATELETS Thousands/uL 175 190 172 198     Results from last 7 days   Lab Units 24  0541 24  0442 12/15/24  1306   POTASSIUM mmol/L 3.7 3.9 3.9   CHLORIDE mmol/L 109* 105 102   CO2 mmol/L 23 26 24   BUN mg/dL 14 13 12   CREATININE  mg/dL 1.01 1.08 1.01   EGFR ml/min/1.73sq m 81 74 81   CALCIUM mg/dL 8.6 8.6 8.8   AST U/L 14 15 19   ALT U/L 21 26 35   ALK PHOS U/L 53 54 69     Results from last 7 days   Lab Units 12/15/24  1549 12/15/24  1547 12/15/24  1306   BLOOD CULTURE   --  Gram Negative Gray* Enterobacter cloacae*   GRAM STAIN RESULT   --  Gram negative rods* Gram negative rods*   URINE CULTURE  >100,000 cfu/ml Enterobacter cloacae*  --   --        Imaging Studies:   I have personally reviewed pertinent imaging study reports and images in PACS.  Comments/Interpretations:  CT imaging of the chest reviewed without any acute lung findings aside from emphysematous changes.  Chest x-ray without infiltrate  CT imaging of the abdomen requested from primary.  MRI of the prostate on 11/14 with BPH.    Other Studies:   I have personally reviewed other pertinent reports as below.  Records in CareEverywhere: No recent cultures or visits in Care Everywhere    Nursing home/EMS Records: None    Current/Prior Cultures: No other significant culture data in the past.  Patient is not been seen by our service previously.

## 2024-12-17 NOTE — PLAN OF CARE

## 2024-12-17 NOTE — PROGRESS NOTES
Progress Note - Hospitalist   Name: Randal Romeo 59 y.o. male I MRN: [id number]  Unit/Bed#: S -01 I Date of Admission: 12/15/2024   Date of Service: 12/17/2024 I Hospital Day: 1    Assessment & Plan  Sepsis (HCC)  Patient met sepsis criteria time of admission with tachycardia and tachypnea  Source likely bacteremia with bacteriuria  Blood cultures growing gram-negative rods-Enterobacter  Urine culture also growing gram-negative rods-Enterobacter  Currently receiving cefepime  ID following  Follow-up sensitivities, will likely need 1 week of antibiotics for bacteremia  Check CTA PE for completeness  Does report pacemaker was placed approximately 3 weeks ago, Steri-Strips removed, no evidence of topical infection, incision well-approximated  Bacteremia due to Enterobacter species  Fortanetly, patient remains AFVSS. WBC 11  Suspected secondary to acute cystitis   Start on Cefepime 2g q8, await final urine and blood cultures.   Infectious disease following  Trend WBCs/fever curve     Asymptomatic bacteriuria  Now with evidence of GNR bacteremia   Continue IV Cefepime    Chronic Obstructive Pulmonary Disease  COPD on Breztri  Not in acute exacerbation  Malignant neoplasm of upper lobe of right lung (HCC)  Patient had previous VATS in January 2021 for right lung mass  Follows with pulmonology as outpatient.    Gastroesophageal reflux disease without esophagitis  Continue famotidine  MARIANA (obstructive sleep apnea)  Obstructive sleep apnea on CPAP  Will order CPAP daily at night.  Status post placement of cardiac pacemaker  Status post cardiac pacemaker due to sinus pauses which was done on 11/27/2024.  Missed his outpatient follow-up appointment today, will need to reschedule  Spoke with inpatient cardiology, approval to remove Steri-Strips, incision well-approximated  Positive urine culture    BPH (benign prostatic hyperplasia)    Chronic diarrhea    Class 2 obesity in adult      VTE Pharmacologic Prophylaxis: VTE  Score: 5 High Risk (Score >/= 5) - Pharmacological DVT Prophylaxis Ordered: enoxaparin (Lovenox). Sequential Compression Devices Ordered.    Mobility:   Basic Mobility Inpatient Raw Score: 24  JH-HLM Goal: 8: Walk 250 feet or more  JH-HLM Achieved: 7: Walk 25 feet or more  JH-HLM Goal NOT achieved. Continue with multidisciplinary rounding and encourage appropriate mobility to improve upon JH-HLM goals.    Patient Centered Rounds: I performed bedside rounds with nursing staff today.   Discussions with Specialists or Other Care Team Provider: Infectious disease    Current Length of Stay: 1 day(s)  Current Patient Status: Inpatient   Certification Statement: The patient will continue to require additional inpatient hospital stay due to sepsis pending cultures and sensitivities  Discharge Plan: Anticipate discharge in 24-48 hrs to home.    Code Status: Level 1 - Full Code    Subjective   Patient seen examined at bedside.  Notes he is feeling very well today.  Reports significant improvement since admission.    Objective :  Temp:  [98 °F (36.7 °C)-99 °F (37.2 °C)] 98.4 °F (36.9 °C)  HR:  [64-70] 67  BP: (121-144)/(67-83) 144/83  Resp:  [18] 18  SpO2:  [95 %-97 %] 97 %  O2 Device: None (Room air)    Body mass index is 38.05 kg/m².     Input and Output Summary (last 24 hours):     Intake/Output Summary (Last 24 hours) at 12/17/2024 1425  Last data filed at 12/17/2024 0900  Gross per 24 hour   Intake 1130 ml   Output 0 ml   Net 1130 ml       Physical Exam  Vitals reviewed.   Constitutional:       General: He is not in acute distress.  HENT:      Head: Normocephalic and atraumatic.   Eyes:      General: No scleral icterus.     Conjunctiva/sclera: Conjunctivae normal.   Cardiovascular:      Rate and Rhythm: Normal rate and regular rhythm.      Heart sounds: No murmur heard.  Pulmonary:      Effort: Pulmonary effort is normal. No respiratory distress.      Breath sounds: Normal breath sounds. No wheezing.   Abdominal:       General: Bowel sounds are normal. There is no distension.      Palpations: Abdomen is soft.      Tenderness: There is no abdominal tenderness.   Musculoskeletal:      Cervical back: Neck supple.      Right lower leg: No edema.      Left lower leg: No edema.   Skin:     General: Skin is warm and dry.      Comments: Left chest incision well approximated, no drainage or tenderness   Neurological:      Mental Status: He is alert and oriented to person, place, and time.   Psychiatric:         Mood and Affect: Mood normal.         Behavior: Behavior normal.           Lines/Drains:              Lab Results: I have reviewed the following results:   Results from last 7 days   Lab Units 12/17/24  0541 12/15/24  1900 12/15/24  1306   WBC Thousand/uL 7.01   < > 8.96   HEMOGLOBIN g/dL 13.7   < > 14.4   HEMATOCRIT % 43.4   < > 43.5   PLATELETS Thousands/uL 175   < > 198   BANDS PCT %  --   --  9*   SEGS PCT % 60  --   --    LYMPHO PCT % 21  --  7*   MONO PCT % 15*  --  2*   EOS PCT % 2  --  2    < > = values in this interval not displayed.     Results from last 7 days   Lab Units 12/17/24  0541   SODIUM mmol/L 138   POTASSIUM mmol/L 3.7   CHLORIDE mmol/L 109*   CO2 mmol/L 23   BUN mg/dL 14   CREATININE mg/dL 1.01   ANION GAP mmol/L 6   CALCIUM mg/dL 8.6   ALBUMIN g/dL 3.9   TOTAL BILIRUBIN mg/dL 0.56   ALK PHOS U/L 53   ALT U/L 21   AST U/L 14   GLUCOSE RANDOM mg/dL 94     Results from last 7 days   Lab Units 12/15/24  1306   INR  1.03             Results from last 7 days   Lab Units 12/16/24  0442 12/15/24  1547 12/15/24  1306   LACTIC ACID mmol/L  --  1.2 2.3*   PROCALCITONIN ng/ml 35.40*  --  0.76*       Recent Cultures (last 7 days):   Results from last 7 days   Lab Units 12/15/24  1549 12/15/24  1547 12/15/24  1306   BLOOD CULTURE   --  Enterobacter cloacae* Enterobacter cloacae*   GRAM STAIN RESULT   --  Gram negative rods* Gram negative rods*   URINE CULTURE  >100,000 cfu/ml Enterobacter cloacae*  --   --              Last  24 Hours Medication List:     Current Facility-Administered Medications:     acetaminophen (TYLENOL) tablet 650 mg, Q6H PRN    acetaminophen (TYLENOL) tablet 975 mg, Q8H    albuterol (PROVENTIL HFA,VENTOLIN HFA) inhaler 2 puff, Q6H PRN    benzonatate (TESSALON PERLES) capsule 200 mg, TID    budesonide-formoterol (SYMBICORT) 160-4.5 mcg/act inhaler 2 puff, BID    ceFEPime (MAXIPIME) 2,000 mg in dextrose 5 % 50 mL IVPB, Q8H, Last Rate: 2,000 mg (12/17/24 1356)    enoxaparin (LOVENOX) subcutaneous injection 40 mg, Daily    famotidine (PEPCID) tablet 40 mg, Daily    furosemide (LASIX) tablet 20 mg, Daily    guaiFENesin (MUCINEX) 12 hr tablet 600 mg, Q12H BOO    metoprolol succinate (TOPROL-XL) 24 hr tablet 25 mg, BID    ondansetron (ZOFRAN) injection 4 mg, Q6H PRN    umeclidinium 62.5 mcg/actuation inhaler AEPB 1 puff, Daily    Administrative Statements   Today, Patient Was Seen By: Simin Scanlon PA-C      **Please Note: This note may have been constructed using a voice recognition system.**

## 2024-12-17 NOTE — ASSESSMENT & PLAN NOTE
Status post cardiac pacemaker due to sinus pauses which was done on 11/27/2024.  Missed his outpatient follow-up appointment today, will need to reschedule  Spoke with inpatient cardiology, approval to remove Steri-Strips, incision well-approximated

## 2024-12-18 VITALS
TEMPERATURE: 97.7 F | HEART RATE: 60 BPM | SYSTOLIC BLOOD PRESSURE: 127 MMHG | RESPIRATION RATE: 16 BRPM | DIASTOLIC BLOOD PRESSURE: 81 MMHG | OXYGEN SATURATION: 96 % | BODY MASS INDEX: 37.65 KG/M2 | WEIGHT: 268.96 LBS | HEIGHT: 71 IN

## 2024-12-18 LAB
ALBUMIN SERPL BCG-MCNC: 3.6 G/DL (ref 3.5–5)
ALP SERPL-CCNC: 49 U/L (ref 34–104)
ALT SERPL W P-5'-P-CCNC: 26 U/L (ref 7–52)
ANION GAP SERPL CALCULATED.3IONS-SCNC: 6 MMOL/L (ref 4–13)
AST SERPL W P-5'-P-CCNC: 19 U/L (ref 13–39)
BACTERIA BLD CULT: ABNORMAL
BACTERIA UR CULT: ABNORMAL
BASOPHILS # BLD AUTO: 0.08 THOUSANDS/ΜL (ref 0–0.1)
BASOPHILS NFR BLD AUTO: 2 % (ref 0–1)
BILIRUB SERPL-MCNC: 0.44 MG/DL (ref 0.2–1)
BUN SERPL-MCNC: 14 MG/DL (ref 5–25)
CALCIUM SERPL-MCNC: 8.6 MG/DL (ref 8.4–10.2)
CHLORIDE SERPL-SCNC: 107 MMOL/L (ref 96–108)
CO2 SERPL-SCNC: 26 MMOL/L (ref 21–32)
CREAT SERPL-MCNC: 1 MG/DL (ref 0.6–1.3)
E CLOAC COMP DNA BLD POS NAA+NON-PROBE: DETECTED
EOSINOPHIL # BLD AUTO: 0.21 THOUSAND/ΜL (ref 0–0.61)
EOSINOPHIL NFR BLD AUTO: 5 % (ref 0–6)
ERYTHROCYTE [DISTWIDTH] IN BLOOD BY AUTOMATED COUNT: 13.7 % (ref 11.6–15.1)
GFR SERPL CREATININE-BSD FRML MDRD: 82 ML/MIN/1.73SQ M
GLUCOSE SERPL-MCNC: 90 MG/DL (ref 65–140)
GRAM STN SPEC: ABNORMAL
HCT VFR BLD AUTO: 40.6 % (ref 36.5–49.3)
HGB BLD-MCNC: 13.5 G/DL (ref 12–17)
IMM GRANULOCYTES # BLD AUTO: 0.02 THOUSAND/UL (ref 0–0.2)
IMM GRANULOCYTES NFR BLD AUTO: 0 % (ref 0–2)
LYMPHOCYTES # BLD AUTO: 1.18 THOUSANDS/ΜL (ref 0.6–4.47)
LYMPHOCYTES NFR BLD AUTO: 26 % (ref 14–44)
MCH RBC QN AUTO: 31.3 PG (ref 26.8–34.3)
MCHC RBC AUTO-ENTMCNC: 33.3 G/DL (ref 31.4–37.4)
MCV RBC AUTO: 94 FL (ref 82–98)
MONOCYTES # BLD AUTO: 0.56 THOUSAND/ΜL (ref 0.17–1.22)
MONOCYTES NFR BLD AUTO: 13 % (ref 4–12)
NEUTROPHILS # BLD AUTO: 2.43 THOUSANDS/ΜL (ref 1.85–7.62)
NEUTS SEG NFR BLD AUTO: 54 % (ref 43–75)
NRBC BLD AUTO-RTO: 0 /100 WBCS
PLATELET # BLD AUTO: 220 THOUSANDS/UL (ref 149–390)
PMV BLD AUTO: 10.2 FL (ref 8.9–12.7)
POTASSIUM SERPL-SCNC: 3.6 MMOL/L (ref 3.5–5.3)
PROT SERPL-MCNC: 6.8 G/DL (ref 6.4–8.4)
RBC # BLD AUTO: 4.32 MILLION/UL (ref 3.88–5.62)
SODIUM SERPL-SCNC: 139 MMOL/L (ref 135–147)
WBC # BLD AUTO: 4.48 THOUSAND/UL (ref 4.31–10.16)

## 2024-12-18 PROCEDURE — 99233 SBSQ HOSP IP/OBS HIGH 50: CPT | Performed by: INTERNAL MEDICINE

## 2024-12-18 PROCEDURE — 99239 HOSP IP/OBS DSCHRG MGMT >30: CPT | Performed by: PHYSICIAN ASSISTANT

## 2024-12-18 PROCEDURE — 85025 COMPLETE CBC W/AUTO DIFF WBC: CPT | Performed by: INTERNAL MEDICINE

## 2024-12-18 PROCEDURE — 80053 COMPREHEN METABOLIC PANEL: CPT | Performed by: INTERNAL MEDICINE

## 2024-12-18 RX ORDER — LEVOFLOXACIN 750 MG/1
750 TABLET, FILM COATED ORAL EVERY 24 HOURS
Status: DISCONTINUED | OUTPATIENT
Start: 2024-12-18 | End: 2024-12-18 | Stop reason: HOSPADM

## 2024-12-18 RX ORDER — LEVOFLOXACIN 750 MG/1
750 TABLET, FILM COATED ORAL EVERY 24 HOURS
Qty: 4 TABLET | Refills: 0 | Status: SHIPPED | OUTPATIENT
Start: 2024-12-19 | End: 2024-12-23

## 2024-12-18 RX ORDER — LEVOFLOXACIN 750 MG/1
750 TABLET, FILM COATED ORAL EVERY 24 HOURS
Status: DISCONTINUED | OUTPATIENT
Start: 2024-12-18 | End: 2024-12-18

## 2024-12-18 RX ADMIN — FUROSEMIDE 20 MG: 20 TABLET ORAL at 08:26

## 2024-12-18 RX ADMIN — CEFEPIME 2000 MG: 2 INJECTION, POWDER, FOR SOLUTION INTRAVENOUS at 06:18

## 2024-12-18 RX ADMIN — BUDESONIDE AND FORMOTEROL FUMARATE DIHYDRATE 2 PUFF: 160; 4.5 AEROSOL RESPIRATORY (INHALATION) at 08:26

## 2024-12-18 RX ADMIN — UMECLIDINIUM 1 PUFF: 62.5 AEROSOL, POWDER ORAL at 09:01

## 2024-12-18 RX ADMIN — METOPROLOL SUCCINATE 25 MG: 25 TABLET, EXTENDED RELEASE ORAL at 08:26

## 2024-12-18 RX ADMIN — LEVOFLOXACIN 750 MG: 750 TABLET, FILM COATED ORAL at 11:39

## 2024-12-18 RX ADMIN — ENOXAPARIN SODIUM 40 MG: 40 INJECTION SUBCUTANEOUS at 08:26

## 2024-12-18 NOTE — ASSESSMENT & PLAN NOTE
Has had issues with chronic diarrhea since gallbladder removal.  Again uncertain if patient was having worsening of baseline symptoms in the setting of the above.  CT imaging fortunately without focal findings.  Continue antibiotics otherwise as above.

## 2024-12-18 NOTE — CASE MANAGEMENT
Case Management Discharge Planning Note    Patient name Randal FERREIRA /S -01 MRN [id number]  : 1965 Date 2024       Current Admission Date: 12/15/2024  Current Admission Diagnosis:Sepsis (HCC)   Patient Active Problem List    Diagnosis Date Noted Date Diagnosed    Sepsis (HCC) 2024     Positive urine culture 2024     BPH (benign prostatic hyperplasia) 2024     Chronic diarrhea 2024     Class 2 obesity in adult 2024     Status post placement of cardiac pacemaker 12/15/2024     Asymptomatic bacteriuria 12/15/2024     Bacteremia due to Enterobacter species 12/15/2024     Obesity, morbid (HCC) 2024     Nasal lesion 2023     Mononeuropathy, unspecified      MARIANA (obstructive sleep apnea) 2022     Obesity 2022     Shortness of breath 2022     COVID-19 virus infection 2021     Gastroesophageal reflux disease without esophagitis 2021     Malignant neoplasm of upper lobe of right lung (HCC) 2021     Cigarette nicotine dependence in remission 2020     Liver lesion 10/26/2020     Chronic Obstructive Pulmonary Disease      Syncope and collapse 2017     Knee injury 2017       LOS (days): 2  Geometric Mean LOS (GMLOS) (days): 3.5  Days to GMLOS:1.6     OBJECTIVE:  Risk of Unplanned Readmission Score: 10.79         Current admission status: Inpatient   Preferred Pharmacy:   [facility] - [facility] [place] - [address] [facility]  [address] [facility]  [address]  [facility] [place] [address]  Phone: [phone] Fax: [phone]    [facility] [place] - [address]  [address]  [place] [address]  Phone: [phone] Fax: [phone]    [id number] - [address]  [address]  [facility] [place] [address]  Phone: [phone] Fax: [phone]    Primary Care Provider: RAY Redmond    Primary  Insurance: [employer] REP  Secondary Insurance:     DISCHARGE DETAILS:                                                                                     IMM reviewed with patient, patient agrees with discharge determination.  IMM Given (Date):: 12/18/24  IMM Given to:: Patient

## 2024-12-18 NOTE — PROGRESS NOTES
Progress Note - Infectious Disease   Name: Randal Romeo 59 y.o. male I MRN: [id number]  Unit/Bed#: S -01 I Date of Admission: 12/15/2024   Date of Service: 12/18/2024 I Hospital Day: 2     Assessment & Plan  Sepsis (HCC)  Noted with tachycardia and tachypnea along with mild bandemia of 9%.  Source is patient's bacteremia.  Clinical parameters overall improving.  Definitive source of bacteremia remains uncertain.  CT imaging without focal findings.  Antibiotics adjusted as below  Trend fever curve/WBC while admitted  Monitor for new/developing symptoms while admitted  Monitor pacemaker site otherwise  Duration of therapy as below  Drug administration reviewed with the patient  Potential drug side effects reviewed with patient as well  Additional supportive care/discharge per primary  Bacteremia due to Enterobacter species  2 out of 2 blood cultures have isolated Enterobacter by PCR.  Unfortunately this organism does typically have inducible resistance and so limited antibiotic options.  Patient does not have a sense of smell and at baseline has urinary frequency, BPH and chronic diarrhea.  Suspicion overall is for a GI/ source.  Consider also viral illness which may have propagated given reports of upper GI symptoms on Sunday.  Exam otherwise nonfocal and benign.  Pacer site evaluated without Steri-Strips and the site has overall healed.  CT without any focal findings.  Susceptibilities reviewed and patient with limited antibiotic options.  EKG reviewed with primary as well.  Reviewed in detail drug options with the patient and ultimately decided for Levaquin to complete short course of therapy.  Drug administration as well as side effects reviewed in detail with the patient along with alternatives.  Discussed with primary later today and patient tolerated initial dose.  Continue Levaquin 750 mg every 24 hours  Plan to complete total 7 days of therapy through 12/22  Last doses of antibiotics ordered  Drug  administration reviewed with the patient  Trend fever curve/WBC while admitted  Monitor for new/developing symptoms while admitted  Supportive care/discharge per primary  Positive urine culture  Urine culture now also isolating Enterobacter.  Again given chronic issues above uncertain if this may have been an undiagnosed developing urinary tract infection.  Patient fortunately otherwise improving.  Antibiotic as above  Urology follow-up as outpatient    BPH (benign prostatic hyperplasia)  Patient had previous prostate biopsy and recent MRI of the prostate in November.  Possible risk factor for the above.  CT abdomen without focal findings.  Continue antibiotics as above.  Follow-up with urology as outpatient.  Chronic diarrhea  Has had issues with chronic diarrhea since gallbladder removal.  Again uncertain if patient was having worsening of baseline symptoms in the setting of the above.  CT imaging fortunately without focal findings.  Continue antibiotics otherwise as above.  Status post placement of cardiac pacemaker  Site appears grossly unremarkable.  Po Steri-Strip removal the site appears grossly unremarkable and without concerning signs for infection.  Continue to follow closely otherwise with cardiology.  EKG reviewed with primary.  Antibiotics as above.  Class 2 obesity in adult  BMI noted to be 38.  This can impact antibiotic dosing.  Levaquin dosing as above.  Additional care per primary.    Above plan discussed in detail with the patient at bedside  Above plan discussed in detail with primary service advance practitioner who is aware of transition to oral antibiotics, reviewed EKG together and cardiac issues.  Discussed later in the day with advanced practitioner and patient had tolerated Levaquin dosing without issue and so we discussed clearance from ID standpoint.    ID consult service will sign off at this time.  Please call if questions.    Antibiotics:  Levaquin 1  Total antibiotic 3    24 Hour  events:  Yesterday and overnight notes reviewed and no acute events noted    Subjective:  Patient denies having any nausea, vomiting, chest pain or shortness of breath.  Tolerating current antibiotics without issue.  We reviewed current culture results and negative CT imaging.  Patient's symptoms overall improved.  We discussed antibiotic options overall being limited as well as issues with tolerance for certain antibiotics.  We discussed transition to relatively short course of levofloxacin.  Patient is agreeable at this time.  We discussed issues associated with neuropathy, tendinopathy, tendon tear/rupture, arrhythmia, issues with lowering seizure threshold and hypo and hyperglycemia.  Patient is without history of seizure issues and we reviewed EKG with primary service as well.  We discussed overall course would be relatively short and benefits being once daily dosing, we discussed administration and also limited side effects compared to other alternatives such as Bactrim.  Patient agreeable to trial and is excited for discharge today.  Discussed with primary and if patient tolerates antibiotic he is otherwise cleared from ID standpoint.    Objective:  Vitals:  Temp:  [97.7 °F (36.5 °C)-99.1 °F (37.3 °C)] 97.7 °F (36.5 °C)  HR:  [60-62] 60  Resp:  [16] 16  BP: (127-131)/(78-81) 127/81  SpO2:  [96 %-97 %] 96 %  Temp (24hrs), Av.4 °F (36.9 °C), Min:97.7 °F (36.5 °C), Max:99.1 °F (37.3 °C)  Current: Temperature: 97.7 °F (36.5 °C)    Physical Exam:   General Appearance:  Alert, interactive, nontoxic, no acute distress.   Throat: Oropharynx moist without lesions.    Lungs:   Clear to auscultation bilaterally; no wheezes, rhonchi or rales; respirations unlabored on room air   Heart:  RRR; no murmur, rub or gallop noted   Abdomen:   Soft, non-tender, non-distended, positive bowel sounds.     Extremities: No clubbing, cyanosis or edema   Skin: No new rashes or lesions. No new draining wounds noted.  Pacemaker  incision site reviewed myself unfortunately there is no breakdown or drainage or erythema appreciated.       Labs, Imaging, & Other studies:   All pertinent labs and imaging studies in PACS were personally reviewed as below.  Results from last 7 days   Lab Units 12/18/24  0504 12/17/24  0541 12/16/24  0442   WBC Thousand/uL 4.48 7.01 11.93*   HEMOGLOBIN g/dL 13.5 13.7 13.4   PLATELETS Thousands/uL 220 175 190     Results from last 7 days   Lab Units 12/18/24  0504 12/17/24  0541 12/16/24  0442   POTASSIUM mmol/L 3.6 3.7 3.9   CHLORIDE mmol/L 107 109* 105   CO2 mmol/L 26 23 26   BUN mg/dL 14 14 13   CREATININE mg/dL 1.00 1.01 1.08   EGFR ml/min/1.73sq m 82 81 74   CALCIUM mg/dL 8.6 8.6 8.6   AST U/L 19 14 15   ALT U/L 26 21 26   ALK PHOS U/L 49 53 54     Results from last 7 days   Lab Units 12/15/24  1549 12/15/24  1547 12/15/24  1306   BLOOD CULTURE   --  Enterobacter cloacae* Enterobacter cloacae*   GRAM STAIN RESULT   --  Gram negative rods* Gram negative rods*   URINE CULTURE  >100,000 cfu/ml Enterobacter cloacae*  --   --        Lab interpretation/comments: No leukocytosis    Imaging interpretation/comments: CT of the abdomen and pelvis with contrast without any focal findings.    Culture data: Culture susceptibilities reviewed.  EKG reviewed as well.    External notes: None

## 2024-12-18 NOTE — ASSESSMENT & PLAN NOTE
2 out of 2 blood cultures have isolated Enterobacter by PCR.  Unfortunately this organism does typically have inducible resistance and so limited antibiotic options.  Patient does not have a sense of smell and at baseline has urinary frequency, BPH and chronic diarrhea.  Suspicion overall is for a GI/ source.  Consider also viral illness which may have propagated given reports of upper GI symptoms on Sunday.  Exam otherwise nonfocal and benign.  Pacer site evaluated without Steri-Strips and the site has overall healed.  CT without any focal findings.  Susceptibilities reviewed and patient with limited antibiotic options.  EKG reviewed with primary as well.  Reviewed in detail drug options with the patient and ultimately decided for Levaquin to complete short course of therapy.  Drug administration as well as side effects reviewed in detail with the patient along with alternatives.  Discussed with primary later today and patient tolerated initial dose.  Continue Levaquin 750 mg every 24 hours  Plan to complete total 7 days of therapy through 12/22  Last doses of antibiotics ordered  Drug administration reviewed with the patient  Trend fever curve/WBC while admitted  Monitor for new/developing symptoms while admitted  Supportive care/discharge per primary

## 2024-12-18 NOTE — ASSESSMENT & PLAN NOTE
Patient remains AFVSS. WBC 11  Suspected secondary to acute cystitis   Continue Levaquin through 12/22  Black box warning discussed  Infectious disease following

## 2024-12-18 NOTE — ASSESSMENT & PLAN NOTE
Site appears grossly unremarkable.  Po Steri-Strip removal the site appears grossly unremarkable and without concerning signs for infection.  Continue to follow closely otherwise with cardiology.  EKG reviewed with primary.  Antibiotics as above.

## 2024-12-18 NOTE — ASSESSMENT & PLAN NOTE
Noted with tachycardia and tachypnea along with mild bandemia of 9%.  Source is patient's bacteremia.  Clinical parameters overall improving.  Definitive source of bacteremia remains uncertain.  CT imaging without focal findings.  Antibiotics adjusted as below  Trend fever curve/WBC while admitted  Monitor for new/developing symptoms while admitted  Monitor pacemaker site otherwise  Duration of therapy as below  Drug administration reviewed with the patient  Potential drug side effects reviewed with patient as well  Additional supportive care/discharge per primary

## 2024-12-18 NOTE — PLAN OF CARE

## 2024-12-18 NOTE — ASSESSMENT & PLAN NOTE
Urine culture now also isolating Enterobacter.  Again given chronic issues above uncertain if this may have been an undiagnosed developing urinary tract infection.  Patient fortunately otherwise improving.  Antibiotic as above  Urology follow-up as outpatient

## 2024-12-18 NOTE — ASSESSMENT & PLAN NOTE
BMI noted to be 38.  This can impact antibiotic dosing.  Levaquin dosing as above.  Additional care per primary.

## 2024-12-18 NOTE — ASSESSMENT & PLAN NOTE
Patient had previous prostate biopsy and recent MRI of the prostate in November.  Possible risk factor for the above.  CT abdomen without focal findings.  Continue antibiotics as above.  Follow-up with urology as outpatient.

## 2024-12-19 ENCOUNTER — TRANSITIONAL CARE MANAGEMENT (OUTPATIENT)
Dept: FAMILY MEDICINE CLINIC | Facility: CLINIC | Age: 59
End: 2024-12-19

## 2024-12-20 ENCOUNTER — RESULTS FOLLOW-UP (OUTPATIENT)
Dept: EMERGENCY DEPT | Facility: HOSPITAL | Age: 59
End: 2024-12-20

## 2024-12-22 LAB
BACTERIA BLD CULT: ABNORMAL
GRAM STN SPEC: ABNORMAL

## 2024-12-23 ENCOUNTER — OFFICE VISIT (OUTPATIENT)
Dept: FAMILY MEDICINE CLINIC | Facility: CLINIC | Age: 59
End: 2024-12-23
Payer: COMMERCIAL

## 2024-12-23 VITALS
OXYGEN SATURATION: 98 % | HEART RATE: 66 BPM | BODY MASS INDEX: 37.38 KG/M2 | SYSTOLIC BLOOD PRESSURE: 120 MMHG | TEMPERATURE: 97.4 F | WEIGHT: 267 LBS | RESPIRATION RATE: 18 BRPM | HEIGHT: 71 IN | DIASTOLIC BLOOD PRESSURE: 68 MMHG

## 2024-12-23 DIAGNOSIS — Z87.440 PERSONAL HISTORY UTI: ICD-10-CM

## 2024-12-23 DIAGNOSIS — B96.89 BACTEREMIA DUE TO ENTEROBACTER SPECIES: ICD-10-CM

## 2024-12-23 DIAGNOSIS — Z78.9 TRANSITION OF CARE: Primary | ICD-10-CM

## 2024-12-23 DIAGNOSIS — R78.81 BACTEREMIA DUE TO ENTEROBACTER SPECIES: ICD-10-CM

## 2024-12-23 LAB
SL AMB  POCT GLUCOSE, UA: ABNORMAL
SL AMB LEUKOCYTE ESTERASE,UA: ABNORMAL
SL AMB POCT BILIRUBIN,UA: ABNORMAL
SL AMB POCT BLOOD,UA: ABNORMAL
SL AMB POCT CLARITY,UA: ABNORMAL
SL AMB POCT COLOR,UA: YELLOW
SL AMB POCT KETONES,UA: ABNORMAL
SL AMB POCT NITRITE,UA: ABNORMAL
SL AMB POCT PH,UA: 6
SL AMB POCT SPECIFIC GRAVITY,UA: 1.01
SL AMB POCT URINE PROTEIN: ABNORMAL
SL AMB POCT UROBILINOGEN: 0.2

## 2024-12-23 PROCEDURE — 81002 URINALYSIS NONAUTO W/O SCOPE: CPT | Performed by: NURSE PRACTITIONER

## 2024-12-23 PROCEDURE — 99495 TRANSJ CARE MGMT MOD F2F 14D: CPT | Performed by: NURSE PRACTITIONER

## 2024-12-23 NOTE — ASSESSMENT & PLAN NOTE
Orders:    POCT urine dip  Has no symptoms continuing completed the Levaquin will continue to monitor urine dip was normal.

## 2024-12-23 NOTE — PROGRESS NOTES
Transition of Care Visit  Name: Randal Romeo      : 1965      MRN: [id number]  Encounter Provider: RAY Redmond  Encounter Date: 2024   Encounter department: [facility]    Assessment & Plan  Personal history UTI    Orders:    POCT urine dip    Bacteremia due to Enterobacter species    Orders:    POCT urine dip  Has no symptoms continuing completed the Levaquin will continue to monitor urine dip was normal.  Transition of care  Patient denies any need for PT OT or home health.  Is ambulatory with no complications.  Has completed the oral antibiotic given and all medication currently taken were reviewed and all questions were answered.          Physical assessment unremarkable.  VS were well controlled.  Urine was tested in the office and found to only have a trace blood.  Patient is advised any urinary complaints any temperature or any back pain he is to contact back in the office immediately.  RTO as needed or for next scheduled appt. All questions answered.    History of Present Illness     Transitional Care Management Review:   Randal Romeo is a 59 y.o. male here for TCM follow up.     During the TCM phone call patient stated:  TCM Call       Date and time call was made  2024  8:18 AM    Hospital care reviewed  Records reviewed    Patient was hospitialized at  [facility]    Date of Admission  12/15/24    Date of discharge  24    Diagnosis  Sepsis    Disposition  Home    Were the patients medications reviewed and updated  Yes    Current Symptoms  None          TCM Call       Post hospital issues  None    Should patient be enrolled in anticoag monitoring?  No    Scheduled for follow up?  No    Did you obtain your prescribed medications  Yes    Do you need help managing your prescriptions or medications  No    Is transportation to your appointment needed  No    I have advised the patient to call PCP with any new or worsening symptoms  Zita Prince  "CMA    Living Arrangements  Spouse or Significiant other    Support System  Family; Friends    The type of support provided  Emotional; Physical    Do you have social support  Yes, as much as I need    Are you recieving any outpatient services  No    Are you recieving home care services  No    Are you using any community resources  No    Current waiver services  No    Have you fallen in the last 12 months  No    Interperter language line needed  No          Patient is here for transition of care management for recent hospitalization for septicemia due to a bladder infection.      Review of Systems   Constitutional:  Negative for appetite change and fever.   HENT:  Negative for congestion and trouble swallowing.    Respiratory:  Negative for shortness of breath.    Cardiovascular:  Negative for chest pain.   Gastrointestinal:  Negative for abdominal pain.   Genitourinary:  Negative for difficulty urinating.   Musculoskeletal:  Negative for myalgias.   Neurological:  Negative for dizziness.   Psychiatric/Behavioral:  The patient is not nervous/anxious.      Objective   /68 (BP Location: Left arm, Patient Position: Sitting, Cuff Size: Large)   Pulse 66   Temp (!) 97.4 °F (36.3 °C) (Temporal)   Resp 18   Ht 5' 10.5\" (1.791 m)   Wt 121 kg (267 lb)   SpO2 98%   BMI 37.77 kg/m²     Physical Exam  Constitutional:       Appearance: Normal appearance.   HENT:      Head: Normocephalic.      Right Ear: Tympanic membrane and external ear normal.      Left Ear: Tympanic membrane and external ear normal.      Nose: Nose normal.      Mouth/Throat:      Mouth: Mucous membranes are moist.      Pharynx: Oropharynx is clear.   Eyes:      Pupils: Pupils are equal, round, and reactive to light.   Cardiovascular:      Rate and Rhythm: Normal rate and regular rhythm.      Pulses: Normal pulses.      Heart sounds: Normal heart sounds.   Pulmonary:      Effort: Pulmonary effort is normal.      Breath sounds: Normal breath " sounds.   Abdominal:      General: Bowel sounds are normal.      Palpations: Abdomen is soft.   Musculoskeletal:         General: Normal range of motion.      Cervical back: Neck supple.   Neurological:      General: No focal deficit present.      Mental Status: He is alert and oriented to person, place, and time.   Psychiatric:         Mood and Affect: Mood normal.         Behavior: Behavior normal.         Thought Content: Thought content normal.         Judgment: Judgment normal.       Medications have been reviewed by provider in current encounter

## 2025-01-16 PROBLEM — A41.9 SEPSIS (HCC): Status: RESOLVED | Noted: 2024-12-17 | Resolved: 2025-01-16

## 2025-01-21 ENCOUNTER — OFFICE VISIT (OUTPATIENT)
Dept: PULMONOLOGY | Facility: CLINIC | Age: 60
End: 2025-01-21
Payer: COMMERCIAL

## 2025-01-21 VITALS
BODY MASS INDEX: 38.76 KG/M2 | WEIGHT: 274 LBS | HEART RATE: 71 BPM | SYSTOLIC BLOOD PRESSURE: 120 MMHG | DIASTOLIC BLOOD PRESSURE: 68 MMHG | TEMPERATURE: 99.5 F | OXYGEN SATURATION: 99 %

## 2025-01-21 DIAGNOSIS — E66.812 CLASS 2 OBESITY DUE TO EXCESS CALORIES WITH BODY MASS INDEX (BMI) OF 38.0 TO 38.9 IN ADULT, UNSPECIFIED WHETHER SERIOUS COMORBIDITY PRESENT: ICD-10-CM

## 2025-01-21 DIAGNOSIS — F17.211 CIGARETTE NICOTINE DEPENDENCE IN REMISSION: ICD-10-CM

## 2025-01-21 DIAGNOSIS — E66.09 CLASS 2 OBESITY DUE TO EXCESS CALORIES WITH BODY MASS INDEX (BMI) OF 38.0 TO 38.9 IN ADULT, UNSPECIFIED WHETHER SERIOUS COMORBIDITY PRESENT: ICD-10-CM

## 2025-01-21 DIAGNOSIS — R06.02 SHORTNESS OF BREATH: ICD-10-CM

## 2025-01-21 DIAGNOSIS — C34.11 MALIGNANT NEOPLASM OF UPPER LOBE, RIGHT BRONCHUS OR LUNG (HCC): ICD-10-CM

## 2025-01-21 DIAGNOSIS — J43.2 CENTRILOBULAR EMPHYSEMA (HCC): Primary | ICD-10-CM

## 2025-01-21 DIAGNOSIS — R06.09 DYSPNEA ON EXERTION: ICD-10-CM

## 2025-01-21 DIAGNOSIS — R00.1 SYMPTOMATIC BRADYCARDIA: ICD-10-CM

## 2025-01-21 DIAGNOSIS — G47.33 OSA (OBSTRUCTIVE SLEEP APNEA): ICD-10-CM

## 2025-01-21 PROCEDURE — 99214 OFFICE O/P EST MOD 30 MIN: CPT | Performed by: INTERNAL MEDICINE

## 2025-01-21 PROCEDURE — 94010 BREATHING CAPACITY TEST: CPT | Performed by: INTERNAL MEDICINE

## 2025-01-21 PROCEDURE — 94618 PULMONARY STRESS TESTING: CPT | Performed by: INTERNAL MEDICINE

## 2025-01-21 RX ORDER — BUDESONIDE, GLYCOPYRROLATE, AND FORMOTEROL FUMARATE 160; 9; 4.8 UG/1; UG/1; UG/1
2 AEROSOL, METERED RESPIRATORY (INHALATION) 2 TIMES DAILY
Qty: 31.1 G | Refills: 3 | Status: SHIPPED | OUTPATIENT
Start: 2025-01-21 | End: 2026-01-21

## 2025-01-21 RX ORDER — ALBUTEROL SULFATE 90 UG/1
2 INHALANT RESPIRATORY (INHALATION) EVERY 6 HOURS PRN
Qty: 18 G | Refills: 3 | Status: SHIPPED | OUTPATIENT
Start: 2025-01-21

## 2025-01-21 NOTE — ASSESSMENT & PLAN NOTE
Related to weight gain and deconditioning  Spirometry is stable   No need for supplemental oxygen  Encouraged to resume regular exercise routine, monitor progress, consider pulmonary rehab  Orders:    POCT Oxygen Titration    POCT spirometry

## 2025-01-21 NOTE — PROGRESS NOTES
Pulmonary Follow Up Note   Randal Romeo 59 y.o. male MRN: [id number]  2025      Name: Randal Romeo      : 1965      MRN: [id number]  Encounter Provider: Nick Henry DO  Encounter Date: 2025   Encounter department: [facility]  :  Assessment & Plan  Shortness of breath  Related to weight gain and deconditioning  Spirometry is stable   No need for supplemental oxygen  Encouraged to resume regular exercise routine, monitor progress, consider pulmonary rehab  Orders:    POCT Oxygen Titration    POCT spirometry    Centrilobular emphysema (HCC)  Moderate obstructive airflow defect - stable today  Continue Breztri prn MARK  Flu Vaccine , pneumovax , Prevnar 20 - , COVID-19 x 2     Orders:    Budeson-Glycopyrrol-Formoterol (Breztri Aerosphere) 160-9-4.8 MCG/ACT AERO; Inhale 2 puffs 2 (two) times a day Rinse mouth after use.    MARIANA (obstructive sleep apnea)  PSG 2022 - CHAY 38.9, severe MARIANA, recommended auto-titrating PSG 5-20 cmH2O  Compliance report -2025 97% days used, 7hr 8 min, AHI 0, 95th pressure 10.9cmH2O  Check ESS next visit  Encouraged continued CPAP compliance and MARIANA precautions reviewed       Cigarette nicotine dependence in remission         Class 2 obesity due to excess calories with body mass index (BMI) of 38.0 to 38.9 in adult, unspecified whether serious comorbidity present         Dyspnea on exertion    Orders:    albuterol (PROVENTIL HFA,VENTOLIN HFA) 90 mcg/act inhaler; Inhale 2 puffs every 6 (six) hours as needed for wheezing or shortness of breath    Malignant neoplasm of upper lobe, right bronchus or lung (HCC)  Plan repeat CT at 12 month interval - Dec 2025       Symptomatic bradycardia  Per cardiology - improved with PPM          Return in about 6 months (around 2025) for Recheck.    History of Present Illness   HPI:  Randal Romeo is a 59 y.o. male who was previously seen by Dr. Tucker for chronic bronchitis  and lung nodules. 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  He was diagnosed with COVID-19 on 12/20/21, he did not require admission but did need OCS and antibiotics. He had prolonged symptoms with negative cardiac evaluation.  He was last seen in March 2024 with noted worsened dyspnea.  Plans for repeat labs and echo were made given 10lb weight gain.  Cardiac evaluation with noted symptomatic bradycardia and intermittent pause, he had PPM placed.  He had recent admission in Dec 2024 for Enterobacter bacteremia secondary to cystitis/UTI.      He reports feeling improved. He notes less LH and no syncopal events since PPM. He denied chest pain. He does not intermittent dyspnea and overall fatigue. He is using Breztri and intermittent MARK.  He has not been exercising but had improved to 5 miles a day walking in the fall.  He is using his CPAP regularly except when in the hospital. Notes 10lb weight gain    Review of Systems   Constitutional:  Positive for activity change, fatigue and unexpected weight change. Negative for chills and fever.   HENT:  Negative for mouth sores, sore throat and trouble swallowing.    Respiratory:  Positive for shortness of breath. Negative for cough, chest tightness and wheezing.    Cardiovascular:  Negative for chest pain and leg swelling.   Gastrointestinal:  Negative for abdominal pain, nausea and vomiting.   Musculoskeletal:  Negative for gait problem.   Allergic/Immunologic: Negative for immunocompromised state.   Neurological:  Negative for syncope, light-headedness and headaches.   Hematological:  Negative for adenopathy.   Psychiatric/Behavioral:  Negative for sleep disturbance. The patient is not nervous/anxious.        Historical Information   Past Medical History:   Diagnosis Date    COPD (chronic obstructive pulmonary disease) (HCC)     Cough     Emphysema of lung (HCC)     Liver lesion     Lung cancer (HCC)     Right upper lobe  pulmonary nodule     Shortness of breath     Sleep apnea, obstructive     Syncope and collapse      Past Surgical History:   Procedure Laterality Date    CARDIAC ELECTROPHYSIOLOGY PROCEDURE N/A 7/26/2022    Procedure: Cardiac loop recorder implant;  Surgeon: Daniel Carmona MD;  Location: [facility];  Service: Cardiology    CARDIAC ELECTROPHYSIOLOGY PROCEDURE N/A 11/27/2024    Procedure: Cardiac pacer implant dual chamber;  Surgeon: Efrem Parra MD;  Location: [facility];  Service: Cardiology    CARDIAC ELECTROPHYSIOLOGY PROCEDURE N/A 11/27/2024    Procedure: Cardiac loop recorder explant;  Surgeon: Efrem Parra MD;  [facility]: [facility];  Service: Cardiology    CHOLECYSTECTOMY      LUNG SEGMENTECTOMY Right 1/6/2021    Procedure: RESECTION WEDGE LUNG RIGHT UPPER LOBE;  Surgeon: Mila Joya MD;  Location: [facility];  Service: Thoracic    IA BRNCC INCL FLUOR GDNCE DX W/CELL WASHG SPX N/A 1/6/2021    Procedure: BRONCHOSCOPY FLEXIBLE;  Surgeon: Mila Joya MD;  Location: [facility];  Service: Thoracic    IA THORACOSCOPY W/LOBECTOMY SINGLE LOBE Right 1/6/2021    Procedure: RIGHT UPPER LOBECTOMY LUNG;  Surgeon: Mila Joya MD;  [facility]: [facility];  Service: Thoracic    IA THORACOSCOPY W/THERA WEDGE RESEXN INITIAL UNILAT Right 1/6/2021    Procedure: THORACOSCOPY VIDEO ASSISTED SURGERY (VATS),;  Surgeon: Mila Joya MD;  Location: [facility];  Service: Thoracic    TESTICLE SURGERY       Family History   Problem Relation Age of Onset    Stroke Father     Heart attack Father     Dementia Father     Alzheimer's disease Father     Clotting disorder Father     Diabetes Mother          Meds/Allergies     Current Outpatient Medications:     albuterol (PROVENTIL HFA,VENTOLIN HFA) 90 mcg/act inhaler, Inhale 2 puffs every 6 (six) hours as needed for wheezing or shortness of breath, Disp: 18 g, Rfl: 3    Budeson-Glycopyrrol-Formoterol ([facility] Aerosphere) 160-9-4.8  MCG/ACT AERO, Inhale 2 puffs 2 (two) times a day Rinse mouth after use., Disp: 31.1 g, Rfl: 3    famotidine (PEPCID) 40 MG tablet, Take 1 tablet (40 mg total) by mouth daily, Disp: 100 tablet, Rfl: 1    furosemide (LASIX) 40 mg tablet, Take 0.5 tablets (20 mg total) by mouth daily, Disp: 90 tablet, Rfl: 3    metoprolol succinate (TOPROL-XL) 50 mg 24 hr tablet, Take 1 tablet (50 mg total) by mouth daily, Disp: 90 tablet, Rfl: 3  No Known Allergies    Vitals: Blood pressure 120/68, pulse 71, temperature 99.5 °F (37.5 °C), weight 124 kg (274 lb), SpO2 99%. Body mass index is 38.76 kg/m². Oxygen Therapy  SpO2: 99 %      Physical Exam  Physical Exam  Vitals reviewed.   Constitutional:       General: He is not in acute distress.     Appearance: Normal appearance. He is well-developed. He is obese. He is not ill-appearing, toxic-appearing or diaphoretic.   HENT:      Head: Normocephalic and atraumatic.      Right Ear: External ear normal.      Left Ear: External ear normal.      Nose: Nose normal.      Mouth/Throat:      Mouth: Mucous membranes are moist.      Pharynx: Oropharynx is clear. No oropharyngeal exudate.   Eyes:      General: No scleral icterus.        Right eye: No discharge.         Left eye: No discharge.      Conjunctiva/sclera: Conjunctivae normal.      Pupils: Pupils are equal, round, and reactive to light.   Neck:      Vascular: No JVD.      Trachea: No tracheal deviation.   Cardiovascular:      Rate and Rhythm: Normal rate and regular rhythm.      Heart sounds: Normal heart sounds. No murmur heard.  Pulmonary:      Effort: Pulmonary effort is normal. No respiratory distress.      Breath sounds: No stridor. No wheezing, rhonchi or rales.      Comments: Slightly diminished BS, no wheeze or rales  Abdominal:      General: Bowel sounds are normal. There is no distension.      Palpations: Abdomen is soft.      Tenderness: There is no abdominal tenderness. There is no guarding.   Musculoskeletal:          "General: No swelling or deformity.      Cervical back: Neck supple.      Right lower leg: No edema.      Left lower leg: No edema.   Lymphadenopathy:      Cervical: No cervical adenopathy.   Skin:     General: Skin is warm and dry.      Coloration: Skin is not jaundiced.      Findings: No rash.   Neurological:      General: No focal deficit present.      Mental Status: He is alert and oriented to person, place, and time. Mental status is at baseline.   Psychiatric:         Mood and Affect: Mood normal.         Behavior: Behavior normal.         Thought Content: Thought content normal.         Labs: I have personally reviewed pertinent lab results.  Lab Results   Component Value Date    WBC 4.48 12/18/2024    HGB 13.5 12/18/2024    HCT 40.6 12/18/2024    MCV 94 12/18/2024     12/18/2024     Lab Results   Component Value Date    CALCIUM 8.6 12/18/2024    K 3.6 12/18/2024    CO2 26 12/18/2024     12/18/2024    BUN 14 12/18/2024    CREATININE 1.00 12/18/2024     No results found for: \"IGE\"  Lab Results   Component Value Date    ALT 26 12/18/2024    AST 19 12/18/2024    ALKPHOS 49 12/18/2024     Imaging and other studies: new images and reports personally reviewed  CT angio 12/15/2024 - no PE, emphysematous changes, RUL lobectomy, right basilar atelectasis    CT Chest 3/16/2022 - post RUL lobectomy, right basilar atelectasis/scar, background emphysematous changes, no sig mediastinal adenoapthy     CT angio 01/07/2022 - no PE, mild emphysemtous changes, post surgical changes on right, no sig effusions or mediastinal adenopathy     CXR 12/30/2021 - stable mild volume loss on right and blunted CP angle, no new infiltrates, no effusions, no PTX     CT Chest 7/12/21 - post surgical changes on right, mild upper lobe/apical emphysematous changes, no new lesions appreciated     CXR 2/15/21 - volume loss on right - post surgical changes, small right effusion, resolved right apical PTX, no focal infiltrates    "   CT/PET 11/10/2020 - 2.5x1.8cm RUL nodular lesion with SUV 1.8, hepatic hypodensities w/o FDG avidity        Pulmonary function testin2025 - Ratio 65%, FVC 3.45L (70%, Z -2.51), FEV1 2.24L (61%, Z -3.01) - moderate obstructive airflow defect    3/25/2022 - Ratio 68%, FVC 3.26L (66%), FEV1 2.25 (59%) - moderate obstructive airflow defect     2022 - Ratio 60%, FVC 3.24L (66%), FEV1 1.94L (52%) - moderate obstructive airflow defect with reduced VC     2020 - Ratio 69%, FVC 4.07L, FEV1 2.80L (72%), post BD FEV1 inc 9%, TLC 92%, %, DLCO 66% - mild obstructive airflow defect, no response to BD normal lung volumes, mildly reduced diffusion     Spirometry  - Ratio 78%, FVC 4.20L (81%), FEV1 3.29L (82%) - normal spirometry     Ambulation Testing  2025 - initial SpO2 on RA at rest 97%, ambulated 6 minutes on RA with chuck SpO2 94%, 95% at conclusion, maximal HR 85bpm    2022 - 6MWT on RA - Total Walk distance 369 meters, initial SpO2 95%, chuck SpO2 93%, conclusion 96%, maximal HR 119bpm     2009 - 6MWT on RA -  total walk distance 324 meters, Chuck SpO2 95%, maximal HR 98bpm     Sleep Testing  PSG 2022 - CHAY 38.9, severe MARIANA, recommended auto-titrating PSG 5-75yeM2S     EKG, Pathology, and Other Studies: I have personally reviewed pertinent reports.    TTE 2024 EF 55%, mildly dilated RV, normal RV systolic function, grade I DD    Myocardial perfusions scan 2022 - EF 59%, no evidence of stress induced myocardial ischemia     Myocardial perfusion scan 2020 - normal study after pharmacologic vasodilation, normal LV function 61%     Holter monitor 10/20/2020 - NSR, ectopy atrial cuplets, late beats, single PACs     TTE 2022 - EF 60%, mod dilated RV, no definitive interatrial shunt, PASP 38mmHg    Nick Henry DO, FACP  [facility]

## 2025-01-21 NOTE — PATIENT INSTRUCTIONS
Continue breztri 2puffs twice a day  Use albuterol as needed  No need for oxygen and breathing tests are stable  Resume regular exercise - silver sneakers, regular walking

## 2025-01-21 NOTE — ASSESSMENT & PLAN NOTE
Moderate obstructive airflow defect - stable today  Continue Breztri prn MARK  Flu Vaccine 2024, pneumovax 2021, Prevnar 20 - 2023, COVID-19 x 2     Orders:    Budeson-Glycopyrrol-Formoterol (Breztri Aerosphere) 160-9-4.8 MCG/ACT AERO; Inhale 2 puffs 2 (two) times a day Rinse mouth after use.

## 2025-01-21 NOTE — ASSESSMENT & PLAN NOTE
PSG 7/20/2022 - CHAY 38.9, severe MARIANA, recommended auto-titrating PSG 5-20 cmH2O  Compliance report 12/22-1/20 2025 97% days used, 7hr 8 min, AHI 0, 95th pressure 10.9cmH2O  Check ESS next visit  Encouraged continued CPAP compliance and MARIANA precautions reviewed

## 2025-03-05 ENCOUNTER — OFFICE VISIT (OUTPATIENT)
Dept: CARDIOLOGY CLINIC | Facility: CLINIC | Age: 60
End: 2025-03-05
Payer: COMMERCIAL

## 2025-03-05 VITALS
WEIGHT: 274 LBS | RESPIRATION RATE: 16 BRPM | HEART RATE: 77 BPM | HEIGHT: 71 IN | OXYGEN SATURATION: 94 % | DIASTOLIC BLOOD PRESSURE: 68 MMHG | SYSTOLIC BLOOD PRESSURE: 118 MMHG | BODY MASS INDEX: 38.36 KG/M2

## 2025-03-05 DIAGNOSIS — Z95.0 PACEMAKER: ICD-10-CM

## 2025-03-05 DIAGNOSIS — R55 SYNCOPE AND COLLAPSE: Primary | ICD-10-CM

## 2025-03-05 DIAGNOSIS — I45.5 SINUS PAUSE: ICD-10-CM

## 2025-03-05 DIAGNOSIS — E66.9 OBESITY (BMI 30-39.9): ICD-10-CM

## 2025-03-05 DIAGNOSIS — I10 PRIMARY HYPERTENSION: ICD-10-CM

## 2025-03-05 DIAGNOSIS — G47.33 OSA (OBSTRUCTIVE SLEEP APNEA): ICD-10-CM

## 2025-03-05 PROCEDURE — 99214 OFFICE O/P EST MOD 30 MIN: CPT | Performed by: INTERNAL MEDICINE

## 2025-03-05 NOTE — PROGRESS NOTES
CARDIOLOGY OFFICE VISIT  [facility]  [address], [place] [id number]  Tel: [phone]      NAME: Randal Romeo  AGE: 59 y.o.  SEX: male  : 1965  MRN: [id number]      Chief Complaint:  Chief Complaint   Patient presents with    Follow-up         History of Present Illness:   Patient comes for follow up. States he is doing okay from cardiac stand point and denies chest pain / pressure, SOB, palpitations, lightheadedness, syncope, orthopnea, PND, claudication.    Syncope - multiple episodes of sudden onset LOC of unclear etiology going back to 2017 and beyond. Some of those were associated with some shakiness of arms and legs.  A couple of them were thought to be from dehydration. His Holter monitor showed no significant arrhythmia or pauses. His loop recorde, however, picked up 1 pause episode lasting 4 seconds @ 1302 hrs. on 2024.  Also episodes of sinus bradycardia with heart rate in 40s were picked up.  He is now s/p MDT dual chamber pacemaker    Primary hypertension -  Has been hypertensive for many years.  Taking medications regularly.  Denies lightheadedness, headache, medication side effects.      MARIANA - uses CPAP regularly    Obesity -  Trying to lose weight.      Past Medical History:  Past Medical History:   Diagnosis Date    COPD (chronic obstructive pulmonary disease) (HCC)     Cough     Emphysema of lung (HCC)     Liver lesion     Lung cancer (HCC)     Right upper lobe pulmonary nodule     Shortness of breath     Sleep apnea, obstructive     Syncope and collapse          Past Surgical History:  Past Surgical History:   Procedure Laterality Date    CARDIAC ELECTROPHYSIOLOGY PROCEDURE N/A 2022    Procedure: Cardiac loop recorder implant;  Surgeon: Daniel Camrona MD;  Location: [facility];  Service: Cardiology    CARDIAC ELECTROPHYSIOLOGY PROCEDURE N/A 2024    Procedure: Cardiac pacer  implant dual chamber;  Surgeon: Efrem Parra MD;  Location: [facility];  Service: Cardiology    CARDIAC ELECTROPHYSIOLOGY PROCEDURE N/A 2024    Procedure: Cardiac loop recorder explant;  Surgeon: Efrem Parra MD;  [facility]: [facility];  Service: Cardiology    CHOLECYSTECTOMY      LUNG SEGMENTECTOMY Right 2021    Procedure: RESECTION WEDGE LUNG RIGHT UPPER LOBE;  Surgeon: Mila Joya MD;  Location: [facility];  Service: Thoracic    OR [facility] INCL FLUOR GDNCE DX W/CELL WASHG SPX N/A 2021    Procedure: BRONCHOSCOPY FLEXIBLE;  Surgeon: Mila Joya MD;  [facility]: [facility];  Service: Thoracic    OR THORACOSCOPY W/LOBECTOMY SINGLE LOBE Right 2021    Procedure: RIGHT UPPER LOBECTOMY LUNG;  Surgeon: Mila Joya MD;  [facility];  Service: Thoracic    OR THORACOSCOPY W/THERA WEDGE RESEXN INITIAL UNILAT Right 2021    Procedure: THORACOSCOPY VIDEO ASSISTED SURGERY (VATS),;  Surgeon: Mila Joya MD;  [facility]: [facility];  Service: Thoracic    TESTICLE SURGERY           Family History:  Family History   Problem Relation Age of Onset    Stroke Father     Heart attack Father     Dementia Father     Alzheimer's disease Father     Clotting disorder Father     Diabetes Mother          Social History:  Social History     Socioeconomic History    Marital status: /Civil Union     Spouse name: None    Number of children: None    Years of education: None    Highest education level: None   Occupational History    None   Tobacco Use    Smoking status: Former     Current packs/day: 0.00     Average packs/day: 3.0 packs/day for 30.0 years (90.0 ttl pk-yrs)     Types: Cigarettes     Start date: 10/26/1990     Quit date: 10/26/2020     Years since quittin.3     Passive exposure: Past    Smokeless tobacco: Never    Tobacco comments:     30 years-5 packs a day    Vaping Use    Vaping status: Never Used   Substance and Sexual Activity    Alcohol use:  Not Currently    Drug use: Not Currently    Sexual activity: Not Currently     Partners: Female   Other Topics Concern    None   Social History Narrative    None     Social Drivers of Health     Financial Resource Strain: Not on file   Food Insecurity: No Food Insecurity (12/15/2024)    Nursing - Inadequate Food Risk Classification     Worried About Running Out of Food in the Last Year: Never true     Ran Out of Food in the Last Year: Never true     Ran Out of Food in the Last Year: Never true   Transportation Needs: No Transportation Needs (12/15/2024)    Nursing - Transportation Risk Classification     Lack of Transportation: Not on file     Lack of Transportation: No   Physical Activity: Not on file   Stress: Not on file   Social Connections: Not on file   Intimate Partner Violence: Unknown (12/15/2024)    Nursing IPS     Feels Physically and Emotionally Safe: Not on file     Physically Hurt by Someone: Not on file     Humiliated or Emotionally Abused by Someone: Not on file     Physically Hurt by Someone: No     Hurt or Threatened by Someone: No   Housing Stability: Unknown (12/15/2024)    Nursing: Inadequate Housing Risk Classification     Has Housing: Not on file     Worried About Losing Housing: Not on file     Unable to Get Utilities: Not on file     Unable to Pay for Housing in the Last Year: No     Has Housin         Active Problems:  Patient Active Problem List   Diagnosis    Syncope and collapse    Knee injury    Chronic Obstructive Pulmonary Disease    Liver lesion    Cigarette nicotine dependence in remission    Malignant neoplasm of upper lobe of right lung (HCC)    Gastroesophageal reflux disease without esophagitis    COVID-19 virus infection    Shortness of breath    MARIANA (obstructive sleep apnea)    Obesity    Mononeuropathy, unspecified    Nasal lesion    Obesity, morbid (HCC)    Status post placement of cardiac pacemaker    Asymptomatic bacteriuria    Bacteremia due to Enterobacter species     Positive urine culture    BPH (benign prostatic hyperplasia)    Chronic diarrhea    Class 2 obesity in adult    Symptomatic bradycardia         The following portions of the patient's history were reviewed and updated as appropriate: past medical history, past surgical history, past family history,  past social history, current medications, allergies and problem list.      Review of Systems:  Constitutional: Denies fever, chills  Eyes: Denies eye redness, eye discharge  ENT: Denies hearing loss, sneezing, nasal discharge, sore throat   Respiratory: Denies cough, expectoration, shortness of breath  Cardiovascular: Denies chest pain, palpitations  Gastrointestinal: Denies abdominal pain, nausea, vomiting, diarrhea  Genito-Urinary: Denies dysuria, incontinence  Musculoskeletal: Denies back pain, joint pain, muscle pain  Neurologic: Denies lightheadedness, syncope, headache, seizures  Endocrine: Denies polydipsia, temperature intolerance  Allergy and Immunology: Denies hives, insect bite sensitivity  Hematological and Lymphatic: Denies bleeding problems, swollen glands   Psychological: Denies depression, suicidal ideation, anxiety, panic  Dermatological: Denies pruritus, rash, skin lesion changes      Vitals:  Vitals:    03/05/25 0907   BP: 118/68   Pulse: 77   Resp: 16   SpO2: 94%       Body mass index is 38.76 kg/m².    Weight (last 2 days)       Date/Time Weight    03/05/25 0907 124 (274)              Physical Examination:  General: Patient is not in acute distress. Awake, alert, oriented in time, place and person. Responding to commands  Head: Normocephalic. Atraumatic  Eyes: Both pupils normal sized, round and reactive to light. Nonicteric  ENT: Normal external ear canals  Neck: Supple. JVP not raised. Trachea central. No thyromegaly  Lungs: Bilateral bronchovascular breath sounds with no crackles or rhonchi  Chest wall: Well-healed pacemaker scar  Cardiovascular: RRR. S1 and S2 normal. No murmur, rub or  "gallop  Gastrointestinal: Abdomen soft, nontender. No guarding or rigidity. Liver and spleen not palpable. Bowel sounds present  Neurologic: Patient is awake, alert, oriented in time, place and person. Responding to commands. Moving all extremities  Integumentary:  No skin rash  Lymphatic: No cervical lymphadenopathy  Back: Symmetric. No CVA tenderness  Extremities: No clubbing, cyanosis or edema      Laboratory Results:  CBC with diff:   Lab Results   Component Value Date    WBC 4.48 12/18/2024    RBC 4.32 12/18/2024    HGB 13.5 12/18/2024    HCT 40.6 12/18/2024    MCV 94 12/18/2024    MCH 31.3 12/18/2024    RDW 13.7 12/18/2024     12/18/2024       CMP:  Lab Results   Component Value Date    CREATININE 1.00 12/18/2024    BUN 14 12/18/2024    K 3.6 12/18/2024     12/18/2024    CO2 26 12/18/2024    ALKPHOS 49 12/18/2024    ALT 26 12/18/2024    AST 19 12/18/2024       Lab Results   Component Value Date    HGBA1C 5.6 12/10/2024    MG 2.2 06/02/2021       Lab Results   Component Value Date    TROPONINI <0.02 10/26/2020    TROPONINI <0.02 10/26/2020    TROPONINI <0.02 10/26/2020    CKTOTAL 93 01/07/2022       Lipid Profile:   No results found for: \"CHOL\"  Lab Results   Component Value Date    HDL 41 12/04/2024    HDL 43 11/05/2024    HDL 39 (L) 06/06/2024     Lab Results   Component Value Date    LDLCALC 118 (H) 12/04/2024    LDLCALC 100 11/05/2024    LDLCALC 116 (H) 06/06/2024     Lab Results   Component Value Date    TRIG 128 12/04/2024    TRIG 128 11/05/2024    TRIG 153 (H) 06/06/2024       Cardiac testing:   Results for orders placed during the hospital encounter of 04/25/24    Echo complete w/ contrast if indicated    Interpretation Summary    Left Ventricle: Left ventricular cavity size is normal. Wall thickness is normal. The left ventricular ejection fraction is 55%. Systolic function is normal. Wall motion is normal. Diastolic function is mildly abnormal, consistent with grade I (abnormal) " relaxation.    Right Ventricle: Right ventricular cavity size is mildly dilated. Systolic function is normal.    Tricuspid Valve: There is trace regurgitation.    No significant change since prior echo 2022.      Results for orders placed during the hospital encounter of 22    NM myocardial perfusion spect (rx stress and/or rest)    Interpretation Summary    Stress ECG: No ST deviation is noted. The ECG was negative for ischemia.    Perfusion Defect Conclusion: There is no evidence of transient ischemic dilation (TID). TID index 1.02.    Perfusion: There is a left ventricular perfusion defect that is medium in size present in the basal to mid inferior location(s) that is fixed. This likely represents diaphragmatic attenuation. Clears with prone imaging. No reversible defects suggesting ischemia identified. There is also tissue artifact noted during resting images.    Stress Function: Left ventricular function post-stress is normal. Post-stress ejection fraction is 59 %.    Stress Combined Conclusion: The ECG and SPECT imaging portions of the stress study are concordant with no evidence of stress induced myocardial ischemia. Left ventricular perfusion is probably normal.    Results for orders placed during the hospital encounter of 21    Stress test only, exercise    [facility]  [address], [place] [id number] [phone]    Stress Electrocardiography during Exercise    Name: BETTY LEWIS  MR #: [id number]  Account #: [id number]  Study date: 2021  : 1965  Age: 55 years  Gender: Male  Height: 70.5 in  Weight: 232 lb  BSA: 2.24 mï¾²    Allergies: NO KNOWN ALLERGIES    Diagnosis: R06.02 - Shortness of breath    Technician:  Jen Peterson MS, CCT  GROUP:  [facility]  Interpreting Physician:  Adam Hendricks MD  Referring Physician:  Rehan Forman MD  Primary Physician:  Rehan Forman MD    CLINICAL QUESTION: Detection of coronary artery  disease.    HISTORY: The patient is a 55 year old  male. Chest pain status: no chest pain. Other symptoms: dyspnea. Coronary artery disease risk factors: hypertension and family history of premature coronary artery disease. Cardiovascular  history: none significant. Co-morbidity: history of lung CA, COPD and obesity. Medications: a diuretic and an antihypertensive agent, albuterol, Breo Ellipta.    REST ECG: Normal baseline ECG.    PROCEDURE: Treadmill exercise testing was performed, using the Nissa protocol. Stress electrocardiographic evaluation was performed.    NISSA PROTOCOL:  HR bpm SBP mmHg DBP mmHg Symptoms Rhythm/conduct  Baseline 74 120 70 none NSR  Stage 1 117 136 60 mild dyspnea sinus tach  Stage 2 131 146 64 severe dyspnea sinus tach  Immediate 131 -- -- severe dyspnea sinus tach  Recovery 1 113 -- -- subsiding sinus tach  Recovery 2 94 118 64 none NSR  Recovery 3 90 -- -- none NSR  Recovery 5 83 116 62 none NSR  No medications or fluids given.    STRESS SUMMARY: O2 Sat rest 94%. O2 Sat peak 95%. Duration of pharmacologic stress was 3 min. Functional capacity was decreased (greater than 40%). Maximal heart rate during stress was 131 bpm ( 79 % of maximal predicted heart rate). The  heart rate response to stress was normal. There was normal resting blood pressure with an appropriate response to stress. The rate-pressure product for the peak heart rate and blood pressure was 51959. There was no chest pain during stress  for the level of stress achieved. The stress test was terminated due to severe dyspnea. There were no stress arrhythmias or conduction abnormalities for the level of stress achieved. Non-diagnostic ECG due to submaximal heart rate response.    SUMMARY:  -  Stress results: Functional capacity was decreased (greater than 40%). Target heart rate was not achieved. There was no chest pain during stress for the level of stress achieved.  -  ECG conclusions: There were no stress  arrhythmias or conduction abnormalities for the level of stress achieved. Non-diagnostic ECG due to submaximal heart rate response.    IMPRESSIONS: Nondiagnostic ECG due to submaximal heart rate response. Pharmacological imaging stress test recommended for further evaluation. Diagnostic sensitivity was limited by submaximal stress.    Prepared and signed by    Adam Hendricks MD  Signed 06/17/2021 14:17:21    Results for orders placed during the hospital encounter of 05/20/22    Holter monitor    Interpretation Summary  INDICATIONS:  Syncope    Impression  This Holter monitoring and analysis was done for a period of 47 hours and 59 minutes.  The rhythm was sinus. Minimum heart rate was 45 bpm. Average heart rate was 68 bpm. Maximum heart rate was 118 bpm.  Ventricular ectopic activity consisted of 3 beats, of which 2 were in couplets, 1 was single PVC.  Supraventricular ectopic activity consisted of 53 beats, of which 3 were in 1 runs, 6 were in atrial couplets, 13 were late beats, 31 were single PACs.  The fastest and longest supraventricular run consisted of 3 beats at the maximum rate of 103 beats per minute.  No irregular rhythm episodes were detected.  Patient's rhythm included 18 hours 56 minutes 21 seconds of bradycardia. The slowest single episode of bradycardia lasted 5 minutes and 22 seconds with a minimum heart rate of 43 bpm.The longest R-R interval 2.4 seconds.  Patient's rhythm included 20 minutes 11 seconds of tachycardia. The fastest single episode of tachycardia lasted 1 minutes 10 seconds with a maximum heart rate of 118 bpm.  Patient's 2 complaints of shortness of breath had underlying sinus rhythm (72 bpm).    CONCLUSIONS:  Sinus rhythm with ectopic activity as detailed above.  Patient's 2 complaints of shortness of breath had underlying sinus rhythm (72 bpm).    Medications:    Current Outpatient Medications:     albuterol (PROVENTIL HFA,VENTOLIN HFA) 90 mcg/act inhaler, Inhale 2 puffs every 6  (six) hours as needed for wheezing or shortness of breath, Disp: 18 g, Rfl: 3    Budeson-Glycopyrrol-Formoterol (Breztri Aerosphere) 160-9-4.8 MCG/ACT AERO, Inhale 2 puffs 2 (two) times a day Rinse mouth after use., Disp: 31.1 g, Rfl: 3    famotidine (PEPCID) 40 MG tablet, Take 1 tablet (40 mg total) by mouth daily, Disp: 100 tablet, Rfl: 1    furosemide (LASIX) 40 mg tablet, Take 0.5 tablets (20 mg total) by mouth daily, Disp: 90 tablet, Rfl: 3    metoprolol succinate (TOPROL-XL) 50 mg 24 hr tablet, Take 1 tablet (50 mg total) by mouth daily, Disp: 90 tablet, Rfl: 3      Allergies:  No Known Allergies      Assessment and Plan:  1. Syncope and collapse (Primary)  2. Sinus pause.  Sick sinus syndrome  3. Pacemaker  No further episodes since pacemaker implantation.  Continue regular pacemaker interrogations.  Last pacemaker interrogation discussed with the patient and spouse    4. Primary hypertension  Blood pressure stable.  Continue metoprolol succinate 50 mg daily.  Continue to monitor BP at home and call if abnormal    5. Obesity (BMI 30-39.9)  Try to lose weight    6. MARIANA (obstructive sleep apnea)  Continue regular CPAP use      Recommend aggressive risk factor modification and therapeutic lifestyle changes.  Low-salt, low-calorie, low-fat, low-cholesterol diet with regular exercise and to optimize weight.  I will defer the ordering and monitoring of necessity lab studies to you, but I am available and happy to review and manage any of the data at your request in the future.    Discussed concepts of atherosclerosis, including signs and symptoms of cardiac disease.    Previous studies were reviewed.    Safety measures were reviewed.  Questions were entertained and answered.  Patient was advised to report any problems requiring medical attention.    Follow-up with PCP and appropriate specialist and lab work as discussed.    Return for follow up visit as scheduled or earlier, if needed.  Thank you for allowing me to  participate in the care and evaluation of your patient.  Should you have any questions, please feel free to contact me.    Adam Hendricks MD  3/5/2025,9:54 AM

## 2025-04-04 DIAGNOSIS — I10 PRIMARY HYPERTENSION: ICD-10-CM

## 2025-04-04 DIAGNOSIS — K21.9 GASTROESOPHAGEAL REFLUX DISEASE WITHOUT ESOPHAGITIS: ICD-10-CM

## 2025-04-04 RX ORDER — FAMOTIDINE 40 MG/1
40 TABLET, FILM COATED ORAL DAILY
Qty: 100 TABLET | Refills: 1 | Status: SHIPPED | OUTPATIENT
Start: 2025-04-04

## 2025-04-04 RX ORDER — METOPROLOL SUCCINATE 50 MG/1
50 TABLET, EXTENDED RELEASE ORAL DAILY
Qty: 90 TABLET | Refills: 3 | Status: SHIPPED | OUTPATIENT
Start: 2025-04-04

## 2025-04-07 ENCOUNTER — TELEPHONE (OUTPATIENT)
Age: 60
End: 2025-04-07

## 2025-04-07 DIAGNOSIS — J43.2 CENTRILOBULAR EMPHYSEMA (HCC): ICD-10-CM

## 2025-04-07 DIAGNOSIS — R06.09 DYSPNEA ON EXERTION: ICD-10-CM

## 2025-04-07 NOTE — TELEPHONE ENCOUNTER
Refill must be reviewed and completed by the office or provider. The refill is unable to be approved or denied by the medication management team.    Request for refill at [facility].  Albuterol as needed for wheezing and SOB  Breztri - no protocol attached

## 2025-04-07 NOTE — TELEPHONE ENCOUNTER
Patient called and requested Breztri and albuterol medications be refilled to [facility]. Please advise.

## 2025-04-08 ENCOUNTER — TELEPHONE (OUTPATIENT)
Dept: UROLOGY | Facility: CLINIC | Age: 60
End: 2025-04-08

## 2025-04-08 RX ORDER — BUDESONIDE, GLYCOPYRROLATE, AND FORMOTEROL FUMARATE 160; 9; 4.8 UG/1; UG/1; UG/1
2 AEROSOL, METERED RESPIRATORY (INHALATION) 2 TIMES DAILY
Qty: 31.1 G | Refills: 3 | Status: SHIPPED | OUTPATIENT
Start: 2025-04-08 | End: 2026-04-08

## 2025-04-08 RX ORDER — ALBUTEROL SULFATE 90 UG/1
2 INHALANT RESPIRATORY (INHALATION) EVERY 6 HOURS PRN
Qty: 18 G | Refills: 3 | Status: SHIPPED | OUTPATIENT
Start: 2025-04-08

## 2025-04-08 NOTE — TELEPHONE ENCOUNTER
Spoke with pt wife per cc providing a friendly reminder to please get their PSA done prior to their appt. Informed pt they can get it done at any [facility] lab. Pt wife  verbalized understanding. No further assistance

## 2025-04-11 ENCOUNTER — APPOINTMENT (OUTPATIENT)
Dept: LAB | Facility: MEDICAL CENTER | Age: 60
End: 2025-04-11
Attending: PHYSICIAN ASSISTANT
Payer: COMMERCIAL

## 2025-04-11 ENCOUNTER — RESULTS FOLLOW-UP (OUTPATIENT)
Dept: UROLOGY | Facility: CLINIC | Age: 60
End: 2025-04-11

## 2025-04-11 DIAGNOSIS — R97.20 ELEVATED PSA: ICD-10-CM

## 2025-04-11 LAB — PSA SERPL-MCNC: 12.4 NG/ML (ref 0–4)

## 2025-04-11 PROCEDURE — 84153 ASSAY OF PSA TOTAL: CPT

## 2025-04-11 PROCEDURE — 36415 COLL VENOUS BLD VENIPUNCTURE: CPT

## 2025-04-14 NOTE — PROGRESS NOTES
Name: Randal Romeo      : 1965      MRN: [id number]  Encounter Provider: Becky Snyder PA-C  Encounter Date: 4/15/2025   Encounter department: [facility] [place]  :  Assessment & Plan  Elevated PSA  - TRUS prostate biopsy on 3/25/24- Negative. TRUS prostate measurement of 107 g  - mpMRI from 24 - PIRADS 2, calculated prostate volume of 188 mL  - Most recent PSA from 25 was 12.399  - PSAD remains reassuring at 0.066  - Given reassuring PSA density, would simply recommend PSA surveillance at this time with monitoring every 6-12 months. He is not agreeable to this and does not wish to have his PSA monitored anymore. He understands potential risks of undiagnosed prostate cancer in the future. He will follow up as needed.            Component  Ref Range & Units (hover) 25  7:17 AM 10/3/24  7:22 AM 24  7:30 AM 23  9:16 AM   PSA, Diagnostic 12.399 High  11.851 High  CM 9.3 High  R, CM 23.25 High  R, CM              History of Present Illness   Randal Romeo is a 59 y.o. male who presents for follow up of elevated PSA.     Review of Systems   Constitutional:  Negative for chills and fever.   Respiratory:  Negative for shortness of breath.    Cardiovascular:  Negative for chest pain.   Gastrointestinal:  Negative for abdominal pain.   Genitourinary:  Negative for difficulty urinating, dysuria, flank pain, frequency, hematuria and urgency.   Neurological:  Negative for dizziness.        Objective   There were no vitals taken for this visit.    Physical Exam  Constitutional:       Appearance: Normal appearance. He is obese.   HENT:      Head: Normocephalic and atraumatic.      Right Ear: External ear normal.      Left Ear: External ear normal.      Nose: Nose normal.   Eyes:      General: No scleral icterus.     Conjunctiva/sclera: Conjunctivae normal.   Cardiovascular:      Pulses: Normal pulses.   Pulmonary:      Effort: Pulmonary effort is normal.   Musculoskeletal:          General: Normal range of motion.      Cervical back: Normal range of motion.   Neurological:      General: No focal deficit present.      Mental Status: He is alert and oriented to person, place, and time.   Psychiatric:         Mood and Affect: Mood normal.         Behavior: Behavior normal.         Thought Content: Thought content normal.         Judgment: Judgment normal.          Results   Lab Results   Component Value Date    PSA 12.399 (H) 04/11/2025    PSA 11.851 (H) 10/03/2024    PSA 9.3 (H) 01/05/2024     Lab Results   Component Value Date    CALCIUM 8.6 12/18/2024    K 3.6 12/18/2024    CO2 26 12/18/2024     12/18/2024    BUN 14 12/18/2024    CREATININE 1.00 12/18/2024     Lab Results   Component Value Date    WBC 4.48 12/18/2024    HGB 13.5 12/18/2024    HCT 40.6 12/18/2024    MCV 94 12/18/2024     12/18/2024       Office Urine Dip  No results found for this or any previous visit (from the past hour).

## 2025-04-15 ENCOUNTER — OFFICE VISIT (OUTPATIENT)
Dept: UROLOGY | Facility: CLINIC | Age: 60
End: 2025-04-15
Payer: COMMERCIAL

## 2025-04-15 VITALS
OXYGEN SATURATION: 97 % | TEMPERATURE: 97.6 F | HEART RATE: 73 BPM | SYSTOLIC BLOOD PRESSURE: 124 MMHG | WEIGHT: 274 LBS | HEIGHT: 71 IN | DIASTOLIC BLOOD PRESSURE: 78 MMHG | BODY MASS INDEX: 38.36 KG/M2

## 2025-04-15 DIAGNOSIS — R97.20 ELEVATED PSA: Primary | ICD-10-CM

## 2025-04-15 PROCEDURE — 99213 OFFICE O/P EST LOW 20 MIN: CPT | Performed by: PHYSICIAN ASSISTANT

## 2025-04-22 ENCOUNTER — IN-CLINIC DEVICE VISIT (OUTPATIENT)
Dept: CARDIOLOGY CLINIC | Facility: MEDICAL CENTER | Age: 60
End: 2025-04-22
Payer: COMMERCIAL

## 2025-04-22 DIAGNOSIS — Z95.0 CARDIAC PACEMAKER IN SITU: Primary | ICD-10-CM

## 2025-04-22 PROCEDURE — 93280 PM DEVICE PROGR EVAL DUAL: CPT | Performed by: INTERNAL MEDICINE

## 2025-04-22 NOTE — PROGRESS NOTES
Results for orders placed or performed in visit on 04/22/25   Cardiac EP device report    Narrative    MDT DUAL PM/ACTIVE SYSTEM IS MRI CONDITIONAL  DEVICE INTERROGATED IN THE [facility] OFFICE: PRESENTING EGRAM AS VS@ 75 BPM. BATTERY VOLTAGE ADEQUATE-14 YRS. AP 43%  20%. ALL AVAILABLE LEAD PARAMETERS WITHIN NORMAL LIMITS. NO SIGNIFICANT HIGH RATE EPISODES. NO PROGRAMMING CHANGES MADE TO DEVICE PARAMETERS. NORMAL DEVICE FUNCTION. NC

## 2025-04-26 ENCOUNTER — RESULTS FOLLOW-UP (OUTPATIENT)
Dept: NON INVASIVE DIAGNOSTICS | Facility: HOSPITAL | Age: 60
End: 2025-04-26

## 2025-05-21 ENCOUNTER — APPOINTMENT (EMERGENCY)
Dept: ULTRASOUND IMAGING | Facility: HOSPITAL | Age: 60
End: 2025-05-21
Payer: COMMERCIAL

## 2025-05-21 ENCOUNTER — HOSPITAL ENCOUNTER (EMERGENCY)
Facility: HOSPITAL | Age: 60
Discharge: HOME | End: 2025-05-21
Attending: EMERGENCY MEDICINE
Payer: COMMERCIAL

## 2025-05-21 ENCOUNTER — TELEPHONE (OUTPATIENT)
Age: 60
End: 2025-05-21

## 2025-05-21 VITALS
SYSTOLIC BLOOD PRESSURE: 130 MMHG | HEART RATE: 69 BPM | OXYGEN SATURATION: 96 % | TEMPERATURE: 98.2 F | DIASTOLIC BLOOD PRESSURE: 69 MMHG | RESPIRATION RATE: 16 BRPM

## 2025-05-21 DIAGNOSIS — N45.3 EPIDIDYMOORCHITIS: Primary | ICD-10-CM

## 2025-05-21 LAB
BILIRUB UR QL STRIP: NEGATIVE
CLARITY UR: CLEAR
COLOR UR: NORMAL
GLUCOSE UR STRIP-MCNC: NEGATIVE MG/DL
HGB UR QL STRIP.AUTO: NEGATIVE
KETONES UR STRIP-MCNC: NEGATIVE MG/DL
LEUKOCYTE ESTERASE UR QL STRIP: NEGATIVE
NITRITE UR QL STRIP: NEGATIVE
PH UR STRIP.AUTO: 5.5 [PH]
PROT UR STRIP-MCNC: NEGATIVE MG/DL
SP GR UR STRIP.AUTO: 1.01 (ref 1–1.03)
UROBILINOGEN UR STRIP-ACNC: <2 MG/DL

## 2025-05-21 PROCEDURE — 76870 US EXAM SCROTUM: CPT

## 2025-05-21 PROCEDURE — 99284 EMERGENCY DEPT VISIT MOD MDM: CPT | Performed by: PHYSICIAN ASSISTANT

## 2025-05-21 PROCEDURE — 99284 EMERGENCY DEPT VISIT MOD MDM: CPT

## 2025-05-21 PROCEDURE — 81003 URINALYSIS AUTO W/O SCOPE: CPT | Performed by: PHYSICIAN ASSISTANT

## 2025-05-21 RX ORDER — MELOXICAM 7.5 MG/1
7.5 TABLET ORAL DAILY
Qty: 7 TABLET | Refills: 0 | Status: SHIPPED | OUTPATIENT
Start: 2025-05-21 | End: 2025-05-21

## 2025-05-21 RX ORDER — OXYCODONE AND ACETAMINOPHEN 5; 325 MG/1; MG/1
1 TABLET ORAL ONCE
Refills: 0 | Status: COMPLETED | OUTPATIENT
Start: 2025-05-21 | End: 2025-05-21

## 2025-05-21 RX ORDER — MELOXICAM 7.5 MG/1
7.5 TABLET ORAL DAILY
Qty: 7 TABLET | Refills: 0 | Status: SHIPPED | OUTPATIENT
Start: 2025-05-21

## 2025-05-21 RX ADMIN — OXYCODONE HYDROCHLORIDE AND ACETAMINOPHEN 1 TABLET: 5; 325 TABLET ORAL at 13:19

## 2025-05-21 NOTE — ED PROVIDER NOTES
Time reflects when diagnosis was documented in both MDM as applicable and the Disposition within this note       Time User Action Codes Description Comment    5/21/2025  3:04 PM Domingo Wright Add [N45.3] Epididymoorchitis     5/21/2025  3:05 PM Dominog Wright Modify [N45.3] Epididymoorchitis left traumatic          ED Disposition       ED Disposition   Discharge    Condition   Stable    Date/Time   Wed May 21, 2025  3:04 PM    Comment   Randal Romeo discharge to home/self care.                   Assessment & Plan       Medical Decision Making  59-year-old male patient who had his 42 pound beagle jump on him landing on his left testicle 3 days ago now has pain and swelling of the left testicle without other symptoms.  Differential diagnose includes not limited to testicular trauma with hematoma, torsion, orchitis, epididymitis    Problems Addressed:  Epididymoorchitis: acute illness or injury     Details: I spoke with urology advanced practitioner who felt this was more of a traumatic diagnosis due to the fact that he got pain and swelling after dog jumped on his left testicle.  The patient is monogamous and not sexually active he was told by urology myself that antibiotics at this time is not necessary.  They recommended ice, heat, anti-inflammatories, testicular support and follow-up with urology    Amount and/or Complexity of Data Reviewed  Labs: ordered. Decision-making details documented in ED Course.     Details: I reviewed the urine dip there is no acute finding  Radiology: ordered.     Details: I reviewed the ultrasound of the scrotum and contents positive for the epididymal orchitis left  Discussion of management or test interpretation with external provider(s): Using joint decision-making patient be discharged home with the treatment above given strict return precautions follow-up with urology he and his wife verbalized understanding and agreement    Risk  Prescription drug management.        ED  Course as of 05/21/25 1941   Wed May 21, 2025   1456 findings compatible with left epididymoorchitis. No evidence of testicular injury.  Bilateral epididymal head cysts.  The study was marked in EPIC for immediate notification.  Workstation performed: [id number]  Signed by Esther Posadas MD on 5/21/2025  2:51 PM         Medications   oxyCODONE-acetaminophen (PERCOCET) 5-325 mg per tablet 1 tablet (1 tablet Oral Given 5/21/25 1319)       ED Risk Strat Scores                    No data recorded        SBIRT 22yo+      Flowsheet Row Most Recent Value   Initial Alcohol Screen: US AUDIT-C     1. How often do you have a drink containing alcohol? 0 Filed at: 05/21/2025 1515   2. How many drinks containing alcohol do you have on a typical day you are drinking?  0 Filed at: 05/21/2025 1515   3a. Male UNDER 65: How often do you have five or more drinks on one occasion? 0 Filed at: 05/21/2025 1515   Audit-C Score 0 Filed at: 05/21/2025 1515   ROXY: How many times in the past year have you...    Used an illegal drug or used a prescription medication for non-medical reasons? Never Filed at: 05/21/2025 1515                            History of Present Illness       Chief Complaint   Patient presents with    Testicle Injury     Pt states on Sunday dog jumped on groin and has been having increasing L sided testicle pain ever since.        Past Medical History:   Diagnosis Date    COPD (chronic obstructive pulmonary disease) (HCC)     Cough     Emphysema of lung (HCC)     Liver lesion     Lung cancer (HCC)     Right upper lobe pulmonary nodule     Shortness of breath     Sleep apnea, obstructive     Syncope and collapse       Past Surgical History:   Procedure Laterality Date    CARDIAC ELECTROPHYSIOLOGY PROCEDURE N/A 7/26/2022    Procedure: Cardiac loop recorder implant;  Surgeon: Daniel Carmona MD;  Location: [facility];  Service: Cardiology    CARDIAC ELECTROPHYSIOLOGY PROCEDURE N/A 11/27/2024    Procedure:  Cardiac pacer implant dual chamber;  Surgeon: Efrem Parra MD;  Location: [facility];  Service: Cardiology    CARDIAC ELECTROPHYSIOLOGY PROCEDURE N/A 11/27/2024    Procedure: Cardiac loop recorder explant;  Surgeon: Efrem Parra MD;  Location: [facility];  Service: Cardiology    CHOLECYSTECTOMY      LUNG SEGMENTECTOMY Right 1/6/2021    Procedure: RESECTION WEDGE LUNG RIGHT UPPER LOBE;  Surgeon: Mila Joya MD;  Location: [facility];  Service: Thoracic    HI [facility] INCL FLUOR GDNCE DX W/CELL WASHG SPX N/A 1/6/2021    Procedure: BRONCHOSCOPY FLEXIBLE;  Surgeon: Mila Joya MD;  [facility]: [facility];  Service: Thoracic    HI THORACOSCOPY W/LOBECTOMY SINGLE LOBE Right 1/6/2021    Procedure: RIGHT UPPER LOBECTOMY LUNG;  Surgeon: Mila Joya MD;  [facility]: [facility];  Service: Thoracic    HI THORACOSCOPY W/THERA WEDGE RESEXN INITIAL UNILAT Right 1/6/2021    Procedure: THORACOSCOPY VIDEO ASSISTED SURGERY (VATS),;  Surgeon: Mila Joya MD;  Location: [facility];  Service: Thoracic    TESTICLE SURGERY        Family History   Problem Relation Name Age of Onset    Stroke Father      Heart attack Father      Dementia Father      Alzheimer's disease Father      Clotting disorder Father      Diabetes Mother Ana       Social History[1]   E-Cigarette/Vaping    E-Cigarette Use Never User       E-Cigarette/Vaping Substances    Nicotine No     THC No     CBD No     Flavoring No     Other No     Unknown No       I have reviewed and agree with the history as documented.     This is a 59-year-old male patient who approximately 3 days ago his 42 pound beagle jumped on his groin landing on his left testicle.  He now has pain and swelling that is getting worse with time.  He is taken nothing over-the-counter.  He denies any urgency frequency or dysuria.  No fever chills headache blurred vision double vision cough congestion or sore throat no chest pain or shortness of breath.  He  states the pain is now rating up into his groin a little bit more than it was as the days go on but has no reproducible pain in his left groin.        Review of Systems   Constitutional:  Negative for chills, diaphoresis, fatigue and fever.   HENT:  Negative for congestion, ear pain, nosebleeds and sore throat.    Eyes:  Negative for photophobia, pain, discharge and visual disturbance.   Respiratory:  Negative for cough, choking, chest tightness, shortness of breath and wheezing.    Cardiovascular:  Negative for chest pain and palpitations.   Gastrointestinal:  Negative for abdominal distention, abdominal pain, diarrhea and vomiting.   Genitourinary:  Positive for testicular pain. Negative for decreased urine volume, dysuria, flank pain, frequency, hematuria, penile discharge, penile pain, penile swelling, scrotal swelling and urgency.   Musculoskeletal:  Negative for arthralgias, back pain, gait problem and joint swelling.   Skin:  Negative for color change and rash.   Neurological:  Negative for dizziness, seizures, syncope and headaches.   Psychiatric/Behavioral:  Negative for behavioral problems and confusion. The patient is not nervous/anxious.    All other systems reviewed and are negative.          Objective       ED Triage Vitals [05/21/25 1257]   Temperature Pulse Blood Pressure Respirations SpO2 Patient Position - Orthostatic VS   98.2 °F (36.8 °C) 76 159/75 20 97 % Lying      Temp Source Heart Rate Source BP Location FiO2 (%) Pain Score    Oral Monitor Right arm -- 10 - Worst Possible Pain      Vitals      Date and Time Temp Pulse SpO2 Resp BP Pain Score FACES Pain Rating User   05/21/25 1458 -- 69 96 % 16 130/69 -- --    05/21/25 1348 -- -- -- -- -- 7 --    05/21/25 1319 -- -- -- -- -- 10 - Worst Possible Pain --    05/21/25 1257 98.2 °F (36.8 °C) 76 97 % 20 159/75 10 - Worst Possible Pain -- DB            Physical Exam  Vitals and nursing note reviewed. Exam conducted with a chaperone present.    Constitutional:       General: He is in acute distress.      Appearance: Normal appearance. He is well-developed. He is obese. He is not ill-appearing, toxic-appearing or diaphoretic.   HENT:      Head: Normocephalic and atraumatic.      Right Ear: Tympanic membrane, ear canal and external ear normal.      Left Ear: Tympanic membrane, ear canal and external ear normal.      Nose: Nose normal.      Mouth/Throat:      Mouth: Mucous membranes are moist.      Pharynx: Oropharynx is clear. No oropharyngeal exudate or posterior oropharyngeal erythema.     Eyes:      General: No scleral icterus.        Right eye: No discharge.         Left eye: No discharge.      Conjunctiva/sclera: Conjunctivae normal.      Pupils: Pupils are equal, round, and reactive to light.       Cardiovascular:      Rate and Rhythm: Normal rate and regular rhythm.   Pulmonary:      Effort: Pulmonary effort is normal.      Breath sounds: Normal breath sounds.   Abdominal:      General: Bowel sounds are normal.      Palpations: Abdomen is soft.      Tenderness: There is no abdominal tenderness.   Genitourinary:     Penis: Normal.       Testes:         Left: Tenderness and swelling present. Mass, testicular hydrocele or varicocele not present. Left testis is descended. Cremasteric reflex is absent.       Epididymis:      Left: Normal.     Musculoskeletal:         General: Normal range of motion.      Cervical back: Normal range of motion and neck supple.      Right lower leg: No edema.      Left lower leg: No edema.     Skin:     General: Skin is warm.      Capillary Refill: Capillary refill takes less than 2 seconds.     Neurological:      General: No focal deficit present.      Mental Status: He is alert and oriented to person, place, and time. Mental status is at baseline.     Psychiatric:         Mood and Affect: Mood normal.         Behavior: Behavior normal.         Results Reviewed       Procedure Component Value Units Date/Time    UA w Reflex  to Microscopic w Reflex to Culture [[id number]] Collected: 05/21/25 1321    Lab Status: Final result Specimen: Urine, Clean Catch Updated: 05/21/25 1330     Color, UA Light Yellow     Clarity, UA Clear     Specific Gravity, UA 1.014     pH, UA 5.5     Leukocytes, UA Negative     Nitrite, UA Negative     Protein, UA Negative mg/dl      Glucose, UA Negative mg/dl      Ketones, UA Negative mg/dl      Urobilinogen, UA <2.0 mg/dl      Bilirubin, UA Negative     Occult Blood, UA Negative            US scrotum and testicles   Final Interpretation by Esther Posadas MD (05/21 2452)      Findings compatible with left epididymoorchitis. No evidence of testicular injury.      Bilateral epididymal head cysts.      The study was marked in EPIC for immediate notification.      Workstation performed: [id number]             Procedures    ED Medication and Procedure Management   Prior to Admission Medications   Prescriptions Last Dose Informant Patient Reported? Taking?   Budeson-Glycopyrrol-Formoterol (Breztri Aerosphere) 160-9-4.8 MCG/ACT AERO  Self No No   Sig: Inhale 2 puffs 2 (two) times a day Rinse mouth after use.   albuterol (PROVENTIL HFA,VENTOLIN HFA) 90 mcg/act inhaler  Self No No   Sig: Inhale 2 puffs every 6 (six) hours as needed for wheezing or shortness of breath   famotidine (PEPCID) 40 MG tablet  Self No No   Sig: Take 1 tablet (40 mg total) by mouth daily   furosemide (LASIX) 40 mg tablet  Self No No   Sig: Take 0.5 tablets (20 mg total) by mouth daily   Patient not taking: Reported on 4/15/2025   metoprolol succinate (TOPROL-XL) 50 mg 24 hr tablet  Self No No   Sig: Take 1 tablet (50 mg total) by mouth daily      Facility-Administered Medications: None     Discharge Medication List as of 5/21/2025  3:08 PM        START taking these medications    Details   meloxicam (Mobic) 7.5 mg tablet Take 1 tablet (7.5 mg total) by mouth daily, Starting Wed 5/21/2025, Normal           CONTINUE these medications which have NOT  CHANGED    Details   albuterol (PROVENTIL HFA,VENTOLIN HFA) 90 mcg/act inhaler Inhale 2 puffs every 6 (six) hours as needed for wheezing or shortness of breath, Starting 2025, Normal      Budeson-Glycopyrrol-Formoterol (Breztri Aerosphere) 160-9-4.8 MCG/ACT AERO Inhale 2 puffs 2 (two) times a day Rinse mouth after use., Starting 2025, Until 2026, Normal      famotidine (PEPCID) 40 MG tablet Take 1 tablet (40 mg total) by mouth daily, Starting 2025, Normal      metoprolol succinate (TOPROL-XL) 50 mg 24 hr tablet Take 1 tablet (50 mg total) by mouth daily, Starting 2025, Normal           STOP taking these medications       furosemide (LASIX) 40 mg tablet Comments:   Reason for Stopping:               ED SEPSIS DOCUMENTATION   Time reflects when diagnosis was documented in both MDM as applicable and the Disposition within this note       Time User Action Codes Description Comment    2025  3:04 PM Domingo Jackson Add [N45.3] Epididymoorchitis     2025  3:05 PM Domingo Jackson Modify [N45.3] Epididymoorchitis left traumatic                     [1]   Social History  Tobacco Use    Smoking status: Former     Current packs/day: 0.00     Average packs/day: 3.0 packs/day for 30.0 years (90.0 ttl pk-yrs)     Types: Cigarettes     Start date: 10/26/1990     Quit date: 10/26/2020     Years since quittin.5     Passive exposure: Past    Smokeless tobacco: Never    Tobacco comments:     30 years-5 packs a day    Vaping Use    Vaping status: Never Used   Substance Use Topics    Alcohol use: Not Currently    Drug use: Not Currently        Domingo Jackson PA-C  25 [phone]

## 2025-05-21 NOTE — DISCHARGE INSTRUCTIONS
Your epididymal orchitis is most likely due to trauma and does not require antibiotics.    Please use ice, heat, and the anti-inflammatory you are given today elevation and jockstrap for support.    You must follow-up with urology for further evaluation.    Please return with any worsening symptoms questions comments or concerns increased pain and swelling fever chills or no improving symptoms in 3 to 5 days

## 2025-05-21 NOTE — TELEPHONE ENCOUNTER
Pt under care of:  Becky   Office Location: [place]     Insurance   Current Insurance? Yes   Insurance E-verified? Yes       History  Last Seen (include Follow Up recommendations of last visit- see Office Visit - Instructions): 4/15/25    Pt calling due to: ASAP ref for Epididymoorchitis     Patient was seen in ER today 5/21/25.    Patient currently was given pain medication from ER.       IMPRESSION:     Findings compatible with left epididymoorchitis. No evidence of testicular injury.     Bilateral epididymal head cysts.      Appointment Details   Date:  5/23/25     Time:  10:20 am     Location:  [place]     Provider:  Yina     Does the appointment need further review? No       Pt can be reached at: [phone]

## 2025-05-23 ENCOUNTER — OFFICE VISIT (OUTPATIENT)
Dept: UROLOGY | Facility: CLINIC | Age: 60
End: 2025-05-23
Attending: PHYSICIAN ASSISTANT
Payer: COMMERCIAL

## 2025-05-23 VITALS
OXYGEN SATURATION: 97 % | SYSTOLIC BLOOD PRESSURE: 138 MMHG | WEIGHT: 274 LBS | HEIGHT: 70 IN | BODY MASS INDEX: 39.22 KG/M2 | HEART RATE: 79 BPM | DIASTOLIC BLOOD PRESSURE: 72 MMHG

## 2025-05-23 DIAGNOSIS — N50.812 LEFT TESTICULAR PAIN: Primary | ICD-10-CM

## 2025-05-23 PROCEDURE — 99213 OFFICE O/P EST LOW 20 MIN: CPT

## 2025-05-23 NOTE — PROGRESS NOTES
Name: Randal Romeo      : 1965      MRN: [id number]  Encounter Provider: RAY Flynn  Encounter Date: 2025   Encounter department: [facility]  :  Assessment & Plan  Left testicular pain  - Physical examination reveals uncircumcised penis, patent meatus.  Bilateral testicles equally descended.  Left testicle is moderately enlarged.  Sensitive to palpation.  There is no erythema.  -Patient states testicular pain has significantly improved since dog jumped on left testicle a few days ago.  Ultrasound did not show any acute testicular injury.  -Urinalysis was negative for infection.  -He is asymptomatic of urinary symptoms, voiding complaints, fevers.  -Recommended scrotal support, ice, Tylenol, meloxicam as needed that was prescribed by ED.  -I have ordered a follow-up scrotal ultrasound for patient to complete in 2 months.  All questions addressed.  Please do not hesitate to reach out with further questions or concerns.  Orders:    US scrotum and testicles; Future        History of Present Illness   Randal Romeo is a 59 y.o. male who presents for follow-up of epididymoorchitis.  Patient is known to our service with history of elevated PSA.  He had a negative TRUS prostate biopsy on 3-25-24, prostate measurement of 107 g.  It was recommended that patient monitor PSA surveillance every 6 to 12 months.  Patient was not agreeable to this and does not wish to have his PSA monitored anymore.     Patient presented to the ED on 25 with complaints of patient had a 42 pound beagle jump on him landing on his left testicle 4 days ago.    Scrotal ultrasound showed left epididymal orchitis.  No evidence of testicular injury.  Bilateral epididymal head cyst.    Urinalysis was negative for infection on 25.    Patient states that his testicular pain is significantly improving.  He is taking meloxicam, which is helping.  He does state there is some sensitivity to touch.  He  denies difficulty with urinating or urinary symptoms.  Denies fevers.        AUA SYMPTOM SCORE      Flowsheet Row Most Recent Value   AUA SYMPTOM SCORE    How often have you had a sensation of not emptying your bladder completely after you finished urinating? 0 (P)     How often have you had to urinate again less than two hours after you finished urinating? 1 (P)     How often have you found you stopped and started again several times when you urinate? 1 (P)     How often have you found it difficult to postpone urination? 1 (P)     How often have you had a weak urinary stream? 0 (P)     How often have you had to push or strain to begin urination? 0 (P)     How many times did you most typically get up to urinate from the time you went to bed at night until the time you got up in the morning? 1 (P)     Quality of Life: If you were to spend the rest of your life with your urinary condition just the way it is now, how would you feel about that? 2 (P)     AUA SYMPTOM SCORE 4 (P)            Review of Systems   Constitutional:  Negative for activity change, chills, fatigue and fever.   HENT:  Negative for congestion, rhinorrhea and sore throat.    Eyes:  Negative for photophobia, redness and visual disturbance.   Respiratory:  Negative for cough, shortness of breath and wheezing.    Cardiovascular:  Negative for chest pain, palpitations and leg swelling.   Gastrointestinal:  Negative for abdominal pain, diarrhea, nausea and vomiting.   Genitourinary:  Positive for scrotal swelling. Negative for difficulty urinating, dysuria, flank pain, frequency, hematuria and urgency.   Neurological:  Negative for weakness, light-headedness and headaches.          Objective   There were no vitals taken for this visit.    Physical Exam  Vitals reviewed.   Constitutional:       General: He is not in acute distress.     Appearance: He is well-developed.   HENT:      Head: Normocephalic and atraumatic.     Eyes:      Conjunctiva/sclera:  Conjunctivae normal.     Pulmonary:      Effort: Pulmonary effort is normal. No respiratory distress.   Genitourinary:     Comments: hysical examination reveals uncircumcised penis, patent meatus.  Bilateral testicles equally descended.  Left testicle is moderately enlarged.  Sensitive to palpation.  There is no erythema.      Neurological:      Mental Status: He is alert and oriented to person, place, and time.     Psychiatric:         Mood and Affect: Mood normal.          Results   Lab Results   Component Value Date    PSA 12.399 (H) 04/11/2025    PSA 11.851 (H) 10/03/2024    PSA 9.3 (H) 01/05/2024     Lab Results   Component Value Date    CALCIUM 8.6 12/18/2024    K 3.6 12/18/2024    CO2 26 12/18/2024     12/18/2024    BUN 14 12/18/2024    CREATININE 1.00 12/18/2024     Lab Results   Component Value Date    WBC 4.48 12/18/2024    HGB 13.5 12/18/2024    HCT 40.6 12/18/2024    MCV 94 12/18/2024     12/18/2024       Office Urine Dip  No results found for this or any previous visit (from the past hour).

## 2025-06-06 ENCOUNTER — APPOINTMENT (OUTPATIENT)
Dept: LAB | Facility: MEDICAL CENTER | Age: 60
End: 2025-06-06
Payer: COMMERCIAL

## 2025-06-06 DIAGNOSIS — K21.9 GASTROESOPHAGEAL REFLUX DISEASE WITHOUT ESOPHAGITIS: ICD-10-CM

## 2025-06-06 DIAGNOSIS — E66.01 OBESITY, MORBID (HCC): ICD-10-CM

## 2025-06-06 DIAGNOSIS — J43.9 PULMONARY EMPHYSEMA, UNSPECIFIED EMPHYSEMA TYPE (HCC): ICD-10-CM

## 2025-06-06 LAB
ALBUMIN SERPL BCG-MCNC: 4.1 G/DL (ref 3.5–5)
ALP SERPL-CCNC: 70 U/L (ref 34–104)
ALT SERPL W P-5'-P-CCNC: 22 U/L (ref 7–52)
ANION GAP SERPL CALCULATED.3IONS-SCNC: 9 MMOL/L (ref 4–13)
AST SERPL W P-5'-P-CCNC: 15 U/L (ref 13–39)
BACTERIA UR QL AUTO: ABNORMAL /HPF
BASOPHILS # BLD AUTO: 0.07 THOUSANDS/ÂΜL (ref 0–0.1)
BASOPHILS NFR BLD AUTO: 1 % (ref 0–1)
BILIRUB SERPL-MCNC: 0.68 MG/DL (ref 0.2–1)
BILIRUB UR QL STRIP: NEGATIVE
BUN SERPL-MCNC: 13 MG/DL (ref 5–25)
CALCIUM SERPL-MCNC: 9.2 MG/DL (ref 8.4–10.2)
CHLORIDE SERPL-SCNC: 105 MMOL/L (ref 96–108)
CHOLEST SERPL-MCNC: 176 MG/DL (ref ?–200)
CLARITY UR: CLEAR
CO2 SERPL-SCNC: 27 MMOL/L (ref 21–32)
COLOR UR: YELLOW
CREAT SERPL-MCNC: 0.99 MG/DL (ref 0.6–1.3)
EOSINOPHIL # BLD AUTO: 0.1 THOUSAND/ÂΜL (ref 0–0.61)
EOSINOPHIL NFR BLD AUTO: 1 % (ref 0–6)
ERYTHROCYTE [DISTWIDTH] IN BLOOD BY AUTOMATED COUNT: 14 % (ref 11.6–15.1)
GFR SERPL CREATININE-BSD FRML MDRD: 83 ML/MIN/1.73SQ M
GLUCOSE P FAST SERPL-MCNC: 86 MG/DL (ref 65–99)
GLUCOSE UR STRIP-MCNC: NEGATIVE MG/DL
HCT VFR BLD AUTO: 47.8 % (ref 36.5–49.3)
HDLC SERPL-MCNC: 44 MG/DL
HGB BLD-MCNC: 14.8 G/DL (ref 12–17)
HGB UR QL STRIP.AUTO: NEGATIVE
IMM GRANULOCYTES # BLD AUTO: 0.04 THOUSAND/UL (ref 0–0.2)
IMM GRANULOCYTES NFR BLD AUTO: 1 % (ref 0–2)
KETONES UR STRIP-MCNC: NEGATIVE MG/DL
LDLC SERPL CALC-MCNC: 103 MG/DL (ref 0–100)
LEUKOCYTE ESTERASE UR QL STRIP: NEGATIVE
LYMPHOCYTES # BLD AUTO: 1.81 THOUSANDS/ÂΜL (ref 0.6–4.47)
LYMPHOCYTES NFR BLD AUTO: 21 % (ref 14–44)
MCH RBC QN AUTO: 30.3 PG (ref 26.8–34.3)
MCHC RBC AUTO-ENTMCNC: 31 G/DL (ref 31.4–37.4)
MCV RBC AUTO: 98 FL (ref 82–98)
MONOCYTES # BLD AUTO: 0.68 THOUSAND/ÂΜL (ref 0.17–1.22)
MONOCYTES NFR BLD AUTO: 8 % (ref 4–12)
MUCOUS THREADS UR QL AUTO: ABNORMAL
NEUTROPHILS # BLD AUTO: 6.02 THOUSANDS/ÂΜL (ref 1.85–7.62)
NEUTS SEG NFR BLD AUTO: 68 % (ref 43–75)
NITRITE UR QL STRIP: NEGATIVE
NON-SQ EPI CELLS URNS QL MICRO: ABNORMAL /HPF
NONHDLC SERPL-MCNC: 132 MG/DL
NRBC BLD AUTO-RTO: 0 /100 WBCS
PH UR STRIP.AUTO: 5.5 [PH]
PLATELET # BLD AUTO: 302 THOUSANDS/UL (ref 149–390)
PMV BLD AUTO: 10.1 FL (ref 8.9–12.7)
POTASSIUM SERPL-SCNC: 4.8 MMOL/L (ref 3.5–5.3)
PROT SERPL-MCNC: 7.3 G/DL (ref 6.4–8.4)
PROT UR STRIP-MCNC: ABNORMAL MG/DL
RBC # BLD AUTO: 4.89 MILLION/UL (ref 3.88–5.62)
RBC #/AREA URNS AUTO: ABNORMAL /HPF
SODIUM SERPL-SCNC: 141 MMOL/L (ref 135–147)
SP GR UR STRIP.AUTO: 1.02 (ref 1–1.03)
TRIGL SERPL-MCNC: 145 MG/DL (ref ?–150)
UROBILINOGEN UR STRIP-ACNC: <2 MG/DL
WBC # BLD AUTO: 8.72 THOUSAND/UL (ref 4.31–10.16)
WBC #/AREA URNS AUTO: ABNORMAL /HPF

## 2025-06-06 PROCEDURE — 36415 COLL VENOUS BLD VENIPUNCTURE: CPT

## 2025-06-06 PROCEDURE — 80061 LIPID PANEL: CPT

## 2025-06-06 PROCEDURE — 81001 URINALYSIS AUTO W/SCOPE: CPT

## 2025-06-06 PROCEDURE — 80053 COMPREHEN METABOLIC PANEL: CPT

## 2025-06-06 PROCEDURE — 85025 COMPLETE CBC W/AUTO DIFF WBC: CPT

## 2025-06-12 ENCOUNTER — OFFICE VISIT (OUTPATIENT)
Dept: FAMILY MEDICINE CLINIC | Facility: CLINIC | Age: 60
End: 2025-06-12
Payer: COMMERCIAL

## 2025-06-12 VITALS
TEMPERATURE: 98.6 F | SYSTOLIC BLOOD PRESSURE: 122 MMHG | OXYGEN SATURATION: 95 % | BODY MASS INDEX: 39.08 KG/M2 | WEIGHT: 273 LBS | HEIGHT: 70 IN | HEART RATE: 73 BPM | DIASTOLIC BLOOD PRESSURE: 74 MMHG

## 2025-06-12 DIAGNOSIS — Z00.00 MEDICARE ANNUAL WELLNESS VISIT, SUBSEQUENT: Primary | ICD-10-CM

## 2025-06-12 DIAGNOSIS — N40.0 BENIGN PROSTATIC HYPERPLASIA, UNSPECIFIED WHETHER LOWER URINARY TRACT SYMPTOMS PRESENT: ICD-10-CM

## 2025-06-12 DIAGNOSIS — I49.5 SICK SINUS SYNDROME (HCC): ICD-10-CM

## 2025-06-12 DIAGNOSIS — Z13.220 SCREENING CHOLESTEROL LEVEL: ICD-10-CM

## 2025-06-12 DIAGNOSIS — I70.90 UNSPECIFIED ATHEROSCLEROSIS: ICD-10-CM

## 2025-06-12 DIAGNOSIS — J43.8 OTHER EMPHYSEMA (HCC): ICD-10-CM

## 2025-06-12 DIAGNOSIS — E66.01 OBESITY, MORBID (HCC): ICD-10-CM

## 2025-06-12 PROBLEM — E11.9 TYPE 2 DIABETES MELLITUS WITHOUT COMPLICATION, WITHOUT LONG-TERM CURRENT USE OF INSULIN (HCC): Status: RESOLVED | Noted: 2025-06-12 | Resolved: 2025-06-12

## 2025-06-12 PROBLEM — E11.9 TYPE 2 DIABETES MELLITUS WITHOUT COMPLICATION, WITHOUT LONG-TERM CURRENT USE OF INSULIN (HCC): Status: ACTIVE | Noted: 2025-06-12

## 2025-06-12 PROCEDURE — G0439 PPPS, SUBSEQ VISIT: HCPCS | Performed by: NURSE PRACTITIONER

## 2025-06-12 PROCEDURE — 99213 OFFICE O/P EST LOW 20 MIN: CPT | Performed by: NURSE PRACTITIONER

## 2025-06-12 NOTE — PATIENT INSTRUCTIONS
Medicare Preventive Visit Patient Instructions  Thank you for completing your Welcome to Medicare Visit or Medicare Annual Wellness Visit today. Your next wellness visit will be due in one year (6/13/2026).  The screening/preventive services that you may require over the next 5-10 years are detailed below. Some tests may not apply to you based off risk factors and/or age. Screening tests ordered at today's visit but not completed yet may show as past due. Also, please note that scanned in results may not display below.  Preventive Screenings:  Service Recommendations Previous Testing/Comments   Colorectal Cancer Screening  Colonoscopy    Fecal Occult Blood Test (FOBT)/Fecal Immunochemical Test (FIT)  Fecal DNA/Cologuard Test  Flexible Sigmoidoscopy Age: 45-75 years old   Colonoscopy: every 10 years (May be performed more frequently if at higher risk)  OR  FOBT/FIT: every 1 year  OR  Cologuard: every 3 years  OR  Sigmoidoscopy: every 5 years  Screening may be recommended earlier than age 45 if at higher risk for colorectal cancer. Also, an individualized decision between you and your healthcare provider will decide whether screening between the ages of 76-85 would be appropriate. Colonoscopy: Not on file  FOBT/FIT: Not on file  Cologuard: Not on file  Sigmoidoscopy: Not on file          Prostate Cancer Screening Individualized decision between patient and health care provider in men between ages of 55-69   Medicare will cover every 12 months beginning on the day after your 50th birthday PSA: 12.399 ng/mL     Screening Current     Hepatitis C Screening Once for adults born between 1945 and 1965  More frequently in patients at high risk for Hepatitis C Hep C Antibody: Not on file        Diabetes Screening 1-2 times per year if you're at risk for diabetes or have pre-diabetes Fasting glucose: 86 mg/dL (6/6/2025)  A1C: 5.6 (12/10/2024)  Screening Not Indicated  History Diabetes   Cholesterol Screening Once every 5 years  if you don't have a lipid disorder. May order more often based on risk factors. Lipid panel: 06/06/2025  Screening Not Indicated  History Lipid Disorder      Other Preventive Screenings Covered by Medicare:  Abdominal Aortic Aneurysm (AAA) Screening: covered once if your at risk. You're considered to be at risk if you have a family history of AAA or a male between the age of 65-75 who smoking at least 100 cigarettes in your lifetime.  Lung Cancer Screening: covers low dose CT scan once per year if you meet all of the following conditions: (1) Age 55-77; (2) No signs or symptoms of lung cancer; (3) Current smoker or have quit smoking within the last 15 years; (4) You have a tobacco smoking history of at least 20 pack years (packs per day x number of years you smoked); (5) You get a written order from a healthcare provider.  Glaucoma Screening: covered annually if you're considered high risk: (1) You have diabetes OR (2) Family history of glaucoma OR (3)  aged 50 and older OR (4)  American aged 65 and older  Osteoporosis Screening: covered every 2 years if you meet one of the following conditions: (1) Have a vertebral abnormality; (2) On glucocorticoid therapy for more than 3 months; (3) Have primary hyperparathyroidism; (4) On osteoporosis medications and need to assess response to drug therapy.  HIV Screening: covered annually if you're between the age of 15-65. Also covered annually if you are younger than 15 and older than 65 with risk factors for HIV infection. For pregnant patients, it is covered up to 3 times per pregnancy.    Immunizations:  Immunization Recommendations   Influenza Vaccine Annual influenza vaccination during flu season is recommended for all persons aged >= 6 months who do not have contraindications   Pneumococcal Vaccine   * Pneumococcal conjugate vaccine = PCV13 (Prevnar 13), PCV15 (Vaxneuvance), PCV20 (Prevnar 20)  * Pneumococcal polysaccharide vaccine = PPSV23  (Pneumovax) Adults 19-63 yo with certain risk factors or if 65+ yo  If never received any pneumonia vaccine: recommend Prevnar 20 (PCV20)  Give PCV20 if previously received 1 dose of PCV13 or PPSV23   Hepatitis B Vaccine 3 dose series if at intermediate or high risk (ex: diabetes, end stage renal disease, liver disease)   Respiratory syncytial virus (RSV) Vaccine - COVERED BY MEDICARE PART D  * RSVPreF3 (Arexvy) CDC recommends that adults 60 years of age and older may receive a single dose of RSV vaccine using shared clinical decision-making (SCDM)   Tetanus (Td) Vaccine - COST NOT COVERED BY MEDICARE PART B Following completion of primary series, a booster dose should be given every 10 years to maintain immunity against tetanus. Td may also be given as tetanus wound prophylaxis.   Tdap Vaccine - COST NOT COVERED BY MEDICARE PART B Recommended at least once for all adults. For pregnant patients, recommended with each pregnancy.   Shingles Vaccine (Shingrix) - COST NOT COVERED BY MEDICARE PART B  2 shot series recommended in those 19 years and older who have or will have weakened immune systems or those 50 years and older     Health Maintenance Due:      Topic Date Due   • Hepatitis C Screening  Never done   • HIV Screening  Never done   • Colorectal Cancer Screening  Never done     Immunizations Due:      Topic Date Due   • COVID-19 Vaccine (3 - 2024-25 season) 09/01/2024     Advance Directives   What are advance directives?  Advance directives are legal documents that state your wishes and plans for medical care. These plans are made ahead of time in case you lose your ability to make decisions for yourself. Advance directives can apply to any medical decision, such as the treatments you want, and if you want to donate organs.   What are the types of advance directives?  There are many types of advance directives, and each state has rules about how to use them. You may choose a combination of any of the  following:  Living will:  This is a written record of the treatment you want. You can also choose which treatments you do not want, which to limit, and which to stop at a certain time. This includes surgery, medicine, IV fluid, and tube feedings.   Durable power of  for healthcare (DPAHC):  This is a written record that states who you want to make healthcare choices for you when you are unable to make them for yourself. This person, called a proxy, is usually a family member or a friend. You may choose more than 1 proxy.  Do not resuscitate (DNR) order:  A DNR order is used in case your heart stops beating or you stop breathing. It is a request not to have certain forms of treatment, such as CPR. A DNR order may be included in other types of advance directives.  Medical directive:  This covers the care that you want if you are in a coma, near death, or unable to make decisions for yourself. You can list the treatments you want for each condition. Treatment may include pain medicine, surgery, blood transfusions, dialysis, IV or tube feedings, and a ventilator (breathing machine).  Values history:  This document has questions about your views, beliefs, and how you feel and think about life. This information can help others choose the care that you would choose.  Why are advance directives important?  An advance directive helps you control your care. Although spoken wishes may be used, it is better to have your wishes written down. Spoken wishes can be misunderstood, or not followed. Treatments may be given even if you do not want them. An advance directive may make it easier for your family to make difficult choices about your care.   Weight Management   Why it is important to manage your weight:  Being overweight increases your risk of health conditions such as heart disease, high blood pressure, type 2 diabetes, and certain types of cancer. It can also increase your risk for osteoarthritis, sleep apnea, and  other respiratory problems. Aim for a slow, steady weight loss. Even a small amount of weight loss can lower your risk of health problems.  How to lose weight safely:  A safe and healthy way to lose weight is to eat fewer calories and get regular exercise. You can lose up about 1 pound a week by decreasing the number of calories you eat by 500 calories each day.   Healthy meal plan for weight management:  A healthy meal plan includes a variety of foods, contains fewer calories, and helps you stay healthy. A healthy meal plan includes the following:  Eat whole-grain foods more often.  A healthy meal plan should contain fiber. Fiber is the part of grains, fruits, and vegetables that is not broken down by your body. Whole-grain foods are healthy and provide extra fiber in your diet. Some examples of whole-grain foods are whole-wheat breads and pastas, oatmeal, brown rice, and bulgur.  Eat a variety of vegetables every day.  Include dark, leafy greens such as spinach, kale, gerry greens, and mustard greens. Eat yellow and orange vegetables such as carrots, sweet potatoes, and winter squash.   Eat a variety of fruits every day.  Choose fresh or canned fruit (canned in its own juice or light syrup) instead of juice. Fruit juice has very little or no fiber.  Eat low-fat dairy foods.  Drink fat-free (skim) milk or 1% milk. Eat fat-free yogurt and low-fat cottage cheese. Try low-fat cheeses such as mozzarella and other reduced-fat cheeses.  Choose meat and other protein foods that are low in fat.  Choose beans or other legumes such as split peas or lentils. Choose fish, skinless poultry (chicken or turkey), or lean cuts of red meat (beef or pork). Before you cook meat or poultry, cut off any visible fat.   Use less fat and oil.  Try baking foods instead of frying them. Add less fat, such as margarine, sour cream, regular salad dressing and mayonnaise to foods. Eat fewer high-fat foods. Some examples of high-fat foods  include french fries, doughnuts, ice cream, and cakes.  Eat fewer sweets.  Limit foods and drinks that are high in sugar. This includes candy, cookies, regular soda, and sweetened drinks.  Exercise:  Exercise at least 30 minutes per day on most days of the week. Some examples of exercise include walking, biking, dancing, and swimming. You can also fit in more physical activity by taking the stairs instead of the elevator or parking farther away from stores. Ask your healthcare provider about the best exercise plan for you.    © Copyright [employer] 2018 Information is for End User's use only and may not be sold, redistributed or otherwise used for commercial purposes. All illustrations and images included in CareNotes® are the copyrighted property of [employer] Inc[employer] or [employer]

## 2025-06-12 NOTE — PROGRESS NOTES
Name: Randal Romeo      : 1965      MRN: [id number]  Encounter Provider: RAY Redmond  Encounter Date: 2025   Encounter department: [facility]  :  Assessment & Plan  Benign prostatic hyperplasia, unspecified whether lower urinary tract symptoms present    Orders:    CBC and differential; Future    Comprehensive metabolic panel; Future    Lipid panel; Future    UA w Reflex to Microscopic w Reflex to Culture; Future    PSA Total (Reflex To Free); Future    Other emphysema (HCC)    Orders:    CBC and differential; Future    Comprehensive metabolic panel; Future    Lipid panel; Future    UA w Reflex to Microscopic w Reflex to Culture; Future    PSA Total (Reflex To Free); Future    Medicare annual wellness visit, subsequent         Sick sinus syndrome (HCC)    Orders:    Lipid panel; Future    Obesity, morbid (HCC)           Screening cholesterol level    Orders:    Lipid panel; Future    Unspecified atherosclerosis    Orders:    Lipid panel; Future       Physical assessment unremarkable. Labs reviewed were WNL also VS were well controlled. Dosing all possible side effects of the prescribed medications or medications that had been prescribed in the past were reviewed and all questions were answered.  Patient verbalized agreement and understanding of the plan of care as outlined during the office visit today return to office as indicated or sooner if a problem arises.  Labs given is to complete in 6 mos for possible fu discussion. RTO as needed or for next scheduled appt. All questions answered.  Preventive health issues were discussed with patient, and age appropriate screening tests were ordered as noted in patient's After Visit Summary. Personalized health advice and appropriate referrals for health education or preventive services given if needed, as noted in patient's After Visit Summary.    History of Present Illness       Patient is here for routine follow-up.  To  review most recent labs.  To also discuss current state of health and any new problems that they may be experiencing.  Patient states that medications taken as prescribed and very well tolerated no new complaints at this time.           Patient Care Team:  RAY Herbert as PCP - General (Nurse Practitioner)  [facility]ina Flor [facility]  RAY Herbert as PCP - PCP-[facility] ([facility])  MD Natasha Martinez MD John Kintzer, MD Meredith A Harrison, MD as Surgeon (Thoracic Surgery)  Ana Rosa Hernandez PA-C as Physician Assistant (Physician Assistant)    Review of Systems   Constitutional:  Negative for appetite change and fever.   HENT:  Negative for congestion and trouble swallowing.    Respiratory:  Negative for shortness of breath.    Cardiovascular:  Negative for chest pain.   Gastrointestinal:  Negative for abdominal pain.   Genitourinary:  Negative for difficulty urinating.   Musculoskeletal:  Negative for myalgias.   Neurological:  Negative for dizziness.   Psychiatric/Behavioral:  The patient is not nervous/anxious.      Medical History Reviewed by provider this encounter:  Tobacco  Allergies  Meds  Problems  Med Hx  Surg Hx  Fam Hx       Annual Wellness Visit Questionnaire   Randal is here for his Subsequent Wellness visit.     Health Risk Assessment:   Patient rates overall health as good. Patient feels that their physical health rating is same. Patient is satisfied with their life. Eyesight was rated as same. Hearing was rated as same. Patient feels that their emotional and mental health rating is same. Patients states they are never, rarely angry. Patient states they are sometimes unusually tired/fatigued. Pain experienced in the last 7 days has been none. Patient states that he has experienced no weight loss or gain in last 6 months.     Depression Screening:   PHQ-2 Score: 0      Fall Risk Screening:   In the past year, patient has experienced: no history  of falling in past year      Home Safety:  Patient does not have trouble with stairs inside or outside of their home. Patient has working smoke alarms and has working carbon monoxide detector. Home safety hazards include: none.     Nutrition:   Current diet is Regular.     Medications:   Patient is not currently taking any over-the-counter supplements. Patient is able to manage medications.     Activities of Daily Living (ADLs)/Instrumental Activities of Daily Living (IADLs):   Walk and transfer into and out of bed and chair?: Yes  Dress and groom yourself?: Yes    Bathe or shower yourself?: Yes    Feed yourself? Yes  Do your laundry/housekeeping?: Yes  Manage your money, pay your bills and track your expenses?: Yes  Make your own meals?: Yes    Do your own shopping?: Yes    Previous Hospitalizations:   Any hospitalizations or ED visits within the last 12 months?: No      Advance Care Planning:   Living will: No    Durable POA for healthcare: No    Advanced directive: No      Preventive Screenings      Cardiovascular Screening:    General: Screening Not Indicated and History Lipid Disorder      Diabetes Screening:     General: Screening Not Indicated and History Diabetes      Prostate Cancer Screening:    General: Screening Current      Abdominal Aortic Aneurysm (AAA) Screening:    Risk factors include: tobacco use        Lung Cancer Screening:     General: Screening Not Indicated and History Lung Cancer    Immunizations:  - Immunizations due: Zoster (Shingrix)    Screening, Brief Intervention, and Referral to Treatment (SBIRT)     Screening  Typical number of drinks in a day: 0  Typical number of drinks in a week: 0  Interpretation: Low risk drinking behavior.    AUDIT-C Screenin) How often did you have a drink containing alcohol in the past year? never  2) How many drinks did you have on a typical day when you were drinking in the past year? 0  3) How often did you have 6 or more drinks on one occasion in the  "past year? never    AUDIT-C Score: 0  Interpretation: Score 0-3 (male): Negative screen for alcohol misuse    Single Item Drug Screening:  How often have you used an illegal drug (including marijuana) or a prescription medication for non-medical reasons in the past year? never    Single Item Drug Screen Score: 0  Interpretation: Negative screen for possible drug use disorder    Social Drivers of Health     Food Insecurity: No Food Insecurity (6/6/2025)    Nursing - Inadequate Food Risk Classification     Worried About Running Out of Food in the Last Year: Never true     Ran Out of Food in the Last Year: Never true     Ran Out of Food in the Last Year: Never true   Transportation Needs: No Transportation Needs (6/6/2025)    PRAPARE - Transportation     Lack of Transportation (Medical): No     Lack of Transportation (Non-Medical): No   Housing Stability: Low Risk  (6/6/2025)    Housing Stability Vital Sign     Unable to Pay for Housing in the Last Year: No     Number of Times Moved in the Last Year: 0     Homeless in the Last Year: No   Utilities: Not At Risk (6/6/2025)    [facility] Utilities     Threatened with loss of utilities: No     No results found.    Objective   /74 (BP Location: Left arm, Patient Position: Sitting, Cuff Size: Large)   Pulse 73   Temp 98.6 °F (37 °C) (Skin)   Ht 5' 10\" (1.778 m)   Wt 124 kg (273 lb)   SpO2 95%   BMI 39.17 kg/m²     Physical Exam  Constitutional:       Appearance: Normal appearance.   HENT:      Head: Normocephalic.      Right Ear: Tympanic membrane and external ear normal.      Left Ear: Tympanic membrane and external ear normal.      Nose: Nose normal.      Mouth/Throat:      Mouth: Mucous membranes are moist.      Pharynx: Oropharynx is clear.     Eyes:      Pupils: Pupils are equal, round, and reactive to light.       Cardiovascular:      Rate and Rhythm: Normal rate and regular rhythm.      Pulses: Normal pulses.      Heart sounds: Normal heart sounds.   Pulmonary: "      Effort: Pulmonary effort is normal.      Breath sounds: Normal breath sounds.   Abdominal:      General: Bowel sounds are normal.      Palpations: Abdomen is soft.     Musculoskeletal:         General: Normal range of motion.      Cervical back: Neck supple.     Neurological:      General: No focal deficit present.      Mental Status: He is alert and oriented to person, place, and time.     Psychiatric:         Mood and Affect: Mood normal.         Behavior: Behavior normal.         Thought Content: Thought content normal.         Judgment: Judgment normal.

## 2025-06-12 NOTE — ASSESSMENT & PLAN NOTE
Orders:    CBC and differential; Future    Comprehensive metabolic panel; Future    Lipid panel; Future    UA w Reflex to Microscopic w Reflex to Culture; Future    PSA Total (Reflex To Free); Future

## 2025-07-24 ENCOUNTER — REMOTE DEVICE CLINIC VISIT (OUTPATIENT)
Dept: CARDIOLOGY CLINIC | Facility: CLINIC | Age: 60
End: 2025-07-24
Payer: COMMERCIAL

## 2025-07-24 DIAGNOSIS — Z95.0 CARDIAC PACEMAKER IN SITU: Primary | ICD-10-CM

## 2025-07-24 PROCEDURE — 93296 REM INTERROG EVL PM/IDS: CPT | Performed by: INTERNAL MEDICINE

## 2025-07-24 PROCEDURE — 93294 REM INTERROG EVL PM/LDLS PM: CPT | Performed by: INTERNAL MEDICINE

## 2025-07-24 NOTE — PROGRESS NOTES
Results for orders placed or performed in visit on 07/24/25   Cardiac EP device report    Narrative    MDT DUAL PM/ACTIVE SYSTEM IS MRI CONDITIONAL  CARELINK TRANSMISSION: BATTERY VOLTAGE ADEQUATE (13.5 YRS). AP-41%, -22%. ALL AVAILABLE LEAD PARAMETERS WITHIN NORMAL LIMITS. NO SIGNIFICANT HIGH RATE EPISODES. NORMAL DEVICE FUNCTION. GV

## 2025-08-07 DIAGNOSIS — R06.09 DYSPNEA ON EXERTION: ICD-10-CM

## 2025-08-08 RX ORDER — ALBUTEROL SULFATE 90 UG/1
INHALANT RESPIRATORY (INHALATION)
Qty: 51 G | Refills: 2 | Status: SHIPPED | OUTPATIENT
Start: 2025-08-08

## (undated) DEVICE — DRAPE FLUID WARMER (BIRD BATH)

## (undated) DEVICE — CHLORAPREP HI-LITE 26ML ORANGE

## (undated) DEVICE — ELECTRODE LAP L WIRE E-Z CLEAN 33CM -0100

## (undated) DEVICE — STERILE THORACIC PACK: Brand: CARDINAL HEALTH

## (undated) DEVICE — UTILITY MARKER,BLACK WITH LABELS: Brand: DEVON

## (undated) DEVICE — GAUZE SPONGES,16 PLY: Brand: CURITY

## (undated) DEVICE — THE ECHELON, ECHELON ENDOPATH™ AND ECHELON FLEX™ FAMILIES OF ENDOSCOPIC LINEAR CUTTERS AND RELOADS ARE STERILE, SINGLE PATIENT USE INSTRUMENTS THAT SIMULTANEOUSLY CUT AND STAPLE TISSUE. THERE ARE SIX STAGGERED ROWS OF STAPLES, THREE ON EITHER SIDE OF THE CUT LINE. THE 45 MM INSTRUMENTS HAVE A STAPLE LINE THATIS APPROXIMATELY 45 MM LONG AND A CUT LINE THAT IS APPROXIMATELY 42 MM LONG. THE SHAFT CAN ROTATE FREELY IN BOTH DIRECTIONS AND AN ARTICULATION MECHANISM ON ARTICULATING INSTRUMENTS ENABLES BENDING THE DISTAL PORTIONOF THE SHAFT TO FACILITATE LATERAL ACCESS OF THE OPERATIVE SITE.THE INSTRUMENTS ARE SHIPPED WITHOUT A RELOAD AND MUST BE LOADED PRIOR TO USE. A STAPLE RETAINING CAP ON THE RELOAD PROTECTS THE STAPLE LEG POINTS DURING SHIPPING AND TRANSPORTATION. THE INSTRUMENTS’ LOCK-OUT FEATURE IS DESIGNED TO PREVENT A USED RELOAD FROM BEING REFIRED.: Brand: ECHELON ENDOPATH

## (undated) DEVICE — INTENDED FOR TISSUE SEPARATION, AND OTHER PROCEDURES THAT REQUIRE A SHARP SURGICAL BLADE TO PUNCTURE OR CUT.: Brand: BARD-PARKER SAFETY BLADES SIZE 15, STERILE

## (undated) DEVICE — SINGLE USE SUCTION VALVE MAJ-209: Brand: SINGLE USE SUCTION VALVE (STERILE)

## (undated) DEVICE — ADAPTOR TRACH SWIVEL

## (undated) DEVICE — SUT VICRYL 0 UR-6 27 IN J603H

## (undated) DEVICE — GLOVE INDICATOR PI UNDERGLOVE SZ 6.5 BLUE

## (undated) DEVICE — PAD GROUNDING ADULT

## (undated) DEVICE — SINGLE USE BIOPSY VALVE MAJ-210: Brand: SINGLE USE BIOPSY VALVE (STERILE)

## (undated) DEVICE — TUBING SUCTION 5MM X 12 FT

## (undated) DEVICE — ELECTRODE BLADE MOD E-Z CLEAN 2.5IN 6.4CM -0012M

## (undated) DEVICE — CATH GUIDING FIXED SHAPE 43CM

## (undated) DEVICE — SUT VICRYL 2-0 SH 27 IN UNDYED J417H

## (undated) DEVICE — SLITTER ADJUSTABLE

## (undated) DEVICE — INTENDED FOR TISSUE SEPARATION, AND OTHER PROCEDURES THAT REQUIRE A SHARP SURGICAL BLADE TO PUNCTURE OR CUT.: Brand: BARD-PARKER SAFETY BLADES SIZE 10, STERILE

## (undated) DEVICE — SYRINGE 10ML SLIP TIP LF

## (undated) DEVICE — SUT MONOCRYL 4-0 PS-2 18 IN Y496G

## (undated) DEVICE — GLOVE SRG BIOGEL ECLIPSE 6

## (undated) DEVICE — ENDOPATH ECHELON VASCULAR  RELOADS, WHITE, 35MM: Brand: ECHELON ENDOPATH

## (undated) DEVICE — ANTI-FOG SOLUTION WITH FOAM PAD: Brand: DEVON

## (undated) DEVICE — SUT VICRYL 0 CT-1 27 IN J260H

## (undated) DEVICE — FIRST STEP BEDSIDE KIT - STAND-UP POUCH, ENDOSCOPIC CLEANING PAD - 1 POUCH: Brand: FIRST STEP BEDSIDE KIT - STAND-UP POUCH, ENDOSCOPIC CLEANING PAD

## (undated) DEVICE — ADHESIVE SKIN HIGH VISCOSITY EXOFIN 1ML

## (undated) DEVICE — 28 FR STRAIGHT – SOFT PVC CATHETER: Brand: PVC THORACIC CATHETERS

## (undated) DEVICE — HEAVY DUTY TABLE COVER: Brand: CONVERTORS

## (undated) DEVICE — ECHELON FLEX  POWERED VASCULAR STAPLER WITH ADVANCED PLACEMENT TIP, 35MM: Brand: ECHELON FLEX

## (undated) DEVICE — AVITENE NON-WOVEN WEB, 70 MM X 35 MM X 1 MM: Brand: AVITENE

## (undated) DEVICE — THE ECHELON FLEX POWERED PLUS ARTICULATING ENDOSCOPIC LINEAR CUTTERS ARE STERILE, SINGLE PATIENT USE INSTRUMENTS THAT SIMULTANEOUSLYCUT AND STAPLE TISSUE. THERE ARE SIX STAGGERED ROWS OF STAPLES, THREE ON EITHER SIDE OF THE CUT LINE. THE ECHELON FLEX 45 POWERED PLUSINSTRUMENTS HAVE A STAPLE LINE THAT IS APPROXIMATELY 45 MM LONG AND A CUT LINE THAT IS APPROXIMATELY 42 MM LONG. THE SHAFT CAN ROTATE FREELYIN BOTH DIRECTIONS AND AN ARTICULATION MECHANISM ENABLES THE DISTAL PORTION OF THE SHAFT TO PIVOT TO FACILITATE LATERAL ACCESS TO THE OPERATIVESITE.THE INSTRUMENTS ARE PACKAGED WITH A PRIMARY LITHIUM BATTERY PACK THAT MUST BE INSTALLED PRIOR TO USE. THERE ARE SPECIFIC REQUIREMENTS FORDISPOSING OF THE BATTERY PACK. REFER TO THE BATTERY PACK DISPOSAL SECTION.THE INSTRUMENTS ARE PACKAGED WITHOUT A RELOAD AND MUST BE LOADED PRIOR TO USE. A STAPLE RETAINING CAP ON THE RELOAD PROTECTS THE STAPLE LEGPOINTS DURING SHIPPING AND TRANSPORTATION. THE INSTRUMENTS’ LOCK-OUT FEATURE IS DESIGNED TO PREVENT A USED OR IMPROPERLY INSTALLED RELOADFROM BEING REFIRED OR AN INSTRUMENT FROM BEING FIRED WITHOUT A RELOAD.: Brand: ECHELON FLEX

## (undated) DEVICE — NEEDLE SPINAL 20G X 3.5 LF

## (undated) DEVICE — INTRO SHEATH PEEL AWAY 7FR

## (undated) DEVICE — WOUND RETRACTOR AND PROTECTOR: Brand: ALEXIS WOUND PROTECTOR-RETRACTOR

## (undated) DEVICE — SUT VICRYL 3-0 SH 27 IN J416H

## (undated) DEVICE — VESSEL LOOPS X-RAY DETECTABLE: Brand: DEROYAL

## (undated) DEVICE — DGW .035 FC J3MM 150CM TEF: Brand: EMERALD

## (undated) DEVICE — SUT PROLENE 0 CT-1 30 IN 8424H

## (undated) DEVICE — Device: Brand: TISSUE RETRIEVAL SYSTEM